# Patient Record
Sex: FEMALE | Race: BLACK OR AFRICAN AMERICAN | NOT HISPANIC OR LATINO | Employment: FULL TIME | URBAN - METROPOLITAN AREA
[De-identification: names, ages, dates, MRNs, and addresses within clinical notes are randomized per-mention and may not be internally consistent; named-entity substitution may affect disease eponyms.]

---

## 2017-03-31 ENCOUNTER — HOSPITAL ENCOUNTER (EMERGENCY)
Facility: HOSPITAL | Age: 32
Discharge: HOME | End: 2017-03-31
Attending: FAMILY MEDICINE
Payer: MEDICAID

## 2017-03-31 VITALS
RESPIRATION RATE: 16 BRPM | DIASTOLIC BLOOD PRESSURE: 65 MMHG | TEMPERATURE: 99 F | SYSTOLIC BLOOD PRESSURE: 109 MMHG | OXYGEN SATURATION: 99 % | BODY MASS INDEX: 26.58 KG/M2 | WEIGHT: 150 LBS | HEIGHT: 63 IN | HEART RATE: 91 BPM

## 2017-03-31 DIAGNOSIS — M54.50 ACUTE BILATERAL LOW BACK PAIN WITHOUT SCIATICA: Primary | ICD-10-CM

## 2017-03-31 DIAGNOSIS — Z86.39 HISTORY OF DIABETES MELLITUS: ICD-10-CM

## 2017-03-31 LAB
B-HCG UR QL: NEGATIVE
B-HCG UR QL: NEGATIVE
BACTERIA #/AREA URNS AUTO: ABNORMAL /HPF
BILIRUB UR QL STRIP: NEGATIVE
CLARITY UR REFRACT.AUTO: CLEAR
COLOR UR AUTO: YELLOW
CTP QC/QA: YES
GLUCOSE UR QL STRIP: ABNORMAL
HGB UR QL STRIP: ABNORMAL
HYALINE CASTS UR QL AUTO: 0 /LPF
KETONES UR QL STRIP: NEGATIVE
LEUKOCYTE ESTERASE UR QL STRIP: NEGATIVE
MICROSCOPIC COMMENT: ABNORMAL
NITRITE UR QL STRIP: NEGATIVE
PH UR STRIP: 6 [PH] (ref 5–8)
PROT UR QL STRIP: ABNORMAL
RBC #/AREA URNS AUTO: 6 /HPF (ref 0–4)
SP GR UR STRIP: 1.03 (ref 1–1.03)
SQUAMOUS #/AREA URNS AUTO: 0 /HPF
URN SPEC COLLECT METH UR: ABNORMAL
UROBILINOGEN UR STRIP-ACNC: NEGATIVE EU/DL
WBC #/AREA URNS AUTO: 2 /HPF (ref 0–5)
YEAST UR QL AUTO: ABNORMAL

## 2017-03-31 PROCEDURE — 81025 URINE PREGNANCY TEST: CPT | Performed by: FAMILY MEDICINE

## 2017-03-31 PROCEDURE — 81001 URINALYSIS AUTO W/SCOPE: CPT

## 2017-03-31 PROCEDURE — 81025 URINE PREGNANCY TEST: CPT

## 2017-03-31 PROCEDURE — 99283 EMERGENCY DEPT VISIT LOW MDM: CPT | Mod: 25

## 2017-03-31 PROCEDURE — 99284 EMERGENCY DEPT VISIT MOD MDM: CPT | Mod: ,,, | Performed by: PHYSICIAN ASSISTANT

## 2017-03-31 RX ORDER — METHOCARBAMOL 750 MG/1
750 TABLET, FILM COATED ORAL 2 TIMES DAILY PRN
Qty: 15 TABLET | Refills: 0 | Status: SHIPPED | OUTPATIENT
Start: 2017-03-31 | End: 2017-06-29

## 2017-03-31 NOTE — DISCHARGE INSTRUCTIONS
Back Care Tips    Caring for your back  These are things you can do to prevent a recurrence of acute back pain and to reduce symptoms from chronic back pain:  · Maintain a healthy weight. If you are overweight, losing weight will help most types of back pain.  · Exercise is an important part of recovery from most types of back pain. The muscles behind and in front of the spine support the back. This means strengthening both the back muscles and the abdominal muscles will provide better support for your spine.   · Swimming and brisk walking are good overall exercises to improve your fitness level.  · Practice safe lifting methods (below).  · Practice good posture when sitting, standing and walking. Avoid prolonged sitting. This puts more stress on the lower back than standing or walking.  · Wear quality shoes with sufficient arch support. Foot and ankle alignment can affect back symptoms. Women should avoid wearing high heels.  · Therapeutic massage can help relax the back muscles without stretching them.  · During the first 24 to 72 hours after an acute injury or flare-up of chronic back pain, apply an ice pack to the painful area for 20 minutes and then remove it for 20 minutes, over a period of 60 to 90 minutes, or several times a day. As a safety precaution, do not use a heating pad at bedtime. Sleeping on a heating pad can lead to skin burns or tissue damage.  · You can alternate ice and heat therapies.  Medications  Talk to your healthcare provider before using medicines, especially if you have other medical problems or are taking other medicines.  · You may use acetaminophen or ibuprofen to control pain, unless your healthcare provider prescribed other pain medicine. If you have chronic conditions like diabetes, liver or kidney disease, stomach ulcers, or gastrointestinal bleeding, or are taking blood thinners, talk with your healthcare provider before taking any medicines.  · Be careful if you are given  prescription pain medicines, narcotics, or medicine for muscle spasm. They can cause drowsiness, affect your coordination, reflexes, and judgment. Do not drive or operate heavy machinery while taking these types of medicines. Take prescription pain medicine only as prescribed by your healthcare provider.  Lumbar stretch  Here is a simple stretching exercise that will help relax muscle spasm and keep your back more limber. If exercise makes your back pain worse, dont do it.  · Lie on your back with your knees bent and both feet on the ground.  · Slowly raise your left knee to your chest as you flatten your lower back against the floor. Hold for 5 seconds.  · Relax and repeat the exercise with your right knee.  · Do 10 of these exercises for each leg.  Safe lifting method  · Dont bend over at the waist to lift an object off the floor.  Instead, bend your knees and hips in a squat.   · Keep your back and head upright  · Hold the object close to your body, directly in front of you.  · Straighten your legs to lift the object.   · Lower the object to the floor in the reverse fashion.  · If you must slide something across the floor, push it.  Posture tips  Sitting  Sit in chairs with straight backs or low-back support. Keep your knees lower than your hips, with your feet flat on the floor.  When driving, sit up straight. Adjust the seat forward so you are not leaning toward the steering wheel.  A small pillow or rolled towel behind your lower back may help if you are driving long distances.   Standing  When standing for long periods, shift most of your weight to one leg at a time. Alternate legs every few minutes.   Sleeping  The best way to sleep is on your side with your knees bent. Put a low pillow under your head to support your neck in a neutral spine position. Avoid thick pillows that bend your neck to one side. Put a pillow between your legs to further relax your lower back. If you sleep on your back, put pillows  under your knees to support your legs in a slightly flexed position. Use a firm mattress. If your mattress sags, replace it, or use a 1/2-inch plywood board under the mattress to add support.  Follow-up care  Follow up with your healthcare provider, or as advised.  If X-rays, a CT scan or an MRI scan were taken, they will be reviewed by a radiologist. You will be notified of any new findings that may affect your care.  Call 911  Seek emergency medical care if any of the following occur:  · Trouble breathing  · Confusion  · Very drowsy  · Fainting or loss of consciousness  · Rapid or very slow heart rate  · Loss of  bowel or bladder control  When to seek medical care  Call your healthcare provider if any of the following occur:  · Pain becomes worse or spreads to your arms or legs  · Weakness or numbness in one or both arms or legs  · Numbness in the groin area  Date Last Reviewed: 6/1/2016  © 5078-9494 [employer]. [address] [place] [address]. All rights reserved. This information is not intended as a substitute for professional medical care. Always follow your healthcare professional's instructions.          Self-Care for Low Back Pain    Most people have low back pain now and then. In many cases, it isnt serious and self-care can help. Sometimes low back pain can be a sign of a bigger problem. Call your healthcare provider if your pain returns often or gets worse over time. For the long-term care of your back, get regular exercise, lose any excess weight and learn good posture.  Take a short rest  Lying down during the day may be beneficial for short periods of time if severe pain increases with sitting or standing. Long-term bed rest could be detrimental.  Reduce pain and swelling  Cold reduces swelling. Both cold and heat can reduce pain. Protect your skin by placing a towel between your body and the ice or heat source.  · For the first few days, apply an ice pack for 15 to 20  minutes .  · After the first few days, try heat for 15 minutes at a time to ease pain. Never sleep on a heating pad.  · Over-the-counter medicine can help control pain and swelling. Try aspirin or ibuprofen.  Exercise  Exercise can help your back heal. It also helps your back get stronger and more flexible, preventing any reinjury. Ask your healthcare provider about specific exercises for your back.  Use good posture to avoid reinjury  · When moving, bend at the hips and knees. Dont bend at the waist or twist around.  · When lifting, keep the object close to your body. Dont try to lift more than you can handle.  · When sitting, keep your lower back supported. Use a rolled-up towel as needed.  Seek immediate medical care if:  · Youre unable to stand or walk.  · You have a temperature over 100.4°F (38.0°C)  · You have frequent, painful, or bloody urination.  · You have severe abdominal pain.  · You have a sharp, stabbing pain.  · Your pain is constant.  · You have pain or numbness in your leg.  · You feel pain in a new area of your back.  · You notice that the pain isnt decreasing after more than a week.   Date Last Reviewed: 9/29/2015  © 8798-2507 [employer]. [address] [place] [address], [place] [address]. All rights reserved. This information is not intended as a substitute for professional medical care. Always follow your healthcare professional's instructions.          Relieving Back Pain  Back pain is a common problem. You can strain back muscles by lifting too much weight or just by moving the wrong way. Back strain can be uncomfortable, even painful. And it can take weeks or months to improve. To help yourself feel better and prevent future back strains, try these tips.  Important Note: Do not give aspirin to children or teens without first discussing it with your healthcare provider.      ? Ice    Ice reduces muscle pain and swelling. It helps most during the first 24 to 48 hours after an  injury.  · Wrap an ice pack or a bag of frozen peas in a thin towel. (Never place ice directly on your skin.)  · Place the ice where your back hurts the most.  · Dont ice for more than 20 minutes at a time.  · You can use ice several times a day.  ? Medicines  Over-the-counter pain relievers can include acetaminophen and anti-inflammatory medicines, which includes aspirin or ibuprofen. They can help ease discomfort. Some also reduce swelling.  · Tell your healthcare provider about any medicines you are already taking.  · Take medicines only as directed.  ? Heat  After the first 48 hours, heat can relax sore muscles and improve blood flow.  · Try a warm bath or shower. Or use a heating pad set on low. To prevent a burn, keep a cloth between you and the heating pad.  · Dont use a heating pad for more than 15 minutes at a time. Never sleep on a heating pad.  Date Last Reviewed: 9/1/2015  © 4435-6340 [employer]. [address] [place] [address]. All rights reserved. This information is not intended as a substitute for professional medical care. Always follow your healthcare professional's instructions.          Self-Care for Low Back Pain    Most people have low back pain now and then. In many cases, it isnt serious and self-care can help. Sometimes low back pain can be a sign of a bigger problem. Call your healthcare provider if your pain returns often or gets worse over time. For the long-term care of your back, get regular exercise, lose any excess weight and learn good posture.  Take a short rest  Lying down during the day may be beneficial for short periods of time if severe pain increases with sitting or standing. Long-term bed rest could be detrimental.  Reduce pain and swelling  Cold reduces swelling. Both cold and heat can reduce pain. Protect your skin by placing a towel between your body and the ice or heat source.  · For the first few days, apply an ice pack for 15 to 20 minutes  .  · After the first few days, try heat for 15 minutes at a time to ease pain. Never sleep on a heating pad.  · Over-the-counter medicine can help control pain and swelling. Try aspirin or ibuprofen.  Exercise  Exercise can help your back heal. It also helps your back get stronger and more flexible, preventing any reinjury. Ask your healthcare provider about specific exercises for your back.  Use good posture to avoid reinjury  · When moving, bend at the hips and knees. Dont bend at the waist or twist around.  · When lifting, keep the object close to your body. Dont try to lift more than you can handle.  · When sitting, keep your lower back supported. Use a rolled-up towel as needed.  Seek immediate medical care if:  · Youre unable to stand or walk.  · You have a temperature over 100.4°F (38.0°C)  · You have frequent, painful, or bloody urination.  · You have severe abdominal pain.  · You have a sharp, stabbing pain.  · Your pain is constant.  · You have pain or numbness in your leg.  · You feel pain in a new area of your back.  · You notice that the pain isnt decreasing after more than a week.   Date Last Reviewed: 9/29/2015  © 1598-4885 [employer]. [address] [place] [address], [place] [address]. All rights reserved. This information is not intended as a substitute for professional medical care. Always follow your healthcare professional's instructions.

## 2017-03-31 NOTE — ED AVS SNAPSHOT
[facility]  [address] [address]               Mary Sims   3/31/2017 12:30 PM   ED    Description:  Female : 1985   Department:  [facility]           Your Care was Coordinated By:     Provider Role From To    Eduard Stanton MD Attending Provider 17 1300 --    Gautam Charles PA-C Physician Assistant 17 1246 --      Reason for Visit     Flank Pain           Diagnoses this Visit        Comments    Acute bilateral low back pain without sciatica    -  Primary     History of diabetes mellitus           ED Disposition     ED Disposition Condition Comment    Discharge             To Do List           Follow-up Information     Follow up with [facility]. Schedule an appointment as soon as possible for a visit in 2 days.    Specialties:  Behavioral Health, Psychiatry    Why:  Follow up with your primary care physician in the next 1-2 days for re-evaluation and further management.     Contact information:    [address]  [facility] [id number] [phone]          Follow up with [facility].    Specialty:  Emergency Medicine    Why:  If symptoms worsen in any way.     Contact information:    [address] [address] [phone]       These Medications        Disp Refills Start End    methocarbamol (ROBAXIN) 750 MG Tab 15 tablet 0 3/31/2017     Take 1 tablet (750 mg total) by mouth 2 (two) times daily as needed (Back pain). - Oral      [facility]     [facility] Nurse Care Line - 24/ Assistance  Unless otherwise directed by your provider, please contact [facility], our nurse care line that is available for  assistance.     Registered nurses in the [facility] provide: appointment scheduling, clinical advisement, health education, and other advisory services.  Call: [phone] (toll free)               Medications  "          Message regarding Medications     Verify the changes and/or additions to your medication regime listed below are the same as discussed with your clinician today.  If any of these changes or additions are incorrect, please notify your healthcare provider.        START taking these NEW medications        Refills    methocarbamol (ROBAXIN) 750 MG Tab 0    Sig: Take 1 tablet (750 mg total) by mouth 2 (two) times daily as needed (Back pain).    Class: Print    Route: Oral           Verify that the below list of medications is an accurate representation of the medications you are currently taking.  If none reported, the list may be blank. If incorrect, please contact your healthcare provider. Carry this list with you in case of emergency.           Current Medications     cetirizine (ZYRTEC) 10 MG tablet Take 1 tablet (10 mg total) by mouth once daily.    fluticasone (FLONASE) 50 mcg/actuation nasal spray 1 spray by Each Nare route 2 (two) times daily as needed.    insulin aspart (NOVOLOG) 100 unit/mL InPn pen Inject 5 Units into the skin 3 (three) times daily with meals.    insulin detemir (LEVEMIR FLEXTOUCH) 100 unit/mL (3 mL) SubQ InPn pen Inject 15 Units into the skin 2 (two) times daily.    metformin (GLUCOPHAGE) 500 MG tablet Take 500 mg by mouth 2 (two) times daily with meals.    methocarbamol (ROBAXIN) 750 MG Tab Take 1 tablet (750 mg total) by mouth 2 (two) times daily as needed (Back pain).    ondansetron (ZOFRAN) 4 MG tablet Take 1 tablet (4 mg total) by mouth every 8 (eight) hours as needed.    pen needle, diabetic (BD ULTRA-FINE ISAAC PEN NEEDLES) 32 gauge x 5/32" Ndle Use to inject insulin 5x/day    polyethylene glycol (GLYCOLAX) 17 gram/dose powder Take 17 g by mouth once daily.           Clinical Reference Information           Your Vitals Were     BP Pulse Temp Resp Height Weight    109/65 (BP Location: Left arm, Patient Position: Sitting, BP Method: Automatic) 91 98.6 °F (37 °C) (Oral) 16 5' 3" " (1.6 m) 68 kg (150 lb)    SpO2 BMI             99% 26.57 kg/m2         Allergies as of 3/31/2017     No Known Allergies      Immunizations Administered on Date of Encounter - 3/31/2017     None      ED Micro, Lab, POCT     Start Ordered       Status Ordering Provider    03/31/17 1314 03/31/17 1313  POCT urine pregnancy  Once      Final result     03/31/17 1311 03/31/17 1311    Add-on,   Status:  Canceled      Canceled     03/31/17 1304 03/31/17 1303    Once,   Status:  Canceled      Canceled     03/31/17 1252 03/31/17 1251  Urinalysis  STAT      Final result     03/31/17 1251 03/31/17 1251  Urinalysis Microscopic  Once      Final result       ED Imaging Orders     None        Discharge Instructions         Back Care Tips    Caring for your back  These are things you can do to prevent a recurrence of acute back pain and to reduce symptoms from chronic back pain:  · Maintain a healthy weight. If you are overweight, losing weight will help most types of back pain.  · Exercise is an important part of recovery from most types of back pain. The muscles behind and in front of the spine support the back. This means strengthening both the back muscles and the abdominal muscles will provide better support for your spine.   · Swimming and brisk walking are good overall exercises to improve your fitness level.  · Practice safe lifting methods (below).  · Practice good posture when sitting, standing and walking. Avoid prolonged sitting. This puts more stress on the lower back than standing or walking.  · Wear quality shoes with sufficient arch support. Foot and ankle alignment can affect back symptoms. Women should avoid wearing high heels.  · Therapeutic massage can help relax the back muscles without stretching them.  · During the first 24 to 72 hours after an acute injury or flare-up of chronic back pain, apply an ice pack to the painful area for 20 minutes and then remove it for 20 minutes, over a period of 60 to 90 minutes,  or several times a day. As a safety precaution, do not use a heating pad at bedtime. Sleeping on a heating pad can lead to skin burns or tissue damage.  · You can alternate ice and heat therapies.  Medications  Talk to your healthcare provider before using medicines, especially if you have other medical problems or are taking other medicines.  · You may use acetaminophen or ibuprofen to control pain, unless your healthcare provider prescribed other pain medicine. If you have chronic conditions like diabetes, liver or kidney disease, stomach ulcers, or gastrointestinal bleeding, or are taking blood thinners, talk with your healthcare provider before taking any medicines.  · Be careful if you are given prescription pain medicines, narcotics, or medicine for muscle spasm. They can cause drowsiness, affect your coordination, reflexes, and judgment. Do not drive or operate heavy machinery while taking these types of medicines. Take prescription pain medicine only as prescribed by your healthcare provider.  Lumbar stretch  Here is a simple stretching exercise that will help relax muscle spasm and keep your back more limber. If exercise makes your back pain worse, dont do it.  · Lie on your back with your knees bent and both feet on the ground.  · Slowly raise your left knee to your chest as you flatten your lower back against the floor. Hold for 5 seconds.  · Relax and repeat the exercise with your right knee.  · Do 10 of these exercises for each leg.  Safe lifting method  · Dont bend over at the waist to lift an object off the floor.  Instead, bend your knees and hips in a squat.   · Keep your back and head upright  · Hold the object close to your body, directly in front of you.  · Straighten your legs to lift the object.   · Lower the object to the floor in the reverse fashion.  · If you must slide something across the floor, push it.  Posture tips  Sitting  Sit in chairs with straight backs or low-back support. Keep  your knees lower than your hips, with your feet flat on the floor.  When driving, sit up straight. Adjust the seat forward so you are not leaning toward the steering wheel.  A small pillow or rolled towel behind your lower back may help if you are driving long distances.   Standing  When standing for long periods, shift most of your weight to one leg at a time. Alternate legs every few minutes.   Sleeping  The best way to sleep is on your side with your knees bent. Put a low pillow under your head to support your neck in a neutral spine position. Avoid thick pillows that bend your neck to one side. Put a pillow between your legs to further relax your lower back. If you sleep on your back, put pillows under your knees to support your legs in a slightly flexed position. Use a firm mattress. If your mattress sags, replace it, or use a 1/2-inch plywood board under the mattress to add support.  Follow-up care  Follow up with your healthcare provider, or as advised.  If X-rays, a CT scan or an MRI scan were taken, they will be reviewed by a radiologist. You will be notified of any new findings that may affect your care.  Call 911  Seek emergency medical care if any of the following occur:  · Trouble breathing  · Confusion  · Very drowsy  · Fainting or loss of consciousness  · Rapid or very slow heart rate  · Loss of  bowel or bladder control  When to seek medical care  Call your healthcare provider if any of the following occur:  · Pain becomes worse or spreads to your arms or legs  · Weakness or numbness in one or both arms or legs  · Numbness in the groin area  Date Last Reviewed: 6/1/2016  © 3723-6277 [employer]. [address] [place] [address]. All rights reserved. This information is not intended as a substitute for professional medical care. Always follow your healthcare professional's instructions.          Self-Care for Low Back Pain    Most people have low back pain now and then. In many  cases, it isnt serious and self-care can help. Sometimes low back pain can be a sign of a bigger problem. Call your healthcare provider if your pain returns often or gets worse over time. For the long-term care of your back, get regular exercise, lose any excess weight and learn good posture.  Take a short rest  Lying down during the day may be beneficial for short periods of time if severe pain increases with sitting or standing. Long-term bed rest could be detrimental.  Reduce pain and swelling  Cold reduces swelling. Both cold and heat can reduce pain. Protect your skin by placing a towel between your body and the ice or heat source.  · For the first few days, apply an ice pack for 15 to 20 minutes .  · After the first few days, try heat for 15 minutes at a time to ease pain. Never sleep on a heating pad.  · Over-the-counter medicine can help control pain and swelling. Try aspirin or ibuprofen.  Exercise  Exercise can help your back heal. It also helps your back get stronger and more flexible, preventing any reinjury. Ask your healthcare provider about specific exercises for your back.  Use good posture to avoid reinjury  · When moving, bend at the hips and knees. Dont bend at the waist or twist around.  · When lifting, keep the object close to your body. Dont try to lift more than you can handle.  · When sitting, keep your lower back supported. Use a rolled-up towel as needed.  Seek immediate medical care if:  · Youre unable to stand or walk.  · You have a temperature over 100.4°F (38.0°C)  · You have frequent, painful, or bloody urination.  · You have severe abdominal pain.  · You have a sharp, stabbing pain.  · Your pain is constant.  · You have pain or numbness in your leg.  · You feel pain in a new area of your back.  · You notice that the pain isnt decreasing after more than a week.   Date Last Reviewed: 9/29/2015  © 4206-3112 [employer]. [address] [place] [address], [place] [address]. All  rights reserved. This information is not intended as a substitute for professional medical care. Always follow your healthcare professional's instructions.          Relieving Back Pain  Back pain is a common problem. You can strain back muscles by lifting too much weight or just by moving the wrong way. Back strain can be uncomfortable, even painful. And it can take weeks or months to improve. To help yourself feel better and prevent future back strains, try these tips.  Important Note: Do not give aspirin to children or teens without first discussing it with your healthcare provider.      ? Ice    Ice reduces muscle pain and swelling. It helps most during the first 24 to 48 hours after an injury.  · Wrap an ice pack or a bag of frozen peas in a thin towel. (Never place ice directly on your skin.)  · Place the ice where your back hurts the most.  · Dont ice for more than 20 minutes at a time.  · You can use ice several times a day.  ? Medicines  Over-the-counter pain relievers can include acetaminophen and anti-inflammatory medicines, which includes aspirin or ibuprofen. They can help ease discomfort. Some also reduce swelling.  · Tell your healthcare provider about any medicines you are already taking.  · Take medicines only as directed.  ? Heat  After the first 48 hours, heat can relax sore muscles and improve blood flow.  · Try a warm bath or shower. Or use a heating pad set on low. To prevent a burn, keep a cloth between you and the heating pad.  · Dont use a heating pad for more than 15 minutes at a time. Never sleep on a heating pad.  Date Last Reviewed: 9/1/2015 © 2000-2016 [employer]. [address] [place] [address], [place] [address]. All rights reserved. This information is not intended as a substitute for professional medical care. Always follow your healthcare professional's instructions.          Self-Care for Low Back Pain    Most people have low back pain now and then. In many cases, it isnt  serious and self-care can help. Sometimes low back pain can be a sign of a bigger problem. Call your healthcare provider if your pain returns often or gets worse over time. For the long-term care of your back, get regular exercise, lose any excess weight and learn good posture.  Take a short rest  Lying down during the day may be beneficial for short periods of time if severe pain increases with sitting or standing. Long-term bed rest could be detrimental.  Reduce pain and swelling  Cold reduces swelling. Both cold and heat can reduce pain. Protect your skin by placing a towel between your body and the ice or heat source.  · For the first few days, apply an ice pack for 15 to 20 minutes .  · After the first few days, try heat for 15 minutes at a time to ease pain. Never sleep on a heating pad.  · Over-the-counter medicine can help control pain and swelling. Try aspirin or ibuprofen.  Exercise  Exercise can help your back heal. It also helps your back get stronger and more flexible, preventing any reinjury. Ask your healthcare provider about specific exercises for your back.  Use good posture to avoid reinjury  · When moving, bend at the hips and knees. Dont bend at the waist or twist around.  · When lifting, keep the object close to your body. Dont try to lift more than you can handle.  · When sitting, keep your lower back supported. Use a rolled-up towel as needed.  Seek immediate medical care if:  · Youre unable to stand or walk.  · You have a temperature over 100.4°F (38.0°C)  · You have frequent, painful, or bloody urination.  · You have severe abdominal pain.  · You have a sharp, stabbing pain.  · Your pain is constant.  · You have pain or numbness in your leg.  · You feel pain in a new area of your back.  · You notice that the pain isnt decreasing after more than a week.   Date Last Reviewed: 9/29/2015  © 4171-7559 [employer]. [address] [place] [address], [place] [address]. All rights reserved.  This information is not intended as a substitute for professional medical care. Always follow your healthcare professional's instructions.           [facility] complies with applicable Federal civil rights laws and does not discriminate on the basis of race, color, national origin, age, disability, or sex.        Language Assistance Services     ATTENTION: Language assistance services are available, free of charge. Please call [phone].      ATENCIÓN: Si habla español, tiene a mesa disposición servicios gratuitos de asistencia lingüística. Llame al [phone].     CHÚ Ý: N?u b?n nói Ti?ng Vi?t, có các d?ch v? h? tr? ngôn ng? mi?n phí dành cho b?n. G?i s? [phone].

## 2017-03-31 NOTE — ED NOTES
"The patient reports lower back pain and "side pain" that began yesterday afternoon. Denies any trauma or heavy lifting. Hx of DM. Reports urinary frequency. Denies nausea vomiting or diarrhea. + excessive flatus. Pt is concerned her back pain may have something to do with her kidneys  "

## 2017-03-31 NOTE — ED PROVIDER NOTES
Encounter Date: 3/31/2017       History     Chief Complaint   Patient presents with    Flank Pain     Denies Dysuria, hematuria.      Review of patient's allergies indicates:  No Known Allergies  HPI Comments: The patient presents to the ER c/o bilateral low back pain. She states that the pain began gradually yesterday. She denies any known injury or trauma. She states that the pain is sore and aching nature. She states that the pain does not radiate. She states that the pain degree is mild to moderate. She states that the pain course is constant. She states that the pain is worse with certain movements and positions. This is a new problem. She reports having polyuria, but notes that she is a diabetic. She denies any dysuria, fever, or chills. She denies any additional symptoms. She denies any saddle paresthesia. She denies any weakness, numbness, or loss of function. She denies any bowel or bladder dysfunction. Pre-arrival treatment: none.     Past Medical History:   Diagnosis Date    Diabetes mellitus      History reviewed. No pertinent surgical history.  Family History   Problem Relation Age of Onset    Diabetes Mother      Social History   Substance Use Topics    Smoking status: Never Smoker    Smokeless tobacco: Never Used    Alcohol use Yes      Comment: social     Review of Systems   Constitutional: Negative for activity change, appetite change, chills and fever.   HENT: Negative for sore throat.    Respiratory: Negative for cough, chest tightness and shortness of breath.    Cardiovascular: Negative for chest pain and palpitations.   Gastrointestinal: Negative for abdominal distention, abdominal pain, constipation, diarrhea, nausea and vomiting.   Genitourinary: Positive for frequency. Negative for decreased urine volume, difficulty urinating, dysuria, menstrual problem, pelvic pain, urgency, vaginal bleeding and vaginal discharge.   Musculoskeletal: Positive for back pain. Negative for gait problem,  myalgias and neck pain.   Skin: Negative for rash.   Neurological: Negative for dizziness, syncope, weakness, light-headedness and headaches.   Psychiatric/Behavioral: Negative for confusion.       Physical Exam   Initial Vitals   BP Pulse Resp Temp SpO2   03/31/17 1215 03/31/17 1215 03/31/17 1215 03/31/17 1215 03/31/17 1215   112/72 98 16 98.8 °F (37.1 °C) 98 %     Physical Exam    Nursing note and vitals reviewed.  Constitutional: She appears well-developed and well-nourished. She is not diaphoretic.   HENT:   Mouth/Throat: Oropharynx is clear and moist.   Eyes: Conjunctivae are normal.   Cardiovascular: Normal rate, regular rhythm and intact distal pulses.   Pulmonary/Chest: Breath sounds normal. No respiratory distress.   Abdominal: Soft. She exhibits no distension. There is no tenderness. There is no rebound and no guarding.   Musculoskeletal: Normal range of motion.        Arms:  Mild, diffuse, Lumbar paraspinal muscle tenderness to palpation bilaterally. No midline or vertebral point tenderness. No CVA tenderness. No T-spine tenderness.    Neurological: She is alert and oriented to person, place, and time. She has normal strength. No cranial nerve deficit or sensory deficit.   Skin: Skin is warm and dry. No rash and no abscess noted. No erythema.   Psychiatric: She has a normal mood and affect. Her behavior is normal.         ED Course   Procedures  Labs Reviewed   URINALYSIS - Abnormal; Notable for the following:        Result Value    Protein, UA 2+ (*)     Glucose, UA 3+ (*)     Occult Blood UA 1+ (*)     All other components within normal limits    Narrative:     1 CUP OF URINE   URINALYSIS MICROSCOPIC - Abnormal; Notable for the following:     RBC, UA 6 (*)     All other components within normal limits    Narrative:     1 CUP OF URINE   POCT URINE PREGNANCY     Results for orders placed or performed during the hospital encounter of 03/31/17   Urinalysis   Result Value Ref Range    Specimen UA Urine,  Clean Catch     Color, UA Yellow Yellow, Straw, Suly    Appearance, UA Clear Clear    pH, UA 6.0 5.0 - 8.0    Specific Gravity, UA 1.030 1.005 - 1.030    Protein, UA 2+ (A) Negative    Glucose, UA 3+ (A) Negative    Ketones, UA Negative Negative    Bilirubin (UA) Negative Negative    Occult Blood UA 1+ (A) Negative    Nitrite, UA Negative Negative    Urobilinogen, UA Negative <2.0 EU/dL    Leukocytes, UA Negative Negative   Urinalysis Microscopic   Result Value Ref Range    RBC, UA 6 (H) 0 - 4 /hpf    WBC, UA 2 0 - 5 /hpf    Bacteria, UA None None-Occ /hpf    Yeast, UA None None    Squam Epithel, UA 0 /hpf    Hyaline Casts, UA 0 0-1/lpf /lpf    Microscopic Comment SEE COMMENT    POCT urine pregnancy   Result Value Ref Range    POC Preg Test, Ur Negative Negative     Acceptable Yes                Medical Decision Making:   Initial Assessment:   Acute bilateral low back pain, atraumatic, x 2 days.   Differential Diagnosis:   Pregnancy, UTI, Kidney stone, muscle spasm, Lumbar strain, etc   Clinical Tests:   Lab Tests: Ordered and Reviewed  ED Management:  UA negative for infection   UPT negative     Rx for Robaxin provided   Patient instructed to follow up with her PCP in the next 2-3 days for re-evaluation, or return to the ER if worse.   Other:   I have discussed this case with another health care provider.       <> Summary of the Discussion: I discussed the case in detail with the ER attending physician.                    ED Course     Clinical Impression:   The primary encounter diagnosis was Acute bilateral low back pain without sciatica. A diagnosis of History of diabetes mellitus was also pertinent to this visit.    Disposition:   Disposition: Discharged  Condition: Stable       Gautam Charles PA-C  03/31/17 2627

## 2017-06-29 ENCOUNTER — HOSPITAL ENCOUNTER (EMERGENCY)
Facility: HOSPITAL | Age: 32
Discharge: HOME | End: 2017-06-29
Attending: EMERGENCY MEDICINE
Payer: MEDICAID

## 2017-06-29 VITALS
HEIGHT: 63 IN | DIASTOLIC BLOOD PRESSURE: 74 MMHG | BODY MASS INDEX: 24.8 KG/M2 | HEART RATE: 95 BPM | OXYGEN SATURATION: 99 % | TEMPERATURE: 99 F | RESPIRATION RATE: 14 BRPM | WEIGHT: 140 LBS | SYSTOLIC BLOOD PRESSURE: 134 MMHG

## 2017-06-29 DIAGNOSIS — L02.31 LEFT BUTTOCK ABSCESS: Primary | ICD-10-CM

## 2017-06-29 PROCEDURE — 10061 I&D ABSCESS COMP/MULTIPLE: CPT

## 2017-06-29 PROCEDURE — 25000003 PHARM REV CODE 250: Performed by: EMERGENCY MEDICINE

## 2017-06-29 PROCEDURE — 99283 EMERGENCY DEPT VISIT LOW MDM: CPT | Mod: 25

## 2017-06-29 PROCEDURE — 10060 I&D ABSCESS SIMPLE/SINGLE: CPT | Mod: ,,, | Performed by: EMERGENCY MEDICINE

## 2017-06-29 PROCEDURE — 99284 EMERGENCY DEPT VISIT MOD MDM: CPT | Mod: 25,,, | Performed by: EMERGENCY MEDICINE

## 2017-06-29 RX ORDER — SULFAMETHOXAZOLE AND TRIMETHOPRIM 800; 160 MG/1; MG/1
2 TABLET ORAL 2 TIMES DAILY
Qty: 40 TABLET | Refills: 0 | Status: SHIPPED | OUTPATIENT
Start: 2017-06-29 | End: 2017-07-09

## 2017-06-29 RX ORDER — SULFAMETHOXAZOLE AND TRIMETHOPRIM 800; 160 MG/1; MG/1
2 TABLET ORAL
Status: COMPLETED | OUTPATIENT
Start: 2017-06-29 | End: 2017-06-29

## 2017-06-29 RX ORDER — LIDOCAINE HYDROCHLORIDE 10 MG/ML
10 INJECTION INFILTRATION; PERINEURAL
Status: COMPLETED | OUTPATIENT
Start: 2017-06-29 | End: 2017-06-29

## 2017-06-29 RX ADMIN — SULFAMETHOXAZOLE AND TRIMETHOPRIM 2 TABLET: 800; 160 TABLET ORAL at 02:06

## 2017-06-29 RX ADMIN — LIDOCAINE HYDROCHLORIDE 10 ML: 10 INJECTION, SOLUTION INFILTRATION; PERINEURAL at 02:06

## 2017-06-29 NOTE — ED PROVIDER NOTES
Encounter Date: 6/29/2017    SCRIBE #1 NOTE: I, Yung Maier, am scribing for, and in the presence of,  Kevin Disla MD. I have scribed the entire note.       History     Chief Complaint   Patient presents with    Recurrent Skin Infections     Pt reports boil in sacral area x2 weeks.     Time seen by provider: 1:29 AM    This is a 32 y.o. female who presents for evaluation of recurrent skin infection. Reports abscess in sacral area for 2 weeks duration. States she popped it herself, attempted to medicate herself with peroxide and petroleum jelly, then it turned a yellowish color and another one formed. Denies any fever, chills, N/V. States blood sugar has been running in the 300s recently which is typical for her. No further complaints or concerns at this time.         The history is provided by the patient and medical records.     Review of patient's allergies indicates:  No Known Allergies  Past Medical History:   Diagnosis Date    Diabetes mellitus      History reviewed. No pertinent surgical history.  Family History   Problem Relation Age of Onset    Diabetes Mother      Social History   Substance Use Topics    Smoking status: Never Smoker    Smokeless tobacco: Never Used    Alcohol use Yes      Comment: social     Review of Systems   Constitutional: Negative for chills and fever.   HENT: Negative for sore throat.    Respiratory: Negative for shortness of breath.    Cardiovascular: Negative for chest pain.   Gastrointestinal: Negative for nausea and vomiting.   Genitourinary: Negative for dysuria.   Musculoskeletal: Negative for back pain.   Skin: Negative for rash.        (+) Sacral abscess   Neurological: Negative for weakness.   Hematological: Does not bruise/bleed easily.       Physical Exam     Initial Vitals [06/29/17 0103]   BP Pulse Resp Temp SpO2   134/74 95 14 99.3 °F (37.4 °C) 99 %      MAP       94         Physical Exam    Nursing note and vitals reviewed.  Constitutional: She appears  well-developed and well-nourished. She is not diaphoretic. No distress.   HENT:   Head: Normocephalic and atraumatic.   Eyes: EOM are normal. Pupils are equal, round, and reactive to light.   Neck: Normal range of motion. Neck supple.   Cardiovascular: Normal rate and regular rhythm. Exam reveals no gallop and no friction rub.    No murmur heard.  Pulmonary/Chest: Breath sounds normal. No respiratory distress. She has no wheezes. She has no rhonchi. She has no rales.   Abdominal: Soft. She exhibits no distension. There is no tenderness. There is no rebound and no guarding.   Musculoskeletal: Normal range of motion. She exhibits no edema or tenderness.   Neurological: She is alert and oriented to person, place, and time. She has normal strength. No cranial nerve deficit or sensory deficit.   Skin: Skin is warm and dry. Abscess noted.   3X3 cm area of induration to left gluteal region. More inferiorly, she has two smaller areas of induration that are about 1X1 cm. She has a resolved small abscess to her right gluteal region. No erythema or fever. There is a chronic skin induration, likely from peroxide use.   Psychiatric: She has a normal mood and affect. Her behavior is normal. Judgment and thought content normal.         ED Course   I & D - Incision and Drainage  Date/Time: 6/29/2017 2:03 AM  Performed by: AVLERIE ARMSTRONG III  Authorized by: VALERIE ARMSTRONG III   Consent Done: Not Needed  Type: abscess  Location: Left gluteal region.  Anesthesia: local infiltration    Anesthesia:  Local Anesthetic: lidocaine 1% without epinephrine  Patient sedated: no  Scalpel size: 11  Incision type: single straight  Complexity: complex  Drainage: purulent  Drainage amount: moderate  Wound treatment: incision,  drainage and  wound packed  Packing material: 1/4 in gauze  Complications: No  Specimens: No  Implants: No  Patient tolerance: Patient tolerated the procedure well with no immediate complications        Labs Reviewed - No data  to display          Medical Decision Making:   History:   Old Medical Records: I decided to obtain old medical records.  Initial Assessment:   Pt with 2 abscesses. Will I&D at least 1 of them and discharge with antibiotics.   ED Management:  2:07 AM: I&D preformed. Will discharge with Bactrim.             Scribe Attestation:   Scribe #1: I performed the above scribed service and the documentation accurately describes the services I performed. I attest to the accuracy of the note.    Attending Attestation:           Physician Attestation for Scribe:  Physician Attestation Statement for Scribe #1: I, Kevin Disla MD, reviewed documentation, as scribed by Yung Maier in my presence, and it is both accurate and complete.                 ED Course     Clinical Impression:   The encounter diagnosis was Left buttock abscess.    Disposition:   Disposition: Discharged  Condition: Stable                        Kevin Disla III, MD  07/21/17 [id number]

## 2017-06-29 NOTE — ED NOTES
Two patient identifiers have been checked and are correct.      Appearance: Pt awake, alert & oriented to person, place & time. Pt in no acute distress at present time. Pt is clean and well groomed with clothes appropriately fastened.   Skin: Skin warm, dry & intact. Color consistent with ethnicity. Mucous membranes moist. Pt reports two boils in between buttocks. Pt states she popped one about 2 weeks ago and then another appeared.   Musculoskeletal: Patient moving all extremities well, no obvious swelling or deformities noted.   Respiratory: Respirations spontaneous, even, and non-labored. Visible chest rise noted. Airway is open and patent. No accessory muscle use noted. Denies SOB.   Neurologic: Sensation is intact. Speech is clear and appropriate. Eyes open spontaneously, behavior appropriate to situation, follows commands, facial expression symmetrical, bilateral hand grasp equal and even, purposeful motor response noted. Denies HA.  Cardiac: All peripheral pulses present. No Bilateral lower extremity edema. Cap refill is <3 seconds. Denies CP.  Abdomen: Abdomen soft, non-tender to palpation. Denies NVD.  : Pt reports no dysuria or hematuria.

## 2017-06-29 NOTE — ED NOTES
Patient escorted to registration desk. Discharge paperwork discussed. Discussed changes in medications, new prescriptions were given and discussed. Patient instructed to follow up per physician recommendations. Patient verbalized understanding.

## 2017-06-29 NOTE — ED TRIAGE NOTES
Mary Sims, a 32 y.o. female presents to the ED       Chief Complaint   Patient presents with    Recurrent Skin Infections     Pt reports boil in sacral area x2 weeks.     Review of patient's allergies indicates:  No Known Allergies  Past Medical History:   Diagnosis Date    Diabetes mellitus

## 2017-09-19 ENCOUNTER — HOSPITAL ENCOUNTER (EMERGENCY)
Facility: HOSPITAL | Age: 32
Discharge: HOME | End: 2017-09-19
Attending: EMERGENCY MEDICINE
Payer: MEDICAID

## 2017-09-19 VITALS
BODY MASS INDEX: 24.8 KG/M2 | HEART RATE: 80 BPM | WEIGHT: 140 LBS | RESPIRATION RATE: 14 BRPM | DIASTOLIC BLOOD PRESSURE: 80 MMHG | OXYGEN SATURATION: 98 % | SYSTOLIC BLOOD PRESSURE: 132 MMHG | HEIGHT: 63 IN | TEMPERATURE: 98 F

## 2017-09-19 DIAGNOSIS — N64.4 BREAST PAIN: Primary | ICD-10-CM

## 2017-09-19 LAB
B-HCG UR QL: NEGATIVE
CTP QC/QA: YES

## 2017-09-19 PROCEDURE — 99284 EMERGENCY DEPT VISIT MOD MDM: CPT | Mod: ,,, | Performed by: PHYSICIAN ASSISTANT

## 2017-09-19 PROCEDURE — 25000003 PHARM REV CODE 250: Performed by: PHYSICIAN ASSISTANT

## 2017-09-19 PROCEDURE — 81025 URINE PREGNANCY TEST: CPT | Performed by: EMERGENCY MEDICINE

## 2017-09-19 PROCEDURE — 99284 EMERGENCY DEPT VISIT MOD MDM: CPT

## 2017-09-19 RX ORDER — KETOROLAC TROMETHAMINE 10 MG/1
10 TABLET, FILM COATED ORAL
Status: COMPLETED | OUTPATIENT
Start: 2017-09-19 | End: 2017-09-19

## 2017-09-19 RX ORDER — NAPROXEN 375 MG/1
375 TABLET ORAL 2 TIMES DAILY WITH MEALS
Qty: 12 TABLET | Refills: 0 | Status: SHIPPED | OUTPATIENT
Start: 2017-09-19 | End: 2018-07-20 | Stop reason: SDUPTHER

## 2017-09-19 RX ADMIN — KETOROLAC TROMETHAMINE 10 MG: 10 TABLET, FILM COATED ORAL at 04:09

## 2017-09-19 NOTE — ED TRIAGE NOTES
Mary Sims, a 32 y.o. female presents to the ED c/o L breast pain that began last night.  Pain came back this am.  Pt states she had clear breast discharge in shower last night.       Chief Complaint   Patient presents with    Breast Pain     L breast painful and red, did have leakage but stopped     Review of patient's allergies indicates:  No Known Allergies  Past Medical History:   Diagnosis Date    Diabetes mellitus      LOC: Patient name and date of birth verified. The patient is awake, alert and aware of environment with an appropriate affect, the patient is oriented x 3 and speaking appropriately.   APPEARANCE: Patient resting comfortably, patient is clean and well groomed, patient's clothing is properly fastened.  SKIN: The skin is warm and dry, color consistent with ethnicity, patient has normal skin turgor and moist mucus membranes, skin intact, no breakdown or bruising noted.  MUSCULOSKELETAL: Patient moving all extremities well, no obvious swelling or deformities noted.   RESPIRATORY: Respirations are spontaneous, patient has a normal effort and rate, no accessory muscle use noted.  CARDIAC: Patient has a normal rate and rhythm, no periphreal edema noted, capillary refill < 3 seconds.  ABDOMEN: Soft and non tender to palpation, no distention noted. Bowel sounds present in all four quadrants.  NEUROLOGIC: Eyes open spontaneously, behavior appropriate to situation, follows commands, facial expression symmetrical, bilateral hand grasp equal and even, purposeful motor response noted, normal sensation in all extremities when touched with a finger.

## 2017-09-19 NOTE — ED NOTES
Patient given urine cup, patient verbalized understanding on how to provide sample. Patient instructed to bring cup back to room with patient.

## 2017-09-19 NOTE — ED PROVIDER NOTES
Encounter Date: 9/19/2017       History     Chief Complaint   Patient presents with    Breast Pain     L breast painful and red, did have leakage but stopped     Patient is a 32 year old female with PMHx of type 2 diabetes mellitus. She presents to the ED for left breast pain. She reports breast pain beginning last night and is localized to top of breast. Her pain is associated with redness, tenderness, and clear nipple discharge. She denies any foul smelling discharge, lactation, or masses. Denies hx of fibrocystic breast. She denies fever ,chills, nausea, vomiting, SOB, chest pain, abd pain, or dysuria. She is a nonsmoker and reports social alcohol use.          Review of patient's allergies indicates:  No Known Allergies  Past Medical History:   Diagnosis Date    Diabetes mellitus      History reviewed. No pertinent surgical history.  Family History   Problem Relation Age of Onset    Diabetes Mother      Social History   Substance Use Topics    Smoking status: Never Smoker    Smokeless tobacco: Never Used    Alcohol use Yes      Comment: social     Review of Systems   Constitutional: Negative for fever.   HENT: Negative for sore throat.    Respiratory: Negative for shortness of breath.    Cardiovascular: Negative for chest pain.   Gastrointestinal: Negative for nausea.   Genitourinary: Negative for dysuria.   Musculoskeletal: Negative for back pain.   Skin: Negative for rash.        Breast pain   Neurological: Negative for weakness.   Hematological: Does not bruise/bleed easily.       Physical Exam     Initial Vitals [09/19/17 1315]   BP Pulse Resp Temp SpO2   122/75 98 18 99 °F (37.2 °C) 100 %      MAP       90.67         Physical Exam    Vitals reviewed.  Constitutional: She appears well-developed and well-nourished. She is not diaphoretic. No distress.   HENT:   Head: Normocephalic and atraumatic.   Nose: Nose normal.   Eyes: Conjunctivae and EOM are normal. Pupils are equal, round, and reactive to  light.   Neck: Normal range of motion.   Cardiovascular: Normal rate and regular rhythm. Exam reveals no friction rub.    No murmur heard.  Pulmonary/Chest: Breath sounds normal. No respiratory distress. She has no wheezes. She has no rales.   Abdominal: Soft. Bowel sounds are normal. She exhibits no distension. There is no tenderness. There is no rebound.   Musculoskeletal: Normal range of motion.   Neurological: She is alert and oriented to person, place, and time. She has normal strength. No sensory deficit.   Skin: Skin is warm and dry. No erythema.   Diffuse TTP of left breast. No erythema of breast or nipple discharge.    Psychiatric: She has a normal mood and affect. Thought content normal.         ED Course   Procedures  Labs Reviewed   POCT URINE PREGNANCY        Imaging Results          US Chest Mediastinum (Final result)  Result time 09/19/17 17:38:11    Final result by Jose Alfredo aVsquez MD (09/19/17 17:38:11)                 Impression:        Unremarkable limited ultrasound of the breasts without evidence of abscess.  If patient's pain persists, recommend outpatient dedicated diagnostic breast ultrasound.  ______________________________________     Electronically signed by resident: WARREN PRATT MD  Date:     09/19/17  Time:    17:28            As the supervising and teaching physician, I personally reviewed the images and resident's interpretation and I agree with the findings.            Electronically signed by: JOSE ALFREDO VASQUEZ MD  Date:     09/19/17  Time:    17:38              Narrative:    Indication: Left breast pain.    Comparison: None.    Findings:     Limited soft tissue ultrasound of the breasts for evaluation of abscess only was performed according to institutional policy.  No focal abnormalities are identified within either breasts.  No fluid collections or hypervascularity.                                       APC / Resident Notes:   Patient is a 32 year old female with PMHx of type 2  diabetes mellitus. She presents to the ED for left breast pain. Patient is in no acute distress. Vitals stable. AAOx3. RRR. Lungs CTA. Abdomen is soft, non tender, non distended. Skin is normal appearance without rashes.     Will order breast u/s.    Differential diagnoses include, but are not limited to: breast abscess, fibrocystic breast, mastitis, ductal ectasia, and pain secondary to large pendulous breast.     I have discussed and reviewed with my supervising physician.    Patient reassessed, reports pain has improved.   Will discharge home with gynecology f/u.               ED Course      Clinical Impression:   The encounter diagnosis was Breast pain.                           Therese Ferraro PA-C  09/19/17 [id number]

## 2017-12-18 ENCOUNTER — OFFICE VISIT (OUTPATIENT)
Dept: URGENT CARE | Facility: CLINIC | Age: 32
End: 2017-12-18
Payer: MEDICAID

## 2017-12-18 VITALS
HEIGHT: 63 IN | TEMPERATURE: 98 F | WEIGHT: 140 LBS | SYSTOLIC BLOOD PRESSURE: 114 MMHG | BODY MASS INDEX: 24.8 KG/M2 | DIASTOLIC BLOOD PRESSURE: 80 MMHG | OXYGEN SATURATION: 100 % | HEART RATE: 92 BPM

## 2017-12-18 DIAGNOSIS — R11.0 NAUSEA: Primary | ICD-10-CM

## 2017-12-18 LAB
B-HCG UR QL: NEGATIVE
BILIRUB UR QL STRIP: NEGATIVE
CTP QC/QA: YES
GLUCOSE SERPL-MCNC: 332 MG/DL (ref 70–110)
GLUCOSE UR QL STRIP: POSITIVE
KETONES UR QL STRIP: NEGATIVE
LEUKOCYTE ESTERASE UR QL STRIP: NEGATIVE
PH, POC UA: 6 (ref 5–8)
POC BLOOD, URINE: POSITIVE
POC NITRATES, URINE: NEGATIVE
PROT UR QL STRIP: POSITIVE
SP GR UR STRIP: 1.01 (ref 1–1.03)
UROBILINOGEN UR STRIP-ACNC: NORMAL (ref 0.1–1.1)

## 2017-12-18 PROCEDURE — 81025 URINE PREGNANCY TEST: CPT | Mod: S$GLB,,, | Performed by: NURSE PRACTITIONER

## 2017-12-18 PROCEDURE — 81003 URINALYSIS AUTO W/O SCOPE: CPT | Mod: QW,S$GLB,, | Performed by: NURSE PRACTITIONER

## 2017-12-18 PROCEDURE — 82948 REAGENT STRIP/BLOOD GLUCOSE: CPT | Mod: S$GLB,,, | Performed by: NURSE PRACTITIONER

## 2017-12-18 PROCEDURE — 99213 OFFICE O/P EST LOW 20 MIN: CPT | Mod: 25,S$GLB,, | Performed by: NURSE PRACTITIONER

## 2017-12-18 RX ORDER — ONDANSETRON 4 MG/1
4 TABLET, ORALLY DISINTEGRATING ORAL EVERY 6 HOURS PRN
Qty: 25 TABLET | Refills: 0 | Status: SHIPPED | OUTPATIENT
Start: 2017-12-18 | End: 2018-03-30 | Stop reason: ALTCHOICE

## 2017-12-18 NOTE — PROGRESS NOTES
"Subjective:       Patient ID: Mary Sims is a 32 y.o. female.    Vitals:  height is 5' 3" (1.6 m) and weight is 63.5 kg (140 lb). Her temperature is 97.6 °F (36.4 °C). Her blood pressure is 114/80 and her pulse is 92. Her oxygen saturation is 100%.     Chief Complaint: Abdominal Pain    Abdominal Pain   This is a new problem. The current episode started today. The problem has been unchanged. The pain is at a severity of 6/10. The pain is moderate. The abdominal pain radiates to the RUQ. Associated symptoms include diarrhea, nausea and vomiting. Pertinent negatives include no dysuria, fever, hematochezia or melena. The pain is aggravated by vomiting. She has tried nothing for the symptoms.     Review of Systems   Constitution: Negative for chills and fever.   Cardiovascular: Negative for chest pain.   Respiratory: Negative for shortness of breath.    Musculoskeletal: Negative for back pain.   Gastrointestinal: Positive for abdominal pain, diarrhea, nausea and vomiting. Negative for hematochezia and melena.   Genitourinary: Negative for dysuria.       Objective:      Physical Exam   Constitutional: She is oriented to person, place, and time. She appears well-developed and well-nourished. She is cooperative.  Non-toxic appearance. She does not appear ill. No distress.   HENT:   Head: Normocephalic and atraumatic.   Right Ear: Hearing, tympanic membrane and ear canal normal.   Left Ear: Hearing, tympanic membrane and ear canal normal.   Nose: Nose normal. No mucosal edema, rhinorrhea or nasal deformity. No epistaxis. Right sinus exhibits no maxillary sinus tenderness and no frontal sinus tenderness. Left sinus exhibits no maxillary sinus tenderness and no frontal sinus tenderness.   Mouth/Throat: Uvula is midline and mucous membranes are normal. No trismus in the jaw. Normal dentition. No uvula swelling. No posterior oropharyngeal erythema.   Eyes: Conjunctivae and lids are normal. Right eye exhibits no " discharge. Left eye exhibits no discharge. No scleral icterus.   Sclera clear bilat   Neck: Trachea normal, normal range of motion, full passive range of motion without pain and phonation normal. Neck supple.   Cardiovascular: Normal rate, regular rhythm and normal pulses.    Pulmonary/Chest: Effort normal. No respiratory distress.   Abdominal: Soft. Normal appearance and bowel sounds are normal. She exhibits no distension, no pulsatile midline mass and no mass. There is tenderness in the suprapubic area.   Genitourinary: No vaginal discharge found.   Musculoskeletal: Normal range of motion. She exhibits no edema or deformity.   Neurological: She is alert and oriented to person, place, and time. She exhibits normal muscle tone. Coordination normal.   Skin: Skin is warm, dry and intact. She is not diaphoretic. No pallor.   Psychiatric: She has a normal mood and affect. Her speech is normal and behavior is normal. Judgment and thought content normal. Cognition and memory are normal.   Nursing note and vitals reviewed.      Assessment:       1. Nausea        Plan:         Nausea  -     POCT urine pregnancy  -     POCT Urinalysis, Dipstick, Automated, W/O Scope  -     POCT glucose (Manual)

## 2017-12-18 NOTE — LETTER
December 18, 2017      [facility] [address] [facility] [address] [facility], [place] [address]  Phone: [phone]  Fax: [phone]       Patient: Mary Sims   YOB: 1985  Date of Visit: 12/18/2017    To Whom It May Concern:    Wale Sims  was at [facility] on 12/18/2017. She may return to work/school on 12/20/2017 with no restrictions. If you have any questions or concerns, or if I can be of further assistance, please do not hesitate to contact me.    Sincerely,    Stacy Torrez NP

## 2017-12-19 NOTE — PATIENT INSTRUCTIONS

## 2018-02-07 ENCOUNTER — HOSPITAL ENCOUNTER (EMERGENCY)
Facility: HOSPITAL | Age: 33
Discharge: HOME | End: 2018-02-08
Attending: EMERGENCY MEDICINE
Payer: MEDICAID

## 2018-02-07 DIAGNOSIS — Z79.4 TYPE 2 DIABETES MELLITUS WITH HYPERGLYCEMIA, WITH LONG-TERM CURRENT USE OF INSULIN: ICD-10-CM

## 2018-02-07 DIAGNOSIS — L03.115 CELLULITIS OF RIGHT LOWER EXTREMITY: Primary | ICD-10-CM

## 2018-02-07 DIAGNOSIS — E11.65 TYPE 2 DIABETES MELLITUS WITH HYPERGLYCEMIA, WITH LONG-TERM CURRENT USE OF INSULIN: ICD-10-CM

## 2018-02-07 PROCEDURE — 99283 EMERGENCY DEPT VISIT LOW MDM: CPT | Mod: 25

## 2018-02-07 PROCEDURE — 96361 HYDRATE IV INFUSION ADD-ON: CPT

## 2018-02-07 PROCEDURE — 82962 GLUCOSE BLOOD TEST: CPT

## 2018-02-07 PROCEDURE — 96374 THER/PROPH/DIAG INJ IV PUSH: CPT

## 2018-02-07 PROCEDURE — 99284 EMERGENCY DEPT VISIT MOD MDM: CPT | Mod: ,,, | Performed by: EMERGENCY MEDICINE

## 2018-02-08 VITALS
DIASTOLIC BLOOD PRESSURE: 75 MMHG | HEART RATE: 91 BPM | TEMPERATURE: 99 F | HEIGHT: 63 IN | BODY MASS INDEX: 24.8 KG/M2 | RESPIRATION RATE: 18 BRPM | OXYGEN SATURATION: 99 % | WEIGHT: 140 LBS | SYSTOLIC BLOOD PRESSURE: 122 MMHG

## 2018-02-08 LAB
ANION GAP SERPL CALC-SCNC: 10 MMOL/L
BACTERIA #/AREA URNS AUTO: ABNORMAL /HPF
BILIRUB UR QL STRIP: NEGATIVE
BUN SERPL-MCNC: 18 MG/DL
CALCIUM SERPL-MCNC: 8.6 MG/DL
CHLORIDE SERPL-SCNC: 100 MMOL/L
CLARITY UR REFRACT.AUTO: ABNORMAL
CO2 SERPL-SCNC: 21 MMOL/L
COLOR UR AUTO: YELLOW
CREAT SERPL-MCNC: 0.9 MG/DL
EST. GFR  (AFRICAN AMERICAN): >60 ML/MIN/1.73 M^2
EST. GFR  (NON AFRICAN AMERICAN): >60 ML/MIN/1.73 M^2
GLUCOSE SERPL-MCNC: 428 MG/DL
GLUCOSE UR QL STRIP: ABNORMAL
HGB UR QL STRIP: ABNORMAL
HYALINE CASTS UR QL AUTO: 0 /LPF
KETONES UR QL STRIP: NEGATIVE
LEUKOCYTE ESTERASE UR QL STRIP: NEGATIVE
MICROSCOPIC COMMENT: ABNORMAL
NITRITE UR QL STRIP: NEGATIVE
PH UR STRIP: 5 [PH] (ref 5–8)
POCT GLUCOSE: 284 MG/DL (ref 70–110)
POCT GLUCOSE: 447 MG/DL (ref 70–110)
POTASSIUM SERPL-SCNC: 4.7 MMOL/L
PROT UR QL STRIP: ABNORMAL
RBC #/AREA URNS AUTO: 6 /HPF (ref 0–4)
SODIUM SERPL-SCNC: 131 MMOL/L
SP GR UR STRIP: 1.03 (ref 1–1.03)
SQUAMOUS #/AREA URNS AUTO: 0 /HPF
URN SPEC COLLECT METH UR: ABNORMAL
UROBILINOGEN UR STRIP-ACNC: NEGATIVE EU/DL
WBC #/AREA URNS AUTO: 4 /HPF (ref 0–5)
YEAST UR QL AUTO: ABNORMAL

## 2018-02-08 PROCEDURE — 25000003 PHARM REV CODE 250: Performed by: PHYSICIAN ASSISTANT

## 2018-02-08 PROCEDURE — 80048 BASIC METABOLIC PNL TOTAL CA: CPT

## 2018-02-08 PROCEDURE — 81001 URINALYSIS AUTO W/SCOPE: CPT

## 2018-02-08 PROCEDURE — 63600175 PHARM REV CODE 636 W HCPCS: Performed by: PHYSICIAN ASSISTANT

## 2018-02-08 RX ORDER — SULFAMETHOXAZOLE AND TRIMETHOPRIM 800; 160 MG/1; MG/1
2 TABLET ORAL 2 TIMES DAILY
Qty: 28 TABLET | Refills: 0 | Status: SHIPPED | OUTPATIENT
Start: 2018-02-08 | End: 2018-02-15

## 2018-02-08 RX ADMIN — INSULIN HUMAN 10 UNITS: 100 INJECTION, SOLUTION PARENTERAL at 12:02

## 2018-02-08 RX ADMIN — SODIUM CHLORIDE 1000 ML: 0.9 INJECTION, SOLUTION INTRAVENOUS at 12:02

## 2018-02-08 NOTE — ED PROVIDER NOTES
Encounter Date: 2/7/2018    SCRIBE #1 NOTE: I, Juliette Jaime, am scribing for, and in the presence of,  Dr. Prakash. I have scribed the following portions of the note - the APC attestation.       History     Chief Complaint   Patient presents with    Rash     pt states she has a rash on bottom of leg x2days, states it is painful today and itching      32 year old female with medical history of IDDM, lower extremity cellulitis presenting to the ED with the chief complaint of a rash. Patient reports having bumps occurring on her bilateral extremities for the past 1 year. She reports having to be admitted for cellulitis requiring IV antibiotics a few months ago. She reports the bumps heal into black spots. She has previously seen dermatology for the bumps and received phototherapy. She reports developing a new bump on her left lateral shin 2 days ago. She has associated pain and tingling sensations. She denies having any discharge, erythema, or pruritus. Patient states she takes Levemir 20 units three times a day. She reports she does not check her glucose at all and does not know what it typically runs.           Review of patient's allergies indicates:  No Known Allergies  Past Medical History:   Diagnosis Date    Diabetes mellitus      No past surgical history on file.  Family History   Problem Relation Age of Onset    Diabetes Mother      Social History   Substance Use Topics    Smoking status: Never Smoker    Smokeless tobacco: Never Used    Alcohol use Yes      Comment: social     Review of Systems   Constitutional: Negative for chills and fever.   HENT: Negative for sore throat and trouble swallowing.    Respiratory: Negative for shortness of breath.    Cardiovascular: Negative for chest pain.   Gastrointestinal: Negative for nausea.   Genitourinary: Negative for dysuria.   Musculoskeletal: Negative for back pain, gait problem, joint swelling, neck pain and neck stiffness.   Skin: Positive for rash  and wound.   Neurological: Positive for numbness. Negative for weakness, light-headedness and headaches.   Hematological: Does not bruise/bleed easily.       Physical Exam     Initial Vitals [02/07/18 2314]   BP Pulse Resp Temp SpO2   122/69 92 20 98.8 °F (37.1 °C) 100 %      MAP       86.67         Physical Exam    Constitutional: She appears well-developed and well-nourished. She is not diaphoretic. No distress.   HENT:   Head: Normocephalic and atraumatic.   Mouth/Throat: Oropharynx is clear and moist. No oropharyngeal exudate.   Eyes: EOM are normal. Pupils are equal, round, and reactive to light.   Neck: Normal range of motion. Neck supple.   Cardiovascular: Normal rate and regular rhythm.   Pulmonary/Chest: Breath sounds normal. No respiratory distress. She has no wheezes.   Abdominal: Soft. Bowel sounds are normal.   Musculoskeletal: Normal range of motion. She exhibits no edema or tenderness.   Neurological: She is alert and oriented to person, place, and time. She has normal strength. No cranial nerve deficit.   Skin: Skin is warm and dry. No erythema.   There are scattered 1-3 diameter macular hypopigmentations on the bilateral lower extremities. No palmar or sole involvement. There is a tender, slightly edematous lesion on the left lateral shin. There is no fluctuance or expressible discharge.          ED Course   Procedures  Labs Reviewed   BASIC METABOLIC PANEL - Abnormal; Notable for the following:        Result Value    Sodium 131 (*)     CO2 21 (*)     Glucose 428 (*)     Calcium 8.6 (*)     All other components within normal limits   URINALYSIS, REFLEX TO URINE CULTURE - Abnormal; Notable for the following:     Appearance, UA Hazy (*)     Protein, UA 2+ (*)     Glucose, UA 3+ (*)     Occult Blood UA 1+ (*)     All other components within normal limits    Narrative:     rec'd one cup  Preferred Collection Type->Urine, Clean Catch   URINALYSIS MICROSCOPIC - Abnormal; Notable for the following:      RBC, UA 6 (*)     All other components within normal limits    Narrative:     rec'd one cup  Preferred Collection Type->Urine, Clean Catch   POCT GLUCOSE - Abnormal; Notable for the following:     POCT Glucose 447 (*)     All other components within normal limits   POCT GLUCOSE - Abnormal; Notable for the following:     POCT Glucose 284 (*)     All other components within normal limits   POCT GLUCOSE MONITORING CONTINUOUS             Medical Decision Making:   History:   Old Medical Records: I decided to obtain old medical records.  Clinical Tests:   Lab Tests: Ordered and Reviewed  ED Management:  Benson Hines PA-C  Case signed out to me by fellow CIERA Hamilton,   Repeat blood sugar improving.  No evidence of DKA.   She will be discharged home to follow-up with her primary care provider and take her regular prescribed medications       APC / Resident Notes:   32 year old female with medical history of IDDM, lower extremity cellulitis presenting to the ED c/o rash. DDx includes but not limited to cellulitis, abscess, dermatitis, syphilis, lyme disease, RMSF.  Will get POCT glucose given patient states she does not monitor her glucose.     POCT glucose elevated 447. Patient likely lives at elevated glucose given poor compliance. Will give fluids and insulin 10 units IV. Will get UA and BMP to check for ketones and renal function.        Scribe Attestation:   Scribe #1: I performed the above scribed service and the documentation accurately describes the services I performed. I attest to the accuracy of the note.    Attending Attestation:     Physician Attestation Statement for NP/PA:   I have conducted a face to face encounter with this patient in addition to the NP/PA, due to Medical Complexity    Other NP/PA Attestation Additions:    History of Present Illness: No area of induration significant enough for drainage at this time. Patient provided with anticipatory guidance, instructions for warm compresses, Bactrim, and  follow up with derm.                    ED Course      Clinical Impression:   The primary encounter diagnosis was Cellulitis of right lower extremity. A diagnosis of Type 2 diabetes mellitus with hyperglycemia, with long-term current use of insulin was also pertinent to this visit.    Disposition:   Disposition: Discharged  Condition: Ashley Hines PA-C  02/08/18 0210

## 2018-02-08 NOTE — DISCHARGE INSTRUCTIONS
The rash on your legs is the beginning of cellulitis. The area of concern is not drainable at this time. Please begin the prescribed antibiotics and follow-up with dermatology for further management. Please establish yourself with a primary care provider to manage your diabetes mellitus and glucose.

## 2018-03-30 ENCOUNTER — OFFICE VISIT (OUTPATIENT)
Dept: URGENT CARE | Facility: CLINIC | Age: 33
End: 2018-03-30
Payer: MEDICAID

## 2018-03-30 ENCOUNTER — HOSPITAL ENCOUNTER (EMERGENCY)
Facility: HOSPITAL | Age: 33
Discharge: HOME | End: 2018-03-30
Attending: FAMILY MEDICINE
Payer: MEDICAID

## 2018-03-30 VITALS
OXYGEN SATURATION: 99 % | TEMPERATURE: 98 F | BODY MASS INDEX: 24.8 KG/M2 | WEIGHT: 140 LBS | SYSTOLIC BLOOD PRESSURE: 106 MMHG | HEART RATE: 115 BPM | HEIGHT: 63 IN | DIASTOLIC BLOOD PRESSURE: 79 MMHG

## 2018-03-30 VITALS
WEIGHT: 140 LBS | OXYGEN SATURATION: 99 % | SYSTOLIC BLOOD PRESSURE: 124 MMHG | HEART RATE: 96 BPM | TEMPERATURE: 98 F | HEIGHT: 63 IN | BODY MASS INDEX: 24.8 KG/M2 | DIASTOLIC BLOOD PRESSURE: 80 MMHG | RESPIRATION RATE: 18 BRPM

## 2018-03-30 DIAGNOSIS — R11.2 NON-INTRACTABLE VOMITING WITH NAUSEA, UNSPECIFIED VOMITING TYPE: Primary | ICD-10-CM

## 2018-03-30 DIAGNOSIS — R11.0 NAUSEA: Primary | ICD-10-CM

## 2018-03-30 LAB
B-HCG UR QL: NEGATIVE
BILIRUB UR QL STRIP: NEGATIVE
CTP QC/QA: YES
GLUCOSE SERPL-MCNC: 172 MG/DL (ref 70–110)
GLUCOSE UR QL STRIP: POSITIVE
KETONES UR QL STRIP: NEGATIVE
LEUKOCYTE ESTERASE UR QL STRIP: NEGATIVE
PH, POC UA: 7.5 (ref 5–8)
POC BLOOD, URINE: NEGATIVE
POC NITRATES, URINE: NEGATIVE
POCT GLUCOSE: 246 MG/DL (ref 70–110)
PROT UR QL STRIP: POSITIVE
SP GR UR STRIP: 1.01 (ref 1–1.03)
UROBILINOGEN UR STRIP-ACNC: ABNORMAL (ref 0.1–1.1)

## 2018-03-30 PROCEDURE — 82948 REAGENT STRIP/BLOOD GLUCOSE: CPT | Mod: S$GLB,,, | Performed by: NURSE PRACTITIONER

## 2018-03-30 PROCEDURE — 99283 EMERGENCY DEPT VISIT LOW MDM: CPT | Mod: ,,, | Performed by: FAMILY MEDICINE

## 2018-03-30 PROCEDURE — 99283 EMERGENCY DEPT VISIT LOW MDM: CPT | Mod: 25

## 2018-03-30 PROCEDURE — 99214 OFFICE O/P EST MOD 30 MIN: CPT | Mod: 25,S$GLB,, | Performed by: NURSE PRACTITIONER

## 2018-03-30 PROCEDURE — 82962 GLUCOSE BLOOD TEST: CPT

## 2018-03-30 PROCEDURE — 81025 URINE PREGNANCY TEST: CPT | Mod: S$GLB,,, | Performed by: NURSE PRACTITIONER

## 2018-03-30 PROCEDURE — 81003 URINALYSIS AUTO W/O SCOPE: CPT | Mod: QW,S$GLB,, | Performed by: NURSE PRACTITIONER

## 2018-03-30 RX ORDER — ONDANSETRON 4 MG/1
4 TABLET, FILM COATED ORAL EVERY 8 HOURS PRN
Qty: 16 TABLET | Refills: 0 | Status: SHIPPED | OUTPATIENT
Start: 2018-03-30 | End: 2019-01-22

## 2018-03-30 RX ORDER — ONDANSETRON 4 MG/1
4 TABLET, ORALLY DISINTEGRATING ORAL EVERY 6 HOURS PRN
Qty: 20 TABLET | Refills: 0 | Status: SHIPPED | OUTPATIENT
Start: 2018-03-30 | End: 2019-01-22

## 2018-03-30 NOTE — PROGRESS NOTES
"Subjective:       Patient ID: Mary Sims is a 32 y.o. female.    Vitals:  height is 5' 3" (1.6 m) and weight is 63.5 kg (140 lb). Her temperature is 97.8 °F (36.6 °C). Her blood pressure is 106/79 and her pulse is 115 (abnormal). Her oxygen saturation is 99%.     Chief Complaint: Abdominal Pain    Pt denies abd pain at present time, states she had doctor appt at "clinic" today and she couldn't make it due to work so she came here instead. Pt aware of glucose in urine and elevated CBG, admits she just started back on her insulin recently, dx at age 15y/o and has been in "denial". Pt instructed to f/u with clinic on Monday for further evaluation of her Diabetes. Admits she does not stick to appropriate diabetic diet.      Abdominal Pain   This is a new problem. The current episode started in the past 7 days. The problem has been gradually worsening. The pain is located in the generalized abdominal region. The pain is at a severity of 7/10. The pain is moderate. The quality of the pain is cramping. Associated symptoms include diarrhea, nausea and vomiting. Pertinent negatives include no constipation, dysuria, fever, hematochezia or melena. The pain is aggravated by eating and vomiting. She has tried antacids for the symptoms. The treatment provided no relief.     Review of Systems   Constitution: Negative for chills and fever.   Cardiovascular: Negative for chest pain.   Respiratory: Negative for shortness of breath.    Musculoskeletal: Negative for back pain.   Gastrointestinal: Positive for diarrhea, nausea and vomiting. Negative for abdominal pain, constipation, hematochezia and melena.   Genitourinary: Negative for dysuria.   All other systems reviewed and are negative.      Objective:      Physical Exam   Constitutional: She is oriented to person, place, and time. She appears well-developed and well-nourished.   HENT:   Head: Normocephalic and atraumatic.   Right Ear: External ear normal.   Left Ear: " External ear normal.   Nose: Nose normal.   Mouth/Throat: Mucous membranes are normal.   Eyes: Conjunctivae and lids are normal.   Neck: Trachea normal and full passive range of motion without pain. Neck supple.   Cardiovascular: Normal rate, regular rhythm and normal heart sounds.    Pulmonary/Chest: Effort normal and breath sounds normal. No respiratory distress.   Abdominal: Soft. Normal appearance and bowel sounds are normal. She exhibits no distension, no abdominal bruit, no pulsatile midline mass and no mass. There is no tenderness.   Musculoskeletal: Normal range of motion. She exhibits no edema.   Neurological: She is alert and oriented to person, place, and time. She has normal strength.   Skin: Skin is warm, dry and intact. She is not diaphoretic. No pallor.   Psychiatric: She has a normal mood and affect. Her speech is normal and behavior is normal. Judgment and thought content normal. Cognition and memory are normal.   Nursing note and vitals reviewed.      Results for orders placed or performed in visit on 03/30/18   POCT glucose   Result Value Ref Range    POC Glucose 172 (A) 70 - 110 mg/dL   POCT Urinalysis, Dipstick, Automated, W/O Scope   Result Value Ref Range    POC Blood, Urine Negative Negative    POC Bilirubin, Urine Negative Negative    POC Urobilinogen, Urine p 0.1 - 1.1    POC Ketones, Urine Negative Negative    POC Protein, Urine Positive (A) Negative    POC Nitrates, Urine Negative Negative    POC Glucose, Urine Positive (A) Negative    pH, UA 7.5 5 - 8    POC Specific Gravity, Urine 1.010 1.003 - 1.029    POC Leukocytes, Urine Negative Negative   POCT urine pregnancy   Result Value Ref Range    POC Preg Test, Ur Negative Negative     Acceptable Yes      Assessment:       1. Nausea        Plan:      Pt instructed to f/u with clinic on Monday for further evaluation of her Diabetes.     Nausea  -     POCT glucose  -     POCT Urinalysis, Dipstick, Automated, W/O Scope  -      POCT urine pregnancy  -     ondansetron (ZOFRAN-ODT) 4 MG TbDL; Take 1 tablet (4 mg total) by mouth every 6 (six) hours as needed (nausea vomiting).  Dispense: 20 tablet; Refill: 0

## 2018-03-30 NOTE — LETTER
March 30, 2018      [facility] [address] [facility], [place] [address]  Phone: [phone]  Fax: [phone]       Patient: Mary Sims   YOB: 1985  Date of Visit: 03/30/2018    To Whom It May Concern:    Wale Sims  was at [facility] on 03/30/2018. She may return to work/school on 03/31/2018 with no restrictions. If you have any questions or concerns, or if I can be of further assistance, please do not hesitate to contact me.    Sincerely,    Margarita Houston MA

## 2018-03-30 NOTE — ED NOTES
.  Patient identifiers for Mary Sims 32 y.o. female checked and correct.  Chief Complaint   Patient presents with    Medication Refill     seen at , no rx zofran, needing rx     Past Medical History:   Diagnosis Date    Diabetes mellitus      Allergies reported: Review of patient's allergies indicates:  No Known Allergies      LOC: Patient is awake, alert, and aware of environment with an appropriate affect. Patient is oriented x 3 and speaking appropriately.  APPEARANCE: Patient resting comfortably and in no acute distress. Patient is clean and well groomed, patient's clothing is properly fastened.  SKIN: The skin is warm and dry. Patient has normal skin turgor and moist mucus membranes. Skin is intact; no bruising or breakdown noted.  MUSKULOSKELETAL: Patient is moving all extremities well, no obvious deformities noted. Pulses intact.   RESPIRATORY: Airway is open and patent. Respirations are spontaneous and non-labored with normal effort and rate, BBS=clear  ABDOMEN: No distention noted. Bowel sounds active in all 4 quadrants. Reports approx 4-5 episodes of diarrhea today, nauseated and vomiting. Reports her blood sugars have been high since shes been having these symptoms. Today reports her blood sugar is 162.

## 2018-03-30 NOTE — PATIENT INSTRUCTIONS
Understanding Carbohydrates, Fats, and Protein  Food is a source of fuel and nourishment for your body. Its also a source of pleasure. Having diabetes doesnt mean you have to eat special foods or give up desserts. Instead, your dietitian can show you how to plan meals to suit your body. To start, learn how different foods affect blood sugar.  Carbohydrates  Carbohydrates are the main source of fuel for the body. Carbohydrates raise blood sugar. Many people think carbohydrates are only found in pasta or bread. But carbohydrates are actually in many kinds of foods:  · Sugars occur naturally in foods such as fruit, milk, honey, and molasses. Sugars can also be added to many foods, from cereals and yogurt to candy and desserts. Sugars raise blood sugar.  · Starches are found in bread, cereals, pasta, and dried beans. Theyre also found in corn, peas, potatoes, yam, acorn squash, and butternut squash. Starches also raise blood sugar.   · Fiber is found in foods such as vegetables, fruits, beans, and whole grains. Unlike other carbs, fiber isnt digested or absorbed. So it doesnt raise blood sugar. In fact, fiber can help keep blood sugar from rising too fast. It also helps keep blood cholesterol at a healthy level.  Did you know?  Even though carbohydrates raise blood sugar, its best to have some in every meal. They are an important part of a healthy diet.   Fat  Fat is an energy source that can be stored until needed. Fat does not raise blood sugar. However, it can raise blood cholesterol, increasing the risk of heart disease. Fat is also high in calories, which can cause weight gain. Not all types of fat are the same.  More Healthy:  · Monounsaturated fats are mostly found in vegetable oils, such as olive, canola, and peanut oils. They are also found in avocados and some nuts. Monounsaturated fats are healthy for your heart. Thats because they lower LDL (unhealthy) cholesterol.  · Polyunsaturated fats are mostly  "found in vegetable oils, such as corn, safflower, and soybean oils. They are also found in some seeds, nuts, and fish. Polyunsaturated fats lower LDL (unhealthy) cholesterol. So, choosing them instead of saturated fats is healthy for your heart. Certain unsaturated fats can help lower triglycerides.   Less Healthy:  · Saturated fats are found in animal products, such as meat, poultry, whole milk, lard, and butter. Saturated fats raise LDL cholesterol and are not healthy for your heart.  · Hydrogenated oils and trans fats are formed when vegetable oils are processed into solid fats. They are found in many processed foods. Hydrogenated oils and trans fats raise LDL cholesterol and lower HDL (healthy) cholesterol. They are not healthy for your heart.  Protein  Protein helps the body build and repair muscle and other tissue. Protein has little or no effect on blood sugar. However, many foods that contain protein also contain saturated fat. By choosing low-fat protein sources, you can get the benefits of protein without the extra fat:  · Plant protein is found in dry beans and peas, nuts, and soy products, such as tofu and soymilk. These sources tend to be cholesterol-free and low in saturated fat.  · Animal protein is found in fish, poultry, meat, cheese, milk, and eggs. These contain cholesterol and can be high in saturated fat. Aim for lean, lower-fat choices.  Date Last Reviewed: 3/1/2016  © 6218-3933 [employer]. [address] [place] [id number]. All rights reserved. This information is not intended as a substitute for professional medical care. Always follow your healthcare professional's instructions.        Vomiting (Adult)  Vomiting is a common symptom that may be due to different causes. These include gastroenteritis ("stomach flu"), food poisoning and gastritis. There are other more serious causes of vomiting which may be hard to diagnose early in the illness. Therefore, it is important " to watch for the warning signs listed below.  The main danger from repeated vomiting is dehydration. This is due to excess loss of water and minerals from the body. When this occurs, body fluids must be replaced.  Home care  · If symptoms are severe, rest at home for the next 24 hours.  · Because your symptoms may be from an infection, wash your hands frequently and well, and use alcohol-based  to avoid spreading the infection to others.  · Wash your hands for at least 20 seconds. Hum the happy birthday song twice for the correct length of time.  · Wash your hands after using the toilet, before and after preparing food, before eating food, after changing a diaper, cleaning a wound, caring for a sick person, and blowing your nose, coughing, or sneezing. You should also wash your hands after caring for someone who is sick, touching pet food, or treats, and touching an animal, or animal waste.  · You may use acetaminophen or NSAID medicines like ibuprofen or naproxen to control fever, unless another medicine was prescribed. If you have chronic liver or kidney disease or ever had a stomach ulcer or GI bleeding, talk with your doctor before using these medicines. Aspirin should never be used in anyone under 18 years of age who is ill with a fever. It may cause severe liver damage. Don't use NSAID medicines if you are already taking one for another condition (like arthritis) or are on aspirin (such as for heart disease, or after a stroke)  · Avoid tobacco and alcohol use, which may worsen your symptoms.  · If medicines for vomiting were prescribed, take as directed.  · Once vomiting stops, then follow these guidelines:  During the first 12 to 24 hours follow the diet below:  · Fruit juices. Apple, grape juice, clear fruit drinks, and electrolyte replacement drinks.  · Beverages. Soft drinks without caffeine; mineral water (plain or flavored), decaffeinated tea and coffee.  · Soups. Clear broth, consommé and  bouillon  · Desserts. Plain gelatin, popsicles and fruit juice bars. As you feel better, you may add 6-8 ounces of yogurt per day.  During the next 24 hours you may add the following to the above:  · Hot cereal, plain toast, bread, rolls, crackers  · Plain noodles, rice, mashed potatoes, chicken noodle or rice soup  · Unsweetened canned fruit (avoid pineapple), bananas  · Limit caffeine and chocolate. No spices or seasonings except salt.  During the next 24 hours:  Gradually resume a normal diet, as you feel better and your symptoms lessen.  Follow-up care  Follow up with your healthcare provider, or as advised.  When to seek medical advice  Call your healthcare provider right away if any of these occur:  · Constant right-sided lower abdominal pain or increasing general abdominal pain  · Continued vomiting (unable to keep liquids down) for 24 hours  · Frequent diarrhea (more than 5 times a day); blood (red or black color) or mucus in diarrhea  · Reduced urine output or extreme thirst  · Weakness, dizziness or fainting  · Unusually drowsy or confused  · Fever of 100.4°F (38°C) oral or higher, or as directed  · Yellow color of the eyes or skin  Date Last Reviewed: 11/16/2015 © 2000-2017 [employer]. [address] [place] [address], [place] [address]. All rights reserved. This information is not intended as a substitute for professional medical care. Always follow your healthcare professional's instructions.    Please arrange follow up with your primary medical clinic as soon as possible. You must understand that you've received an Urgent Care treatment only and that you may be released before all of your medical problems are known or treated. You, the patient, will arrange for follow up as instructed. If your symptoms worsen or fail to improve you should go to the Emergency Room.

## 2018-03-30 NOTE — ED TRIAGE NOTES
Presents with c/o vomiting, nausea and diarrhea x 3 days and elevated blood sugar. Was seen at Urgent care today and was unable to get RX pharmacy where it was sent is closed.

## 2018-03-31 NOTE — ED PROVIDER NOTES
Encounter Date: 3/30/2018       History     Chief Complaint   Patient presents with    Medication Refill     seen at , no rx zofran, needing rx     HPI patient is a 32-year-old female with a history of type 1 diabetes who was seen at a local urgent care clinic today because of nausea, vomiting and diarrhea.  At the time her blood sugar was approximately 160.  She was hydrated at the urgent care and discharged in stable condition with prescriptions for Zofran to help control her nausea.  Unfortunately, she is unable to get to the pharmacy where her Zofran was prescribed and presents with concerns that she will become dehydrated without an adequate antibiotic.  Ice fever, chills, hematemesis or melena.  Review of patient's allergies indicates:  No Known Allergies  Past Medical History:   Diagnosis Date    Diabetes mellitus      History reviewed. No pertinent surgical history.  Family History   Problem Relation Age of Onset    Diabetes Mother      Social History   Substance Use Topics    Smoking status: Never Smoker    Smokeless tobacco: Never Used    Alcohol use Yes      Comment: social     Review of Systems   Constitutional: Negative for chills, fever and unexpected weight change.   HENT: Negative for hearing loss, nosebleeds, sore throat and trouble swallowing.    Eyes: Negative for photophobia.   Respiratory: Negative for chest tightness, shortness of breath and wheezing.    Cardiovascular: Negative for chest pain and palpitations.   Gastrointestinal: Positive for diarrhea, nausea and vomiting. Negative for abdominal distention.   Genitourinary: Negative for dysuria, frequency, menstrual problem, urgency and vaginal bleeding.   Musculoskeletal: Negative for arthralgias, back pain and gait problem.   Skin: Negative for rash.   Neurological: Negative for seizures, syncope, light-headedness and headaches.   Hematological: Negative for adenopathy.       Physical Exam     Initial Vitals [03/30/18 1825]   BP  Pulse Resp Temp SpO2   124/80 96 18 98.2 °F (36.8 °C) 99 %      MAP       94.67         Physical Exam    Nursing note and vitals reviewed.  Constitutional: She appears well-developed and well-nourished.   HENT:   Head: Normocephalic.   Mouth/Throat: Oropharynx is clear and moist.   Eyes: Conjunctivae are normal. Pupils are equal, round, and reactive to light.   Neck: Normal range of motion.   Cardiovascular: Normal rate and regular rhythm.   Pulmonary/Chest: Breath sounds normal.   Abdominal: Soft. There is no tenderness.   Neurological: She is alert and oriented to person, place, and time.   Skin: Skin is dry.         ED Course   Procedures  Labs Reviewed   POCT GLUCOSE - Abnormal; Notable for the following:        Result Value    POCT Glucose 246 (*)     All other components within normal limits   POCT GLUCOSE MONITORING CONTINUOUS        Random glucose 245.     Medical Decision Making:   ED Management:  Patient stable for discharge.  Glucose is normal.  Patient has no signs or symptoms consistent with evolving hyperosmolar coma.  We will prescribe Zofran for her with a written prescription.  She can take or she wishes she is discharged in stable condition                      Clinical Impression:     The encounter diagnosis was Non-intractable vomiting with nausea, unspecified vomiting type.                         Eduard Stanton MD  03/30/18 2008

## 2018-07-10 ENCOUNTER — HOSPITAL ENCOUNTER (EMERGENCY)
Facility: HOSPITAL | Age: 33
Discharge: HOME | End: 2018-07-10
Attending: EMERGENCY MEDICINE
Payer: MEDICAID

## 2018-07-10 VITALS
OXYGEN SATURATION: 99 % | WEIGHT: 140 LBS | HEART RATE: 83 BPM | SYSTOLIC BLOOD PRESSURE: 130 MMHG | RESPIRATION RATE: 16 BRPM | BODY MASS INDEX: 24.8 KG/M2 | DIASTOLIC BLOOD PRESSURE: 75 MMHG | TEMPERATURE: 98 F | HEIGHT: 63 IN

## 2018-07-10 DIAGNOSIS — M54.9 RIGHT-SIDED BACK PAIN, UNSPECIFIED BACK LOCATION, UNSPECIFIED CHRONICITY: Primary | ICD-10-CM

## 2018-07-10 DIAGNOSIS — D64.9 ANEMIA, UNSPECIFIED TYPE: ICD-10-CM

## 2018-07-10 LAB
ALBUMIN SERPL BCP-MCNC: 2.4 G/DL
ALP SERPL-CCNC: 65 U/L
ALT SERPL W/O P-5'-P-CCNC: 11 U/L
ANION GAP SERPL CALC-SCNC: 7 MMOL/L
AST SERPL-CCNC: 15 U/L
B-HCG UR QL: NEGATIVE
BACTERIA #/AREA URNS AUTO: NORMAL /HPF
BASOPHILS # BLD AUTO: 0.05 K/UL
BASOPHILS NFR BLD: 0.8 %
BILIRUB SERPL-MCNC: 0.2 MG/DL
BILIRUB UR QL STRIP: NEGATIVE
BUN SERPL-MCNC: 10 MG/DL
CALCIUM SERPL-MCNC: 8.3 MG/DL
CHLORIDE SERPL-SCNC: 107 MMOL/L
CLARITY UR REFRACT.AUTO: ABNORMAL
CO2 SERPL-SCNC: 24 MMOL/L
COLOR UR AUTO: YELLOW
CREAT SERPL-MCNC: 0.7 MG/DL
CTP QC/QA: YES
DIFFERENTIAL METHOD: ABNORMAL
EOSINOPHIL # BLD AUTO: 0.1 K/UL
EOSINOPHIL NFR BLD: 2.2 %
ERYTHROCYTE [DISTWIDTH] IN BLOOD BY AUTOMATED COUNT: 14 %
EST. GFR  (AFRICAN AMERICAN): >60 ML/MIN/1.73 M^2
EST. GFR  (NON AFRICAN AMERICAN): >60 ML/MIN/1.73 M^2
GLUCOSE SERPL-MCNC: 138 MG/DL
GLUCOSE UR QL STRIP: NEGATIVE
HCT VFR BLD AUTO: 29.6 %
HGB BLD-MCNC: 10.2 G/DL
HGB UR QL STRIP: ABNORMAL
HYALINE CASTS UR QL AUTO: 0 /LPF
IMM GRANULOCYTES # BLD AUTO: 0.01 K/UL
IMM GRANULOCYTES NFR BLD AUTO: 0.2 %
KETONES UR QL STRIP: NEGATIVE
LEUKOCYTE ESTERASE UR QL STRIP: NEGATIVE
LYMPHOCYTES # BLD AUTO: 1.9 K/UL
LYMPHOCYTES NFR BLD: 31.4 %
MCH RBC QN AUTO: 24.8 PG
MCHC RBC AUTO-ENTMCNC: 34.5 G/DL
MCV RBC AUTO: 72 FL
MICROSCOPIC COMMENT: NORMAL
MONOCYTES # BLD AUTO: 0.4 K/UL
MONOCYTES NFR BLD: 6.7 %
NEUTROPHILS # BLD AUTO: 3.5 K/UL
NEUTROPHILS NFR BLD: 58.7 %
NITRITE UR QL STRIP: NEGATIVE
NRBC BLD-RTO: 0 /100 WBC
PH UR STRIP: 6 [PH] (ref 5–8)
PLATELET # BLD AUTO: 267 K/UL
PMV BLD AUTO: 11.4 FL
POTASSIUM SERPL-SCNC: 3.5 MMOL/L
PROT SERPL-MCNC: 5.8 G/DL
PROT UR QL STRIP: ABNORMAL
RBC # BLD AUTO: 4.11 M/UL
RBC #/AREA URNS AUTO: 2 /HPF (ref 0–4)
SODIUM SERPL-SCNC: 138 MMOL/L
SP GR UR STRIP: 1.03 (ref 1–1.03)
SQUAMOUS #/AREA URNS AUTO: 3 /HPF
URN SPEC COLLECT METH UR: ABNORMAL
UROBILINOGEN UR STRIP-ACNC: NEGATIVE EU/DL
WBC # BLD AUTO: 5.98 K/UL
WBC #/AREA URNS AUTO: 1 /HPF (ref 0–5)

## 2018-07-10 PROCEDURE — 99283 EMERGENCY DEPT VISIT LOW MDM: CPT | Mod: ,,, | Performed by: EMERGENCY MEDICINE

## 2018-07-10 PROCEDURE — 99283 EMERGENCY DEPT VISIT LOW MDM: CPT | Mod: 25

## 2018-07-10 PROCEDURE — 96374 THER/PROPH/DIAG INJ IV PUSH: CPT

## 2018-07-10 PROCEDURE — 81025 URINE PREGNANCY TEST: CPT | Performed by: PHYSICIAN ASSISTANT

## 2018-07-10 PROCEDURE — 81001 URINALYSIS AUTO W/SCOPE: CPT

## 2018-07-10 PROCEDURE — 85025 COMPLETE CBC W/AUTO DIFF WBC: CPT

## 2018-07-10 PROCEDURE — 63600175 PHARM REV CODE 636 W HCPCS: Performed by: PHYSICIAN ASSISTANT

## 2018-07-10 PROCEDURE — 80053 COMPREHEN METABOLIC PANEL: CPT

## 2018-07-10 RX ORDER — KETOROLAC TROMETHAMINE 30 MG/ML
15 INJECTION, SOLUTION INTRAMUSCULAR; INTRAVENOUS
Status: COMPLETED | OUTPATIENT
Start: 2018-07-10 | End: 2018-07-10

## 2018-07-10 RX ORDER — NAPROXEN 500 MG/1
500 TABLET ORAL 2 TIMES DAILY WITH MEALS
Qty: 30 TABLET | Refills: 0 | Status: SHIPPED | OUTPATIENT
Start: 2018-07-10 | End: 2018-07-20 | Stop reason: SDUPTHER

## 2018-07-10 RX ADMIN — KETOROLAC TROMETHAMINE 15 MG: 30 INJECTION, SOLUTION INTRAMUSCULAR at 09:07

## 2018-07-10 NOTE — DISCHARGE INSTRUCTIONS
Please take the prescribed Naprosyn for ongoing management of your pain. Please follow-up with your primary care provider if you continue to have back pain. Please return to the emergency room if you develop fever, difficulty urinating, vomiting, or worsening of your pain.    Our goal in the emergency department is to always give you outstanding care and exceptional service. You may receive a survey by mail or e-mail in the next week regarding your experience in our ED. We would greatly appreciate your completing and returning the survey. Your feedback provides us with a way to recognize our staff who give very good care and it helps us learn how to improve when your experience was below our aspiration of excellence.

## 2018-07-10 NOTE — ED PROVIDER NOTES
Encounter Date: 7/10/2018       History     Chief Complaint   Patient presents with    Flank Pain     my whole r side been hurting since 1 am     33 year old female with medical history of Type I DM presenting to the ED with the chief complaint of right flank pain. Patient reports the pain started at 1am today and woke her up from sleep. She reports taking IBU and falling back asleep. She states waking up at 530am and the pain was still present. She describes it as a sharp, constant sensation that increases whenever she twists to her right side. She denies falls, trauma, fever, dysuria, hematuria, nausea, vomiting, abdominal pain, diarrhea, constipation. 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 She denies history of kidney stones.           Review of patient's allergies indicates:  No Known Allergies  Past Medical History:   Diagnosis Date    Diabetes mellitus      History reviewed. No pertinent surgical history.  Family History   Problem Relation Age of Onset    Diabetes Mother      Social History   Substance Use Topics    Smoking status: Never Smoker    Smokeless tobacco: Never Used    Alcohol use Yes      Comment: social     Review of Systems   Constitutional: Negative for chills, diaphoresis, fatigue and fever.   HENT: Negative for congestion, sore throat and trouble swallowing.    Eyes: Negative for pain and redness.   Respiratory: Negative for cough and shortness of breath.    Cardiovascular: Negative for chest pain and leg swelling.   Gastrointestinal: Negative for abdominal pain, diarrhea, nausea and vomiting.   Genitourinary: Negative for dysuria, hematuria, vaginal bleeding, vaginal discharge and vaginal pain.   Musculoskeletal: Positive for back pain. Negative for arthralgias, neck pain and neck stiffness.   Skin: Negative for rash and wound.   Neurological: Negative for weakness, light-headedness and headaches.       Physical Exam     Initial Vitals [07/10/18  0818]   BP Pulse Resp Temp SpO2   134/80 95 18 98.5 °F (36.9 °C) 99 %      MAP       --         Physical Exam    Constitutional: She appears well-developed and well-nourished. She is not diaphoretic. No distress.   HENT:   Head: Normocephalic and atraumatic.   Mouth/Throat: Oropharynx is clear and moist. No oropharyngeal exudate.   Eyes: EOM are normal. Pupils are equal, round, and reactive to light.   Neck: Normal range of motion. Neck supple.   Cardiovascular: Normal rate and regular rhythm.   Pulmonary/Chest: Breath sounds normal. No respiratory distress. She has no wheezes.           Abdominal: Soft. Bowel sounds are normal. There is no tenderness.   Musculoskeletal: Normal range of motion. She exhibits no edema or tenderness.   Neurological: She is alert and oriented to person, place, and time. She has normal strength. No cranial nerve deficit or sensory deficit.   Skin: Skin is warm and dry. No erythema.         ED Course   Procedures  Labs Reviewed   CBC W/ AUTO DIFFERENTIAL - Abnormal; Notable for the following:        Result Value    Hemoglobin 10.2 (*)     Hematocrit 29.6 (*)     MCV 72 (*)     MCH 24.8 (*)     All other components within normal limits   COMPREHENSIVE METABOLIC PANEL - Abnormal; Notable for the following:     Glucose 138 (*)     Calcium 8.3 (*)     Total Protein 5.8 (*)     Albumin 2.4 (*)     Anion Gap 7 (*)     All other components within normal limits   URINALYSIS, REFLEX TO URINE CULTURE - Abnormal; Notable for the following:     Appearance, UA Hazy (*)     Protein, UA 3+ (*)     Occult Blood UA 2+ (*)     All other components within normal limits    Narrative:     Preferred Collection Type->Urine, Clean Catch   URINALYSIS MICROSCOPIC    Narrative:     Preferred Collection Type->Urine, Clean Catch   POCT URINE PREGNANCY          Imaging Results    None                APC / Resident Notes:   33 year old female with medical history of Type I DM presenting to the ED c/o R flank pain  starting today. DDx includes but not limited to muscular strain, nephrolithiasis, UTI, pyelonephritis, electrolyte disturbance. Will get labs and UA. Will give Toradol for pain.     Work-up shows UA negative nitrite, leuko, 2 micro RBC. Negative UPT. H/H decreased 10.2/29.6 (BL 11-12/30-35), CMP stable.     Patient on reassessment states her pain has improved with Toradol. Etiology for patient's back pain consistent with muscular injury. Do not suspect nephrolithiasis, pyelonephritis or other emergent processes at this time. Will give Naprosyn for ongoing management. Work excuse given. Labs show anemia which patient denies history of. Patient does not show any symptoms from her anemia and stable for follow-up with her PCP for further management. Return to ED precautions given for new, worsening, or concerning symptoms. I have discussed the care of this patient with my supervising physician.                  Clinical Impression:   The primary encounter diagnosis was Right-sided back pain, unspecified back location, unspecified chronicity. A diagnosis of Anemia, unspecified type was also pertinent to this visit.      Disposition:   Disposition: Discharged  Condition: Stable                        Alfonso Llamas PA-C  07/10/18 1129

## 2018-07-10 NOTE — ED TRIAGE NOTES
Pt c/o right flank pain that began around 1am.  Pt states pain worsens with movement.  Denies urinary symptoms.

## 2018-07-20 ENCOUNTER — HOSPITAL ENCOUNTER (EMERGENCY)
Facility: HOSPITAL | Age: 33
Discharge: HOME | End: 2018-07-20
Attending: EMERGENCY MEDICINE
Payer: MEDICAID

## 2018-07-20 VITALS
BODY MASS INDEX: 24.8 KG/M2 | TEMPERATURE: 99 F | HEIGHT: 63 IN | HEART RATE: 85 BPM | SYSTOLIC BLOOD PRESSURE: 155 MMHG | DIASTOLIC BLOOD PRESSURE: 80 MMHG | WEIGHT: 140 LBS | RESPIRATION RATE: 18 BRPM | OXYGEN SATURATION: 98 %

## 2018-07-20 DIAGNOSIS — R10.9 FLANK PAIN: Primary | ICD-10-CM

## 2018-07-20 DIAGNOSIS — R31.9 HEMATURIA, UNSPECIFIED TYPE: ICD-10-CM

## 2018-07-20 LAB
ALBUMIN SERPL BCP-MCNC: 2.7 G/DL
ALP SERPL-CCNC: 74 U/L
ALT SERPL W/O P-5'-P-CCNC: 12 U/L
ANION GAP SERPL CALC-SCNC: 7 MMOL/L
AST SERPL-CCNC: 13 U/L
B-HCG UR QL: NEGATIVE
BACTERIA #/AREA URNS AUTO: ABNORMAL /HPF
BASOPHILS # BLD AUTO: 0.06 K/UL
BASOPHILS NFR BLD: 1 %
BILIRUB SERPL-MCNC: 0.2 MG/DL
BILIRUB UR QL STRIP: NEGATIVE
BUN SERPL-MCNC: 10 MG/DL
CALCIUM SERPL-MCNC: 8.9 MG/DL
CHLORIDE SERPL-SCNC: 103 MMOL/L
CLARITY UR REFRACT.AUTO: CLEAR
CO2 SERPL-SCNC: 28 MMOL/L
COLOR UR AUTO: YELLOW
CREAT SERPL-MCNC: 0.9 MG/DL
CTP QC/QA: YES
DIFFERENTIAL METHOD: ABNORMAL
EOSINOPHIL # BLD AUTO: 0.1 K/UL
EOSINOPHIL NFR BLD: 1.7 %
ERYTHROCYTE [DISTWIDTH] IN BLOOD BY AUTOMATED COUNT: 13.7 %
EST. GFR  (AFRICAN AMERICAN): >60 ML/MIN/1.73 M^2
EST. GFR  (NON AFRICAN AMERICAN): >60 ML/MIN/1.73 M^2
GLUCOSE SERPL-MCNC: 317 MG/DL
GLUCOSE UR QL STRIP: ABNORMAL
HCT VFR BLD AUTO: 32.5 %
HGB BLD-MCNC: 11.1 G/DL
HGB UR QL STRIP: ABNORMAL
HYALINE CASTS UR QL AUTO: 1 /LPF
IMM GRANULOCYTES # BLD AUTO: 0.01 K/UL
IMM GRANULOCYTES NFR BLD AUTO: 0.2 %
KETONES UR QL STRIP: NEGATIVE
LEUKOCYTE ESTERASE UR QL STRIP: NEGATIVE
LYMPHOCYTES # BLD AUTO: 1.7 K/UL
LYMPHOCYTES NFR BLD: 28.7 %
MCH RBC QN AUTO: 24.8 PG
MCHC RBC AUTO-ENTMCNC: 34.2 G/DL
MCV RBC AUTO: 73 FL
MICROSCOPIC COMMENT: ABNORMAL
MONOCYTES # BLD AUTO: 0.3 K/UL
MONOCYTES NFR BLD: 5.5 %
NEUTROPHILS # BLD AUTO: 3.8 K/UL
NEUTROPHILS NFR BLD: 62.9 %
NITRITE UR QL STRIP: NEGATIVE
NRBC BLD-RTO: 0 /100 WBC
PH UR STRIP: 6 [PH] (ref 5–8)
PLATELET # BLD AUTO: 256 K/UL
PMV BLD AUTO: 11 FL
POTASSIUM SERPL-SCNC: 3.8 MMOL/L
PROT SERPL-MCNC: 6.5 G/DL
PROT UR QL STRIP: ABNORMAL
RBC # BLD AUTO: 4.48 M/UL
RBC #/AREA URNS AUTO: 18 /HPF (ref 0–4)
SODIUM SERPL-SCNC: 138 MMOL/L
SP GR UR STRIP: 1.02 (ref 1–1.03)
SQUAMOUS #/AREA URNS AUTO: 1 /HPF
URN SPEC COLLECT METH UR: ABNORMAL
UROBILINOGEN UR STRIP-ACNC: NEGATIVE EU/DL
WBC # BLD AUTO: 5.96 K/UL
WBC #/AREA URNS AUTO: 0 /HPF (ref 0–5)
YEAST UR QL AUTO: ABNORMAL

## 2018-07-20 PROCEDURE — 81025 URINE PREGNANCY TEST: CPT | Performed by: EMERGENCY MEDICINE

## 2018-07-20 PROCEDURE — 81001 URINALYSIS AUTO W/SCOPE: CPT

## 2018-07-20 PROCEDURE — 96374 THER/PROPH/DIAG INJ IV PUSH: CPT

## 2018-07-20 PROCEDURE — 99283 EMERGENCY DEPT VISIT LOW MDM: CPT | Mod: 25

## 2018-07-20 PROCEDURE — 63600175 PHARM REV CODE 636 W HCPCS: Performed by: EMERGENCY MEDICINE

## 2018-07-20 PROCEDURE — 85025 COMPLETE CBC W/AUTO DIFF WBC: CPT

## 2018-07-20 PROCEDURE — 96361 HYDRATE IV INFUSION ADD-ON: CPT

## 2018-07-20 PROCEDURE — 99283 EMERGENCY DEPT VISIT LOW MDM: CPT | Mod: ,,, | Performed by: EMERGENCY MEDICINE

## 2018-07-20 PROCEDURE — 80053 COMPREHEN METABOLIC PANEL: CPT

## 2018-07-20 PROCEDURE — 25000003 PHARM REV CODE 250: Performed by: EMERGENCY MEDICINE

## 2018-07-20 RX ORDER — KETOROLAC TROMETHAMINE 30 MG/ML
15 INJECTION, SOLUTION INTRAMUSCULAR; INTRAVENOUS
Status: COMPLETED | OUTPATIENT
Start: 2018-07-20 | End: 2018-07-20

## 2018-07-20 RX ORDER — METHOCARBAMOL 500 MG/1
1000 TABLET, FILM COATED ORAL
Status: COMPLETED | OUTPATIENT
Start: 2018-07-20 | End: 2018-07-20

## 2018-07-20 RX ORDER — METHOCARBAMOL 500 MG/1
1000 TABLET, FILM COATED ORAL 3 TIMES DAILY PRN
Qty: 30 TABLET | Refills: 0 | Status: SHIPPED | OUTPATIENT
Start: 2018-07-20 | End: 2018-07-25

## 2018-07-20 RX ORDER — NAPROXEN 500 MG/1
500 TABLET ORAL 2 TIMES DAILY PRN
Qty: 20 TABLET | Refills: 0 | Status: SHIPPED | OUTPATIENT
Start: 2018-07-20 | End: 2019-04-23 | Stop reason: CLARIF

## 2018-07-20 RX ADMIN — KETOROLAC TROMETHAMINE 15 MG: 30 INJECTION, SOLUTION INTRAMUSCULAR at 03:07

## 2018-07-20 RX ADMIN — SODIUM CHLORIDE 1000 ML: 0.9 INJECTION, SOLUTION INTRAVENOUS at 03:07

## 2018-07-20 RX ADMIN — METHOCARBAMOL 1000 MG: 500 TABLET ORAL at 03:07

## 2018-07-20 NOTE — ED PROVIDER NOTES
Encounter Date: 7/20/2018    SCRIBE #1 NOTE: I, Kadie Dudley, am scribing for, and in the presence of,  Dr. Gutierrez. I have scribed the following portions of the note - the Resident attestation.       History     Chief Complaint   Patient presents with    Back Pain     Right sided for the past week. Naproxen not helping     Patient is a 33-year-old female with past medical history of hypertension and diabetes who presents with right-sided flank pain.  Patient states that pain began approximately 1.5 weeks ago was seen at this facility approximately 1 week ago was diagnosed with musculoskeletal pain and discharged with naproxen.  Patient stays naproxen is no longer helping pain and feels that pain is getting worse.  Patient describes pain in his right back and right flank pain 7/10 intensity radiating into min worsened with toys rotation or palpation with no obvious alleviating symptoms.  Patient denies fevers chills chest pain shortness of breath nausea vomiting diarrhea dysuria hematuria vaginal pain discharge bleeding lightheadedness dizziness numbness weakness or recent trauma.          Review of patient's allergies indicates:  No Known Allergies  Past Medical History:   Diagnosis Date    Diabetes mellitus      No past surgical history on file.  Family History   Problem Relation Age of Onset    Diabetes Mother      Social History   Substance Use Topics    Smoking status: Never Smoker    Smokeless tobacco: Never Used    Alcohol use Yes      Comment: social     Review of Systems   Constitutional: Negative for fever.   HENT: Negative for congestion, facial swelling, rhinorrhea and sore throat.    Eyes: Negative for visual disturbance.   Respiratory: Negative for cough, shortness of breath and wheezing.    Cardiovascular: Negative for chest pain, palpitations and leg swelling.   Gastrointestinal: Negative for abdominal pain, blood in stool, constipation, diarrhea, nausea and vomiting.   Genitourinary:  Positive for flank pain. Negative for dysuria, frequency, hematuria, vaginal bleeding, vaginal discharge and vaginal pain.   Musculoskeletal: Positive for back pain. Negative for neck pain and neck stiffness.   Skin: Negative for rash and wound.   Neurological: Negative for facial asymmetry, weakness, light-headedness and numbness.   Hematological: Does not bruise/bleed easily.   Psychiatric/Behavioral: Negative for agitation and confusion.   All other systems reviewed and are negative.      Physical Exam     Initial Vitals [07/20/18 0313]   BP Pulse Resp Temp SpO2   (!) 169/85 100 16 98.4 °F (36.9 °C) 100 %      MAP       --         Physical Exam    Nursing note and vitals reviewed.  Constitutional: She appears well-developed and well-nourished.   HENT:   Head: Normocephalic and atraumatic.   Eyes: Conjunctivae and EOM are normal. Pupils are equal, round, and reactive to light.   Neck: Normal range of motion. Neck supple.   Cardiovascular: Normal rate, regular rhythm, normal heart sounds and intact distal pulses.   Pulmonary/Chest: Breath sounds normal. No respiratory distress. She has no wheezes. She has no rhonchi. She has no rales. She exhibits no tenderness.   Abdominal: Soft. Bowel sounds are normal. She exhibits no distension and no mass. There is no tenderness. There is CVA tenderness. There is no rebound and no guarding.   Right CVA and right flank tenderness to palpation with no obvious rash ecchymosis erythema swelling or induration.   Musculoskeletal: Normal range of motion.   Neurological: She is alert and oriented to person, place, and time. She has normal strength.   Skin: Skin is warm and dry. Capillary refill takes less than 2 seconds.   Psychiatric: She has a normal mood and affect.         ED Course   Procedures  Labs Reviewed   CBC W/ AUTO DIFFERENTIAL - Abnormal; Notable for the following:        Result Value    Hemoglobin 11.1 (*)     Hematocrit 32.5 (*)     MCV 73 (*)     MCH 24.8 (*)     All  other components within normal limits   COMPREHENSIVE METABOLIC PANEL - Abnormal; Notable for the following:     Glucose 317 (*)     Albumin 2.7 (*)     Anion Gap 7 (*)     All other components within normal limits   URINALYSIS, REFLEX TO URINE CULTURE - Abnormal; Notable for the following:     Protein, UA 3+ (*)     Glucose, UA 3+ (*)     Occult Blood UA 1+ (*)     All other components within normal limits    Narrative:     Preferred Collection Type->Urine, Clean Catch   URINALYSIS MICROSCOPIC - Abnormal; Notable for the following:     RBC, UA 18 (*)     All other components within normal limits    Narrative:     Preferred Collection Type->Urine, Clean Catch   POCT URINE PREGNANCY          Imaging Results    None          Medical Decision Making:   History:   Old Medical Records: I decided to obtain old medical records.  Clinical Tests:   Lab Tests: Ordered and Reviewed       APC / Resident Notes:   Patient is a 33-year-old female with past medical history of hypertension and diabetes who presents with right-sided flank pain. Vitals normal.  Patient with a right flank and right CVA tenderness on exam.  Patient with pain with torso rotation.  CBC grossly normal.  CMP reveals glucose 317 with anion gap of 7.  UA reveals glucosuria proteinuria and recurrent hematuria.  Patient with normal creatinine tolerating p.o..  The patient is sleeping comfortably at bedside status post Toradol and Robaxin.  Patient with likely musculoskeletal pain considering point tenderness and pain worsened with torso rotation.  However differential includes nephrolithiasis as patient with hematuria and flank pain, however flank pain has been occurring for months, and patient has non infectious labs and normal creatinine not concerning for an obstructive uropathy or infectious etiology.  Patient with improvement of pain status post Toradol Robaxin IV fluids.  Plan to discharge home with naproxen robaxin return precautions and outpatient PCP  follow-up.  Patient agrees with plan.       Scribe Attestation:   Scribe #1: I performed the above scribed service and the documentation accurately describes the services I performed. I attest to the accuracy of the note.    Attending Attestation:   Physician Attestation Statement for Resident:  As the supervising MD   Physician Attestation Statement: I have personally seen and examined this patient.   I agree with the above history. -: 33 year old female presenting with right sided flank/ back pain for the past 10 days. Worse with movement. Reproducible. Denies hematuria or chest pains.   As the supervising MD I agree with the above PE.   -: General:  Well-appearing in no acute distress  Cardiac:  Normal S1-S2  Lungs:  Clear to auscultation bilaterally  Right back/flank:  Point tenderness with no rebound or guarding, negative CVA tenderness  Exam is consistent with musculoskeletal pain.    As the supervising MD I agree with the above treatment, course, plan, and disposition.   -: CT was initially ordered but canceled. Will review labs. Patient treated with Toradol and muscle relaxer.    P reports improvement of symptoms.  Patient continues to have RBCs in the urine.  She is well-appearing in no acute distress. I doubt obstructing stone.  Will have patient follow up with Urology for continued hematuria  I have reviewed and agree with the residents interpretation of the following: lab data.          Physician Attestation for Scribe:      Comments: I, Dr. Lakshmi Gutierrez, personally performed the services described in this documentation. All medical record entries made by the scribe were at my direction and in my presence.  I have reviewed the chart and agree that the record reflects my personal performance and is accurate and complete. Lakshmi Gutierrez MD.               Clinical Impression:   The primary encounter diagnosis was Flank pain. A diagnosis of Hematuria, unspecified type was also pertinent to this  visit.      Disposition:   Disposition: Discharged  Condition: Stable                        Andrez Peña MD  Resident  07/20/18 0511       Lakshmi Gutierrez MD  07/20/18 0618

## 2018-07-20 NOTE — ED TRIAGE NOTES
Mary Sims, a 33 y.o. female presents to the ED c/o right sided lower back pain since last week. Pt reports being seen last week and told it was musculoskeletal and given Naproxen. Pt denies relief with meds. Pain is worsened with certain movements.      Chief Complaint   Patient presents with    Back Pain     Right sided for the past week. Naproxen not helping     Review of patient's allergies indicates:  No Known Allergies  Past Medical History:   Diagnosis Date    Diabetes mellitus       Adult Physical Assessment  LOC: Mary Sims, 33 y.o. female verified via two identifiers.  The patient is awake, alert, oriented and speaking appropriately at this time.  APPEARANCE: Patient resting comfortably and appears to be in no acute distress at this time. Patient is clean and well groomed, patient's clothing is properly fastened.  SKIN:The skin is warm and dry, color consistent with ethnicity, patient has normal skin turgor and moist mucus membranes, skin intact, no breakdown or brusing noted.  MUSCULOSKELETAL: Patient moving all extremities well, no obvious swelling or deformities noted.  RESPIRATORY: Airway is open and patent, respirations are spontaneous, patient has a normal effort and rate, no accessory muscle use noted.  CARDIAC: Patient has a normal rate and rhythm, no periphreal edema noted in any extremity, capillary refill < 3 seconds in all extremities  ABDOMEN: Soft and non tender to palpation, no abdominal distention noted. Bowel sounds present in all four quadrants.  NEUROLOGIC: Eyes open spontaneously, behavior appropriate to situation, follows commands, facial expression symmetrical, bilateral hand grasp equal and even, purposeful motor response noted, normal sensation in all extremities when touched with a finger.

## 2019-01-22 ENCOUNTER — HOSPITAL ENCOUNTER (EMERGENCY)
Facility: HOSPITAL | Age: 34
Discharge: HOME | End: 2019-01-22
Attending: EMERGENCY MEDICINE
Payer: MEDICAID

## 2019-01-22 VITALS
TEMPERATURE: 98 F | OXYGEN SATURATION: 100 % | HEART RATE: 85 BPM | DIASTOLIC BLOOD PRESSURE: 70 MMHG | RESPIRATION RATE: 16 BRPM | HEIGHT: 63 IN | SYSTOLIC BLOOD PRESSURE: 141 MMHG | WEIGHT: 155 LBS | BODY MASS INDEX: 27.46 KG/M2

## 2019-01-22 DIAGNOSIS — H43.13 VITREOUS HEMORRHAGE OF BOTH EYES: Primary | ICD-10-CM

## 2019-01-22 PROBLEM — E10.3593 PROLIFERATIVE DIABETIC RETINOPATHY OF BOTH EYES ASSOCIATED WITH TYPE 1 DIABETES MELLITUS: Status: ACTIVE | Noted: 2019-01-22

## 2019-01-22 PROCEDURE — 99284 PR EMERGENCY DEPT VISIT,LEVEL IV: ICD-10-PCS | Mod: ,,, | Performed by: EMERGENCY MEDICINE

## 2019-01-22 PROCEDURE — 92004 COMPRE OPH EXAM NEW PT 1/>: CPT | Mod: ,,, | Performed by: OPHTHALMOLOGY

## 2019-01-22 PROCEDURE — 99284 EMERGENCY DEPT VISIT MOD MDM: CPT | Mod: ,,, | Performed by: EMERGENCY MEDICINE

## 2019-01-22 PROCEDURE — 92004 PR EYE EXAM, NEW PATIENT,COMPREHESV: ICD-10-PCS | Mod: ,,, | Performed by: OPHTHALMOLOGY

## 2019-01-22 PROCEDURE — 99282 EMERGENCY DEPT VISIT SF MDM: CPT

## 2019-01-22 NOTE — ED TRIAGE NOTES
Patient states that she cannot see anything out of both eyes and she sees black spots. Patient states she was supposed to see an eye specialist today but they changed the eye appointment.

## 2019-01-22 NOTE — HPI
This is a 33 year old female with PMH of uncontrolled DM type I since the age of 16 who presented to the Emergency Room for floaters and flashes in both eyes for the past 4 weeks. She reports that symptoms alternate between one eye and the other. Currently with decreased vision in both eyes. She denied complete loss of vision.    Patient reports she was seen by optometrist last Thursday 1/17/2019 who noticed vitreous hemorrhage in both eyes. Patient was schedules to follow up with ophthalmologist today but was re-scheduled for 1/24/2019 for insurance reasons. Patient presented to ED today and ophthalmology was consulted.     PMH: Diabetes Mellitus type I since the age of 16 and HTN   Allergies: no known allergies   POH: Shed denied history of retinal detachment or other eye problems in the past   Family history: Negative for glaucoma, blindness, AMD

## 2019-01-22 NOTE — SUBJECTIVE & OBJECTIVE
"No current facility-administered medications on file prior to encounter.      Current Outpatient Medications on File Prior to Encounter   Medication Sig    ergocalciferol, vitamin D2, (VITAMIN D ORAL) Take by mouth.    insulin aspart (NOVOLOG) 100 unit/mL InPn pen Inject 5 Units into the skin 3 (three) times daily with meals. (Patient taking differently: Inject 10 Units into the skin 3 (three) times daily with meals. )    LISINOPRIL ORAL Take by mouth.    naproxen (NAPROSYN) 500 MG tablet Take 1 tablet (500 mg total) by mouth 2 (two) times daily as needed.    pen needle, diabetic (BD ULTRA-FINE ISAAC PEN NEEDLES) 32 gauge x 5/32" Ndle Use to inject insulin 5x/day    [DISCONTINUED] insulin detemir (LEVEMIR FLEXTOUCH) 100 unit/mL (3 mL) SubQ InPn pen Inject 15 Units into the skin 2 (two) times daily. (Patient taking differently: Inject 45 Units into the skin every evening. )    [DISCONTINUED] ondansetron (ZOFRAN) 4 MG tablet Take 1 tablet (4 mg total) by mouth every 8 (eight) hours as needed for Nausea.    [DISCONTINUED] ondansetron (ZOFRAN-ODT) 4 MG TbDL Take 1 tablet (4 mg total) by mouth every 6 (six) hours as needed (nausea vomiting).       Past Medical History:   Diagnosis Date    Diabetes mellitus     Hypertension        History reviewed. No pertinent surgical history.    Review of patient's allergies indicates:  No Known Allergies    Family History     Problem Relation (Age of Onset)    Diabetes Mother        Tobacco Use    Smoking status: Never Smoker    Smokeless tobacco: Never Used   Substance and Sexual Activity    Alcohol use: Yes     Comment: social    Drug use: No    Sexual activity: Yes     Partners: Male     Review of Systems   All other systems reviewed and are negative.    Objective:     Vital Signs (Most Recent):  Temp: 97.6 °F (36.4 °C) (01/22/19 0808)  Pulse: 88 (01/22/19 0808)  Resp: 18 (01/22/19 0808)  BP: (!) 154/93 (01/22/19 0808)  SpO2: 100 % (01/22/19 0808) Vital Signs (24h " Range):  Temp:  [97.6 °F (36.4 °C)] 97.6 °F (36.4 °C)  Pulse:  [88] 88  Resp:  [18] 18  SpO2:  [100 %] 100 %  BP: (154)/(93) 154/93     Weight: 70.3 kg (155 lb)  Body mass index is 27.46 kg/m².    Significant Labs:  None    Significant Imaging:  None

## 2019-01-22 NOTE — CONSULTS
[facility]  Ophthalmology  Consult Note    Patient Name: Mary Sims  MRN: [id number]  Admission Date: 1/22/2019  Hospital Length of Stay: 0 days  Attending Provider: Roel Olivera MD   Primary Care Physician: [facility]  Principal Problem:<principal problem not specified>    Inpatient consult to Ophthalmology  Consult performed by: Catherine Lucia MD  Consult ordered by: Roel Olivera MD        Subjective:     Chief Complaint:  Flashes and floaters     HPI:   This is a 33 year old female with PMH of uncontrolled DM type I since the age of 16 who presented to the Emergency Room for floaters and flashes in both eyes for the past 4 weeks. She reports that symptoms alternate between one eye and the other. Currently with decreased vision in both eyes. She denied complete loss of vision.    Patient reports she was seen by optometrist last Thursday 1/17/2019 who noticed vitreous hemorrhage in both eyes. Patient was schedules to follow up with ophthalmologist today but was re-scheduled for 1/24/2019 for insurance reasons. Patient presented to ED today and ophthalmology was consulted.     PMH: Diabetes Mellitus type I since the age of 16 and HTN   Allergies: no known allergies   POH: Shed denied history of retinal detachment or other eye problems in the past   Family history: Negative for glaucoma, blindness, AMD     No current facility-administered medications on file prior to encounter.      Current Outpatient Medications on File Prior to Encounter   Medication Sig    ergocalciferol, vitamin D2, (VITAMIN D ORAL) Take by mouth.    insulin aspart (NOVOLOG) 100 unit/mL InPn pen Inject 5 Units into the skin 3 (three) times daily with meals. (Patient taking differently: Inject 10 Units into the skin 3 (three) times daily with meals. )    LISINOPRIL ORAL Take by mouth.    naproxen (NAPROSYN) 500 MG tablet Take 1 tablet (500 mg total) by mouth 2 (two) times daily  "as needed.    pen needle, diabetic (BD ULTRA-FINE ISAAC PEN NEEDLES) 32 gauge x 5/32" Ndle Use to inject insulin 5x/day    [DISCONTINUED] insulin detemir (LEVEMIR FLEXTOUCH) 100 unit/mL (3 mL) SubQ InPn pen Inject 15 Units into the skin 2 (two) times daily. (Patient taking differently: Inject 45 Units into the skin every evening. )    [DISCONTINUED] ondansetron (ZOFRAN) 4 MG tablet Take 1 tablet (4 mg total) by mouth every 8 (eight) hours as needed for Nausea.    [DISCONTINUED] ondansetron (ZOFRAN-ODT) 4 MG TbDL Take 1 tablet (4 mg total) by mouth every 6 (six) hours as needed (nausea vomiting).       Past Medical History:   Diagnosis Date    Diabetes mellitus     Hypertension        History reviewed. No pertinent surgical history.    Review of patient's allergies indicates:  No Known Allergies    Family History     Problem Relation (Age of Onset)    Diabetes Mother        Tobacco Use    Smoking status: Never Smoker    Smokeless tobacco: Never Used   Substance and Sexual Activity    Alcohol use: Yes     Comment: social    Drug use: No    Sexual activity: Yes     Partners: Male     Review of Systems   All other systems reviewed and are negative.    Objective:     Vital Signs (Most Recent):  Temp: 97.6 °F (36.4 °C) (01/22/19 0808)  Pulse: 88 (01/22/19 0808)  Resp: 18 (01/22/19 0808)  BP: (!) 154/93 (01/22/19 0808)  SpO2: 100 % (01/22/19 0808) Vital Signs (24h Range):  Temp:  [97.6 °F (36.4 °C)] 97.6 °F (36.4 °C)  Pulse:  [88] 88  Resp:  [18] 18  SpO2:  [100 %] 100 %  BP: (154)/(93) 154/93     Weight: 70.3 kg (155 lb)  Body mass index is 27.46 kg/m².    Significant Labs:  None    Significant Imaging:  None       Base Eye Exam     Visual Acuity (Snellen - Linear)       Right Left    Dist sc 20/40 CF @3 ft     Dist ph sc 20/30-2           Tonometry (Tonopen, 11:22 AM)       Right Left    Pressure 17 18          Pupils       Pupils Dark Light Shape React APD    Right PERRL 4 2 Round Brisk None    Left PERRL 4 2 " Round Brisk None          Visual Fields       Right Left     Full Full          Extraocular Movement       Right Left     Full, Ortho Full, Ortho          Dilation     Both eyes:  1.0% Mydriacyl, 0.5% Mydriacyl @ 11:25 AM            Slit Lamp and Fundus Exam     External Exam       Right Left    External Normal Normal          Slit Lamp Exam       Right Left    Lids/Lashes Normal Normal    Conjunctiva/Sclera White and quiet White and quiet    Cornea Clear Clear    Anterior Chamber Deep and quiet Deep and quiet    Iris Round and reactive no NVI  Round and reactive, no NVI     Lens Clear Clear    Vitreous Vitreous hemorrhage mostly inferiorly  Dense vitreous and preretinal hemorrhage overlying the macula and around ONGH           Fundus Exam       Right Left    Disc NVD  NVD with dense overlying heme     C/D Ratio 0.3 Obscured by heme     Macula Flat  No view     Vessels NVE observed in the midperiphery and around ONH . DBH and preretinal heme seen mostly inferiorly  Obscured by Vit heme     Periphery DBH no breaks, holes or tears  DBH no breaks, holes or tears               Assessment and Plan:     Proliferative diabetic retinopathy of both eyes associated with type 1 diabetes mellitus    Patient with longstanding uncontrolled DMI   Currently with flashes and floaters in both eyes for the past 4 weeks  Seen by optometry who noticed vit heme OU   Patient presented to ED today with flashes and floaters   On DFE with Pre-retinal and vitreous heme OU NVD and NVE (High risk characteristics)   No RD breaks or tears seen on exam   Recommend to sleep with head elevated   Will refer to Retina clinic next available   Discussion regarding importance of good blood sugar, blood pressure control   Patient acknowledged information            Thank you for your consult. I will follow-up with patient. Please contact us if you have any additional questions.    Catherine Lucia MD  Ophthalmology  [facility]

## 2019-01-22 NOTE — ASSESSMENT & PLAN NOTE
Patient with longstanding uncontrolled DMI   Currently with flashes and floaters in both eyes for the past 4 weeks  Seen by optometry who noticed vit heme OU   Patient presented to ED today with flashes and floaters   On DFE with Pre-retinal and vitreous heme OU NVD and NVE (High risk characteristics)   No RD breaks or tears seen on exam   Recommend to sleep with head elevated   Will refer to Retina clinic next available   Discussion regarding importance of good blood sugar, blood pressure control   Patient acknowledged information

## 2019-01-22 NOTE — ED PROVIDER NOTES
"Encounter Date: 1/22/2019    SCRIBE #1 NOTE: I, Nydia Art, am scribing for, and in the presence of,  Dr. Olivera. I have scribed the following portions of the note - the Resident attestation.       History     Chief Complaint   Patient presents with    Loss of Vision     "I  cant see out of both my eyes" x 2 weeks. States has optho appointment on 24th but states vision has gotten worse.      Ms. Sims is a 33 y/of female with a PMHx of uncontrolled type 1 DM, HTN presenting to ED complaining of worsening vision for the last 2 weeks. Pt reports that she first began seeing black spots bilaterally, this progressed into the feeling that a "curtain was coming down" as patient reported that she felt like she was seeing through a film. Patient now reports only being able to see shapes and colors, however, is able to ambulate without much difficulty. Patient was supposed to see an ophthalmologist today, but had her appointment canceled and re-scheduled for Thursday, 01/24. She is experiencing a slight headache and some tightness around her eyes.  Patient denies fever, chills, nausea/vomiting, CP, SOB, abdominal pain, dysuria, weakness/numbness in extremities.           Review of patient's allergies indicates:  No Known Allergies  Past Medical History:   Diagnosis Date    Diabetes mellitus     Hypertension      History reviewed. No pertinent surgical history.  Family History   Problem Relation Age of Onset    Diabetes Mother      Social History     Tobacco Use    Smoking status: Never Smoker    Smokeless tobacco: Never Used   Substance Use Topics    Alcohol use: Yes     Comment: social    Drug use: No     Review of Systems   Constitutional: Negative for fever.   HENT: Negative for sinus pain and sore throat.    Eyes: Positive for visual disturbance. Negative for pain, discharge and redness.   Respiratory: Negative for shortness of breath.    Cardiovascular: Negative for chest pain.   Gastrointestinal: " Negative for nausea.   Genitourinary: Negative for dysuria.   Musculoskeletal: Negative for back pain.   Skin: Negative for rash.   Neurological: Positive for headaches. Negative for dizziness, weakness and light-headedness.   Hematological: Does not bruise/bleed easily.   Psychiatric/Behavioral: Negative for agitation and confusion.       Physical Exam     Initial Vitals [01/22/19 0808]   BP Pulse Resp Temp SpO2   (!) 154/93 88 18 97.6 °F (36.4 °C) 100 %      MAP       --         Physical Exam    Nursing note and vitals reviewed.  Constitutional: She appears well-developed and well-nourished. No distress.   HENT:   Head: Normocephalic and atraumatic.   Mouth/Throat: Oropharynx is clear and moist.   Eyes: Conjunctivae and EOM are normal. Pupils are equal, round, and reactive to light. Right eye exhibits no chemosis, no discharge and no exudate. Left eye exhibits no chemosis, no discharge and no exudate. Right conjunctiva has no hemorrhage. Left conjunctiva has no hemorrhage.   Visual acuity testing:  Right eye: 20/50  Left eye: unable to make out any letter on chart    Bedside ultrasound shows evidence with concern for vitreous vs retinal detachment.    Neck: Normal range of motion. Neck supple.   Cardiovascular: Normal rate, regular rhythm, normal heart sounds and intact distal pulses. Exam reveals no gallop and no friction rub.    No murmur heard.  Pulmonary/Chest: Breath sounds normal. No respiratory distress. She exhibits no tenderness.   Abdominal: Soft. Bowel sounds are normal. She exhibits no distension. There is no tenderness.   Musculoskeletal: Normal range of motion.   Lymphadenopathy:     She has no cervical adenopathy.   Neurological: She is alert and oriented to person, place, and time. No cranial nerve deficit.   Skin: Skin is warm and dry. Capillary refill takes less than 2 seconds. No erythema. No pallor.   Psychiatric: She has a normal mood and affect.         ED Course   Procedures  Labs Reviewed -  No data to display       Imaging Results    None          Medical Decision Making:   History:   Old Medical Records: I decided to obtain old medical records.  Initial Assessment:   34 y/o female with a PMHx of uncontrolled type 1 DM, HTN presenting to the ED complaining of worsening vision loss for 2 weeks. Upon arrival to ED, patient hemodynamically stable, alert and oriented x3. No papilledema noted on exam. No conjunctival hemorrhage visible. My differential diagnosis includes but is not limited to progressive diabetic retinopathy, vitreous detachment, retinal detachment, vitreous hemorrhage. We will consult ophthalmology for further evaluation and management.   Other:   I have discussed this case with another health care provider.       <> Summary of the Discussion: opthalmology        APC / Resident Notes:   Ophthalmology has seen and evaluated pt in clinic, they believe the patient is experiencing vitreous hemorrhages bilaterally, no retinal detachment noted on exam. Floater and flashes also noted one exam. They recommend sleeping with the head elevated. Patient will follow-up in retina clinic at the next available appointment. Discussion was had about the importance of controlling blood sugars and blood pressure.        Scribe Attestation:   Scribe #1: I performed the above scribed service and the documentation accurately describes the services I performed. I attest to the accuracy of the note.    Attending Attestation:   Physician Attestation Statement for Resident:  As the supervising MD   Physician Attestation Statement: I have personally seen and examined this patient.   I agree with the above history. -:   As the supervising MD I agree with the above PE.   - with the following exceptions: No appreciable visual field loss. No injection. VA OD 20/50, OS finger count. EOMI. No nystagmus.    As the supervising MD I agree with the above treatment, course, plan, and disposition.   - with the following  exceptions:     Vitals normal. Afebrile. Exam as above. Tylenol given for headache. Here w/ acute on chronic vision changes, but does describe curtain like effect over past week. Has decreased acuity in OS. Bedside US perfomed which showed bilateral vitreaous hemorrhage/detachment. I have some concern for retinal detachment. Ophthalmology consulted, who evaluted pt. They state no retinal detachment, but will have pt f/u ASAP w/ retinal specialist. Has appt in 2 days. Stable for discharge at this time.                       Clinical Impression:   The encounter diagnosis was Vitreous hemorrhage of both eyes.      Disposition:   Disposition: Discharged  Condition: Stable                        Amilcar Richard MD  Resident  01/25/19 0603       Roel Olivera MD  01/25/19 7253

## 2019-01-22 NOTE — ED NOTES
The patient is resting quietly, eyes closed, arouses easily to stimuli. Airway is open and patent, respirations are spontaneous, normal respiratory effort and rate noted, skin warm and dry, appearance: in no acute distress and resting comfortably.  Blankets provided, patient aware she is awaiting opth, lights dimmed per pt request.

## 2019-02-25 ENCOUNTER — OFFICE VISIT (OUTPATIENT)
Dept: URGENT CARE | Facility: CLINIC | Age: 34
End: 2019-02-25
Payer: MEDICAID

## 2019-02-25 VITALS
DIASTOLIC BLOOD PRESSURE: 75 MMHG | BODY MASS INDEX: 27.46 KG/M2 | OXYGEN SATURATION: 99 % | SYSTOLIC BLOOD PRESSURE: 106 MMHG | TEMPERATURE: 98 F | HEART RATE: 89 BPM | WEIGHT: 155 LBS

## 2019-02-25 DIAGNOSIS — K52.9 GASTROENTERITIS: Primary | ICD-10-CM

## 2019-02-25 DIAGNOSIS — E11.9 TYPE 2 DIABETES MELLITUS WITHOUT COMPLICATION, WITHOUT LONG-TERM CURRENT USE OF INSULIN: ICD-10-CM

## 2019-02-25 PROCEDURE — 99214 OFFICE O/P EST MOD 30 MIN: CPT | Mod: S$GLB,,, | Performed by: SURGERY

## 2019-02-25 PROCEDURE — 99214 PR OFFICE/OUTPT VISIT, EST, LEVL IV, 30-39 MIN: ICD-10-PCS | Mod: S$GLB,,, | Performed by: SURGERY

## 2019-02-25 RX ORDER — ONDANSETRON 4 MG/1
4 TABLET, ORALLY DISINTEGRATING ORAL EVERY 8 HOURS PRN
Qty: 12 TABLET | Refills: 0 | Status: SHIPPED | OUTPATIENT
Start: 2019-02-25 | End: 2019-03-02

## 2019-02-25 RX ORDER — AZITHROMYCIN 500 MG/1
500 TABLET, FILM COATED ORAL DAILY
Qty: 3 TABLET | Refills: 0 | Status: SHIPPED | OUTPATIENT
Start: 2019-02-25 | End: 2019-04-16

## 2019-02-25 RX ORDER — ONDANSETRON 8 MG/1
8 TABLET, ORALLY DISINTEGRATING ORAL ONCE
Status: COMPLETED | OUTPATIENT
Start: 2019-02-25 | End: 2019-02-25

## 2019-02-25 RX ADMIN — ONDANSETRON 8 MG: 8 TABLET, ORALLY DISINTEGRATING ORAL at 04:02

## 2019-02-25 NOTE — PROGRESS NOTES
Subjective:       Patient ID: Mary Sims is a 33 y.o. female.    Vitals:  weight is 70.3 kg (155 lb). Her temperature is 97.8 °F (36.6 °C). Her blood pressure is 106/75 and her pulse is 89. Her oxygen saturation is 99%.     Chief Complaint: Emesis    Pt states that she started with vomiting and nausea about 3 days ago. Pt was taking pepto and zofran .  Her glucose was 144 today. No diarrhea. Think she got food poisoning from frozen pizza      Emesis    This is a new problem. The current episode started in the past 7 days. The emesis has an appearance of stomach contents. Pertinent negatives include no arthralgias, chest pain, chills, coughing, dizziness, fever, headaches or myalgias. Treatments tried: pepto. The treatment provided mild relief.       Constitution: Negative for chills, fatigue and fever.   HENT: Negative for congestion and sore throat.    Neck: Negative for painful lymph nodes.   Cardiovascular: Negative for chest pain and leg swelling.   Eyes: Negative for double vision and blurred vision.   Respiratory: Negative for cough and shortness of breath.    Gastrointestinal: Positive for nausea and vomiting.   Genitourinary: Negative for dysuria, frequency, urgency and history of kidney stones.   Musculoskeletal: Negative for joint pain, joint swelling, muscle cramps and muscle ache.   Skin: Negative for color change, pale, rash and bruising.   Allergic/Immunologic: Negative for seasonal allergies.   Neurological: Negative for dizziness, history of vertigo, light-headedness, passing out and headaches.   Hematologic/Lymphatic: Negative for swollen lymph nodes.   Psychiatric/Behavioral: Negative for nervous/anxious, sleep disturbance and depression. The patient is not nervous/anxious.        Objective:      Physical Exam   Constitutional: She is oriented to person, place, and time. She appears well-developed and well-nourished. She is cooperative.  Non-toxic appearance. She does not appear ill. No  distress.   HENT:   Head: Normocephalic and atraumatic.   Right Ear: Hearing, tympanic membrane, external ear and ear canal normal.   Left Ear: Hearing, tympanic membrane, external ear and ear canal normal.   Nose: Nose normal. No mucosal edema, rhinorrhea or nasal deformity. No epistaxis. Right sinus exhibits no maxillary sinus tenderness and no frontal sinus tenderness. Left sinus exhibits no maxillary sinus tenderness and no frontal sinus tenderness.   Mouth/Throat: Uvula is midline, oropharynx is clear and moist and mucous membranes are normal. No trismus in the jaw. Normal dentition. No uvula swelling. No posterior oropharyngeal erythema.   Eyes: Conjunctivae and lids are normal. No scleral icterus.   Sclera clear bilat   Neck: Trachea normal, full passive range of motion without pain and phonation normal. Neck supple.   Cardiovascular: Normal rate, regular rhythm, normal heart sounds, intact distal pulses and normal pulses.   Pulmonary/Chest: Effort normal and breath sounds normal. No respiratory distress.   Abdominal: Soft. Normal appearance. She exhibits no distension. Bowel sounds are increased. There is no tenderness.   Musculoskeletal: Normal range of motion. She exhibits no edema or deformity.   Neurological: She is alert and oriented to person, place, and time. She exhibits normal muscle tone. Coordination normal.   Skin: Skin is warm, dry and intact. She is not diaphoretic. No pallor.   Psychiatric: She has a normal mood and affect. Her speech is normal and behavior is normal. Judgment and thought content normal. Cognition and memory are normal.   Nursing note and vitals reviewed.      Assessment:       1. Gastroenteritis    2. Type 2 diabetes mellitus without complication, without long-term current use of insulin        Plan:         Gastroenteritis  -     ondansetron disintegrating tablet 8 mg  -     ondansetron (ZOFRAN-ODT) 4 MG TbDL; Take 1 tablet (4 mg total) by mouth every 8 (eight) hours as  needed.  Dispense: 12 tablet; Refill: 0  -     azithromycin (ZITHROMAX) 500 MG tablet; Take 1 tablet (500 mg total) by mouth once daily.  Dispense: 3 tablet; Refill: 0    Type 2 diabetes mellitus without complication, without long-term current use of insulin      Patient Instructions     Noninfectious Gastroenteritis (Ages 6 Years to Adult)    Gastroenteritis can cause nausea, vomiting, diarrhea, and abdominal cramping. This may occur as a result of food sensitivity, inflammation of your gastrointestinal tract, medicines, stress, or other causes not related to infection. Your symptoms will usually last from 1 to 3 days, but can last longer. Antibiotics are not effective, but simple home treatment will be helpful.  Home care  Medicine  · You may use acetaminophen or NSAID medicines like ibuprofen or naproxen to control fever, unless another medicine is prescribed. (Note: If you have chronic liver or kidney disease, or ever had a stomach ulcer or gastrointestinalI bleeding, talk with your healthcare provider before using these medicines.) Aspirin should never be used in anyone under 18 years of age who is ill with a fever. It may cause severe liver damage. Don't increase your NSAID medicines if you are already taking these medicines for another condition (like arthritis). Don't use NSAIDS if you are on aspirin (such as for heart disease, or after a stroke).  · If medicines for diarrhea or vomiting are prescribed, take only as directed.  General care and preventing spread of the illness  · If symptoms are severe, rest at home for the next 24 hours or until you feel better.  · Hand washing with soap and water is the best way to prevent the spread of infection. Wash your hands after touching anyone who is sick.  · Wash your hands after using the toilet and before meals. Clean the toilet after each use.  · Caffeine, tobacco, and alcohol can make your diarrhea, cramping, and pain worse.  Diet  · Water and clear liquids are  important so you do not get dehydrated. Drink a small amount at a time.  · Do not force yourself to eat, especially if you have cramps, vomiting, or diarrhea. When you finally decide to start eating, do not eat large amounts at a time, even if you are hungry.  · If you eat, avoid fatty, greasy, spicy, or fried foods.  · Do not eat dairy products if you have diarrhea; they can make the diarrhea worse.  During the first 24 hours (the first full day), follow the diet below:  · Beverages: Water, clear liquids, soft drinks without caffeine, like ginger ale; mineral water (plain or flavored); decaffeinated tea and coffee.  · Soups: Clear broth, consommé, and bouillon Sports drinks aren't a good choice because they have too much sugar and not enough electrolytes. In this case, commercially available products called oral rehydration solutions are best.  · Desserts: Plain gelatin, popsicles, and fruit juice bars.  During the next 24 hours (the second day), you may add the following to the above if you have improved. If not, continue what you did the first day:  · Hot cereal, plain toast, bread, rolls, crackers  · Plain noodles, rice, mashed potatoes, chicken noodle or rice soup  · Unsweetened canned fruit (avoid pineapple), bananas  · Limit caffeine and chocolate. No spices or seasonings except salt.  During the next 24 hours  · Gradually resume a normal diet, as you feel better and your symptoms improve.  · If at any time your symptoms start getting worse, go back to clear liquids until you feel better.  Food preparation  · If you have diarrhea, you should not prepare food for others. When you  prepare food for yourself, wash your hands before and after.  · Wash your hands after using cutting boards, countertops, and knives that have been in contact with raw food.  · Keep uncooked meats away from cooked and ready-to-eat foods.  Follow-up care  Follow up with your healthcare provider if you are not improving over the next 2  to 3 days, or as advised. If a stool (diarrhea) sample was taken, call for the results as directed.  When to seek medical care  Call your healthcare provider right away if any of these occur:   · Increasing abdominal pain or constant lower right abdominal pain  · Continued vomiting (unable to keep liquids down)  · Frequent diarrhea (more than 5 times a day)  · Blood in vomit or stool (black or red color)  · Inability to tolerate solid food after a few days.  · Dark urine, reduced urine output  · Weakness, dizziness  · Drowsiness  · Fever of 100.4ºF (38.0ºC) or higher, or as directed by your healthcare provider  · New rash  Call 911  Call 911 if any of these occur:  · Trouble breathing  · Chest pain  · Confusion  · Severe drowsiness or trouble awakening  · Seizure  · Stiff neck  Date Last Reviewed: 11/16/2015  © 9073-4893 [employer]. [address] [place] [address], [place] [address]. All rights reserved. This information is not intended as a substitute for professional medical care. Always follow your healthcare professional's instructions.

## 2019-02-25 NOTE — PATIENT INSTRUCTIONS
Noninfectious Gastroenteritis (Ages 6 Years to Adult)    Gastroenteritis can cause nausea, vomiting, diarrhea, and abdominal cramping. This may occur as a result of food sensitivity, inflammation of your gastrointestinal tract, medicines, stress, or other causes not related to infection. Your symptoms will usually last from 1 to 3 days, but can last longer. Antibiotics are not effective, but simple home treatment will be helpful.  Home care  Medicine  · You may use acetaminophen or NSAID medicines like ibuprofen or naproxen to control fever, unless another medicine is prescribed. (Note: If you have chronic liver or kidney disease, or ever had a stomach ulcer or gastrointestinalI bleeding, talk with your healthcare provider before using these medicines.) Aspirin should never be used in anyone under 18 years of age who is ill with a fever. It may cause severe liver damage. Don't increase your NSAID medicines if you are already taking these medicines for another condition (like arthritis). Don't use NSAIDS if you are on aspirin (such as for heart disease, or after a stroke).  · If medicines for diarrhea or vomiting are prescribed, take only as directed.  General care and preventing spread of the illness  · If symptoms are severe, rest at home for the next 24 hours or until you feel better.  · Hand washing with soap and water is the best way to prevent the spread of infection. Wash your hands after touching anyone who is sick.  · Wash your hands after using the toilet and before meals. Clean the toilet after each use.  · Caffeine, tobacco, and alcohol can make your diarrhea, cramping, and pain worse.  Diet  · Water and clear liquids are important so you do not get dehydrated. Drink a small amount at a time.  · Do not force yourself to eat, especially if you have cramps, vomiting, or diarrhea. When you finally decide to start eating, do not eat large amounts at a time, even if you are hungry.  · If you eat, avoid  fatty, greasy, spicy, or fried foods.  · Do not eat dairy products if you have diarrhea; they can make the diarrhea worse.  During the first 24 hours (the first full day), follow the diet below:  · Beverages: Water, clear liquids, soft drinks without caffeine, like ginger ale; mineral water (plain or flavored); decaffeinated tea and coffee.  · Soups: Clear broth, consommé, and bouillon Sports drinks aren't a good choice because they have too much sugar and not enough electrolytes. In this case, commercially available products called oral rehydration solutions are best.  · Desserts: Plain gelatin, popsicles, and fruit juice bars.  During the next 24 hours (the second day), you may add the following to the above if you have improved. If not, continue what you did the first day:  · Hot cereal, plain toast, bread, rolls, crackers  · Plain noodles, rice, mashed potatoes, chicken noodle or rice soup  · Unsweetened canned fruit (avoid pineapple), bananas  · Limit caffeine and chocolate. No spices or seasonings except salt.  During the next 24 hours  · Gradually resume a normal diet, as you feel better and your symptoms improve.  · If at any time your symptoms start getting worse, go back to clear liquids until you feel better.  Food preparation  · If you have diarrhea, you should not prepare food for others. When you  prepare food for yourself, wash your hands before and after.  · Wash your hands after using cutting boards, countertops, and knives that have been in contact with raw food.  · Keep uncooked meats away from cooked and ready-to-eat foods.  Follow-up care  Follow up with your healthcare provider if you are not improving over the next 2 to 3 days, or as advised. If a stool (diarrhea) sample was taken, call for the results as directed.  When to seek medical care  Call your healthcare provider right away if any of these occur:   · Increasing abdominal pain or constant lower right abdominal pain  · Continued  vomiting (unable to keep liquids down)  · Frequent diarrhea (more than 5 times a day)  · Blood in vomit or stool (black or red color)  · Inability to tolerate solid food after a few days.  · Dark urine, reduced urine output  · Weakness, dizziness  · Drowsiness  · Fever of 100.4ºF (38.0ºC) or higher, or as directed by your healthcare provider  · New rash  Call 911  Call 911 if any of these occur:  · Trouble breathing  · Chest pain  · Confusion  · Severe drowsiness or trouble awakening  · Seizure  · Stiff neck  Date Last Reviewed: 11/16/2015  © 7018-9584 [employer]. [address] [place] [address], [place] [address]. All rights reserved. This information is not intended as a substitute for professional medical care. Always follow your healthcare professional's instructions.

## 2019-02-25 NOTE — LETTER
February 25, 2019      [facility] [address], [place] [address]  Phone: [phone]  Fax: [phone]       Patient: Mary Sims   YOB: 1985  Date of Visit: 02/25/2019    To Whom It May Concern:    Wale Sims  was at [facility] on 02/25/2019. She may return to work/school on 2/26/2019 with no restrictions. If you have any questions or concerns, or if I can be of further assistance, please do not hesitate to contact me.    Sincerely,      Ally Murphy MD

## 2019-04-16 ENCOUNTER — INITIAL CONSULT (OUTPATIENT)
Dept: OPHTHALMOLOGY | Facility: CLINIC | Age: 34
End: 2019-04-16
Payer: MEDICAID

## 2019-04-16 ENCOUNTER — TELEPHONE (OUTPATIENT)
Dept: OPHTHALMOLOGY | Facility: CLINIC | Age: 34
End: 2019-04-16

## 2019-04-16 VITALS — SYSTOLIC BLOOD PRESSURE: 129 MMHG | HEART RATE: 101 BPM | DIASTOLIC BLOOD PRESSURE: 85 MMHG

## 2019-04-16 DIAGNOSIS — H43.13 VITREOUS HEMORRHAGE, BILATERAL: ICD-10-CM

## 2019-04-16 DIAGNOSIS — H33.41 COMBINED TRACTION AND RHEGMATOGENOUS RETINAL DETACHMENT OF RIGHT EYE: Primary | ICD-10-CM

## 2019-04-16 DIAGNOSIS — H33.001 COMBINED TRACTION AND RHEGMATOGENOUS RETINAL DETACHMENT OF RIGHT EYE: Primary | ICD-10-CM

## 2019-04-16 PROCEDURE — 92225 PR SPECIAL EYE EXAM, INITIAL: CPT | Mod: 50,PBBFAC | Performed by: OPHTHALMOLOGY

## 2019-04-16 PROCEDURE — 92225 PR SPECIAL EYE EXAM, INITIAL: CPT | Mod: 50,S$PBB,, | Performed by: OPHTHALMOLOGY

## 2019-04-16 PROCEDURE — 99999 PR PBB SHADOW E&M-EST. PATIENT-LVL III: ICD-10-PCS | Mod: PBBFAC,,, | Performed by: OPHTHALMOLOGY

## 2019-04-16 PROCEDURE — 92225 PR SPECIAL EYE EXAM, INITIAL: ICD-10-PCS | Mod: 50,S$PBB,, | Performed by: OPHTHALMOLOGY

## 2019-04-16 PROCEDURE — 99213 OFFICE O/P EST LOW 20 MIN: CPT | Mod: PBBFAC,25 | Performed by: OPHTHALMOLOGY

## 2019-04-16 PROCEDURE — 92014 PR EYE EXAM, EST PATIENT,COMPREHESV: ICD-10-PCS | Mod: S$PBB,,, | Performed by: OPHTHALMOLOGY

## 2019-04-16 PROCEDURE — 99999 PR PBB SHADOW E&M-EST. PATIENT-LVL III: CPT | Mod: PBBFAC,,, | Performed by: OPHTHALMOLOGY

## 2019-04-16 PROCEDURE — 92014 COMPRE OPH EXAM EST PT 1/>: CPT | Mod: S$PBB,,, | Performed by: OPHTHALMOLOGY

## 2019-04-16 NOTE — PROGRESS NOTES
HPI     Eye Problem      Additional comments: consult PDR/ previous [facility] patient              Comments     Everything is blurry x 4 mos. She was seeing black spots/things floating. She has been having eye pain which she rates as a 7 on the pain scale. She also has been having HAs. She was seeing Dr Pearson and has had . She had injections x 2 OU. She was told she needed sx but Dr Pearson does sx at  and insurance doesn't cover that facility. DM under control at present time    ANH=267 last night  A1C= ??    OCT - TRD OU      A/P    1. PDR OU  T1 - uncontrolled  S/p Avastin OU with [facility] Last 3/25/19    2. TRD OU    OD with combined RRD/TRD    Plan 25g PPV/membrane stripping/ILM peel/EL/AFx/GFx/Avastin OD  For RRD/TRD    Local MAC  LOC 80 min    Risks, benefits, and alternatives to treatment discussed in detail with the patient.  The patient voiced understanding and wished to proceed with the procedure    Will need repair OS in near future with Preop Avastin OS    3.VH OU        To OR

## 2019-04-16 NOTE — LETTER
April 16, 2019      Benito Pearson MD  [address] [facility] [place] [address]           [facility]  [address]  [place] [address]  Phone: [phone]  Fax: [phone]          Patient: Mary Sims   MR Number: [id number]   YOB: 1985   Date of Visit: 4/16/2019       Dear Dr. Benito Pearson:    Thank you for referring Mary Sims to me for evaluation. Attached you will find relevant portions of my assessment and plan of care.    If you have questions, please do not hesitate to call me. I look forward to following Mary Sims along with you.    Sincerely,    JAMI Schulz MD    Enclosure  CC:  No Recipients    If you would like to receive this communication electronically, please contact [email or web address] or [phone] to request more information on [facility] access.    For providers and/or their staff who would like to refer a patient to [facility], please contact us through our one-stop-shop provider referral line, [facility], at [phone].    If you feel you have received this communication in error or would no longer like to receive these types of communications, please e-mail [email or web address]

## 2019-04-18 ENCOUNTER — TELEPHONE (OUTPATIENT)
Dept: OPHTHALMOLOGY | Facility: CLINIC | Age: 34
End: 2019-04-18

## 2019-04-18 NOTE — TELEPHONE ENCOUNTER
----- Message from Xiomara Garnett sent at 4/18/2019 12:53 PM CDT -----  Contact: Karen Felix calling to find out after surgery on 04/24/19 what the downtime would be.  She can be reached at [phone]

## 2019-04-18 NOTE — TELEPHONE ENCOUNTER
Spoke with pt informed normal down time is about 2 wks on that return appt. Doctor assess the eye and see if there is a \ddition time needed.  Pt states that she needs this information possible sent to her job, informed will forward this message to Dr. Schulz's nurse and she maybe able to handle it from there they are gone for the day due to the incoming weather.

## 2019-04-22 NOTE — PRE-PROCEDURE INSTRUCTIONS
Pt unsure of dose and type of Insulin she's taking. Pt is currently away from home. Informed that she will receive a call in the am to complete Pre-operative medication review. V/u.

## 2019-04-23 ENCOUNTER — TELEPHONE (OUTPATIENT)
Dept: OPHTHALMOLOGY | Facility: CLINIC | Age: 34
End: 2019-04-23

## 2019-04-23 RX ORDER — INSULIN GLARGINE 100 [IU]/ML
45 INJECTION, SOLUTION SUBCUTANEOUS NIGHTLY
COMMUNITY

## 2019-04-23 NOTE — PRE-PROCEDURE INSTRUCTIONS
Preop instructions: NPO solids/ milk products after midnight or clears up to 2 hours before arrival (clear liquids are: water, Sugar free, Gatorade & Jell-O, black coffee/no milk, cream or creamer), shower instructions, directions, leave all valuables at home, medication instructions for PM prior & am of procedure explained. Patient stated an understanding.      Patient denies any family history of side effects or issues with anesthesia or sedation.    THIS WILL BE PATIENT'S 1ST SURGERY

## 2019-04-24 ENCOUNTER — ANESTHESIA EVENT (OUTPATIENT)
Dept: SURGERY | Facility: HOSPITAL | Age: 34
End: 2019-04-24
Payer: MEDICAID

## 2019-04-24 ENCOUNTER — HOSPITAL ENCOUNTER (OUTPATIENT)
Facility: HOSPITAL | Age: 34
Discharge: HOME | End: 2019-04-24
Attending: OPHTHALMOLOGY | Admitting: OPHTHALMOLOGY
Payer: MEDICAID

## 2019-04-24 ENCOUNTER — ANESTHESIA (OUTPATIENT)
Dept: SURGERY | Facility: HOSPITAL | Age: 34
End: 2019-04-24
Payer: MEDICAID

## 2019-04-24 VITALS
HEART RATE: 91 BPM | DIASTOLIC BLOOD PRESSURE: 89 MMHG | OXYGEN SATURATION: 100 % | RESPIRATION RATE: 15 BRPM | BODY MASS INDEX: 26.58 KG/M2 | TEMPERATURE: 98 F | SYSTOLIC BLOOD PRESSURE: 135 MMHG | WEIGHT: 150 LBS | HEIGHT: 63 IN

## 2019-04-24 DIAGNOSIS — H33.41 TRACTION RETINAL DETACHMENT, RIGHT: ICD-10-CM

## 2019-04-24 DIAGNOSIS — E10.3593 PROLIFERATIVE DIABETIC RETINOPATHY OF BOTH EYES ASSOCIATED WITH TYPE 1 DIABETES MELLITUS, UNSPECIFIED PROLIFERATIVE RETINOPATHY TYPE: ICD-10-CM

## 2019-04-24 DIAGNOSIS — H33.41 COMBINED TRACTION AND RHEGMATOGENOUS RETINAL DETACHMENT OF RIGHT EYE: Primary | ICD-10-CM

## 2019-04-24 DIAGNOSIS — H33.001 COMBINED TRACTION AND RHEGMATOGENOUS RETINAL DETACHMENT OF RIGHT EYE: Primary | ICD-10-CM

## 2019-04-24 LAB
POCT GLUCOSE: 245 MG/DL (ref 70–110)
POCT GLUCOSE: 334 MG/DL (ref 70–110)

## 2019-04-24 PROCEDURE — 37000009 HC ANESTHESIA EA ADD 15 MINS: Performed by: OPHTHALMOLOGY

## 2019-04-24 PROCEDURE — 00145 ANES PX EYE VITREORTNL SURG: CPT | Performed by: OPHTHALMOLOGY

## 2019-04-24 PROCEDURE — 25000003 PHARM REV CODE 250: Performed by: OPHTHALMOLOGY

## 2019-04-24 PROCEDURE — 82962 GLUCOSE BLOOD TEST: CPT | Performed by: OPHTHALMOLOGY

## 2019-04-24 PROCEDURE — 71000015 HC POSTOP RECOV 1ST HR: Performed by: OPHTHALMOLOGY

## 2019-04-24 PROCEDURE — 99499 NO LOS: ICD-10-PCS | Mod: ,,, | Performed by: OPHTHALMOLOGY

## 2019-04-24 PROCEDURE — C1784 OCULAR DEV, INTRAOP, DET RET: HCPCS | Performed by: OPHTHALMOLOGY

## 2019-04-24 PROCEDURE — 36000707: Performed by: OPHTHALMOLOGY

## 2019-04-24 PROCEDURE — D9220A PRA ANESTHESIA: Mod: ANES,,, | Performed by: ANESTHESIOLOGY

## 2019-04-24 PROCEDURE — D9220A PRA ANESTHESIA: Mod: CRNA,,, | Performed by: NURSE ANESTHETIST, CERTIFIED REGISTERED

## 2019-04-24 PROCEDURE — 99499 UNLISTED E&M SERVICE: CPT | Mod: ,,, | Performed by: OPHTHALMOLOGY

## 2019-04-24 PROCEDURE — S0020 INJECTION, BUPIVICAINE HYDRO: HCPCS | Performed by: OPHTHALMOLOGY

## 2019-04-24 PROCEDURE — D9220A PRA ANESTHESIA: ICD-10-PCS | Mod: ANES,,, | Performed by: ANESTHESIOLOGY

## 2019-04-24 PROCEDURE — 63600175 PHARM REV CODE 636 W HCPCS: Performed by: ANESTHESIOLOGY

## 2019-04-24 PROCEDURE — 67113 PR REPAIR COMPLEX RETINA DETACH VITRECTOMY & MEMB PEEL: ICD-10-PCS | Mod: RT,,, | Performed by: OPHTHALMOLOGY

## 2019-04-24 PROCEDURE — 63600175 PHARM REV CODE 636 W HCPCS: Performed by: OPHTHALMOLOGY

## 2019-04-24 PROCEDURE — 63600175 PHARM REV CODE 636 W HCPCS: Performed by: NURSE ANESTHETIST, CERTIFIED REGISTERED

## 2019-04-24 PROCEDURE — 27600004 OPTIME MED/SURG SUP & DEVICES INTRAOCULAR LENS: Performed by: OPHTHALMOLOGY

## 2019-04-24 PROCEDURE — 36000706: Performed by: OPHTHALMOLOGY

## 2019-04-24 PROCEDURE — 37000008 HC ANESTHESIA 1ST 15 MINUTES: Performed by: OPHTHALMOLOGY

## 2019-04-24 PROCEDURE — 67113 REPAIR RETINAL DETACH CPLX: CPT | Mod: RT,,, | Performed by: OPHTHALMOLOGY

## 2019-04-24 PROCEDURE — 25000003 PHARM REV CODE 250: Performed by: ANESTHESIOLOGY

## 2019-04-24 PROCEDURE — D9220A PRA ANESTHESIA: ICD-10-PCS | Mod: CRNA,,, | Performed by: NURSE ANESTHETIST, CERTIFIED REGISTERED

## 2019-04-24 PROCEDURE — 27201423 OPTIME MED/SURG SUP & DEVICES STERILE SUPPLY: Performed by: OPHTHALMOLOGY

## 2019-04-24 RX ORDER — ONDANSETRON 8 MG/1
8 TABLET, ORALLY DISINTEGRATING ORAL EVERY 8 HOURS PRN
Status: DISCONTINUED | OUTPATIENT
Start: 2019-04-24 | End: 2019-04-24 | Stop reason: HOSPADM

## 2019-04-24 RX ORDER — CYCLOPENTOLATE HYDROCHLORIDE 10 MG/ML
1 SOLUTION/ DROPS OPHTHALMIC
Status: DISPENSED | OUTPATIENT
Start: 2019-04-24

## 2019-04-24 RX ORDER — TETRACAINE HYDROCHLORIDE 5 MG/ML
1 SOLUTION OPHTHALMIC
Status: DISPENSED | OUTPATIENT
Start: 2019-04-24

## 2019-04-24 RX ORDER — MOXIFLOXACIN 5 MG/ML
1 SOLUTION/ DROPS OPHTHALMIC
Status: DISPENSED | OUTPATIENT
Start: 2019-04-24

## 2019-04-24 RX ORDER — PROPOFOL 10 MG/ML
VIAL (ML) INTRAVENOUS
Status: DISCONTINUED | OUTPATIENT
Start: 2019-04-24 | End: 2019-04-24

## 2019-04-24 RX ORDER — BUPIVACAINE HYDROCHLORIDE 7.5 MG/ML
INJECTION, SOLUTION EPIDURAL; RETROBULBAR
Status: DISCONTINUED
Start: 2019-04-24 | End: 2019-04-24 | Stop reason: HOSPADM

## 2019-04-24 RX ORDER — DEXAMETHASONE SODIUM PHOSPHATE 4 MG/ML
INJECTION, SOLUTION INTRA-ARTICULAR; INTRALESIONAL; INTRAMUSCULAR; INTRAVENOUS; SOFT TISSUE
Status: DISCONTINUED | OUTPATIENT
Start: 2019-04-24 | End: 2019-04-24 | Stop reason: HOSPADM

## 2019-04-24 RX ORDER — EPINEPHRINE 1 MG/ML
INJECTION, SOLUTION INTRACARDIAC; INTRAMUSCULAR; INTRAVENOUS; SUBCUTANEOUS
Status: DISCONTINUED
Start: 2019-04-24 | End: 2019-04-24 | Stop reason: HOSPADM

## 2019-04-24 RX ORDER — FENTANYL CITRATE 50 UG/ML
INJECTION, SOLUTION INTRAMUSCULAR; INTRAVENOUS
Status: DISCONTINUED | OUTPATIENT
Start: 2019-04-24 | End: 2019-04-24

## 2019-04-24 RX ORDER — EPINEPHRINE 1 MG/ML
INJECTION, SOLUTION INTRACARDIAC; INTRAMUSCULAR; INTRAVENOUS; SUBCUTANEOUS
Status: DISCONTINUED | OUTPATIENT
Start: 2019-04-24 | End: 2019-04-24 | Stop reason: HOSPADM

## 2019-04-24 RX ORDER — VANCOMYCIN HYDROCHLORIDE 500 MG/10ML
INJECTION, POWDER, LYOPHILIZED, FOR SOLUTION INTRAVENOUS
Status: DISCONTINUED
Start: 2019-04-24 | End: 2019-04-24 | Stop reason: HOSPADM

## 2019-04-24 RX ORDER — MIDAZOLAM HYDROCHLORIDE 1 MG/ML
INJECTION, SOLUTION INTRAMUSCULAR; INTRAVENOUS
Status: DISCONTINUED | OUTPATIENT
Start: 2019-04-24 | End: 2019-04-24

## 2019-04-24 RX ORDER — NEOMYCIN SULFATE, POLYMYXIN B SULFATE, AND DEXAMETHASONE 3.5; 10000; 1 MG/G; [USP'U]/G; MG/G
OINTMENT OPHTHALMIC
Status: DISCONTINUED | OUTPATIENT
Start: 2019-04-24 | End: 2019-04-24 | Stop reason: HOSPADM

## 2019-04-24 RX ORDER — ONDANSETRON 4 MG/1
4 TABLET, FILM COATED ORAL EVERY 8 HOURS PRN
Qty: 12 TABLET | Refills: 0 | Status: SHIPPED | OUTPATIENT
Start: 2019-04-24 | End: 2022-04-12 | Stop reason: CLARIF

## 2019-04-24 RX ORDER — OXYCODONE AND ACETAMINOPHEN 5; 325 MG/1; MG/1
1 TABLET ORAL EVERY 6 HOURS PRN
Qty: 12 TABLET | Refills: 0 | Status: SHIPPED | OUTPATIENT
Start: 2019-04-24 | End: 2020-01-05 | Stop reason: CLARIF

## 2019-04-24 RX ORDER — BUPIVACAINE HYDROCHLORIDE 7.5 MG/ML
INJECTION, SOLUTION EPIDURAL; RETROBULBAR
Status: DISCONTINUED | OUTPATIENT
Start: 2019-04-24 | End: 2019-04-24 | Stop reason: HOSPADM

## 2019-04-24 RX ORDER — ACETAMINOPHEN 325 MG/1
650 TABLET ORAL EVERY 4 HOURS PRN
Status: DISCONTINUED | OUTPATIENT
Start: 2019-04-24 | End: 2019-04-24 | Stop reason: HOSPADM

## 2019-04-24 RX ORDER — PREDNISOLONE ACETATE 10 MG/ML
1 SUSPENSION/ DROPS OPHTHALMIC
Status: DISPENSED | OUTPATIENT
Start: 2019-04-24

## 2019-04-24 RX ORDER — LIDOCAINE HYDROCHLORIDE 20 MG/ML
INJECTION, SOLUTION EPIDURAL; INFILTRATION; INTRACAUDAL; PERINEURAL
Status: DISCONTINUED
Start: 2019-04-24 | End: 2019-04-24 | Stop reason: HOSPADM

## 2019-04-24 RX ORDER — LIDOCAINE HYDROCHLORIDE 20 MG/ML
INJECTION, SOLUTION EPIDURAL; INFILTRATION; INTRACAUDAL; PERINEURAL
Status: DISCONTINUED | OUTPATIENT
Start: 2019-04-24 | End: 2019-04-24 | Stop reason: HOSPADM

## 2019-04-24 RX ORDER — SODIUM CHLORIDE 0.9 % (FLUSH) 0.9 %
10 SYRINGE (ML) INJECTION
Status: DISCONTINUED | OUTPATIENT
Start: 2019-04-24 | End: 2019-04-24 | Stop reason: HOSPADM

## 2019-04-24 RX ORDER — NEOMYCIN SULFATE, POLYMYXIN B SULFATE, AND DEXAMETHASONE 3.5; 10000; 1 MG/G; [USP'U]/G; MG/G
OINTMENT OPHTHALMIC
Status: DISCONTINUED
Start: 2019-04-24 | End: 2019-04-24 | Stop reason: HOSPADM

## 2019-04-24 RX ORDER — LIDOCAINE HCL/PF 100 MG/5ML
SYRINGE (ML) INTRAVENOUS
Status: DISCONTINUED | OUTPATIENT
Start: 2019-04-24 | End: 2019-04-24

## 2019-04-24 RX ORDER — NAPROXEN SODIUM 220 MG
220 TABLET ORAL
COMMUNITY
End: 2020-01-05 | Stop reason: CLARIF

## 2019-04-24 RX ORDER — PHENYLEPHRINE HYDROCHLORIDE 25 MG/ML
1 SOLUTION/ DROPS OPHTHALMIC
Status: DISPENSED | OUTPATIENT
Start: 2019-04-24

## 2019-04-24 RX ORDER — HYDROCODONE BITARTRATE AND ACETAMINOPHEN 5; 325 MG/1; MG/1
1 TABLET ORAL EVERY 4 HOURS PRN
Status: DISCONTINUED | OUTPATIENT
Start: 2019-04-24 | End: 2019-04-24 | Stop reason: HOSPADM

## 2019-04-24 RX ORDER — SODIUM CHLORIDE 9 MG/ML
INJECTION, SOLUTION INTRAVENOUS CONTINUOUS
Status: DISCONTINUED | OUTPATIENT
Start: 2019-04-24 | End: 2019-04-24 | Stop reason: HOSPADM

## 2019-04-24 RX ORDER — DEXAMETHASONE SODIUM PHOSPHATE 4 MG/ML
INJECTION, SOLUTION INTRA-ARTICULAR; INTRALESIONAL; INTRAMUSCULAR; INTRAVENOUS; SOFT TISSUE
Status: DISCONTINUED
Start: 2019-04-24 | End: 2019-04-24 | Stop reason: HOSPADM

## 2019-04-24 RX ORDER — TROPICAMIDE 10 MG/ML
1 SOLUTION/ DROPS OPHTHALMIC
Status: DISPENSED | OUTPATIENT
Start: 2019-04-24

## 2019-04-24 RX ORDER — VANCOMYCIN HYDROCHLORIDE 500 MG/10ML
INJECTION, POWDER, LYOPHILIZED, FOR SOLUTION INTRAVENOUS
Status: DISCONTINUED | OUTPATIENT
Start: 2019-04-24 | End: 2019-04-24 | Stop reason: HOSPADM

## 2019-04-24 RX ADMIN — FENTANYL CITRATE 25 MCG: 50 INJECTION, SOLUTION INTRAMUSCULAR; INTRAVENOUS at 01:04

## 2019-04-24 RX ADMIN — PREDNISOLONE ACETATE 1 DROP: 10 SUSPENSION/ DROPS OPHTHALMIC at 10:04

## 2019-04-24 RX ADMIN — CYCLOPENTOLATE HYDROCHLORIDE 1 DROP: 10 SOLUTION/ DROPS OPHTHALMIC at 10:04

## 2019-04-24 RX ADMIN — MOXIFLOXACIN 1 DROP: 5 SOLUTION/ DROPS OPHTHALMIC at 10:04

## 2019-04-24 RX ADMIN — LIDOCAINE HYDROCHLORIDE 50 MG: 20 INJECTION, SOLUTION INTRAVENOUS at 01:04

## 2019-04-24 RX ADMIN — SODIUM CHLORIDE 10 ML/HR: 0.9 INJECTION, SOLUTION INTRAVENOUS at 11:04

## 2019-04-24 RX ADMIN — HOMATROPINE HYDROBROMIDE 1 DROP: 50 SOLUTION OPHTHALMIC at 10:04

## 2019-04-24 RX ADMIN — TROPICAMIDE 1 DROP: 10 SOLUTION/ DROPS OPHTHALMIC at 10:04

## 2019-04-24 RX ADMIN — PHENYLEPHRINE HYDROCHLORIDE 1 DROP: 25 SOLUTION/ DROPS OPHTHALMIC at 10:04

## 2019-04-24 RX ADMIN — PROPOFOL 70 MG: 10 INJECTION, EMULSION INTRAVENOUS at 01:04

## 2019-04-24 RX ADMIN — INSULIN HUMAN 3 UNITS: 100 INJECTION, SOLUTION PARENTERAL at 11:04

## 2019-04-24 RX ADMIN — TETRACAINE HYDROCHLORIDE 1 DROP: 5 SOLUTION OPHTHALMIC at 10:04

## 2019-04-24 RX ADMIN — FENTANYL CITRATE 25 MCG: 50 INJECTION, SOLUTION INTRAMUSCULAR; INTRAVENOUS at 02:04

## 2019-04-24 RX ADMIN — MIDAZOLAM HYDROCHLORIDE 1 MG: 1 INJECTION, SOLUTION INTRAMUSCULAR; INTRAVENOUS at 01:04

## 2019-04-24 RX ADMIN — MIDAZOLAM HYDROCHLORIDE 2 MG: 1 INJECTION, SOLUTION INTRAMUSCULAR; INTRAVENOUS at 01:04

## 2019-04-24 NOTE — TRANSFER OF CARE
"Anesthesia Transfer of Care Note    Patient: Mary Sims    Procedure(s) Performed: Procedure(s) (LRB):  VITRECTOMY, PARS PLANA APPROACH (Right)    Patient location: PACU    Anesthesia Type: MAC    Transport from OR: Transported from OR on room air with adequate spontaneous ventilation    Post pain: adequate analgesia    Post assessment: no apparent anesthetic complications and tolerated procedure well    Post vital signs: stable    Level of consciousness: awake, alert and oriented    Nausea/Vomiting: no nausea/vomiting    Complications: none    Transfer of care protocol was followed      Last vitals:   Visit Vitals  /84   Pulse 92   Temp 37 °C (98.6 °F) (Oral)   Resp 18   Ht 5' 3" (1.6 m)   Wt 68 kg (150 lb)   SpO2 100%   BMI 26.57 kg/m²     "

## 2019-04-24 NOTE — H&P
Pre-Operative History & Physical  Ophthalmology      SUBJECTIVE:     History of Present Illness:  Patient is a 33 y.o. 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    MEDICATIONS:   No medications prior to admission.       ALLERGIES: Review of patient's allergies indicates:  No Known Allergies    PAST MEDICAL HISTORY:   Past Medical History:   Diagnosis Date    Diabetes mellitus     Hypertension      PAST SURGICAL HISTORY: No past surgical history on file.  PAST FAMILY HISTORY:   Family History   Problem Relation Age of Onset    Diabetes Mother      SOCIAL HISTORY:   Social History     Tobacco Use    Smoking status: Never Smoker    Smokeless tobacco: Never Used   Substance Use Topics    Alcohol use: Yes     Comment: social    Drug use: No        MENTAL STATUS: Alert    REVIEW OF SYSTEMS: Negative    OBJECTIVE:     Vital Signs (Most Recent)       Physical Exam:  General: NAD  HEENT: Atraumatic  Lungs: Adequate respirations, LCTAB  Heart: RRR, No murmur  Abdomen: Soft NT    ASSESSMENT/PLAN:     Patient is a 33 y.o. 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     - Plan for surgical correction Plan 25g PPV/membrane stripping/ILM peel/EL/AFx/GFx/Avastin OD  For RRD/TRD     Local MAC  LOC 80 min     - Risks/benefits/alternatives of the procedure including, but not limited to scarring, bleeding, infection, loss or decreased vision, and/or need for possible repeat surgery discussed with the patient and family.   - Informed consent obtained prior to surgery and the patient/family voiced good understanding.    Eduard Magdaleno  4/24/2019  5:39 AM

## 2019-04-24 NOTE — DISCHARGE INSTRUCTIONS
Post Op Instructions:  Patient should Maintain Eye shield & Dressing until seen tomorrow in eye clinic  Tylenol as needed for general discomfort  Use Prescription for pain medication if pain is severe  Use Prescription for Nausea (Zofran) if nausea or vomiting  No excessive exercise   No Bending, Lifting or Straining  Call MD if significant pain or nausea / vomiting uncontrolled by medications  Call MD if temperature in excess of 101' F    Return to eye clinic for Post Op Examination tomorrow Morning.  Bring Medicine bag to tomorrow's appointment.    Do not sleep flat on your back. Sleep either side or with head elevated.    PATIENT INSTRUCTIONS  POST-ANESTHESIA    IMMEDIATELY FOLLOWING SURGERY:  Do not drive or operate machinery for the first twenty four hours after surgery.  Do not make any important decisions for twenty four hours after surgery or while taking narcotic pain medications or sedatives.  If you develop intractable nausea and vomiting or a severe headache please notify your doctor immediately.    FOLLOW-UP:  Please make an appointment with your surgeon as instructed. You do not need to follow up with anesthesia unless specifically instructed to do so.    WOUND CARE INSTRUCTIONS (if applicable):  Keep a dry clean dressing on the anesthesia/puncture wound site if there is drainage.  Once the wound has quit draining you may leave it open to air.  Generally you should leave the bandage intact for twenty four hours unless there is drainage.  If the epidural site drains for more than 36-48 hours please call the anesthesia department.    QUESTIONS?:  Please feel free to call your physician or the hospital  if you have any questions, and they will be happy to assist you.       [facility] Anesthesia Department  [address] [facility]  [phone]      Having a Vitrectomy    A vitrectomy is a type of eye surgery to treat problems with the retina and vitreous. The vitreous is a gel-like  substance that fills the middle portion of your eye. During the surgery, your surgeon removes the vitreous and replaces it with another solution.  What to tell your health care provider  Before your surgery, tell your health care provider:  · What medicines you take. This includes over-the-counter medicines such as ibuprofen. It also includes vitamins, herbs, and other supplements. You may need to stop taking some medicines before the procedure, such as blood thinners and aspirin.  · If you smoke. You may need to stop before your surgery. Smoking can delay healing. Talk with your healthcare provider if you need help to stop smoking.  · If youve had recent changes in your health. This includes an infection or fever.  · If you are sensitive or allergic to anything. This includes medicines, latex, tape, and anesthetic medicines.  · If you are pregnant. Also tell your healthcare provider if you think you may be pregnant.  Getting ready for your surgery  Make sure to:  · Ask a family member or friend to take you home from the hospital  · Make plans for some help at home while you recover  · Follow all other instructions from your health care provider  · Read the consent form and ask questions if something is not clear  · Not eat or drink after midnight before your surgery  Tests before your surgery  Before your surgery, your doctor may look at your retina. Special tools are used to shine a light in your eye and look at your retina. You may need to have your eyes dilated for this eye exam. You also may need to have an ultrasound of your eye. This helps your doctor view the retina. An ultrasound uses sound waves to create images on a computer screen.  On the day of your surgery  Talk with your doctor about what to expect during your surgery. The details of the surgery may differ somewhat. A doctor specially trained in eye surgery (ophthalmologist) will do your operation. In general, you can expect the following:  · You  may have general anesthesia. This will cause you to sleep through the surgery. Or you may be awake during the surgery. You will receive a medicine to help you relax. You also may be given anesthetic eye drops and injections. This is to make sure you dont feel anything.  · Your surgeon will make an incision in the outer layer of your eye.  · He or she will make a small cut in the white part of the eye (sclera).  · Your doctor will remove the vitreous and any scar tissue or other material.  · Your doctor will do other repairs to your eye as needed. For example, he or she might use a laser to fix a tear in your retina. In some cases, your doctor may inject a gas bubble into your eye. This is to help keep the retina in place.  · Your doctor will replace the vitreous with another type of fluid. It may be replaced with silicone oil or saline.  · Your doctor will close your incisions with stitches.  · Your doctor will put an antibiotic ointment on your eye to help prevent infection.  · Your eye will be covered with a patch.  After your surgery  Youll likely be able to go home the same day. Have someone drive you home.  Recovering at home  Follow all of your doctors instructions about eye care. Your eye may be a little sore after the procedure. You can take over-the-counter pain medicine as approved by your healthcare provider. You may need to use eye drops with antibiotics. This is to help prevent infection. You may need to wear an eye patch for a day or so.  If you had a gas bubble placed in your eye during your vitrectomy, you will need to follow specific instructions about positioning. You will also need to not travel on an airplane for a period of time after the surgery. Ask your doctor when it will be safe for you to fly again.  Follow-up care  You will need close follow-up with your doctor to see how well the surgery worked. You may have an appointment the day after the surgery. You may need follow-up surgery in  the future to remove the replacement fluid from your eye.  Your vision may not be completely normal after your vitrectomy, especially if you had permanent damage to your retina. Ask your doctor how much improvement you can expect.     When to call your health care provider  Call your health care provider right away if you have any of the below:  · Vision that gets worse  · Pain or swelling around your eye that gets worse   Date Last Reviewed: 6/16/2015  © 3913-6773 [employer]. [address] [place] [address], [place] [address]. All rights reserved. This information is not intended as a substitute for professional medical care. Always follow your healthcare professional's instructions.

## 2019-04-24 NOTE — PROGRESS NOTES
Pt . Pt no S&S of distress noted. Notified Dr. Malhotra, anesthesia. See new orders.   1250- Per Dr. Pollock, anesthesia, Retake BS 30 mins after insulin given and call for further instructions. Pt states BS usually 208 range.   1200- Notified Dr. Pollock, anesthesia, PT . Pt no S&S of distress noted. Per Dr. Pollock, no more insulin needed to be given.

## 2019-04-24 NOTE — ANESTHESIA POSTPROCEDURE EVALUATION
Anesthesia Post Evaluation    Patient: Mary Sims    Procedure(s) Performed: Procedure(s) (LRB):  VITRECTOMY, PARS PLANA APPROACH (Right)    Final Anesthesia Type: MAC  Patient location during evaluation: PACU  Patient participation: Yes- Able to Participate  Level of consciousness: awake and alert  Post-procedure vital signs: reviewed and stable  Pain management: adequate  Airway patency: patent  PONV status at discharge: No PONV  Anesthetic complications: no      Cardiovascular status: blood pressure returned to baseline  Respiratory status: unassisted  Hydration status: euvolemic  Follow-up not needed.          Vitals Value Taken Time   /84 4/24/2019  2:39 PM   Temp 37 °C (98.6 °F) 4/24/2019 10:49 AM   Pulse 90 4/24/2019  2:42 PM   Resp 12 4/24/2019  2:42 PM   SpO2 100 % 4/24/2019  2:42 PM   Vitals shown include unvalidated device data.      No case tracking events are documented in the log.      Pain/Amparo Score: Amparo Score: 9 (4/24/2019  2:38 PM)

## 2019-04-24 NOTE — BRIEF OP NOTE
Pre-Op Dx: TRD from PDR OD    Post Op Dx: same    Procedure Performed: 25g PPV/membrane stripping/ILM peel/EL/AFx/SF6 20%/Avastin 1.25mg/0.05mL OD    Attending Surgeon: Jazz    Assistant Surgeon: Rasta    Anesthesia: Local/Mac, retrobulbar injection of 4.0cc mixture 0.75%Marcaine, 2% Xylocaine    Estimated blood loss: Minimal    Complication: None    Specimen: None    Disposition: Stable to recovery    Findings/Outcome: posterior macular detachment, SN posterior retinotomy to drain turbid fluid and allow more PRP    Date of Discharge: 4/24/19    Discharge Disposition: stable to recovery then home    F/U: tomorrow

## 2019-04-24 NOTE — ANESTHESIA PREPROCEDURE EVALUATION
"                                                                                                             04/24/2019  Mary Sims is a 33 y.o., female     Pre-operative evaluation for Procedure(s) (LRB):  VITRECTOMY, PARS PLANA APPROACH (Right)    Mary Sims is a 33 y.o. female     LDA:     Prev airway:     Drips:     Patient Active Problem List   Diagnosis    Cellulitis and abscess of leg    Proliferative diabetic retinopathy of both eyes associated with type 1 diabetes mellitus    Uncontrolled type 1 diabetes mellitus with both eyes affected by proliferative retinopathy and traction retinal detachments involving maculae    Vitreous hemorrhage, bilateral    Traction retinal detachment, right       Review of patient's allergies indicates:  No Known Allergies     No current facility-administered medications on file prior to encounter.      Current Outpatient Medications on File Prior to Encounter   Medication Sig Dispense Refill    insulin (BASAGLAR KWIKPEN U-100 INSULIN) glargine 100 units/mL (3mL) SubQ pen Inject 45 Units into the skin every evening.      insulin aspart (NOVOLOG) 100 unit/mL InPn pen Inject 5 Units into the skin 3 (three) times daily with meals. (Patient taking differently: Inject 10 Units into the skin 3 (three) times daily with meals. ) 1 Box 1    LISINOPRIL ORAL Take by mouth.      ergocalciferol, vitamin D2, (VITAMIN D ORAL) Take 50,000 Units by mouth every 7 days.       pen needle, diabetic (BD ULTRA-FINE ISAAC PEN NEEDLES) 32 gauge x 5/32" Ndle Use to inject insulin 5x/day 100 each 1       History reviewed. No pertinent surgical history.    Social History     Socioeconomic History    Marital status:      Spouse name: Not on file    Number of children: Not on file    Years of education: Not on file    Highest education level: Not on file   Occupational History    Not on file   Social Needs    Financial resource strain: Not on file    Food insecurity:     " Worry: Not on file     Inability: Not on file    Transportation needs:     Medical: Not on file     Non-medical: Not on file   Tobacco Use    Smoking status: Never Smoker    Smokeless tobacco: Never Used   Substance and Sexual Activity    Alcohol use: Yes     Comment: social    Drug use: No    Sexual activity: Yes     Partners: Male   Lifestyle    Physical activity:     Days per week: Not on file     Minutes per session: Not on file    Stress: Not on file   Relationships    Social connections:     Talks on phone: Not on file     Gets together: Not on file     Attends Yarsani service: Not on file     Active member of club or organization: Not on file     Attends meetings of clubs or organizations: Not on file     Relationship status: Not on file   Other Topics Concern    Not on file   Social History Narrative    Not on file         Vital Signs Range (Last 24H):         CBC: No results for input(s): WBC, RBC, HGB, HCT, PLT, MCV, MCH, MCHC in the last 72 hours.    CMP: No results for input(s): NA, K, CL, CO2, BUN, CREATININE, GLU, MG, PHOS, CALCIUM, ALBUMIN, PROT, ALKPHOS, ALT, AST, BILITOT in the last 72 hours.    INR  No results for input(s): PT, INR, PROTIME, APTT in the last 72 hours.        Diagnostic Studies:      EKD Echo:      .    Anesthesia Evaluation         Review of Systems      Physical Exam  General:  Well nourished    Airway/Jaw/Neck:  Airway Findings: Mouth Opening: Normal Tongue: Normal  General Airway Assessment: Adult  Mallampati: II  Improves to I with phonation.  TM Distance: Normal, at least 6 cm      Dental:  Dental Findings: Loose tooth         Mental Status:  Mental Status Findings:  Cooperative, Alert and Oriented         Anesthesia Plan  Type of Anesthesia, risks & benefits discussed:  Anesthesia Type:  MAC  Patient's Preference:   Intra-op Monitoring Plan: standard ASA monitors  Intra-op Monitoring Plan Comments:   Post Op Pain Control Plan: multimodal analgesia  Post  Op Pain Control Plan Comments:   Induction:   IV  Beta Blocker:  Patient is not currently on a Beta-Blocker (No further documentation required).       Informed Consent: Patient understands risks and agrees with Anesthesia plan.  Questions answered. Anesthesia consent signed with patient.  ASA Score: 3     Day of Surgery Review of History & Physical:    H&P update referred to the surgeon.         Ready For Surgery From Anesthesia Perspective.

## 2019-04-24 NOTE — OP NOTE
DATE OF PROCEDURE:  04/24/2019    PREOPERATIVE DIAGNOSIS:  Tractional retinal detachment from proliferative   diabetic retinopathy to the right eye, macula involving.    POSTOPERATIVE DIAGNOSIS:  Tractional retinal detachment from proliferative   diabetic retinopathy to the right eye, macula involving.    PROCEDURE PERFORMED:  A 25-gauge pars plana vitrectomy with membrane stripping,   internal limiting membrane peel, endolaser, air-fluid exchange, injection of SF6   gas 20% and Avastin 1.25 mg in 0.05 mL to the right eye.    ATTENDING SURGEON:  JAMI Schulz M.D.    ASSISTANT SURGEON:  Fellow, Eduard Magdaleno.    ANESTHESIA:  Local monitored anesthesia care with a retrobulbar injection of 4.0   mL mixture of 0.75% Marcaine and 2% Xylocaine.    ESTIMATED BLOOD LOSS:  Minimal.    COMPLICATIONS:  None.    DISPOSITION:  Stable to Recovery.    INDICATIONS FOR SURGERY:  This is a 33-year-old female with history of   proliferative diabetic retinopathy, who noticed progressive worsening of her   vision in the right eye over the last six months.  The patient was found to have   a significant tractional detachment with macular involvement.  Decision was   made to take the patient to surgery to remove the macular traction, flatten the   retina, help improve vision and prevent total vision loss.  Risks, benefits, and   alternatives of surgery were discussed in detail with risks including loss of   vision, loss of eye, recurrent retinal detachment, recurrent hemorrhage,   infection, cataract formation, lens dislocation, glaucoma, hypotony, ptosis and   diplopia.  The patient voiced understanding and wished to proceed with the   procedure.    DESCRIPTION OF PROCEDURE:  After proper informed consent was obtained, the   patient was brought back to the Operative Suite at [facility] where   MAC anesthesia was induced.  Retrobulbar injection was provided as above without   complication.  The patient was prepped and  draped in normal sterile fashion for   ophthalmic surgery and lid speculum was placed in the right eye.  Standard   3-port 25-gauge pars plana vitrectomy was set up with the infusion cannula   inserted 4 mm posterior to the limbus.  The infusion cannula was turned on only   after observed to be free and clear of all underlying retinal tissue.    Supranasal and supratemporal trocars were also placed 4 mm posterior to the   limbus.  The vitrector and light pipe were introduced in the vitreous cavity.  A   core vitrectomy was performed.  The hyaloid was contracted pulling on the   posterior macular detachment.  The hyaloid was  360 degrees.  Then, the   vitrector and the ILM forceps were used interchangeably to dissect and remove   the posterior tractional bands and membranes.  ICG was used to stain the   internal limiting membrane, which was peeled off the macula from around the   fovea with the ILM forceps under direct contact lens visualization.  360-degree   cortical vitrectomy was performed with the assistance of scleral depression.  No   additional identifiable retinal breaks were found.  Anterior PRP was performed;   however, there was significant posterior fluid up to the equator.  Decision was   made to allow a posterior drainage retinotomy to get greater PRP to decrease   the neovascular drive and help improve the vision resolution more quickly.    Supranasal retinotomy was created with diathermy and then air-fluid exchange.    Additional air-fluid exchange was performed, which helped the subretinal fluid   drained.  Additional PRP was applied just posterior to the mid periphery 360   degrees allowing for greater application of laser and around the posterior   retinotomy site as well.  Supranasal trocars were removed and not leaking after   gentle massage.  A 20% SF6 gas mixture was created; approximately 40 mL were   cycled through the eye.  The supratemporal infusion trocars were removed and not    leaking after gentle massage.  Avastin 1.25 mg in 0.05 mL was injected and the   eye was normal pressure via palpation.  Following this, a subconjunctival   injection of vancomycin and Decadron were given to the patient.  The drapes were   removed from the patient.  She was washed free of Betadine prep solution.    Maxitrol ointment was placed in the right eye.  The eye was patch shielded.  MAC   anesthesia was reversed.  She was brought to the Recovery Room in stable   condition, tolerating the procedure well.  Dr. Schulz was present for the   entire case.      DAM/IN  dd: 04/24/2019 14:44:28 (CDT)  td: 04/24/2019 17:32:54 (CDT)  Doc ID   #[id number]  Job ID #[id number]    CC:

## 2019-04-25 ENCOUNTER — OFFICE VISIT (OUTPATIENT)
Dept: OPHTHALMOLOGY | Facility: CLINIC | Age: 34
End: 2019-04-25
Payer: MEDICAID

## 2019-04-25 VITALS — HEART RATE: 94 BPM | SYSTOLIC BLOOD PRESSURE: 131 MMHG | DIASTOLIC BLOOD PRESSURE: 84 MMHG

## 2019-04-25 DIAGNOSIS — H33.41 TRACTION RETINAL DETACHMENT, RIGHT: ICD-10-CM

## 2019-04-25 LAB — POCT GLUCOSE: 178 MG/DL (ref 70–110)

## 2019-04-25 PROCEDURE — 99999 PR PBB SHADOW E&M-EST. PATIENT-LVL III: CPT | Mod: PBBFAC,,, | Performed by: OPHTHALMOLOGY

## 2019-04-25 PROCEDURE — 99999 PR PBB SHADOW E&M-EST. PATIENT-LVL III: ICD-10-PCS | Mod: PBBFAC,,, | Performed by: OPHTHALMOLOGY

## 2019-04-25 PROCEDURE — 99024 PR POST-OP FOLLOW-UP VISIT: ICD-10-PCS | Mod: ,,, | Performed by: OPHTHALMOLOGY

## 2019-04-25 PROCEDURE — 99024 POSTOP FOLLOW-UP VISIT: CPT | Mod: ,,, | Performed by: OPHTHALMOLOGY

## 2019-04-25 PROCEDURE — 99213 OFFICE O/P EST LOW 20 MIN: CPT | Mod: PBBFAC,PO | Performed by: OPHTHALMOLOGY

## 2019-04-25 NOTE — PROGRESS NOTES
HPI     Post-op Evaluation      Additional comments: 1 day check              Comments     1 day post op -- She did well post sx. No eye pain but she did c/o HA and nausea       OCT - TRD OU      A/P    1. PDR OU  T1 - uncontrolled  S/p Avastin OU with Germer Last 3/25/19    2. TRD OU    OD with combined RRD/TRD    S/p 25g PPV/membrane stripping/ILM peel/EL/AFx/GFx/Avastin OD For TRD 4/24/19    Doing well, IOP slightly elevated  Start gtts QID, ointment/shield QHS  Add Babs    Will need repair OS in near future with Preop Avastin OS    3.VH OU

## 2019-05-02 ENCOUNTER — OFFICE VISIT (OUTPATIENT)
Dept: OPHTHALMOLOGY | Facility: CLINIC | Age: 34
End: 2019-05-02
Payer: MEDICAID

## 2019-05-02 DIAGNOSIS — H33.41 TRACTION RETINAL DETACHMENT, RIGHT: ICD-10-CM

## 2019-05-02 PROCEDURE — 99024 PR POST-OP FOLLOW-UP VISIT: ICD-10-PCS | Mod: ,,, | Performed by: OPHTHALMOLOGY

## 2019-05-02 PROCEDURE — 99999 PR PBB SHADOW E&M-EST. PATIENT-LVL II: CPT | Mod: PBBFAC,,, | Performed by: OPHTHALMOLOGY

## 2019-05-02 PROCEDURE — 99999 PR PBB SHADOW E&M-EST. PATIENT-LVL II: ICD-10-PCS | Mod: PBBFAC,,, | Performed by: OPHTHALMOLOGY

## 2019-05-02 PROCEDURE — 99024 POSTOP FOLLOW-UP VISIT: CPT | Mod: ,,, | Performed by: OPHTHALMOLOGY

## 2019-05-02 PROCEDURE — 99212 OFFICE O/P EST SF 10 MIN: CPT | Mod: PBBFAC,PO | Performed by: OPHTHALMOLOGY

## 2019-05-02 RX ORDER — LOSARTAN POTASSIUM 50 MG/1
TABLET ORAL
COMMUNITY
Start: 2019-04-25 | End: 2019-05-19

## 2019-05-02 RX ORDER — METFORMIN HYDROCHLORIDE 500 MG/1
500 TABLET ORAL
COMMUNITY
End: 2019-05-19

## 2019-05-02 RX ORDER — PEN NEEDLE, DIABETIC 31 GX5/16"
NEEDLE, DISPOSABLE MISCELLANEOUS
Refills: 0 | COMMUNITY
Start: 2019-01-25

## 2019-05-02 RX ORDER — ERGOCALCIFEROL 1.25 MG/1
CAPSULE ORAL
Refills: 6 | Status: ON HOLD | COMMUNITY
Start: 2019-02-04 | End: 2020-04-18 | Stop reason: HOSPADM

## 2019-05-02 RX ORDER — AMITRIPTYLINE HYDROCHLORIDE 25 MG/1
TABLET, FILM COATED ORAL
Refills: 4 | COMMUNITY
Start: 2019-02-07 | End: 2019-05-19

## 2019-05-02 RX ORDER — INSULIN LISPRO 100 U/ML
INJECTION, SOLUTION INTRAVENOUS; SUBCUTANEOUS
Refills: 4 | COMMUNITY
Start: 2019-02-18

## 2019-05-02 RX ORDER — NAPROXEN SODIUM 220 MG
TABLET ORAL
Refills: 0 | COMMUNITY
Start: 2019-02-07

## 2019-05-02 NOTE — PROGRESS NOTES
HPI     Post-op Evaluation      Additional comments: 1 wk po chk              Comments       PF QID OD  Vigamox QID OD  HA QID OD  Maxitrol CHASITY QHS OD  Combigan BID OD    Prior OCT - TRD OU      A/P    1. PDR OU  T1 - uncontrolled  S/p Avastin OU with Germer Last 3/25/19    2. TRD OU    OD with combined RRD/TRD    S/p 25g PPV/membrane stripping/ILM peel/EL/AFx/GFx/Avastin OD For TRD 4/24/19    Doing well, IOP improved on combigan    Taper PF 3/2/1/0    2 weeks Dilate OU, OCT and preop OS with Avastin OS    3.VH OU

## 2019-05-14 ENCOUNTER — OFFICE VISIT (OUTPATIENT)
Dept: OPHTHALMOLOGY | Facility: CLINIC | Age: 34
End: 2019-05-14
Payer: MEDICAID

## 2019-05-14 ENCOUNTER — TELEPHONE (OUTPATIENT)
Dept: OPHTHALMOLOGY | Facility: CLINIC | Age: 34
End: 2019-05-14

## 2019-05-14 VITALS — HEART RATE: 78 BPM | SYSTOLIC BLOOD PRESSURE: 105 MMHG | DIASTOLIC BLOOD PRESSURE: 73 MMHG

## 2019-05-14 DIAGNOSIS — H43.10 VITREOUS HEMORRHAGE, UNSPECIFIED LATERALITY: ICD-10-CM

## 2019-05-14 DIAGNOSIS — H33.41 COMBINED TRACTION AND RHEGMATOGENOUS RETINAL DETACHMENT OF RIGHT EYE: ICD-10-CM

## 2019-05-14 DIAGNOSIS — H43.13 VITREOUS HEMORRHAGE, BILATERAL: ICD-10-CM

## 2019-05-14 DIAGNOSIS — H33.001 COMBINED TRACTION AND RHEGMATOGENOUS RETINAL DETACHMENT OF RIGHT EYE: ICD-10-CM

## 2019-05-14 DIAGNOSIS — H33.40 TRACTION DETACHMENT OF RETINA, UNSPECIFIED LATERALITY: Primary | ICD-10-CM

## 2019-05-14 PROCEDURE — 92134 POSTERIOR SEGMENT OCT RETINA (OCULAR COHERENCE TOMOGRAPHY)-BOTH EYES: ICD-10-PCS | Mod: 26,S$PBB,, | Performed by: OPHTHALMOLOGY

## 2019-05-14 PROCEDURE — 67028 PR INJECT INTRAVITREAL PHARMCOLOGIC: ICD-10-PCS | Mod: 79,S$PBB,LT, | Performed by: OPHTHALMOLOGY

## 2019-05-14 PROCEDURE — 67028 INJECTION EYE DRUG: CPT | Mod: 79,S$PBB,LT, | Performed by: OPHTHALMOLOGY

## 2019-05-14 PROCEDURE — 99999 PR PBB SHADOW E&M-EST. PATIENT-LVL III: ICD-10-PCS | Mod: PBBFAC,,, | Performed by: OPHTHALMOLOGY

## 2019-05-14 PROCEDURE — 99024 PR POST-OP FOLLOW-UP VISIT: ICD-10-PCS | Mod: ,,, | Performed by: OPHTHALMOLOGY

## 2019-05-14 PROCEDURE — 67028 INJECTION EYE DRUG: CPT | Mod: 79,PBBFAC | Performed by: OPHTHALMOLOGY

## 2019-05-14 PROCEDURE — 99999 PR PBB SHADOW E&M-EST. PATIENT-LVL III: CPT | Mod: PBBFAC,,, | Performed by: OPHTHALMOLOGY

## 2019-05-14 PROCEDURE — 92134 CPTRZ OPH DX IMG PST SGM RTA: CPT | Mod: PBBFAC | Performed by: OPHTHALMOLOGY

## 2019-05-14 PROCEDURE — 99213 OFFICE O/P EST LOW 20 MIN: CPT | Mod: PBBFAC | Performed by: OPHTHALMOLOGY

## 2019-05-14 PROCEDURE — 99024 POSTOP FOLLOW-UP VISIT: CPT | Mod: ,,, | Performed by: OPHTHALMOLOGY

## 2019-05-14 RX ADMIN — BEVACIZUMAB 1.25 MG: 100 INJECTION, SOLUTION INTRAVENOUS at 02:05

## 2019-05-14 NOTE — PROGRESS NOTES
HPI     Post-op Evaluation      Additional comments: PDR OU              Comments     DSL- 5/2/19 Dr. Schulz    Pt states she has floaters OS. Blurry Va OS. Denies pain. Denies flashes   and glare. Pt d/c PF X 1 week ago, misunderstood instructions.     PF QID OD----d/c X 1 week ago  Vigamox QID OD  HA QID OD  Maxitrol CHASITY QHS OD-- d/c   Combigan BID OD    HPI     Post-op Evaluation      Additional comments: 1 wk po chk              Comments       PF QID OD  Vigamox QID OD  HA QID OD  Maxitrol CHASITY QHS OD  Combigan BID OD    Prior OCT - TRD OU      A/P    1. PDR OU  T1 - uncontrolled  S/p Avastin OU with Germer Last 3/25/19    2. TRD OU    OD with combined RRD/TRD    S/p 25g PPV/membrane stripping/ILM peel/EL/AFx/GFx/Avastin OD For TRD 4/24/19    Doing well, IOP improved on combigan - continue BID until ou  Stop all others    PLan 25g PPV/membrane stripping/ILM peel/EL/AFx(?Gas)/Avastin OS for TRD and [facility] OS    Local MAC  LOC 60 min    Risks, benefits, and alternatives to treatment discussed in detail with the patient.  The patient voiced understanding and wished to proceed with the procedure      3.VH OS    To OR    Risks, benefits, and alternatives to treatment discussed in detail with the patient.  The patient voiced understanding and wished to proceed with the procedure    Injection Procedure Note:  Diagnosis: PCR with VH    Patient Identified and Time Out complete  Topical Proparacaine and Betadine.  Inject Avastin OS at 6:00 @ 3.5-4mm posterior to limbus  Post Operative Dx: Same  Complications: None  Follow up as above.

## 2019-05-15 NOTE — PATIENT INSTRUCTIONS

## 2019-05-19 ENCOUNTER — HOSPITAL ENCOUNTER (EMERGENCY)
Facility: HOSPITAL | Age: 34
Discharge: HOME | End: 2019-05-20
Attending: EMERGENCY MEDICINE
Payer: MEDICAID

## 2019-05-19 DIAGNOSIS — R73.9 HYPERGLYCEMIA: ICD-10-CM

## 2019-05-19 DIAGNOSIS — R19.7 DIARRHEA, UNSPECIFIED TYPE: Primary | ICD-10-CM

## 2019-05-19 LAB
ALBUMIN SERPL BCP-MCNC: 3 G/DL (ref 3.5–5.2)
ALLENS TEST: ABNORMAL
ALP SERPL-CCNC: 81 U/L (ref 55–135)
ALT SERPL W/O P-5'-P-CCNC: 13 U/L (ref 10–44)
ANION GAP SERPL CALC-SCNC: 5 MMOL/L (ref 8–16)
AST SERPL-CCNC: 14 U/L (ref 10–40)
B-OH-BUTYR BLD STRIP-SCNC: 0.1 MMOL/L (ref 0–0.5)
BASOPHILS # BLD AUTO: 0.03 K/UL (ref 0–0.2)
BASOPHILS NFR BLD: 0.5 % (ref 0–1.9)
BILIRUB SERPL-MCNC: 0.1 MG/DL (ref 0.1–1)
BUN SERPL-MCNC: 19 MG/DL (ref 6–20)
CALCIUM SERPL-MCNC: 9.7 MG/DL (ref 8.7–10.5)
CHLORIDE SERPL-SCNC: 102 MMOL/L (ref 95–110)
CO2 SERPL-SCNC: 26 MMOL/L (ref 23–29)
CREAT SERPL-MCNC: 1.1 MG/DL (ref 0.5–1.4)
DELSYS: ABNORMAL
DIFFERENTIAL METHOD: ABNORMAL
EOSINOPHIL # BLD AUTO: 0.1 K/UL (ref 0–0.5)
EOSINOPHIL NFR BLD: 1.8 % (ref 0–8)
ERYTHROCYTE [DISTWIDTH] IN BLOOD BY AUTOMATED COUNT: 13.2 % (ref 11.5–14.5)
EST. GFR  (AFRICAN AMERICAN): >60 ML/MIN/1.73 M^2
EST. GFR  (NON AFRICAN AMERICAN): >60 ML/MIN/1.73 M^2
GLUCOSE SERPL-MCNC: 405 MG/DL (ref 70–110)
HCO3 UR-SCNC: 27.1 MMOL/L (ref 24–28)
HCT VFR BLD AUTO: 30.4 % (ref 37–48.5)
HGB BLD-MCNC: 10.4 G/DL (ref 12–16)
IMM GRANULOCYTES # BLD AUTO: 0.01 K/UL (ref 0–0.04)
IMM GRANULOCYTES NFR BLD AUTO: 0.2 % (ref 0–0.5)
LIPASE SERPL-CCNC: 31 U/L (ref 4–60)
LYMPHOCYTES # BLD AUTO: 1.4 K/UL (ref 1–4.8)
LYMPHOCYTES NFR BLD: 25.5 % (ref 18–48)
MCH RBC QN AUTO: 24.8 PG (ref 27–31)
MCHC RBC AUTO-ENTMCNC: 34.2 G/DL (ref 32–36)
MCV RBC AUTO: 72 FL (ref 82–98)
MODE: ABNORMAL
MONOCYTES # BLD AUTO: 0.3 K/UL (ref 0.3–1)
MONOCYTES NFR BLD: 5.1 % (ref 4–15)
NEUTROPHILS # BLD AUTO: 3.7 K/UL (ref 1.8–7.7)
NEUTROPHILS NFR BLD: 66.9 % (ref 38–73)
NRBC BLD-RTO: 0 /100 WBC
PCO2 BLDA: 59 MMHG (ref 35–45)
PH SMN: 7.27 [PH] (ref 7.35–7.45)
PLATELET # BLD AUTO: 246 K/UL (ref 150–350)
PMV BLD AUTO: 11.8 FL (ref 9.2–12.9)
PO2 BLDA: 23 MMHG (ref 40–60)
POC BE: 0 MMOL/L
POC SATURATED O2: 31 % (ref 95–100)
POC TCO2: 29 MMOL/L (ref 24–29)
POTASSIUM SERPL-SCNC: 4.9 MMOL/L (ref 3.5–5.1)
PROT SERPL-MCNC: 6.9 G/DL (ref 6–8.4)
RBC # BLD AUTO: 4.2 M/UL (ref 4–5.4)
SAMPLE: ABNORMAL
SITE: ABNORMAL
SODIUM SERPL-SCNC: 133 MMOL/L (ref 136–145)
WBC # BLD AUTO: 5.49 K/UL (ref 3.9–12.7)

## 2019-05-19 PROCEDURE — 96360 HYDRATION IV INFUSION INIT: CPT

## 2019-05-19 PROCEDURE — 99285 EMERGENCY DEPT VISIT HI MDM: CPT | Mod: ,,, | Performed by: EMERGENCY MEDICINE

## 2019-05-19 PROCEDURE — 99900035 HC TECH TIME PER 15 MIN (STAT)

## 2019-05-19 PROCEDURE — 99285 PR EMERGENCY DEPT VISIT,LEVEL V: ICD-10-PCS | Mod: ,,, | Performed by: EMERGENCY MEDICINE

## 2019-05-19 PROCEDURE — 82010 KETONE BODYS QUAN: CPT

## 2019-05-19 PROCEDURE — 25000003 PHARM REV CODE 250: Performed by: EMERGENCY MEDICINE

## 2019-05-19 PROCEDURE — 83690 ASSAY OF LIPASE: CPT

## 2019-05-19 PROCEDURE — 80053 COMPREHEN METABOLIC PANEL: CPT

## 2019-05-19 PROCEDURE — 99284 EMERGENCY DEPT VISIT MOD MDM: CPT | Mod: 25

## 2019-05-19 PROCEDURE — 82962 GLUCOSE BLOOD TEST: CPT

## 2019-05-19 PROCEDURE — 82803 BLOOD GASES ANY COMBINATION: CPT

## 2019-05-19 PROCEDURE — 85025 COMPLETE CBC W/AUTO DIFF WBC: CPT

## 2019-05-19 PROCEDURE — 96372 THER/PROPH/DIAG INJ SC/IM: CPT

## 2019-05-19 PROCEDURE — 81001 URINALYSIS AUTO W/SCOPE: CPT

## 2019-05-19 RX ADMIN — SODIUM CHLORIDE 1000 ML: 0.9 INJECTION, SOLUTION INTRAVENOUS at 11:05

## 2019-05-20 VITALS
TEMPERATURE: 99 F | OXYGEN SATURATION: 100 % | DIASTOLIC BLOOD PRESSURE: 82 MMHG | SYSTOLIC BLOOD PRESSURE: 140 MMHG | WEIGHT: 150 LBS | BODY MASS INDEX: 26.58 KG/M2 | RESPIRATION RATE: 17 BRPM | HEIGHT: 63 IN | HEART RATE: 83 BPM

## 2019-05-20 LAB
BACTERIA #/AREA URNS AUTO: NORMAL /HPF
BILIRUB UR QL STRIP: NEGATIVE
CLARITY UR REFRACT.AUTO: CLEAR
COLOR UR AUTO: ABNORMAL
GLUCOSE UR QL STRIP: ABNORMAL
HGB UR QL STRIP: ABNORMAL
HYALINE CASTS UR QL AUTO: 0 /LPF
KETONES UR QL STRIP: NEGATIVE
LEUKOCYTE ESTERASE UR QL STRIP: NEGATIVE
MICROSCOPIC COMMENT: NORMAL
NITRITE UR QL STRIP: NEGATIVE
PH UR STRIP: 6 [PH] (ref 5–8)
POCT GLUCOSE: 296 MG/DL (ref 70–110)
POCT GLUCOSE: 336 MG/DL (ref 70–110)
PROT UR QL STRIP: ABNORMAL
RBC #/AREA URNS AUTO: 1 /HPF (ref 0–4)
SP GR UR STRIP: 1.02 (ref 1–1.03)
SQUAMOUS #/AREA URNS AUTO: 0 /HPF
URN SPEC COLLECT METH UR: ABNORMAL
WBC #/AREA URNS AUTO: 0 /HPF (ref 0–5)
YEAST UR QL AUTO: NORMAL

## 2019-05-20 PROCEDURE — S5571 INSULIN DISPOS PEN 3 ML: HCPCS | Performed by: EMERGENCY MEDICINE

## 2019-05-20 PROCEDURE — 63600175 PHARM REV CODE 636 W HCPCS: Performed by: EMERGENCY MEDICINE

## 2019-05-20 PROCEDURE — 96372 THER/PROPH/DIAG INJ SC/IM: CPT

## 2019-05-20 RX ORDER — INSULIN ASPART 100 [IU]/ML
10 INJECTION, SOLUTION INTRAVENOUS; SUBCUTANEOUS
Status: COMPLETED | OUTPATIENT
Start: 2019-05-20 | End: 2019-05-20

## 2019-05-20 RX ADMIN — INSULIN DETEMIR 45 UNITS: 100 INJECTION, SOLUTION SUBCUTANEOUS at 12:05

## 2019-05-20 RX ADMIN — INSULIN ASPART 10 UNITS: 100 INJECTION, SOLUTION INTRAVENOUS; SUBCUTANEOUS at 12:05

## 2019-05-20 NOTE — ED NOTES
"LOC: The patient is awake, alert, and oriented to place, time, situation. Affect is appropriate.  Speech is appropriate and clear.     APPEARANCE: Patient resting comfortably in no acute distress.  Patient is clean and well groomed.    SKIN: The skin is warm and dry; color consistent with ethnicity.  Patient has normal skin turgor and moist mucus membranes.  Skin intact; no breakdown, rash or bruising noted. Pt reports itching and "bumps" in BUE. None noted at this time.     MUSCULOSKELETAL: Patient moving upper and lower extremities without difficulty.  Reports increased weakness. Denies pain.    RESPIRATORY: Airway is open and patent. Respirations spontaneous, even, easy, and non-labored.  Patient has a normal effort and rate.  No accessory muscle use noted. Denies cough. Patient denies sob.    CARDIAC:  Normal rhythm and rate noted.  No peripheral edema noted. No complaints of chest pain.     ABDOMEN: Soft and non tender to palpation.  No distention noted. Patient denies vomitting. Reports nausea, increased gas, and x5 episodes of non-bloody diarrhea starting tonight.     NEUROLOGIC: Eyes open spontaneously.  Behavior appropriate to situation.  Follows commands; facial expression symmetrical.  Purposeful motor response noted; normal sensation in all extremities. Patient denies headache and dizziness.  "

## 2019-05-20 NOTE — ED TRIAGE NOTES
"Reports nausea and diarrhea starting tonight. Reports 5 episodes of diarrhea without blood. Reports increased flatulence. States she also is experiencing "bumps" on BUE and itching. No rash noted at this time. Denies recent abx use. 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  "

## 2019-05-20 NOTE — ED NOTES
Patient discharged home  Discharge instructions given  Patient verbalizes understanding   Patient denies pain, chest pain and shortness of breath   All belongings sent home with patient

## 2019-05-20 NOTE — ED PROVIDER NOTES
"Encounter Date: 5/19/2019    SCRIBE #1 NOTE: I, Deborah Daniels, am scribing for, and in the presence of,  Dr. Nina. I have scribed the entire note.       History     Chief Complaint   Patient presents with    Diarrhea     Pt reports to ED w/ c/o diarrhea onset today. 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     Time patient was seen by the provider: 9:52 PM      The patient is a 33 y.o. female with co-morbidities including: HTN and IDDM who presents to the ED with a complaint of diarrhea since earlier today. Patient states while at work today she became nauseated; then she noted abdominal cramping before having 5 episodes of diarrhea. She states her stools were loose. She denies vomiting, recent travel, known sick contacts, or new foods. Patient notes she typically has irregular bowel movements. She also reports diffuse itching "that feels like a rash". She denies recent changes in her soaps or detergents. Patient reports her blood sugar has been "up and down" as high as 200s; she states she has recently been trying to better control her diabetes.    The history is provided by the patient.     Review of patient's allergies indicates:  No Known Allergies  Past Medical History:   Diagnosis Date    Diabetes mellitus     Hypertension      Past Surgical History:   Procedure Laterality Date    VITRECTOMY, PARS PLANA  APPROACH  25g PPV/Membrane Stripping/IML peel/EL/AFX/Avastin left eye   60 mins Left 5/29/2019    Performed by JAMI Schulz MD at [facility] OR [facility]    VITRECTOMY, PARS PLANA APPROACH Right 4/24/2019    Performed by J[facility]I Schulz MD at [facility] OR [facility]     Family History   Problem Relation Age of Onset    Diabetes Mother      Social History     Tobacco Use    Smoking status: Never Smoker    Smokeless tobacco: Never Used   Substance Use Topics    Alcohol use: Yes     Comment: social    Drug use: No     Review of Systems "   Constitutional: Negative for fever.   HENT: Negative for sore throat.    Eyes: Negative for visual disturbance.   Respiratory: Negative for shortness of breath.    Cardiovascular: Negative for chest pain.   Gastrointestinal: Positive for abdominal pain (cramping prior to diarrhea), diarrhea and nausea.   Genitourinary: Negative for dysuria.   Musculoskeletal: Negative for back pain.   Skin: Positive for rash.   Neurological: Negative for weakness.       Physical Exam     Initial Vitals [05/19/19 2125]   BP Pulse Resp Temp SpO2   127/71 91 18 98.2 °F (36.8 °C) 100 %      MAP       --         Physical Exam    Nursing note and vitals reviewed.  Constitutional: She appears well-developed and well-nourished.   HENT:   Head: Normocephalic and atraumatic.   Nose: Nose normal.   Mouth/Throat: Oropharynx is clear and moist.   Eyes: Conjunctivae and EOM are normal. Pupils are equal, round, and reactive to light. No scleral icterus.   Neck: Normal range of motion. Neck supple.   Cardiovascular: Normal rate and regular rhythm. Exam reveals no gallop and no friction rub.    No murmur heard.  Pulmonary/Chest: Breath sounds normal. No respiratory distress.   Abdominal: Soft. She exhibits no distension. There is tenderness (mild tenderness in lower quadrants). There is no rebound and no guarding.   Musculoskeletal: Normal range of motion. She exhibits no tenderness.   Neurological: She is alert and oriented to person, place, and time. She has normal strength. No sensory deficit.   Skin: Skin is warm and dry.   Psychiatric: She has a normal mood and affect. Her behavior is normal. Judgment and thought content normal.         ED Course   Procedures  Labs Reviewed   CBC W/ AUTO DIFFERENTIAL - Abnormal; Notable for the following components:       Result Value    Hemoglobin 10.4 (*)     Hematocrit 30.4 (*)     Mean Corpuscular Volume 72 (*)     Mean Corpuscular Hemoglobin 24.8 (*)     All other components within normal limits  "  COMPREHENSIVE METABOLIC PANEL - Abnormal; Notable for the following components:    Sodium 133 (*)     Glucose 405 (*)     Albumin 3.0 (*)     Anion Gap 5 (*)     All other components within normal limits   URINALYSIS, REFLEX TO URINE CULTURE - Abnormal; Notable for the following components:    Protein, UA 2+ (*)     Glucose, UA 3+ (*)     Occult Blood UA 1+ (*)     All other components within normal limits    Narrative:     Preferred Collection Type->Urine, Clean Catch   ISTAT PROCEDURE - Abnormal; Notable for the following components:    POC PH 7.270 (*)     POC PCO2 59.0 (*)     POC PO2 23 (*)     POC SATURATED O2 31 (*)     All other components within normal limits   POCT GLUCOSE - Abnormal; Notable for the following components:    POCT Glucose 336 (*)     All other components within normal limits   POCT GLUCOSE - Abnormal; Notable for the following components:    POCT Glucose 296 (*)     All other components within normal limits   LIPASE   BETA - HYDROXYBUTYRATE, SERUM   URINALYSIS MICROSCOPIC    Narrative:     Preferred Collection Type->Urine, Clean Catch          Imaging Results    None          Medical Decision Making:   History:   Old Medical Records: I decided to obtain old medical records.  Initial Assessment:   33-year-old female with past medical history of T1 IDDM presenting with several episodes of loose brown stool today.  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  She denies any nausea or vomiting, no abdominal pain, no fever chills, no recent travel.  She endorses recent hyperglycemia to 200s, but denies any dysuria, polyuria, fatigue.  States she is currently menstruating.    On exam she is well appearing, mmm, no abdominal tenderness to palpation.  Likely gastroenteritis contracted from a known sick contact, less likely C diff as patient has " no known exposure.  As patient is diabetic, however, will obtain basic labs and re-evaluate.    Differential Diagnosis:   Gastroenteritis, C. Diff, hypo/hyperglycemia, DKA, UTI, colitis, IBD, IBS  Clinical Tests:   Lab Tests: Ordered and Reviewed  ED Management:  11:10 PM  H/H near baseline, no leukocytosis, glucose elevated to 405, VBG pH 7.27 however negative beta hydroxybutyrate and no ketones in urine.     Patient states she did not take her insulin this evening, states that she normally takes 10 units of NovoLog and 45 units of Basaglarg at night    D/w pharmacy who states that they substitute Levemir for glargine at equal unit dosing.    Pt stable for d/c to home with return precautions.             Scribe Attestation:   Scribe #1: I performed the above scribed service and the documentation accurately describes the services I performed. I attest to the accuracy of the note.               Clinical Impression:       ICD-10-CM ICD-9-CM   1. Diarrhea, unspecified type R19.7 787.91   2. Hyperglycemia R73.9 790.29                                Blaire Nina MD  06/02/19 [id number]

## 2019-05-20 NOTE — DISCHARGE INSTRUCTIONS
You were seen in the emergency department for diarrhea.  Your blood sugar was noted to be elevated, and your given her home doses of insulin.  Continue to drink plenty of clear fluids to stay hydrated. Do not resume work until you have been symptom free for 24 hr.  Return to the emergency department for worsening pain, uncontrolled vomiting or diarrhea, fever or chills, or other concerning symptoms.   Statement Selected

## 2019-05-27 ENCOUNTER — OFFICE VISIT (OUTPATIENT)
Dept: URGENT CARE | Facility: CLINIC | Age: 34
End: 2019-05-27
Payer: MEDICAID

## 2019-05-27 VITALS
BODY MASS INDEX: 26.58 KG/M2 | RESPIRATION RATE: 18 BRPM | DIASTOLIC BLOOD PRESSURE: 84 MMHG | TEMPERATURE: 98 F | SYSTOLIC BLOOD PRESSURE: 122 MMHG | HEART RATE: 91 BPM | HEIGHT: 63 IN | WEIGHT: 150 LBS | OXYGEN SATURATION: 98 %

## 2019-05-27 DIAGNOSIS — R19.7 DIARRHEA, UNSPECIFIED TYPE: ICD-10-CM

## 2019-05-27 DIAGNOSIS — R10.9 ABDOMINAL PAIN, UNSPECIFIED ABDOMINAL LOCATION: ICD-10-CM

## 2019-05-27 DIAGNOSIS — R11.2 NON-INTRACTABLE VOMITING WITH NAUSEA, UNSPECIFIED VOMITING TYPE: Primary | ICD-10-CM

## 2019-05-27 LAB
B-HCG UR QL: NEGATIVE
BILIRUB UR QL STRIP: NEGATIVE
BILIRUB UR QL STRIP: NORMAL
CTP QC/QA: YES
GLUCOSE UR QL STRIP: NEGATIVE
GLUCOSE UR QL STRIP: NORMAL
KETONES UR QL STRIP: NEGATIVE
KETONES UR QL STRIP: NORMAL
LEUKOCYTE ESTERASE UR QL STRIP: NEGATIVE
LEUKOCYTE ESTERASE UR QL STRIP: NORMAL
PH, POC UA: 5.5 (ref 5–8)
PH, POC UA: NORMAL (ref 5–8)
POC BLOOD, URINE: NEGATIVE
POC BLOOD, URINE: NORMAL
POC NITRATES, URINE: NEGATIVE
POC NITRATES, URINE: NORMAL
PROT UR QL STRIP: NORMAL
PROT UR QL STRIP: POSITIVE
SP GR UR STRIP: 1.02 (ref 1–1.03)
SP GR UR STRIP: NORMAL (ref 1–1.03)
UROBILINOGEN UR STRIP-ACNC: ABNORMAL (ref 0.1–1.1)
UROBILINOGEN UR STRIP-ACNC: NORMAL (ref 0.1–1.1)

## 2019-05-27 PROCEDURE — S0119 PR ONDANSETRON, ORAL, 4MG: ICD-10-PCS | Mod: S$GLB,,, | Performed by: EMERGENCY MEDICINE

## 2019-05-27 PROCEDURE — 81003 POCT URINALYSIS, DIPSTICK, AUTOMATED, W/O SCOPE: ICD-10-PCS | Mod: QW,S$GLB,, | Performed by: PHYSICIAN ASSISTANT

## 2019-05-27 PROCEDURE — S0119 ONDANSETRON 4 MG: HCPCS | Mod: S$GLB,,, | Performed by: EMERGENCY MEDICINE

## 2019-05-27 PROCEDURE — 81025 URINE PREGNANCY TEST: CPT | Mod: S$GLB,,, | Performed by: PHYSICIAN ASSISTANT

## 2019-05-27 PROCEDURE — 99214 OFFICE O/P EST MOD 30 MIN: CPT | Mod: 25,S$GLB,, | Performed by: PHYSICIAN ASSISTANT

## 2019-05-27 PROCEDURE — 99214 PR OFFICE/OUTPT VISIT, EST, LEVL IV, 30-39 MIN: ICD-10-PCS | Mod: 25,S$GLB,, | Performed by: PHYSICIAN ASSISTANT

## 2019-05-27 PROCEDURE — 81025 POCT URINE PREGNANCY: ICD-10-PCS | Mod: S$GLB,,, | Performed by: PHYSICIAN ASSISTANT

## 2019-05-27 PROCEDURE — 81003 URINALYSIS AUTO W/O SCOPE: CPT | Mod: QW,S$GLB,, | Performed by: PHYSICIAN ASSISTANT

## 2019-05-27 RX ORDER — DICYCLOMINE HYDROCHLORIDE 10 MG/1
10 CAPSULE ORAL 4 TIMES DAILY
Qty: 18 CAPSULE | Refills: 0 | Status: SHIPPED | OUTPATIENT
Start: 2019-05-27 | End: 2020-01-05 | Stop reason: CLARIF

## 2019-05-27 RX ORDER — ONDANSETRON 4 MG/1
4 TABLET, ORALLY DISINTEGRATING ORAL
Status: COMPLETED | OUTPATIENT
Start: 2019-05-27 | End: 2019-05-27

## 2019-05-27 RX ORDER — ONDANSETRON 4 MG/1
4 TABLET, FILM COATED ORAL EVERY 8 HOURS PRN
Qty: 15 TABLET | Refills: 0 | Status: SHIPPED | OUTPATIENT
Start: 2019-05-27 | End: 2019-06-01

## 2019-05-27 RX ADMIN — ONDANSETRON 4 MG: 4 TABLET, ORALLY DISINTEGRATING ORAL at 05:05

## 2019-05-27 NOTE — LETTER
May 27, 2019      [facility] [address], [place] [address]  Phone: [phone]  Fax: [phone]       Patient: Mary Sims   YOB: 1985  Date of Visit: 05/27/2019    To Whom It May Concern:    Wale Sims  was at [facility] on 05/27/2019. She may return to work/school on 5/28/19 with no restrictions. If you have any questions or concerns, or if I can be of further assistance, please do not hesitate to contact me.    Sincerely,    Ry Salgado PA-C

## 2019-05-27 NOTE — PROGRESS NOTES
"Subjective:       Patient ID: Mary Sims is a 33 y.o. female.    Vitals:  height is 5' 3" (1.6 m) and weight is 68 kg (150 lb). Her temperature is 97.8 °F (36.6 °C). Her blood pressure is 122/84 and her pulse is 91. Her respiration is 18 and oxygen saturation is 98%.     Chief Complaint: Abdominal Pain    Reports 4 episodes of vomiting and 2 episodes of diarrhea which she describes as "loose and watery." Denies blood in emesis or stool. Reports she is able to tolerate some solids and all liquids with no issues.     Abdominal Pain   This is a new problem. The current episode started today. The onset quality is sudden. The problem occurs constantly. The problem has been unchanged. The pain is located in the generalized abdominal region. The pain is mild. The quality of the pain is aching and colicky. The abdominal pain does not radiate. Associated symptoms include diarrhea, nausea and vomiting. Pertinent negatives include no constipation, dysuria or fever. Nothing aggravates the pain. The pain is relieved by nothing. She has tried nothing for the symptoms. The treatment provided no relief. There is no history of abdominal surgery.       Constitution: Negative for appetite change, chills, sweating and fever.   HENT: Negative for ear discharge and trouble swallowing.    Cardiovascular: Negative for chest pain.   Respiratory: Negative for cough and shortness of breath.    Gastrointestinal: Positive for abdominal pain, nausea, vomiting and diarrhea. Negative for abdominal trauma, abdominal bloating, history of abdominal surgery, constipation, dark colored stools and heartburn.   Genitourinary: Negative for dysuria, missed menses and pelvic pain.   Musculoskeletal: Negative for back pain.   Skin: Negative for pale and rash.   Neurological: Negative for dizziness.       Objective:      Physical Exam   Constitutional: She is oriented to person, place, and time. She appears well-developed and well-nourished.   HENT: "   Head: Normocephalic and atraumatic.   Right Ear: External ear normal.   Left Ear: External ear normal.   Nose: Nose normal.   Mouth/Throat: Mucous membranes are normal.   Eyes: Conjunctivae and lids are normal.   Neck: Trachea normal and full passive range of motion without pain. Neck supple.   Cardiovascular: Normal rate, regular rhythm and normal heart sounds.   Pulmonary/Chest: Effort normal and breath sounds normal. No respiratory distress.   Abdominal: Soft. Normal appearance. She exhibits no distension, no abdominal bruit, no pulsatile midline mass and no mass. Bowel sounds are increased. There is generalized tenderness. There is no rebound, no guarding, no CVA tenderness, no tenderness at McBurney's point and negative Warner's sign.   4mg zofran given in clinic with reported improvement in nausea. Patient was able to tolerate multiple cups of water in clinic with no issues.   Musculoskeletal: Normal range of motion. She exhibits no edema.   Neurological: She is alert and oriented to person, place, and time. She has normal strength.   Skin: Skin is warm, dry and intact. She is not diaphoretic. No pallor.   Psychiatric: She has a normal mood and affect. Her speech is normal and behavior is normal. Judgment and thought content normal. Cognition and memory are normal.   Nursing note and vitals reviewed.      Results for orders placed or performed in visit on 05/27/19   POCT Urinalysis, Dipstick, Automated, W/O Scope   Result Value Ref Range    POC Blood, Urine  Negative    POC Bilirubin, Urine  Negative    POC Urobilinogen, Urine  0.1 - 1.1    POC Ketones, Urine  Negative    POC Protein, Urine  Negative    POC Nitrates, Urine  Negative    POC Glucose, Urine  Negative    pH, UA  5 - 8    POC Specific Gravity, Urine  1.003 - 1.029    POC Leukocytes, Urine  Negative   POCT urine pregnancy   Result Value Ref Range    POC Preg Test, Ur Negative Negative     Acceptable Yes    POCT Urinalysis, Dipstick,  Automated, W/O Scope   Result Value Ref Range    POC Blood, Urine Negative Negative    POC Bilirubin, Urine Negative Negative    POC Urobilinogen, Urine norm 0.1 - 1.1    POC Ketones, Urine Negative Negative    POC Protein, Urine Positive (A) Negative    POC Nitrates, Urine Negative Negative    POC Glucose, Urine Negative Negative    pH, UA 5.5 5 - 8    POC Specific Gravity, Urine 1.020 1.003 - 1.029    POC Leukocytes, Urine Negative Negative     Patient left AMA, then returned to clinic roughly two hours later to complete visit.    Assessment:       1. Non-intractable vomiting with nausea, unspecified vomiting type    2. Diarrhea, unspecified type    3. Abdominal pain, unspecified abdominal location        Plan:         Non-intractable vomiting with nausea, unspecified vomiting type  -     ondansetron disintegrating tablet 4 mg  -     ondansetron (ZOFRAN) 4 MG tablet; Take 1 tablet (4 mg total) by mouth every 8 (eight) hours as needed.  Dispense: 15 tablet; Refill: 0    Diarrhea, unspecified type  -     dicyclomine (BENTYL) 10 MG capsule; Take 1 capsule (10 mg total) by mouth 4 (four) times daily.  Dispense: 18 capsule; Refill: 0    Abdominal pain, unspecified abdominal location  -     POCT Urinalysis, Dipstick, Automated, W/O Scope  -     POCT urine pregnancy  -     POCT Urinalysis, Dipstick, Automated, W/O Scope      Patient Instructions     If your condition worsens or fails to improve we recommend that you receive another evaluation at the ER immediately or contact your PCP to discuss your concerns or return here. You must understand that you've received an urgent care treatment only and that you may be released before all your medical problems are known or treated. You the patient will arrange for followup care as instructed.   - you can take Zofran (ondansetron) as needed for nausea.  - you can try promethazine as needed for nausea.  - Dicyclomine is an anti-spasmodic that helps with stomach pain/cramps  associated with diarrhea. It is to be taken only as needed.   Increase fluids and rest is important   - Pedialyte, Gatorade/powerade, water  Start off with liquid diet and progress as tolerated (see below)  · Water and clear liquids are important so you do not get dehydrated. Drink a small amount at a time.  · Do not force yourself to eat, especially if you have cramps, vomiting, or diarrhea. When you finally decide to start eating, do not eat large amounts at a time, even if you are hungry.  · If you eat, avoid fatty, greasy, spicy, or fried foods.  · Do not eat dairy products if you have diarrhea; they can make the diarrhea worse  Watch for any increase pain, fever, localized pain to right lower abdomen or continued vomiting or diarrhea.     - Tylenol or Ibuprofen as directed as needed for fever/pain.       - Follow up with your PCP or specialty clinic as directed in the next 1-2 days if not improved or as needed.        - Go to the ER if you develop any new or worsening symptoms      Viral Gastroenteritis (Adult)    Gastroenteritis is commonly called the stomach flu. It is most often caused by a virus that affects the stomach and intestinal tract and usually lasts from 2 to 7 days. Common viruses causing gastroenteritis include norovirus, rotavirus, and hepatitis A. Non-viral causes of gastroenteritis include bacteria, parasites, and toxins.  The danger from repeated vomiting or diarrhea is dehydration. This is the loss of too much fluid from the body. When this occurs, body fluids must be replaced. Antibiotics do not help with this illness because it is usually viral.Simple home treatment will be helpful.  Symptoms of viral gastroenteritis may include:  · Watery, loose stools  · Stomach pain or abdominal cramps  · Fever and chills  · Nausea and vomiting  · Loss of bowel control  · Headache  Home care  Gastroenteritis is transmitted by contact with the stool or vomit of an infected person. This can occur from  person to person or from contact with a contaminated surface.  Follow these guidelines when caring for yourself at home:  · If symptoms are severe, rest at home for the next 24 hours or until you are feeling better.  · Wash your hands with soap and water or use alcohol-based  to prevent the spread of infection. Wash your hands after touching anyone who is sick.  · Wash your hands or use alcohol-based  after using the toilet and before meals. Clean the toilet after each use.  Remember these tips when preparing food:  · People with diarrhea should not prepare or serve food to others. When preparing foods, wash your hands before and after.  · Wash your hands after using cutting boards, countertops, knives, or utensils that have been in contact with raw food.  · Keep uncooked meats away from cooked and ready-to-eat foods.  Medicine  You may use acetaminophen or NSAID medicines like ibuprofen or naproxen to control fever unless another medicine was given. If you have chronic liver or kidney disease, talk with your healthcare provider before using these medicines. Also talk with your provider if you've had a stomach ulcer or gastrointestinal bleeding. Don't give aspirin to anyone under 18 years of age who is ill with a fever. It may cause severe liver damage. Don't use NSAIDS is you are already taking one for another condition (like arthritis) or are on aspirin (such as for heart disease or after a stroke).  If medicine for vomiting or diarrhea are prescribed, take these only as directed. Do not take over-the-counter medicines for vomiting or diarrhea unless instructed by your healthcare provider.  Diet  Follow these guidelines for food:  · Water and liquids are important so you don't get dehydrated. Drink a small amount at a time or suck on ice chips if you are vomiting.  · If you eat, avoid fatty, greasy, spicy, or fried foods.  · Don't eat dairy if you have diarrhea. This can make diarrhea  worse.  · Avoid tobacco, alcohol, and caffeine which may worsen symptoms.  During the first 24 hours (the first full day), follow the diet below:  · Beverages. Sports drinks, soft drinks without caffeine, ginger ale, mineral water (plain or flavored), decaffeinated tea and coffee. If you are very dehydrated, sports drinks aren't a good choice. They have too much sugar and not enough electrolytes. In this case, commercially available products called oral rehydration solutions, are best.  · Soups. Eat clear broth, consommé, and bouillon.  · Desserts. Eat gelatin, popsicles, and fruit juice bars.  During the next 24 hours (the second day), you may add the following to the above:  · Hot cereal, plain toast, bread, rolls, and crackers  · Plain noodles, rice, mashed potatoes, chicken noodle or rice soup  · Unsweetened canned fruit (avoid pineapple), bananas  · Limit fat intake to less than 15 grams per day. Do this by avoiding margarine, butter, oils, mayonnaise, sauces, gravies, fried foods, peanut butter, meat, poultry, and fish.  · Limit fiber and avoid raw or cooked vegetables, fresh fruits (except bananas), and bran cereals.  · Limit caffeine and chocolate. Don't use spices or seasonings other than salt.  · Limit dairy products.  · Avoid alcohol.  During the next 24 hours:  · Gradually resume a normal diet as you feel better and your symptoms improve.  · If at any time it starts getting worse again, go back to clear liquids until you feel better.  Follow-up care  Follow up with your healthcare provider, or as advised. Call your provider if you don't get better within 24 hours or if diarrhea lasts more than a week. Also follow up if you are unable to keep down liquids and get dehydrated. If a stool (diarrhea) sample was taken, call as directed for the results.  Call 911  Call 911 if any of these occur:  · Trouble breathing  · Chest pain  · Confused  · Severe drowsiness or trouble awakening  · Fainting or loss of  consciousness  · Rapid heart rate  · Seizure  · Stiff neck  When to seek medical advice  Call your healthcare provider right away if any of these occur:  · Abdominal pain that gets worse  · Continued vomiting (unable to keep liquids down)  · Frequent diarrhea (more than 5 times a day)  · Blood in vomit or stool (black or red color)  · Dark urine, reduced urine output, or extreme thirst  · Weakness or dizziness  · Drowsiness  · Fever of 100.4°F (38°C) oral or higher that does not get better with fever medicine  · New rash  Date Last Reviewed: 1/3/2016  © 6997-8263 [employer]. [address] [place] [address], [place] [address]. All rights reserved. This information is not intended as a substitute for professional medical care. Always follow your healthcare professional's instructions.

## 2019-05-27 NOTE — PATIENT INSTRUCTIONS
If your condition worsens or fails to improve we recommend that you receive another evaluation at the ER immediately or contact your PCP to discuss your concerns or return here. You must understand that you've received an urgent care treatment only and that you may be released before all your medical problems are known or treated. You the patient will arrange for followup care as instructed.   - you can take Zofran (ondansetron) as needed for nausea.  - you can try promethazine as needed for nausea.  - Dicyclomine is an anti-spasmodic that helps with stomach pain/cramps associated with diarrhea. It is to be taken only as needed.   Increase fluids and rest is important   - Pedialyte, Gatorade/powerade, water  Start off with liquid diet and progress as tolerated (see below)  · Water and clear liquids are important so you do not get dehydrated. Drink a small amount at a time.  · Do not force yourself to eat, especially if you have cramps, vomiting, or diarrhea. When you finally decide to start eating, do not eat large amounts at a time, even if you are hungry.  · If you eat, avoid fatty, greasy, spicy, or fried foods.  · Do not eat dairy products if you have diarrhea; they can make the diarrhea worse  Watch for any increase pain, fever, localized pain to right lower abdomen or continued vomiting or diarrhea.     - Tylenol or Ibuprofen as directed as needed for fever/pain.       - Follow up with your PCP or specialty clinic as directed in the next 1-2 days if not improved or as needed.        - Go to the ER if you develop any new or worsening symptoms      Viral Gastroenteritis (Adult)    Gastroenteritis is commonly called the stomach flu. It is most often caused by a virus that affects the stomach and intestinal tract and usually lasts from 2 to 7 days. Common viruses causing gastroenteritis include norovirus, rotavirus, and hepatitis A. Non-viral causes of gastroenteritis include bacteria, parasites, and  toxins.  The danger from repeated vomiting or diarrhea is dehydration. This is the loss of too much fluid from the body. When this occurs, body fluids must be replaced. Antibiotics do not help with this illness because it is usually viral.Simple home treatment will be helpful.  Symptoms of viral gastroenteritis may include:  · Watery, loose stools  · Stomach pain or abdominal cramps  · Fever and chills  · Nausea and vomiting  · Loss of bowel control  · Headache  Home care  Gastroenteritis is transmitted by contact with the stool or vomit of an infected person. This can occur from person to person or from contact with a contaminated surface.  Follow these guidelines when caring for yourself at home:  · If symptoms are severe, rest at home for the next 24 hours or until you are feeling better.  · Wash your hands with soap and water or use alcohol-based  to prevent the spread of infection. Wash your hands after touching anyone who is sick.  · Wash your hands or use alcohol-based  after using the toilet and before meals. Clean the toilet after each use.  Remember these tips when preparing food:  · People with diarrhea should not prepare or serve food to others. When preparing foods, wash your hands before and after.  · Wash your hands after using cutting boards, countertops, knives, or utensils that have been in contact with raw food.  · Keep uncooked meats away from cooked and ready-to-eat foods.  Medicine  You may use acetaminophen or NSAID medicines like ibuprofen or naproxen to control fever unless another medicine was given. If you have chronic liver or kidney disease, talk with your healthcare provider before using these medicines. Also talk with your provider if you've had a stomach ulcer or gastrointestinal bleeding. Don't give aspirin to anyone under 18 years of age who is ill with a fever. It may cause severe liver damage. Don't use NSAIDS is you are already taking one for another condition  (like arthritis) or are on aspirin (such as for heart disease or after a stroke).  If medicine for vomiting or diarrhea are prescribed, take these only as directed. Do not take over-the-counter medicines for vomiting or diarrhea unless instructed by your healthcare provider.  Diet  Follow these guidelines for food:  · Water and liquids are important so you don't get dehydrated. Drink a small amount at a time or suck on ice chips if you are vomiting.  · If you eat, avoid fatty, greasy, spicy, or fried foods.  · Don't eat dairy if you have diarrhea. This can make diarrhea worse.  · Avoid tobacco, alcohol, and caffeine which may worsen symptoms.  During the first 24 hours (the first full day), follow the diet below:  · Beverages. Sports drinks, soft drinks without caffeine, ginger ale, mineral water (plain or flavored), decaffeinated tea and coffee. If you are very dehydrated, sports drinks aren't a good choice. They have too much sugar and not enough electrolytes. In this case, commercially available products called oral rehydration solutions, are best.  · Soups. Eat clear broth, consommé, and bouillon.  · Desserts. Eat gelatin, popsicles, and fruit juice bars.  During the next 24 hours (the second day), you may add the following to the above:  · Hot cereal, plain toast, bread, rolls, and crackers  · Plain noodles, rice, mashed potatoes, chicken noodle or rice soup  · Unsweetened canned fruit (avoid pineapple), bananas  · Limit fat intake to less than 15 grams per day. Do this by avoiding margarine, butter, oils, mayonnaise, sauces, gravies, fried foods, peanut butter, meat, poultry, and fish.  · Limit fiber and avoid raw or cooked vegetables, fresh fruits (except bananas), and bran cereals.  · Limit caffeine and chocolate. Don't use spices or seasonings other than salt.  · Limit dairy products.  · Avoid alcohol.  During the next 24 hours:  · Gradually resume a normal diet as you feel better and your symptoms  improve.  · If at any time it starts getting worse again, go back to clear liquids until you feel better.  Follow-up care  Follow up with your healthcare provider, or as advised. Call your provider if you don't get better within 24 hours or if diarrhea lasts more than a week. Also follow up if you are unable to keep down liquids and get dehydrated. If a stool (diarrhea) sample was taken, call as directed for the results.  Call 911  Call 911 if any of these occur:  · Trouble breathing  · Chest pain  · Confused  · Severe drowsiness or trouble awakening  · Fainting or loss of consciousness  · Rapid heart rate  · Seizure  · Stiff neck  When to seek medical advice  Call your healthcare provider right away if any of these occur:  · Abdominal pain that gets worse  · Continued vomiting (unable to keep liquids down)  · Frequent diarrhea (more than 5 times a day)  · Blood in vomit or stool (black or red color)  · Dark urine, reduced urine output, or extreme thirst  · Weakness or dizziness  · Drowsiness  · Fever of 100.4°F (38°C) oral or higher that does not get better with fever medicine  · New rash  Date Last Reviewed: 1/3/2016  © 2085-9688 [employer]. [address] [place] [address], [place] [address]. All rights reserved. This information is not intended as a substitute for professional medical care. Always follow your healthcare professional's instructions.

## 2019-05-28 ENCOUNTER — TELEPHONE (OUTPATIENT)
Dept: OPHTHALMOLOGY | Facility: CLINIC | Age: 34
End: 2019-05-28

## 2019-05-28 NOTE — H&P
Pre-Operative History & Physical  Ophthalmology      SUBJECTIVE:     History of Present Illness:  Patient is a 33 y.o. 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    MEDICATIONS:   No medications prior to admission.       ALLERGIES: Review of patient's allergies indicates:  No Known Allergies    PAST MEDICAL HISTORY:   Past Medical History:   Diagnosis Date    Diabetes mellitus     Hypertension      PAST SURGICAL HISTORY:   Past Surgical History:   Procedure Laterality Date    VITRECTOMY, PARS PLANA APPROACH Right 4/24/2019    Performed by JAMI Schulz MD at [facility] OR [address] [place] [address]     PAST FAMILY HISTORY:   Family History   Problem Relation Age of Onset    Diabetes Mother      SOCIAL HISTORY:   Social History     Tobacco Use    Smoking status: Never Smoker    Smokeless tobacco: Never Used   Substance Use Topics    Alcohol use: Yes     Comment: social    Drug use: No        MENTAL STATUS: Alert    REVIEW OF SYSTEMS: Negative    OBJECTIVE:     Vital Signs (Most Recent)       Physical Exam:  General: NAD  HEENT: ncvh os  Lungs: Adequate respirations  Heart: + pulses  Abdomen: Soft    ASSESSMENT/PLAN:     Patient is a 33 y.o. 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     - Proceed to OR for  25g PPV/membrane stripping/ILM peel/EL/AFx(?Gas)/Avastin OS for TRD and NCVH OS

## 2019-05-28 NOTE — TELEPHONE ENCOUNTER
----- Message from Coty Crespo sent at 5/28/2019  8:41 AM CDT -----  Contact: Mary Sims   Pt returned the called back ,can we called the pt back please ,pt can be reached at [phone] please thank you.

## 2019-05-29 ENCOUNTER — HOSPITAL ENCOUNTER (OUTPATIENT)
Facility: HOSPITAL | Age: 34
Discharge: HOME | End: 2019-05-29
Attending: OPHTHALMOLOGY | Admitting: OPHTHALMOLOGY
Payer: MEDICAID

## 2019-05-29 ENCOUNTER — ANESTHESIA EVENT (OUTPATIENT)
Dept: SURGERY | Facility: HOSPITAL | Age: 34
End: 2019-05-29
Payer: MEDICAID

## 2019-05-29 ENCOUNTER — ANESTHESIA (OUTPATIENT)
Dept: SURGERY | Facility: HOSPITAL | Age: 34
End: 2019-05-29
Payer: MEDICAID

## 2019-05-29 VITALS
WEIGHT: 150 LBS | BODY MASS INDEX: 26.58 KG/M2 | HEIGHT: 63 IN | SYSTOLIC BLOOD PRESSURE: 119 MMHG | HEART RATE: 82 BPM | TEMPERATURE: 97 F | RESPIRATION RATE: 16 BRPM | DIASTOLIC BLOOD PRESSURE: 75 MMHG | OXYGEN SATURATION: 100 %

## 2019-05-29 DIAGNOSIS — H33.42 TRACTION RETINAL DETACHMENT INVOLVING MACULA, LEFT: ICD-10-CM

## 2019-05-29 DIAGNOSIS — H43.13 VITREOUS HEMORRHAGE, BILATERAL: ICD-10-CM

## 2019-05-29 LAB
POCT GLUCOSE: 147 MG/DL (ref 70–110)
POCT GLUCOSE: 183 MG/DL (ref 70–110)
POCT GLUCOSE: 244 MG/DL (ref 70–110)
POCT GLUCOSE: 65 MG/DL (ref 70–110)
POCT GLUCOSE: 98 MG/DL (ref 70–110)

## 2019-05-29 PROCEDURE — D9220A PRA ANESTHESIA: Mod: ANES,,, | Performed by: ANESTHESIOLOGY

## 2019-05-29 PROCEDURE — 37000009 HC ANESTHESIA EA ADD 15 MINS: Performed by: OPHTHALMOLOGY

## 2019-05-29 PROCEDURE — C1784 OCULAR DEV, INTRAOP, DET RET: HCPCS | Performed by: OPHTHALMOLOGY

## 2019-05-29 PROCEDURE — 63600175 PHARM REV CODE 636 W HCPCS: Performed by: OPHTHALMOLOGY

## 2019-05-29 PROCEDURE — 82962 GLUCOSE BLOOD TEST: CPT | Performed by: OPHTHALMOLOGY

## 2019-05-29 PROCEDURE — D9220A PRA ANESTHESIA: ICD-10-PCS | Mod: CRNA,,, | Performed by: NURSE ANESTHETIST, CERTIFIED REGISTERED

## 2019-05-29 PROCEDURE — 27201423 OPTIME MED/SURG SUP & DEVICES STERILE SUPPLY: Performed by: OPHTHALMOLOGY

## 2019-05-29 PROCEDURE — 27600004 OPTIME MED/SURG SUP & DEVICES INTRAOCULAR LENS: Performed by: OPHTHALMOLOGY

## 2019-05-29 PROCEDURE — 67113 PR REPAIR COMPLEX RETINA DETACH VITRECTOMY & MEMB PEEL: ICD-10-PCS | Mod: 79,LT,, | Performed by: OPHTHALMOLOGY

## 2019-05-29 PROCEDURE — 00145 ANES PX EYE VITREORTNL SURG: CPT | Performed by: OPHTHALMOLOGY

## 2019-05-29 PROCEDURE — D9220A PRA ANESTHESIA: Mod: CRNA,,, | Performed by: NURSE ANESTHETIST, CERTIFIED REGISTERED

## 2019-05-29 PROCEDURE — 63600175 PHARM REV CODE 636 W HCPCS: Performed by: NURSE ANESTHETIST, CERTIFIED REGISTERED

## 2019-05-29 PROCEDURE — 71000015 HC POSTOP RECOV 1ST HR: Performed by: OPHTHALMOLOGY

## 2019-05-29 PROCEDURE — S0020 INJECTION, BUPIVICAINE HYDRO: HCPCS | Performed by: OPHTHALMOLOGY

## 2019-05-29 PROCEDURE — 63600175 PHARM REV CODE 636 W HCPCS: Performed by: ANESTHESIOLOGY

## 2019-05-29 PROCEDURE — 36000707: Performed by: OPHTHALMOLOGY

## 2019-05-29 PROCEDURE — D9220A PRA ANESTHESIA: ICD-10-PCS | Mod: ANES,,, | Performed by: ANESTHESIOLOGY

## 2019-05-29 PROCEDURE — 67113 REPAIR RETINAL DETACH CPLX: CPT | Mod: 79,LT,, | Performed by: OPHTHALMOLOGY

## 2019-05-29 PROCEDURE — 25000003 PHARM REV CODE 250: Performed by: ANESTHESIOLOGY

## 2019-05-29 PROCEDURE — 36000706: Performed by: OPHTHALMOLOGY

## 2019-05-29 PROCEDURE — 25000003 PHARM REV CODE 250: Performed by: OPHTHALMOLOGY

## 2019-05-29 PROCEDURE — 25000003 PHARM REV CODE 250: Performed by: NURSE ANESTHETIST, CERTIFIED REGISTERED

## 2019-05-29 PROCEDURE — 37000008 HC ANESTHESIA 1ST 15 MINUTES: Performed by: OPHTHALMOLOGY

## 2019-05-29 RX ORDER — INDOCYANINE GREEN AND WATER 25 MG
KIT INJECTION
Status: DISCONTINUED | OUTPATIENT
Start: 2019-05-29 | End: 2019-05-29 | Stop reason: HOSPADM

## 2019-05-29 RX ORDER — FENTANYL CITRATE 50 UG/ML
INJECTION, SOLUTION INTRAMUSCULAR; INTRAVENOUS
Status: DISCONTINUED | OUTPATIENT
Start: 2019-05-29 | End: 2019-05-29

## 2019-05-29 RX ORDER — LIDOCAINE HCL/PF 100 MG/5ML
SYRINGE (ML) INTRAVENOUS
Status: DISCONTINUED | OUTPATIENT
Start: 2019-05-29 | End: 2019-05-29

## 2019-05-29 RX ORDER — BUPIVACAINE HYDROCHLORIDE 7.5 MG/ML
INJECTION, SOLUTION EPIDURAL; RETROBULBAR
Status: DISCONTINUED | OUTPATIENT
Start: 2019-05-29 | End: 2019-05-29 | Stop reason: HOSPADM

## 2019-05-29 RX ORDER — ONDANSETRON 8 MG/1
8 TABLET, ORALLY DISINTEGRATING ORAL EVERY 8 HOURS PRN
Status: DISCONTINUED | OUTPATIENT
Start: 2019-05-29 | End: 2019-05-29 | Stop reason: HOSPADM

## 2019-05-29 RX ORDER — DEXTROSE 50 % IN WATER (D50W) INTRAVENOUS SYRINGE
Status: DISCONTINUED
Start: 2019-05-29 | End: 2019-05-29 | Stop reason: HOSPADM

## 2019-05-29 RX ORDER — ACETAMINOPHEN 325 MG/1
650 TABLET ORAL EVERY 4 HOURS PRN
Status: DISCONTINUED | OUTPATIENT
Start: 2019-05-29 | End: 2019-05-29 | Stop reason: HOSPADM

## 2019-05-29 RX ORDER — TROPICAMIDE 10 MG/ML
1 SOLUTION/ DROPS OPHTHALMIC
Status: DISCONTINUED | OUTPATIENT
Start: 2019-05-29 | End: 2019-05-29 | Stop reason: HOSPADM

## 2019-05-29 RX ORDER — INDOCYANINE GREEN AND WATER 25 MG
KIT INJECTION
Status: DISCONTINUED
Start: 2019-05-29 | End: 2019-05-29 | Stop reason: HOSPADM

## 2019-05-29 RX ORDER — PROPOFOL 10 MG/ML
VIAL (ML) INTRAVENOUS
Status: DISCONTINUED | OUTPATIENT
Start: 2019-05-29 | End: 2019-05-29

## 2019-05-29 RX ORDER — LIDOCAINE HYDROCHLORIDE 10 MG/ML
1 INJECTION, SOLUTION EPIDURAL; INFILTRATION; INTRACAUDAL; PERINEURAL ONCE
Status: COMPLETED | OUTPATIENT
Start: 2019-05-29 | End: 2019-05-29

## 2019-05-29 RX ORDER — PHENYLEPHRINE HYDROCHLORIDE 25 MG/ML
1 SOLUTION/ DROPS OPHTHALMIC
Status: DISCONTINUED | OUTPATIENT
Start: 2019-05-29 | End: 2019-05-29 | Stop reason: HOSPADM

## 2019-05-29 RX ORDER — ONDANSETRON 4 MG/1
4 TABLET, FILM COATED ORAL EVERY 8 HOURS PRN
Qty: 12 TABLET | Refills: 0 | Status: SHIPPED | OUTPATIENT
Start: 2019-05-29 | End: 2020-01-05 | Stop reason: CLARIF

## 2019-05-29 RX ORDER — SODIUM CHLORIDE 0.9 % (FLUSH) 0.9 %
3 SYRINGE (ML) INJECTION
Status: DISCONTINUED | OUTPATIENT
Start: 2019-05-29 | End: 2019-05-29 | Stop reason: HOSPADM

## 2019-05-29 RX ORDER — DEXAMETHASONE SODIUM PHOSPHATE 4 MG/ML
INJECTION, SOLUTION INTRA-ARTICULAR; INTRALESIONAL; INTRAMUSCULAR; INTRAVENOUS; SOFT TISSUE
Status: DISCONTINUED | OUTPATIENT
Start: 2019-05-29 | End: 2019-05-29 | Stop reason: HOSPADM

## 2019-05-29 RX ORDER — EPINEPHRINE 1 MG/ML
INJECTION, SOLUTION INTRACARDIAC; INTRAMUSCULAR; INTRAVENOUS; SUBCUTANEOUS
Status: DISCONTINUED
Start: 2019-05-29 | End: 2019-05-29 | Stop reason: HOSPADM

## 2019-05-29 RX ORDER — HYDROCODONE BITARTRATE AND ACETAMINOPHEN 5; 325 MG/1; MG/1
1 TABLET ORAL EVERY 4 HOURS PRN
Status: DISCONTINUED | OUTPATIENT
Start: 2019-05-29 | End: 2019-05-29 | Stop reason: HOSPADM

## 2019-05-29 RX ORDER — VANCOMYCIN HYDROCHLORIDE 500 MG/10ML
INJECTION, POWDER, LYOPHILIZED, FOR SOLUTION INTRAVENOUS
Status: DISCONTINUED | OUTPATIENT
Start: 2019-05-29 | End: 2019-05-29 | Stop reason: HOSPADM

## 2019-05-29 RX ORDER — MIDAZOLAM HYDROCHLORIDE 1 MG/ML
INJECTION, SOLUTION INTRAMUSCULAR; INTRAVENOUS
Status: DISCONTINUED | OUTPATIENT
Start: 2019-05-29 | End: 2019-05-29

## 2019-05-29 RX ORDER — EPINEPHRINE 1 MG/ML
INJECTION, SOLUTION INTRACARDIAC; INTRAMUSCULAR; INTRAVENOUS; SUBCUTANEOUS
Status: DISCONTINUED | OUTPATIENT
Start: 2019-05-29 | End: 2019-05-29 | Stop reason: HOSPADM

## 2019-05-29 RX ORDER — SODIUM CHLORIDE 0.9 % (FLUSH) 0.9 %
10 SYRINGE (ML) INJECTION
Status: DISCONTINUED | OUTPATIENT
Start: 2019-05-29 | End: 2019-05-29 | Stop reason: HOSPADM

## 2019-05-29 RX ORDER — NEOMYCIN SULFATE, POLYMYXIN B SULFATE, AND DEXAMETHASONE 3.5; 10000; 1 MG/G; [USP'U]/G; MG/G
OINTMENT OPHTHALMIC
Status: DISCONTINUED | OUTPATIENT
Start: 2019-05-29 | End: 2019-05-29 | Stop reason: HOSPADM

## 2019-05-29 RX ORDER — LIDOCAINE HYDROCHLORIDE 20 MG/ML
INJECTION, SOLUTION EPIDURAL; INFILTRATION; INTRACAUDAL; PERINEURAL
Status: DISCONTINUED
Start: 2019-05-29 | End: 2019-05-29 | Stop reason: HOSPADM

## 2019-05-29 RX ORDER — FENTANYL CITRATE 50 UG/ML
50 INJECTION, SOLUTION INTRAMUSCULAR; INTRAVENOUS EVERY 5 MIN PRN
Status: DISCONTINUED | OUTPATIENT
Start: 2019-05-29 | End: 2019-05-29 | Stop reason: HOSPADM

## 2019-05-29 RX ORDER — SODIUM CHLORIDE 9 MG/ML
INJECTION, SOLUTION INTRAVENOUS CONTINUOUS
Status: DISCONTINUED | OUTPATIENT
Start: 2019-05-29 | End: 2019-05-29 | Stop reason: HOSPADM

## 2019-05-29 RX ORDER — BUPIVACAINE HYDROCHLORIDE 7.5 MG/ML
INJECTION, SOLUTION EPIDURAL; RETROBULBAR
Status: DISCONTINUED
Start: 2019-05-29 | End: 2019-05-29 | Stop reason: HOSPADM

## 2019-05-29 RX ORDER — MOXIFLOXACIN 5 MG/ML
1 SOLUTION/ DROPS OPHTHALMIC
Status: DISCONTINUED | OUTPATIENT
Start: 2019-05-29 | End: 2019-05-29 | Stop reason: HOSPADM

## 2019-05-29 RX ORDER — TETRACAINE HYDROCHLORIDE 5 MG/ML
1 SOLUTION OPHTHALMIC
Status: DISCONTINUED | OUTPATIENT
Start: 2019-05-29 | End: 2019-05-29 | Stop reason: HOSPADM

## 2019-05-29 RX ORDER — VANCOMYCIN HYDROCHLORIDE 500 MG/10ML
INJECTION, POWDER, LYOPHILIZED, FOR SOLUTION INTRAVENOUS
Status: DISCONTINUED
Start: 2019-05-29 | End: 2019-05-29 | Stop reason: HOSPADM

## 2019-05-29 RX ORDER — SODIUM CHLORIDE 9 MG/ML
INJECTION, SOLUTION INTRAVENOUS CONTINUOUS PRN
Status: DISCONTINUED | OUTPATIENT
Start: 2019-05-29 | End: 2019-05-29

## 2019-05-29 RX ORDER — DEXAMETHASONE SODIUM PHOSPHATE 4 MG/ML
INJECTION, SOLUTION INTRA-ARTICULAR; INTRALESIONAL; INTRAMUSCULAR; INTRAVENOUS; SOFT TISSUE
Status: DISCONTINUED
Start: 2019-05-29 | End: 2019-05-29 | Stop reason: HOSPADM

## 2019-05-29 RX ORDER — PREDNISOLONE ACETATE 10 MG/ML
1 SUSPENSION/ DROPS OPHTHALMIC
Status: DISCONTINUED | OUTPATIENT
Start: 2019-05-29 | End: 2019-05-29 | Stop reason: HOSPADM

## 2019-05-29 RX ORDER — OXYCODONE AND ACETAMINOPHEN 5; 325 MG/1; MG/1
1 TABLET ORAL EVERY 6 HOURS PRN
Qty: 12 TABLET | Refills: 0 | Status: SHIPPED | OUTPATIENT
Start: 2019-05-29 | End: 2020-01-05 | Stop reason: CLARIF

## 2019-05-29 RX ORDER — LIDOCAINE HYDROCHLORIDE 20 MG/ML
INJECTION, SOLUTION EPIDURAL; INFILTRATION; INTRACAUDAL; PERINEURAL
Status: DISCONTINUED | OUTPATIENT
Start: 2019-05-29 | End: 2019-05-29 | Stop reason: HOSPADM

## 2019-05-29 RX ADMIN — PREDNISOLONE ACETATE 1 DROP: 10 SUSPENSION OPHTHALMIC at 07:05

## 2019-05-29 RX ADMIN — DEXTROSE MONOHYDRATE 25 G: 500 INJECTION PARENTERAL at 09:05

## 2019-05-29 RX ADMIN — PHENYLEPHRINE HYDROCHLORIDE 1 DROP: 2.5 SOLUTION/ DROPS OPHTHALMIC at 07:05

## 2019-05-29 RX ADMIN — PROPOFOL 50 MG: 10 INJECTION, EMULSION INTRAVENOUS at 10:05

## 2019-05-29 RX ADMIN — HOMATROPINE HYDROBROMIDE 1 DROP: 50 SOLUTION OPHTHALMIC at 07:05

## 2019-05-29 RX ADMIN — TROPICAMIDE 1 DROP: 10 SOLUTION/ DROPS OPHTHALMIC at 07:05

## 2019-05-29 RX ADMIN — MOXIFLOXACIN HYDROCHLORIDE 1 DROP: 5 SOLUTION/ DROPS OPHTHALMIC at 07:05

## 2019-05-29 RX ADMIN — SODIUM CHLORIDE: 0.9 INJECTION, SOLUTION INTRAVENOUS at 10:05

## 2019-05-29 RX ADMIN — TETRACAINE HYDROCHLORIDE 1 DROP: 5 SOLUTION OPHTHALMIC at 07:05

## 2019-05-29 RX ADMIN — MIDAZOLAM HYDROCHLORIDE 2 MG: 1 INJECTION, SOLUTION INTRAMUSCULAR; INTRAVENOUS at 10:05

## 2019-05-29 RX ADMIN — INSULIN HUMAN 8 UNITS: 100 INJECTION, SOLUTION PARENTERAL at 07:05

## 2019-05-29 RX ADMIN — LIDOCAINE HYDROCHLORIDE 50 MG: 20 INJECTION, SOLUTION INTRAVENOUS at 10:05

## 2019-05-29 RX ADMIN — HYDROCODONE BITARTRATE AND ACETAMINOPHEN 1 TABLET: 5; 325 TABLET ORAL at 11:05

## 2019-05-29 RX ADMIN — FENTANYL CITRATE 25 MCG: 50 INJECTION, SOLUTION INTRAMUSCULAR; INTRAVENOUS at 10:05

## 2019-05-29 RX ADMIN — LIDOCAINE HYDROCHLORIDE 1 MG: 10 INJECTION, SOLUTION EPIDURAL; INFILTRATION; INTRACAUDAL; PERINEURAL at 07:05

## 2019-05-29 NOTE — PROGRESS NOTES
Patient c/o being very shaky. Dr Souza.notified, blood sugar rechecked at 65. Dr Souza notified, order received to give dextrose 50% 25g IV. Given.

## 2019-05-29 NOTE — BRIEF OP NOTE
Pre-Op Dx: TRD and NCVH from [facility] OS    Post Op Dx: same    Procedure Performed: 25g PPV/membrane stripping/ILM peel/EL/AFx/Avastin OS 1.25mg/0.05mL  El #[id number], P 100mW, D0.1s    Attending Surgeon: Jazz    Anesthesia: Local/Mac, retrobulbar injection of 4.0cc mixture 0.75%Marcaine, 2% Xylocaine    Estimated blood loss: Minimal    Complication: None    Specimen: None    Disposition: Stable to recovery    Findings/Outcome: table top posterior traction detachment with fovea attached.    Date of Discharge: 5/29/19    Discharge Disposition: stable to recovery then home    F/U: tomorrow

## 2019-05-29 NOTE — DISCHARGE INSTRUCTIONS
Post Op Instructions:  Patient should Maintain Eye shield & Dressing until seen tomorrow in eye clinic  Tylenol as needed for general discomfort  Use Prescription for pain medication if pain is severe  Use Prescription for Nausea (Zofran) if nausea or vomiting  No excessive exercise   No Bending, Lifting or Straining  Call MD if significant pain or nausea / vomiting uncontrolled by medications  Call MD if temperature in excess of 101' F  Maintain Head in a Face-Down Position until bed, then ok to sleep on either side  Return to eye clinic for Post Op Examination tomorrow Morning.  Bring Medicine bag to tomorrow's appointment.

## 2019-05-29 NOTE — ANESTHESIA PREPROCEDURE EVALUATION
05/29/2019  Mary Sims is a 33 y.o., female.    Anesthesia Evaluation    I have reviewed the Patient Summary Reports.    I have reviewed the Nursing Notes.   I have reviewed the Medications.     Review of Systems  Anesthesia Hx:  No problems with previous Anesthesia  History of prior surgery of interest to airway management or planning: Denies Family Hx of Anesthesia complications.   Denies Personal Hx of Anesthesia complications.   Cardiovascular:   Hypertension Denies MI.   ECG has been reviewed.    Pulmonary:  Pulmonary Normal  Denies Sleep Apnea.    Renal/:  Renal/ Normal     Hepatic/GI:  Hepatic/GI Normal    Musculoskeletal:  Musculoskeletal Normal    Neurological:  Neurology Normal  Denies CVA. Denies Seizures.    Endocrine:   Diabetes        Physical Exam  General:  Well nourished    Airway/Jaw/Neck:  Airway Findings: Mouth Opening: Normal Tongue: Normal  Jaw/Neck Findings:  Micrognathia: Negative Neck ROM: Normal ROM      Dental:  Dental Findings: Periodontal disease, Severe    Chest/Lungs:  Chest/Lungs Findings: Clear to auscultation, Normal Respiratory Rate     Heart/Vascular:  Heart Findings: Rate: Normal  Rhythm: Regular Rhythm  Sounds: Normal  Heart murmur: negative    Abdomen:  Abdomen Findings:  Normal, Nontender, Soft       Mental Status:  Mental Status Findings:  Cooperative, Alert and Oriented         Anesthesia Plan  Type of Anesthesia, risks & benefits discussed:  Anesthesia Type:  general  Patient's Preference:   Intra-op Monitoring Plan:   Intra-op Monitoring Plan Comments:   Post Op Pain Control Plan: multimodal analgesia, IV/PO Opioids PRN and per primary service following discharge from PACU  Post Op Pain Control Plan Comments:   Induction:   IV  Beta Blocker:  Patient is not currently on a Beta-Blocker (No further documentation required).       Informed Consent: Patient  understands risks and agrees with Anesthesia plan.  Questions answered. Anesthesia consent signed with patient.  ASA Score: 2     Day of Surgery Review of History & Physical:    H&P update referred to the surgeon.         Ready For Surgery From Anesthesia Perspective.

## 2019-05-29 NOTE — H&P
"Pre-Operative History & Physical  Ophthalmology      SUBJECTIVE:     History of Present Illness:  Patient is a 33 y.o. 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    MEDICATIONS:   Facility-Administered Medications Prior to Admission   Medication    bevacizumab (AVASTIN) 2.5 mg/0.10 mL injection 1.25 mg     PTA Medications   Medication Sig    ADMELOG U-100 INSULIN LISPRO 100 unit/mL injection ADM 10 UNI SC TID    ergocalciferol (ERGOCALCIFEROL) 50,000 unit Cap TK 1 C PO Q WK    ergocalciferol, vitamin D2, (VITAMIN D ORAL) Take 50,000 Units by mouth every 7 days.     insulin (BASAGLAR KWIKPEN U-100 INSULIN) glargine 100 units/mL (3mL) SubQ pen Inject 45 Units into the skin every evening.    insulin aspart (NOVOLOG) 100 unit/mL InPn pen Inject 5 Units into the skin 3 (three) times daily with meals. (Patient taking differently: Inject 10 Units into the skin 3 (three) times daily with meals. )    LISINOPRIL ORAL Take by mouth.    ondansetron (ZOFRAN) 4 MG tablet Take 1 tablet (4 mg total) by mouth every 8 (eight) hours as needed.    BD ULTRA-FINE SHORT PEN NEEDLE 31 gauge x 5/16" Ndle USE UTD    dicyclomine (BENTYL) 10 MG capsule Take 1 capsule (10 mg total) by mouth 4 (four) times daily.    insulin syringe-needle U-100 1 mL 30 gauge X 7/16" Syrg USE AS DIRECTED    naproxen sodium (ANAPROX) 220 MG tablet Take 220 mg by mouth every 12 (twelve) hours.    ondansetron (ZOFRAN) 4 MG tablet Take 1 tablet (4 mg total) by mouth every 8 (eight) hours as needed for Nausea.    ONETOUCH DELICA LANCETS 33 gauge Misc USE AS DIRECTED    ONETOUCH ULTRA BLUE TEST STRIP Strp USE TO TEST BLOOD GLUCOSE BID    oxyCODONE-acetaminophen (PERCOCET) 5-325 mg per tablet Take 1 tablet by mouth every 6 (six) hours as needed for Pain.    pen needle, diabetic (BD ULTRA-FINE ISAAC " "PEN NEEDLES) 32 gauge x 5/32" Ndle Use to inject insulin 5x/day       ALLERGIES: Review of patient's allergies indicates:  No Known Allergies    PAST MEDICAL HISTORY:   Past Medical History:   Diagnosis Date    Diabetes mellitus     Hypertension      PAST SURGICAL HISTORY:   Past Surgical History:   Procedure Laterality Date    VITRECTOMY, PARS PLANA APPROACH Right 4/24/2019    Performed by [facility] Schulz [facility] at [facility] OR [address] [place] [address]     PAST FAMILY HISTORY:   Family History   Problem Relation Age of Onset    Diabetes Mother      SOCIAL HISTORY:   Social History     Tobacco Use    Smoking status: Never Smoker    Smokeless tobacco: Never Used   Substance Use Topics    Alcohol use: Yes     Comment: social    Drug use: No        MENTAL STATUS: Alert    REVIEW OF SYSTEMS: Negative    OBJECTIVE:     Vital Signs (Most Recent)  Temp: 98.3 °F (36.8 °C) (05/29/19 0734)  Pulse: 83 (05/29/19 0734)  Resp: 16 (05/29/19 0734)  BP: 128/77 (05/29/19 0734)  SpO2: 96 % (05/29/19 0734)    Physical Exam:  General: NAD  HEENT: ncvh  Lungs: Adequate respirations  Heart: + pulses  Abdomen: Soft    ASSESSMENT/PLAN:     Patient is a 33 y.o. 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    Patient seen and examined today, H&P reviewed.  There are no changes to the patient's H&P.  There is still an ongoing indication for the procedure.  Will proceed with 25g PPV/membrane stripping/ILM peel/EL/AFx(?Gas)/Avastin OS for TRD and NCV[facility] OS. All questions answered.    Beth Luciano MD  PGY-2, Ophthalmology   05/29/2019  7:49 AM                "

## 2019-05-29 NOTE — OP NOTE
DATE OF PROCEDURE:  05/29/2019    PREOPERATIVE DIAGNOSES:  Tractional retinal detachment with nonclearing vitreous   hemorrhage from proliferative diabetic retinopathy to the left eye.    POSTOPERATIVE DIAGNOSES:  Tractional retinal detachment with nonclearing   vitreous hemorrhage from proliferative diabetic retinopathy to the left eye.    PROCEDURE PERFORMED:  A 25-gauge pars plana vitrectomy with membrane stripping,   internal limiting membrane peel, endolaser, air-fluid exchange, injection of   Avastin 1.25 mg in 0.05 mL to the left eye.    ENDOLASER PARAMETERS:  Number of spots 1917, power 100 milliwatts, duration 0.1   seconds.    ATTENDING SURGEON:  JAMI Schulz M.D.    ANESTHESIA:  Local Monitored anesthesia care with a retrobulbar injection of 4.0   mL mixture of 0.75% Marcaine and 2% Xylocaine.    ESTIMATED BLOOD LOSS:  Minimal.    COMPLICATIONS:  None.    DISPOSITION:  Stable to recovery.    INDICATIONS FOR SURGERY:  This lady is a young lady who has had bilateral   tractional retinal detachments and vitreous hemorrhages, right eye, status post   repair.  The patient requested surgery for the left eye to remove the hemorrhage   and scar tissue to stabilize the eye, prevent further vision loss and hopefully   improve some vision.  Risks, benefits, and alternatives of surgery were   discussed in detail with risks including loss of vision, loss of eye, recurrent   retinal detachment, recurrent hemorrhage, infection, cataract formation, lens   dislocation, glaucoma, hypotony, ptosis and diplopia.  The patient voiced   understanding and wished to proceed with the procedure.    DESCRIPTION OF PROCEDURE:  After proper informed consent was obtained, the   patient was brought back to the operating suite at [facility] where   MAC anesthesia was induced.  Retrobulbar injection was provided as above without   complication.  The patient was prepped and draped in a sterile fashion for   ophthalmic  surgery and lid speculum was placed in the left eye.  A standard   3-port 25-gauge pars plana vitrectomy was set up with the infusion cannula   inserted 4 mm posterior to the limbus.  The infusion cannula was turned on only   after observed to be free and clear of all underlying retinal tissue.    Supranasal and supratemporal trocars were also placed 4 mm posterior to the   limbus.  The vitrector and light pipe were introduced in the vitreous cavity and   a core vitrectomy was performed.  Posterior hyaloid was attached to the various   neovascular foci from a tabletop detachment.  The hyaloid was segmented and    from the posterior disease, so there was no anterior traction   remaining.  Then, ILM forceps and the vitrector were used to strip off the   neovascular foci and fibrotic elements.  ICG was used to stain the internal   limiting membrane, which was peeled off of the macula with direct contact lens   visualization and the ILM forceps.  Scleral depression was then performed 360   degrees to help with removal of the cortical vitreous hemorrhage and residual   cortical vitreous.  No identifiable retinal breaks were found.  Endolaser was   applied in a panretinal photocoagulation pattern 360 degrees.  An air-fluid   exchange was performed.  The trocars were removed from the eye, not leaking   after gentle massage.  Avastin 1.25 mg in 0.05 mL was injected 4 mm posterior to   the limbus with a 30-gauge needle and the eye was normal pressure via palpation   following injection.  Subconjunctival injections of vancomycin and Decadron   were given to the patient.  The drapes were removed from the patient.  She was   washed free of Betadine prep solution.  Maxitrol ointment was placed in the left   eye.  The eye was patch shielded.  MAC anesthesia was reversed and she was   brought to the Recovery Room in stable condition tolerating the procedure well.    Dr. Schulz was present for the entire  case.      JOSE/IN  dd: 05/29/2019 11:25:36 (CDT)  td: 05/29/2019 12:18:01 (CDT)  Doc ID   #[id number]  Job ID #[id number]    CC:

## 2019-05-29 NOTE — PROGRESS NOTES
. Anesthesia notified. 8 units of insulin ordered. Medication given. Will reassess CBG in 30 mins.

## 2019-05-29 NOTE — ANESTHESIA POSTPROCEDURE EVALUATION
Anesthesia Post Evaluation    Patient: Mary Sims    Procedure(s) Performed: Procedure(s) (LRB):  VITRECTOMY, PARS PLANA  APPROACH  25g PPV/Membrane Stripping/IML peel/EL/AFX/Avastin left eye   60 mins (Left)    Final Anesthesia Type: general  Patient location during evaluation: PACU  Patient participation: Yes- Able to Participate  Level of consciousness: awake and alert  Post-procedure vital signs: reviewed and stable  Pain management: adequate  Airway patency: patent  PONV status at discharge: No PONV  Anesthetic complications: no      Cardiovascular status: stable  Respiratory status: unassisted and spontaneous ventilation  Hydration status: euvolemic  Follow-up not needed.          Vitals Value Taken Time   /75 5/29/2019 12:02 PM   Temp 36.2 °C (97.2 °F) 5/29/2019 12:00 PM   Pulse 83 5/29/2019 12:10 PM   Resp 16 5/29/2019 11:45 AM   SpO2 100 % 5/29/2019 12:10 PM   Vitals shown include unvalidated device data.      No case tracking events are documented in the log.      Pain/Amparo Score: Pain Rating Prior to Med Admin: 7 (5/29/2019 11:43 AM)  Pain Rating Post Med Admin: 6 (5/29/2019 12:00 PM)

## 2019-05-29 NOTE — TRANSFER OF CARE
"Anesthesia Transfer of Care Note    Patient: Mary Sims    Procedure(s) Performed: Procedure(s) (LRB):  VITRECTOMY, PARS PLANA  APPROACH  25g PPV/Membrane Stripping/IML peel/EL/AFX/Avastin left eye   60 mins (Left)    Patient location: [facility]    Transport from OR: Transported from OR on room air with adequate spontaneous ventilation    Post pain: adequate analgesia    Post assessment: no apparent anesthetic complications and tolerated procedure well    Post vital signs: stable    Level of consciousness: awake and alert    Nausea/Vomiting: no nausea/vomiting    Complications: none    Transfer of care protocol was followed      Last vitals:   Visit Vitals  /78   Pulse 82   Temp 36.8 °C (98.3 °F) (Oral)   Resp 18   Ht 5' 3" (1.6 m)   Wt 68 kg (150 lb)   SpO2 99%   Breastfeeding? No   BMI 26.57 kg/m²     "

## 2019-05-30 ENCOUNTER — OFFICE VISIT (OUTPATIENT)
Dept: OPHTHALMOLOGY | Facility: CLINIC | Age: 34
End: 2019-05-30
Payer: MEDICAID

## 2019-05-30 PROCEDURE — 99999 PR PBB SHADOW E&M-EST. PATIENT-LVL III: CPT | Mod: PBBFAC,,, | Performed by: OPHTHALMOLOGY

## 2019-05-30 PROCEDURE — 99213 OFFICE O/P EST LOW 20 MIN: CPT | Mod: PBBFAC,PO | Performed by: OPHTHALMOLOGY

## 2019-05-30 PROCEDURE — 99024 PR POST-OP FOLLOW-UP VISIT: ICD-10-PCS | Mod: ,,, | Performed by: OPHTHALMOLOGY

## 2019-05-30 PROCEDURE — 99999 PR PBB SHADOW E&M-EST. PATIENT-LVL III: ICD-10-PCS | Mod: PBBFAC,,, | Performed by: OPHTHALMOLOGY

## 2019-05-30 PROCEDURE — 99024 POSTOP FOLLOW-UP VISIT: CPT | Mod: ,,, | Performed by: OPHTHALMOLOGY

## 2019-05-30 NOTE — PROGRESS NOTES
HPI     1 day PO     Pt has not used any pain meds yet pain is bearable   Irritation from patch and tearing     Last edited by Lillie Spivey on 5/30/2019  8:02 AM. (History)          Prior OCT - TRD OU      A/P    1. PDR OU  T1 - uncontrolled  S/p Avastin OU with Germer Last 3/25/19    2. TRD OU    OD with combined RRD/TRD    S/p 25g PPV/membrane stripping/ILM peel/EL/AFx/GFx/Avastin OD For TRD 4/24/19    Plan 25g PPV/membrane stripping/ILM peel/EL/AFx/Avastin OS for TRD and [facility] OS    Doing well, IOP slightly elevated  Start gtts QID, ointment shield QHS  Add combigan BID      3.VH OS    4. ?steroid response        1 week

## 2019-06-06 ENCOUNTER — OFFICE VISIT (OUTPATIENT)
Dept: OPHTHALMOLOGY | Facility: CLINIC | Age: 34
End: 2019-06-06
Payer: MEDICAID

## 2019-06-06 DIAGNOSIS — H33.41 COMBINED TRACTION AND RHEGMATOGENOUS RETINAL DETACHMENT OF RIGHT EYE: ICD-10-CM

## 2019-06-06 DIAGNOSIS — H33.001 COMBINED TRACTION AND RHEGMATOGENOUS RETINAL DETACHMENT OF RIGHT EYE: ICD-10-CM

## 2019-06-06 DIAGNOSIS — H33.41 TRACTION RETINAL DETACHMENT, RIGHT: ICD-10-CM

## 2019-06-06 PROCEDURE — 99999 PR PBB SHADOW E&M-EST. PATIENT-LVL III: CPT | Mod: PBBFAC,,, | Performed by: OPHTHALMOLOGY

## 2019-06-06 PROCEDURE — 92014 PR EYE EXAM, EST PATIENT,COMPREHESV: ICD-10-PCS | Mod: S$PBB,,, | Performed by: OPHTHALMOLOGY

## 2019-06-06 PROCEDURE — 99999 PR PBB SHADOW E&M-EST. PATIENT-LVL III: ICD-10-PCS | Mod: PBBFAC,,, | Performed by: OPHTHALMOLOGY

## 2019-06-06 PROCEDURE — 99213 OFFICE O/P EST LOW 20 MIN: CPT | Mod: PBBFAC,PO | Performed by: OPHTHALMOLOGY

## 2019-06-06 PROCEDURE — 92014 COMPRE OPH EXAM EST PT 1/>: CPT | Mod: S$PBB,,, | Performed by: OPHTHALMOLOGY

## 2019-06-06 RX ORDER — NEOMYCIN SULFATE, POLYMYXIN B SULFATE, AND DEXAMETHASONE 3.5; 10000; 1 MG/G; [USP'U]/G; MG/G
1 OINTMENT OPHTHALMIC NIGHTLY
COMMUNITY
End: 2020-01-05 | Stop reason: CLARIF

## 2019-06-06 RX ORDER — MOXIFLOXACIN 5 MG/ML
1 SOLUTION/ DROPS OPHTHALMIC 4 TIMES DAILY
COMMUNITY
End: 2020-01-05 | Stop reason: CLARIF

## 2019-06-06 RX ORDER — BRIMONIDINE TARTRATE AND TIMOLOL MALEATE 2; 5 MG/ML; MG/ML
1 SOLUTION OPHTHALMIC 2 TIMES DAILY
COMMUNITY
End: 2020-01-05 | Stop reason: CLARIF

## 2019-06-06 RX ORDER — PREDNISOLONE ACETATE 10 MG/ML
1 SUSPENSION/ DROPS OPHTHALMIC 4 TIMES DAILY
COMMUNITY
End: 2020-01-05 | Stop reason: CLARIF

## 2019-06-06 NOTE — PROGRESS NOTES
HPI       No pain, Va improving          Prior OCT - TRD OU      A/P    1. PDR OU  T1 - uncontrolled  S/p Avastin OU with Germer Last 3/25/19    2. TRD OU    OD with combined RRD/TRD    S/p 25g PPV/membrane stripping/ILM peel/EL/AFx/GFx/Avastin OD For TRD 4/24/19    Plan 25g PPV/membrane stripping/ILM peel/EL/AFx/Avastin OS for TRD and NCVH OS    Doing well, IOP slightly elevated though improved    Taper PF 3/2/1/0  Continue combigan BID Until empy      3.VH OS    4. ?steroid response        4-5 weeks OCT

## 2019-06-14 ENCOUNTER — HOSPITAL ENCOUNTER (EMERGENCY)
Facility: HOSPITAL | Age: 34
Discharge: HOME | End: 2019-06-14
Attending: EMERGENCY MEDICINE
Payer: MEDICAID

## 2019-06-14 VITALS
RESPIRATION RATE: 18 BRPM | HEART RATE: 92 BPM | SYSTOLIC BLOOD PRESSURE: 134 MMHG | TEMPERATURE: 98 F | HEIGHT: 63 IN | WEIGHT: 150 LBS | BODY MASS INDEX: 26.58 KG/M2 | DIASTOLIC BLOOD PRESSURE: 68 MMHG | OXYGEN SATURATION: 99 %

## 2019-06-14 DIAGNOSIS — K52.9 GASTROENTERITIS: Primary | ICD-10-CM

## 2019-06-14 LAB
ALBUMIN SERPL BCP-MCNC: 2.6 G/DL (ref 3.5–5.2)
ALP SERPL-CCNC: 80 U/L (ref 55–135)
ALT SERPL W/O P-5'-P-CCNC: 13 U/L (ref 10–44)
ANION GAP SERPL CALC-SCNC: 6 MMOL/L (ref 8–16)
AST SERPL-CCNC: 17 U/L (ref 10–40)
BASOPHILS # BLD AUTO: 0.02 K/UL (ref 0–0.2)
BASOPHILS NFR BLD: 0.4 % (ref 0–1.9)
BILIRUB SERPL-MCNC: 0.3 MG/DL (ref 0.1–1)
BUN SERPL-MCNC: 11 MG/DL (ref 6–20)
C DIFF GDH STL QL: NEGATIVE
C DIFF TOX A+B STL QL IA: NEGATIVE
CALCIUM SERPL-MCNC: 8.6 MG/DL (ref 8.7–10.5)
CHLORIDE SERPL-SCNC: 105 MMOL/L (ref 95–110)
CO2 SERPL-SCNC: 25 MMOL/L (ref 23–29)
CREAT SERPL-MCNC: 1 MG/DL (ref 0.5–1.4)
DIFFERENTIAL METHOD: ABNORMAL
EOSINOPHIL # BLD AUTO: 0.1 K/UL (ref 0–0.5)
EOSINOPHIL NFR BLD: 2.4 % (ref 0–8)
ERYTHROCYTE [DISTWIDTH] IN BLOOD BY AUTOMATED COUNT: 13.1 % (ref 11.5–14.5)
EST. GFR  (AFRICAN AMERICAN): >60 ML/MIN/1.73 M^2
EST. GFR  (NON AFRICAN AMERICAN): >60 ML/MIN/1.73 M^2
GLUCOSE SERPL-MCNC: 283 MG/DL (ref 70–110)
HCT VFR BLD AUTO: 29.1 % (ref 37–48.5)
HGB BLD-MCNC: 9.9 G/DL (ref 12–16)
IMM GRANULOCYTES # BLD AUTO: 0.01 K/UL (ref 0–0.04)
IMM GRANULOCYTES NFR BLD AUTO: 0.2 % (ref 0–0.5)
LIPASE SERPL-CCNC: 29 U/L (ref 4–60)
LYMPHOCYTES # BLD AUTO: 1.3 K/UL (ref 1–4.8)
LYMPHOCYTES NFR BLD: 28.5 % (ref 18–48)
MCH RBC QN AUTO: 24.6 PG (ref 27–31)
MCHC RBC AUTO-ENTMCNC: 34 G/DL (ref 32–36)
MCV RBC AUTO: 72 FL (ref 82–98)
MONOCYTES # BLD AUTO: 0.3 K/UL (ref 0.3–1)
MONOCYTES NFR BLD: 7.3 % (ref 4–15)
NEUTROPHILS # BLD AUTO: 2.9 K/UL (ref 1.8–7.7)
NEUTROPHILS NFR BLD: 61.2 % (ref 38–73)
NRBC BLD-RTO: 0 /100 WBC
PLATELET # BLD AUTO: 230 K/UL (ref 150–350)
PMV BLD AUTO: 11.5 FL (ref 9.2–12.9)
POTASSIUM SERPL-SCNC: 4.4 MMOL/L (ref 3.5–5.1)
PROT SERPL-MCNC: 6.3 G/DL (ref 6–8.4)
RBC # BLD AUTO: 4.03 M/UL (ref 4–5.4)
SODIUM SERPL-SCNC: 136 MMOL/L (ref 136–145)
WBC # BLD AUTO: 4.67 K/UL (ref 3.9–12.7)

## 2019-06-14 PROCEDURE — 25000003 PHARM REV CODE 250: Performed by: EMERGENCY MEDICINE

## 2019-06-14 PROCEDURE — 99284 PR EMERGENCY DEPT VISIT,LEVEL IV: ICD-10-PCS | Mod: ,,, | Performed by: EMERGENCY MEDICINE

## 2019-06-14 PROCEDURE — 99283 EMERGENCY DEPT VISIT LOW MDM: CPT

## 2019-06-14 PROCEDURE — 99284 EMERGENCY DEPT VISIT MOD MDM: CPT | Mod: ,,, | Performed by: EMERGENCY MEDICINE

## 2019-06-14 PROCEDURE — 80053 COMPREHEN METABOLIC PANEL: CPT

## 2019-06-14 PROCEDURE — 87449 NOS EACH ORGANISM AG IA: CPT

## 2019-06-14 PROCEDURE — 85025 COMPLETE CBC W/AUTO DIFF WBC: CPT

## 2019-06-14 PROCEDURE — 83690 ASSAY OF LIPASE: CPT

## 2019-06-14 RX ORDER — ONDANSETRON 4 MG/1
4 TABLET, ORALLY DISINTEGRATING ORAL EVERY 6 HOURS PRN
Qty: 10 TABLET | Refills: 0 | Status: SHIPPED | OUTPATIENT
Start: 2019-06-14 | End: 2020-01-05 | Stop reason: CLARIF

## 2019-06-14 RX ORDER — ONDANSETRON 4 MG/1
4 TABLET, ORALLY DISINTEGRATING ORAL
Status: COMPLETED | OUTPATIENT
Start: 2019-06-14 | End: 2019-06-14

## 2019-06-14 RX ADMIN — ONDANSETRON 4 MG: 4 TABLET, ORALLY DISINTEGRATING ORAL at 11:06

## 2019-06-14 RX ADMIN — ONDANSETRON 4 MG: 4 TABLET, ORALLY DISINTEGRATING ORAL at 12:06

## 2019-06-14 NOTE — ED PROVIDER NOTES
"Encounter Date: 6/14/2019    SCRIBE #1 NOTE: I, Judah Mcguire, am scribing for, and in the presence of,  Vinod Ross MD. I have scribed the following portions of the note - Other sections scribed: HPI, ROS, PE.       History     Chief Complaint   Patient presents with    Abdominal Pain     nv and diarrhea stopped, started 7d ago     34 year old female with past medical history of diabetes mellitus and hypertension presents to the ED with complaints of abdominal pain, diarrhea, and vomiting. The patient states that her symptoms began 4 days ago and have been getting worse since. On the first day she felt fine until the evening when she suddenly began vomiting. She is unable to keep down any food, or even the Zofran she was prescribed a while back. She states that she is vomiting about 4 times per day now and has diarrhea "more times than I can count." She has not been on antibiotics any time recently. Denies fever, blood in stool, or mucous in her stool. No chance she is pregnant. She does not get periods due to her IUD.    The history is provided by the patient.     Review of patient's allergies indicates:  No Known Allergies  Past Medical History:   Diagnosis Date    Diabetes mellitus     Hypertension      Past Surgical History:   Procedure Laterality Date    VITRECTOMY, PARS PLANA  APPROACH  25g PPV/Membrane Stripping/IML peel/EL/AFX/Avastin left eye   60 mins Left 5/29/2019    Performed by JAMI Schulz MD at [facility] OR [facility]    VITRECTOMY, PARS PLANA APPROACH Right 4/24/2019    Performed by JAMI Schulz MD at [facility] OR [facility]     Family History   Problem Relation Age of Onset    Diabetes Mother      Social History     Tobacco Use    Smoking status: Never Smoker    Smokeless tobacco: Never Used   Substance Use Topics    Alcohol use: Yes     Comment: social    Drug use: No     Review of Systems   Constitutional: Positive for chills. Negative for fever.   HENT: Negative for drooling and " rhinorrhea.    Respiratory: Negative for cough and shortness of breath.    Cardiovascular: Negative for chest pain and leg swelling.   Gastrointestinal: Positive for abdominal distention, abdominal pain, diarrhea, nausea and vomiting. Negative for blood in stool.        (-) Mucous in stool   Genitourinary: Negative for frequency and urgency.   Musculoskeletal: Negative for arthralgias and myalgias.   Skin: Negative for pallor and rash.   Neurological: Negative for syncope and numbness.   Hematological: Negative for adenopathy. Does not bruise/bleed easily.       Physical Exam     Initial Vitals [06/14/19 1013]   BP Pulse Resp Temp SpO2   134/68 92 18 98 °F (36.7 °C) 99 %      MAP       --         Physical Exam    Nursing note and vitals reviewed.  Constitutional: She appears well-developed and well-nourished. No distress.   HENT:   Head: Normocephalic and atraumatic.   Mouth/Throat: Oropharynx is clear and moist.   Eyes: EOM are normal. Pupils are equal, round, and reactive to light.   Neck: Normal range of motion. Neck supple. No JVD present.   Cardiovascular: Normal rate, regular rhythm, normal heart sounds and intact distal pulses. Exam reveals no gallop and no friction rub.    No murmur heard.  Pulmonary/Chest: Breath sounds normal. No respiratory distress. She has no wheezes.   Abdominal: Soft. Bowel sounds are normal. She exhibits no distension. There is no tenderness. There is no rebound and no guarding.   Musculoskeletal: Normal range of motion. She exhibits no edema.   Neurological: She is alert and oriented to person, place, and time.   Skin: Skin is warm and dry.         ED Course   Procedures  Labs Reviewed   COMPREHENSIVE METABOLIC PANEL - Abnormal; Notable for the following components:       Result Value    Glucose 283 (*)     Calcium 8.6 (*)     Albumin 2.6 (*)     Anion Gap 6 (*)     All other components within normal limits   CBC W/ AUTO DIFFERENTIAL - Abnormal; Notable for the following components:     Hemoglobin 9.9 (*)     Hematocrit 29.1 (*)     Mean Corpuscular Volume 72 (*)     Mean Corpuscular Hemoglobin 24.6 (*)     All other components within normal limits   CLOSTRIDIUM DIFFICILE   LIPASE          Imaging Results    None          Medical Decision Making:   Initial Assessment:   Patient presents with nausea vomiting and diarrhea.  It started after eating April[employer].  Her boyfriend also has it.  She has had multiple episodes of diarrhea but does not endorse any blood or mucus.  Initially vomited undigested food.  Differential Diagnosis:   Differential diagnosis includes but is not limited to food borne illness, gastroenteritis, skin closed heard him difficile colitis  ED Management:  The patient was seen and examined she was given a Zofran ODT.  She has no real appreciable peritonitis based on her physical exam.  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  It really sounds more like she has a gastroenteritis.    1223:  The patient's diagnostic the this point is unremarkable.  I will treat her with orally disintegrating tablets of Zofran.  She has no evidence of peritonitis.  The Clostridium difficile assay still pending.  I will give her a 24 hr work note.            Scribe Attestation:   Scribe #1: I performed the above scribed service and the documentation accurately describes the services I performed. I attest to the accuracy of the note.    Attending Attestation:           Physician Attestation for Scribe:      Comments: I, Dr. Ross, personally performed the services described in this documentation. All medical record entries made by the scribe were at my direction and in my presence.  I have reviewed the chart and agree that the record reflects my personal performance and is accurate and complete. Vinod Ross DO  11:23 AM 06/14/2019                 Clinical Impression:       ICD-10-CM ICD-9-CM   1. Gastroenteritis K52.9 558.9         Disposition:   Disposition:  Discharged  Condition: Stable                        Vinod Ross, DO  06/14/19 [id number]

## 2019-06-14 NOTE — ED TRIAGE NOTES
Patient identifiers verified and correct for Mary Sims, [birth year].  Pt c/o n/v starting 2 1/2 days ago with abd pain. Diarrhea began after initial onset of symptoms.   LOC: The patient is awake, alert and aware of environment with an appropriate affect, the patient is oriented x 3 and speaking appropriately.   APPEARANCE: Patient appears comfortable and in no acute distress, patient is clean and well groomed.  SKIN: The skin is warm and dry, color consistent with ethnicity, patient has normal skin turgor and moist mucus membranes, skin intact, no breakdown or bruising noted.   MUSCULOSKELETAL: Patient moving all extremities spontaneously, no swelling noted.  RESPIRATORY: Airway is open and patent, respirations are spontaneous, patient has a normal effort and rate, no accessory muscle use noted, 99% on room air.  CARDIAC: Pt placed on cardiac monitor. Patient has a normal rate and regular rhythm, no edema noted, capillary refill < 3 seconds.   GASTRO: Rounded abdomen no distention noted. Pt states bowel movements have been diarrhea, no blood or mucous reported in stool.   : Pt denies any pain or frequency with urination.  NEURO: Pt opens eyes spontaneously, behavior appropriate to situation, follows commands, facial expression symmetrical, bilateral hand grasp equal and even, purposeful motor response noted, normal sensation in all extremities when touched with a finger.

## 2019-07-08 ENCOUNTER — OFFICE VISIT (OUTPATIENT)
Dept: URGENT CARE | Facility: CLINIC | Age: 34
End: 2019-07-08
Payer: MEDICAID

## 2019-07-08 VITALS
OXYGEN SATURATION: 98 % | SYSTOLIC BLOOD PRESSURE: 112 MMHG | BODY MASS INDEX: 26.58 KG/M2 | WEIGHT: 150 LBS | HEART RATE: 64 BPM | HEIGHT: 63 IN | TEMPERATURE: 97 F | DIASTOLIC BLOOD PRESSURE: 70 MMHG

## 2019-07-08 DIAGNOSIS — R51.9 NONINTRACTABLE HEADACHE, UNSPECIFIED CHRONICITY PATTERN, UNSPECIFIED HEADACHE TYPE: Primary | ICD-10-CM

## 2019-07-08 PROCEDURE — 99214 PR OFFICE/OUTPT VISIT, EST, LEVL IV, 30-39 MIN: ICD-10-PCS | Mod: S$GLB,,, | Performed by: NURSE PRACTITIONER

## 2019-07-08 PROCEDURE — 99214 OFFICE O/P EST MOD 30 MIN: CPT | Mod: S$GLB,,, | Performed by: NURSE PRACTITIONER

## 2019-07-08 RX ORDER — BUTALBITAL, ACETAMINOPHEN AND CAFFEINE 50; 325; 40 MG/1; MG/1; MG/1
1 TABLET ORAL
Qty: 18 TABLET | Refills: 0 | Status: SHIPPED | OUTPATIENT
Start: 2019-07-08 | End: 2020-01-05 | Stop reason: CLARIF

## 2019-07-08 NOTE — LETTER
July 8, 2019      [facility] [address] [place] [address]  Phone: [phone]  Fax: [phone]       Patient: Mary Sims   YOB: 1985  Date of Visit: 07/08/2019    To Whom It May Concern:    Wale Sims  was at [facility] on 07/08/2019. She may return to work/school on 07/09/2019 with no restrictions. If you have any questions or concerns, or if I can be of further assistance, please do not hesitate to contact me.    Sincerely,    Zarina Mcadams NP

## 2019-07-08 NOTE — PROGRESS NOTES
"Subjective:       Patient ID: Mary Sims is a 34 y.o. female.    Vitals:  height is 5' 3" (1.6 m) and weight is 68 kg (150 lb). Her temperature is 97.1 °F (36.2 °C). Her blood pressure is 112/70 and her pulse is 64. Her oxygen saturation is 98%.     Chief Complaint: Headache    Pt reports she has been having a temporal headache for 3 days , she has been stressed with work lately     Headache    This is a new problem. The current episode started in the past 7 days. The problem occurs constantly. The problem has been gradually worsening. The pain is located in the temporal region. The pain radiates to the face. The pain quality is not similar to prior headaches. The quality of the pain is described as squeezing and throbbing. The pain is at a severity of 8/10. The pain is moderate. Pertinent negatives include no blurred vision, dizziness, eye pain, fever, loss of balance, nausea, neck pain, photophobia, tinnitus, vomiting or weakness. Exacerbated by: stress. She has tried acetaminophen for the symptoms. The treatment provided mild relief. There is no history of migraine headaches.       Constitution: Negative for chills, sweating and fever.   HENT: Negative for tinnitus, facial swelling, congestion and sinus pain.    Neck: Negative for neck pain and neck stiffness.   Eyes: Negative for eye pain, photophobia, vision loss, double vision and blurred vision.   Gastrointestinal: Negative for nausea and vomiting.   Genitourinary: Negative for missed menses.   Musculoskeletal: Negative for trauma and muscle ache.   Skin: Negative for rash, wound and lesion.   Neurological: Positive for headaches. Negative for dizziness, history of vertigo, light-headedness, facial drooping, speech difficulty, coordination disturbances, loss of balance, history of migraines, disorientation and loss of consciousness.   Psychiatric/Behavioral: Negative for disorientation, confusion, nervous/anxious, sleep disturbance and depression. The " patient is not nervous/anxious.        Objective:      Physical Exam   Constitutional: She is oriented to person, place, and time. Vital signs are normal. She appears well-developed and well-nourished. She is active and cooperative.  Non-toxic appearance. She does not have a sickly appearance. She does not appear ill. No distress.   HENT:   Head: Normocephalic and atraumatic.   Right Ear: Hearing, tympanic membrane, external ear and ear canal normal.   Left Ear: Hearing, tympanic membrane, external ear and ear canal normal.   Nose: Nose normal. No mucosal edema, rhinorrhea or nasal deformity. No epistaxis. Right sinus exhibits no maxillary sinus tenderness and no frontal sinus tenderness. Left sinus exhibits no maxillary sinus tenderness and no frontal sinus tenderness.   Mouth/Throat: Uvula is midline, oropharynx is clear and moist and mucous membranes are normal. No trismus in the jaw. Normal dentition. No uvula swelling. No posterior oropharyngeal erythema.   Eyes: Pupils are equal, round, and reactive to light. Conjunctivae, EOM and lids are normal. Right eye exhibits no discharge. Left eye exhibits no discharge. No scleral icterus.   Sclera clear bilat   Neck: Trachea normal, normal range of motion, full passive range of motion without pain and phonation normal. Neck supple.   Cardiovascular: Normal rate, regular rhythm, normal heart sounds, intact distal pulses and normal pulses.   Pulses:       Radial pulses are 2+ on the right side, and 2+ on the left side.   Pulmonary/Chest: Effort normal and breath sounds normal. No respiratory distress.   Musculoskeletal: Normal range of motion. She exhibits no edema or deformity.   Neurological: She is alert and oriented to person, place, and time. She has normal strength and normal reflexes. No cranial nerve deficit or sensory deficit. She exhibits normal muscle tone. She displays a negative Romberg sign. Coordination normal.   Skin: Skin is warm, dry and intact.  Capillary refill takes less than 2 seconds. No rash noted. She is not diaphoretic. No pallor.   Psychiatric: She has a normal mood and affect. Her speech is normal and behavior is normal. Judgment and thought content normal. Cognition and memory are normal.   Nursing note and vitals reviewed.      Assessment:       1. Nonintractable headache, unspecified chronicity pattern, unspecified headache type        Plan:       This is an urgent evaluation of a 34 y.o. female with a PMHx of recurrent headaches presenting to the Urgent Care for gradual onset of headache similar to past headaches per patient. Denies traumatic injury, LOC, emesis, and seizure. Also denies fever, neck rigidity, sinus pain, dental pain, and otalgia.  Vitals reassuring. Neurologically intact without focal deficits. Patient reports stress on job which most likely precipitated the headache.     Presentation most consistent with acute migraine vs tension HA in this patient with Hx of recurrent HAs. No clinical Hx or findings to suggest intracranial hemorrhage or warrant emergent imaging at this time. Not consistent with infectious etiology, including for meningitis, acute bacterial sinusitis, dental abscess, AOM, and mastoiditis. I considered but have a lower suspicion for neoplastic etiology.     Discharged home with Fioricet.    Nonintractable headache, unspecified chronicity pattern, unspecified headache type  -     butalbital-acetaminophen-caffeine -40 mg (FIORICET, ESGIC) -40 mg per tablet; Take 1 tablet by mouth every 4 to 6 hours as needed for Pain or Headaches.  Dispense: 18 tablet; Refill: 0      Patient Instructions     If you were prescribed a narcotic or controlled medication, do not drive or operate heavy equipment or machinery while taking these medications.  You must understand that you've received an Urgent Care treatment only and that you may be released before all your medical problems are known or treated. You, the  "patient, will arrange for follow up care as instructed.  Follow up with your PCP or specialty clinic as directed within 2-5 days if not improved or as needed.  You can call [phone] to schedule an appointment with the appropriate provider.  If your condition worsens we recommend that you receive another evaluation at the emergency room immediately or contact your primary medical clinics after hours call service to discuss your concerns.  Please return here or go to the Emergency Department for any concerns or worsening of condition.      Headache, Unspecified    A number of things can cause headaches. The cause of your headache isnt clear. But it doesnt seem to be a sign of any serious illness.  You could have a tension headache or a migraine headache.  Stress can cause a tension headache. This can happen if you tense the muscles of your shoulders, neck, and scalp without knowing it. If this stress lasts long enough, you may develop a tension headache.  It is not clear why migraines occur, but certain things called" triggers" can raise the risk of having a migraine attack. Migraine triggers may include emotional stress or depression, or by hormone changes during the menstrual cycle. Other triggers include birth control pills and other medicines, alcohol or caffeine, foods with tyramine (such as aged cheese, wine), eyestrain, weather changes, missed meals, and lack of sleep or oversleeping.  Other causes of headache include:  · Viral illness with high fever  · Head injury with concussion  · Sinus, ear, or throat infection  · Dental pain and jaw joint (TMJ) pain  More serious but less common causes of headache include stroke, brain hemorrhage, brain tumor, meningitis, and encephalitis.  Home care  Follow these tips when taking care of yourself at home:  · Dont drive yourself home if you were given pain medicine for your headache. Instead, have someone else drive you home. Try to sleep when you get home. You " should feel much better when you wake up.  · Apply heat to the back of your neck to ease a neck muscle spasm. Take care of a migraine headache by putting an ice pack on your forehead or at the base of your skull.  · If you have nausea or vomiting, eat a light diet until your headache eases.  · If you have a migraine headache, use sunglasses when in the daylight or around bright indoor lighting until your symptoms get better. Bright glaring light can make this type of headache worse.  Follow-up care  Follow up with your healthcare provider, or as advised. Talk with your provider if you have frequent headaches. He or she can help figure out a treatment plan. By knowing the earliest signs of headache, and starting treatment right away, you may be able to stop the pain yourself.  When to seek medical advice  Call your healthcare provider right away if any of these occur:  · Your head pain suddenly gets worse after sexual intercourse or strenuous activity  · Your head pain doesnt get better within 24 hours  · You arent able to keep liquids down (repeated vomiting)  · Fever of 100.4ºF (38ºC) or higher, or as directed by your healthcare provider  · Stiff neck  · Extreme drowsiness, confusion, or fainting  · Dizziness or dizziness with spinning sensation (vertigo)  · Weakness in an arm or leg or one side of your face  · You have trouble talking or seeing  Date Last Reviewed: 8/1/2016  © 2800-4535 [employer]. [address] [place] [address], [place] [id number]. All rights reserved. This information is not intended as a substitute for professional medical care. Always follow your healthcare professional's instructions.        Preventing Migraine Headaches: Triggers     Red wine is a common migraine trigger.     The first step in preventing migraines is to learn what triggers them. You may then be able to control your triggers to avoid or reduce the severity of your migraines.  Know your triggers  Be aware that you may  have more than one trigger, and that some triggers may work together. Common migraine triggers include:  · Food and nutrition. Skipping meals or not drinking enough water can trigger headaches. So can certain foods, such as caffeine, monosodium glutamate (MSG), aged cheese, or sausage.  · Alcohol. Red wine and other alcoholic beverages are common migraine triggers.  · Chemicals. Scents, cleaning products, gasoline, glue, perfume, and paint can be triggers. So can tobacco smoke, including secondhand smoke.  · Emotions. Stress can trigger headaches or make them worse once they begin.  · Sleep disruption. Staying up late, sleeping late, and traveling across time zones can disrupt your sleep cycle, triggering headaches.  · Hormones. Many women notice that migraines tend to happen at a certain point in their menstrual cycle. Birth control pills or hormone replacement therapy may also trigger migraines.  · Environment and weather. Air travel, changes in altitude, air pressure changes, hot sun, or bright or flashing lights can be triggers.  Control your triggers  These are some of the things you can do to try to control triggers:  · Avoid triggers if you can. For example, stay clear of alcohol and foods that trigger your headaches. Use unscented household products. Keep regular sleep habits. Manage stress to help control emotional triggers.  · Change your behavior at times when triggers can't be avoided. For example, make sure to get enough rest and drink plenty of water while you're traveling. Make sure to carry a hat, sunglasses, and your medicines. Be alert for migraine symptoms, so you can treat a migraine early if it happens.  Date Last Reviewed: 10/9/2015  © 6132-7830 [employer]. [address] [place] [address], [place] [address]. All rights reserved. This information is not intended as a substitute for professional medical care. Always follow your healthcare professional's instructions.        Migraine  Headache  This often severe type of headache is different from other types of headaches in that symptoms other than pain occur with the headache. Nausea and vomiting, lightheadedness, sensitivity to light (photophobia), and other visual disturbances are common migraine symptoms. The pain may last from a few hours to several days. It is not clear why migraines occur but certain factors called triggers can raise the risk of having a migraine attack. A migraine may be triggered by emotional stress or depression, or by hormone changes during the menstrual cycle. Other triggers include birth control pills, overuse of migraine medicines, alcohol or caffeine, foods with tyramine (such as aged cheese and wine), eyestrain, weather changes, missed meals, or too little or too much sleep.  Home care  Follow these tips when taking care of yourself at home:  · Dont drive yourself home if you were given pain medicine for your headache or are having visual symptoms. Instead, have someone else drive you home. Try to sleep when you get home. You should feel much better when you wake up.  · Cold can help ease migraine symptoms. Put an ice pack on your forehead or at the base of your skull. Put heat on the back of your neck to help ease any neck spasm.  · Drink only clear liquids or eat a light diet until your symptoms get better. This will help you avoid nausea and vomiting.  How to prevent migraines  Pay attention to what seems to trigger your headache. Try to avoid the triggers when you can. If you have frequent headaches, consider keeping a headache diary. In it, write down what you were doing, feeling, or eating in the hours before each headache. Show this to your healthcare provider to help find the cause of your headaches.  If stress seems to be a trigger for your headaches, figure out what is causing stress in your life. Learn new ways to handle your stress. Ideas include regular exercise, biofeedback, self-hypnosis, yoga,  and meditation. Talk with your healthcare provider to find out more information about managing stress. Many books and digital media are also available on this subject.  Tyramine is a substance found in many foods. It can trigger a migraine in some people. These foods contain tyramine:  · Chocolate  · Yogurt  · All cheeses, but especially aged cheeses  · Smoked or pickled fish and meat, including herring, caviar, bologna, pepperoni, and salami  · Liver  · Avocados  · Bananas  · Figs  · Raisins  · Red wine  Try staying away from these foods for 1 to 2 months to see if you have fewer headaches.  How to treat future headaches  · Take time out at the first sign of a headache, if possible. Find a quiet, dark, comfortable place to sit or lie down. Let yourself relax or sleep.  · Put an ice pack on your forehead or on the area of greatest pain. A heating pad and massage may help if you are having a muscle spasm and tightness in your neck.  · If you have been prescribed a medicine to stop a migraine headache, use this at the first warning sign of the headache for best results. First signs may be an aura or pain.  · If you need to take medicine often for your migraine, talk with your healthcare provider about other ways to prevent your headaches.  Follow-up care  Follow up with your healthcare provider, or as advised. Talk with your provider if you have frequent headaches. He or she can figure out a treatment plan. Ask if you can have medicine to take at home the next time you get a bad headache. This may keep you from having to visit the emergency department in the future. You may need to see a headache specialist (neurologist) if you continue to have headaches.  When to seek medical advice  Call your healthcare provider right away if any of these occur:  · Your head pain gets worse, or doesnt get better within 24 hours  · You cant keep liquids down (repeated vomiting)  · Pain in your sinuses, ears, or throat  · Fever of  100.4º F (38º C) or higher, or as directed by your healthcare provider  · Stiff neck  · Extreme drowsiness, confusion, or fainting  · Dizziness, or dizziness with spinning sensation (vertigo)  · Weakness in an arm or leg, or on one side of your face  · Difficulty talking or seeing  Date Last Reviewed: 8/1/2016  © 1533-3918 [employer]. [address] [place] [address]. All rights reserved. This information is not intended as a substitute for professional medical care. Always follow your healthcare professional's instructions.

## 2019-07-08 NOTE — PATIENT INSTRUCTIONS
"If you were prescribed a narcotic or controlled medication, do not drive or operate heavy equipment or machinery while taking these medications.  You must understand that you've received an Urgent Care treatment only and that you may be released before all your medical problems are known or treated. You, the patient, will arrange for follow up care as instructed.  Follow up with your PCP or specialty clinic as directed within 2-5 days if not improved or as needed.  You can call [phone] to schedule an appointment with the appropriate provider.  If your condition worsens we recommend that you receive another evaluation at the emergency room immediately or contact your primary medical clinics after hours call service to discuss your concerns.  Please return here or go to the Emergency Department for any concerns or worsening of condition.      Headache, Unspecified    A number of things can cause headaches. The cause of your headache isnt clear. But it doesnt seem to be a sign of any serious illness.  You could have a tension headache or a migraine headache.  Stress can cause a tension headache. This can happen if you tense the muscles of your shoulders, neck, and scalp without knowing it. If this stress lasts long enough, you may develop a tension headache.  It is not clear why migraines occur, but certain things called" triggers" can raise the risk of having a migraine attack. Migraine triggers may include emotional stress or depression, or by hormone changes during the menstrual cycle. Other triggers include birth control pills and other medicines, alcohol or caffeine, foods with tyramine (such as aged cheese, wine), eyestrain, weather changes, missed meals, and lack of sleep or oversleeping.  Other causes of headache include:  · Viral illness with high fever  · Head injury with concussion  · Sinus, ear, or throat infection  · Dental pain and jaw joint (TMJ) pain  More serious but less common causes of " headache include stroke, brain hemorrhage, brain tumor, meningitis, and encephalitis.  Home care  Follow these tips when taking care of yourself at home:  · Dont drive yourself home if you were given pain medicine for your headache. Instead, have someone else drive you home. Try to sleep when you get home. You should feel much better when you wake up.  · Apply heat to the back of your neck to ease a neck muscle spasm. Take care of a migraine headache by putting an ice pack on your forehead or at the base of your skull.  · If you have nausea or vomiting, eat a light diet until your headache eases.  · If you have a migraine headache, use sunglasses when in the daylight or around bright indoor lighting until your symptoms get better. Bright glaring light can make this type of headache worse.  Follow-up care  Follow up with your healthcare provider, or as advised. Talk with your provider if you have frequent headaches. He or she can help figure out a treatment plan. By knowing the earliest signs of headache, and starting treatment right away, you may be able to stop the pain yourself.  When to seek medical advice  Call your healthcare provider right away if any of these occur:  · Your head pain suddenly gets worse after sexual intercourse or strenuous activity  · Your head pain doesnt get better within 24 hours  · You arent able to keep liquids down (repeated vomiting)  · Fever of 100.4ºF (38ºC) or higher, or as directed by your healthcare provider  · Stiff neck  · Extreme drowsiness, confusion, or fainting  · Dizziness or dizziness with spinning sensation (vertigo)  · Weakness in an arm or leg or one side of your face  · You have trouble talking or seeing  Date Last Reviewed: 8/1/2016  © 3080-3189 [employer]. [address] [place] [address], [place] [address]. All rights reserved. This information is not intended as a substitute for professional medical care. Always follow your healthcare professional's  instructions.        Preventing Migraine Headaches: Triggers     Red wine is a common migraine trigger.     The first step in preventing migraines is to learn what triggers them. You may then be able to control your triggers to avoid or reduce the severity of your migraines.  Know your triggers  Be aware that you may have more than one trigger, and that some triggers may work together. Common migraine triggers include:  · Food and nutrition. Skipping meals or not drinking enough water can trigger headaches. So can certain foods, such as caffeine, monosodium glutamate (MSG), aged cheese, or sausage.  · Alcohol. Red wine and other alcoholic beverages are common migraine triggers.  · Chemicals. Scents, cleaning products, gasoline, glue, perfume, and paint can be triggers. So can tobacco smoke, including secondhand smoke.  · Emotions. Stress can trigger headaches or make them worse once they begin.  · Sleep disruption. Staying up late, sleeping late, and traveling across time zones can disrupt your sleep cycle, triggering headaches.  · Hormones. Many women notice that migraines tend to happen at a certain point in their menstrual cycle. Birth control pills or hormone replacement therapy may also trigger migraines.  · Environment and weather. Air travel, changes in altitude, air pressure changes, hot sun, or bright or flashing lights can be triggers.  Control your triggers  These are some of the things you can do to try to control triggers:  · Avoid triggers if you can. For example, stay clear of alcohol and foods that trigger your headaches. Use unscented household products. Keep regular sleep habits. Manage stress to help control emotional triggers.  · Change your behavior at times when triggers can't be avoided. For example, make sure to get enough rest and drink plenty of water while you're traveling. Make sure to carry a hat, sunglasses, and your medicines. Be alert for migraine symptoms, so you can treat a migraine  early if it happens.  Date Last Reviewed: 10/9/2015  © 6248-2825 [employer]. [address] [place] [address], [place] [address]. All rights reserved. This information is not intended as a substitute for professional medical care. Always follow your healthcare professional's instructions.        Migraine Headache  This often severe type of headache is different from other types of headaches in that symptoms other than pain occur with the headache. Nausea and vomiting, lightheadedness, sensitivity to light (photophobia), and other visual disturbances are common migraine symptoms. The pain may last from a few hours to several days. It is not clear why migraines occur but certain factors called triggers can raise the risk of having a migraine attack. A migraine may be triggered by emotional stress or depression, or by hormone changes during the menstrual cycle. Other triggers include birth control pills, overuse of migraine medicines, alcohol or caffeine, foods with tyramine (such as aged cheese and wine), eyestrain, weather changes, missed meals, or too little or too much sleep.  Home care  Follow these tips when taking care of yourself at home:  · Dont drive yourself home if you were given pain medicine for your headache or are having visual symptoms. Instead, have someone else drive you home. Try to sleep when you get home. You should feel much better when you wake up.  · Cold can help ease migraine symptoms. Put an ice pack on your forehead or at the base of your skull. Put heat on the back of your neck to help ease any neck spasm.  · Drink only clear liquids or eat a light diet until your symptoms get better. This will help you avoid nausea and vomiting.  How to prevent migraines  Pay attention to what seems to trigger your headache. Try to avoid the triggers when you can. If you have frequent headaches, consider keeping a headache diary. In it, write down what you were doing, feeling, or eating in the  hours before each headache. Show this to your healthcare provider to help find the cause of your headaches.  If stress seems to be a trigger for your headaches, figure out what is causing stress in your life. Learn new ways to handle your stress. Ideas include regular exercise, biofeedback, self-hypnosis, yoga, and meditation. Talk with your healthcare provider to find out more information about managing stress. Many books and digital media are also available on this subject.  Tyramine is a substance found in many foods. It can trigger a migraine in some people. These foods contain tyramine:  · Chocolate  · Yogurt  · All cheeses, but especially aged cheeses  · Smoked or pickled fish and meat, including herring, caviar, bologna, pepperoni, and salami  · Liver  · Avocados  · Bananas  · Figs  · Raisins  · Red wine  Try staying away from these foods for 1 to 2 months to see if you have fewer headaches.  How to treat future headaches  · Take time out at the first sign of a headache, if possible. Find a quiet, dark, comfortable place to sit or lie down. Let yourself relax or sleep.  · Put an ice pack on your forehead or on the area of greatest pain. A heating pad and massage may help if you are having a muscle spasm and tightness in your neck.  · If you have been prescribed a medicine to stop a migraine headache, use this at the first warning sign of the headache for best results. First signs may be an aura or pain.  · If you need to take medicine often for your migraine, talk with your healthcare provider about other ways to prevent your headaches.  Follow-up care  Follow up with your healthcare provider, or as advised. Talk with your provider if you have frequent headaches. He or she can figure out a treatment plan. Ask if you can have medicine to take at home the next time you get a bad headache. This may keep you from having to visit the emergency department in the future. You may need to see a headache specialist  (neurologist) if you continue to have headaches.  When to seek medical advice  Call your healthcare provider right away if any of these occur:  · Your head pain gets worse, or doesnt get better within 24 hours  · You cant keep liquids down (repeated vomiting)  · Pain in your sinuses, ears, or throat  · Fever of 100.4º F (38º C) or higher, or as directed by your healthcare provider  · Stiff neck  · Extreme drowsiness, confusion, or fainting  · Dizziness, or dizziness with spinning sensation (vertigo)  · Weakness in an arm or leg, or on one side of your face  · Difficulty talking or seeing  Date Last Reviewed: 8/1/2016  © 4414-8158 [employer]. [address] [place] [address], [place] [address]. All rights reserved. This information is not intended as a substitute for professional medical care. Always follow your healthcare professional's instructions.

## 2019-07-26 ENCOUNTER — OFFICE VISIT (OUTPATIENT)
Dept: URGENT CARE | Facility: CLINIC | Age: 34
End: 2019-07-26
Payer: MEDICAID

## 2019-07-26 VITALS
TEMPERATURE: 98 F | BODY MASS INDEX: 26.58 KG/M2 | HEART RATE: 93 BPM | RESPIRATION RATE: 18 BRPM | SYSTOLIC BLOOD PRESSURE: 136 MMHG | WEIGHT: 150 LBS | OXYGEN SATURATION: 100 % | DIASTOLIC BLOOD PRESSURE: 94 MMHG | HEIGHT: 63 IN

## 2019-07-26 DIAGNOSIS — B37.89 CANDIDIASIS OF BREAST: ICD-10-CM

## 2019-07-26 DIAGNOSIS — L02.416 ABSCESS OF LEFT LOWER LEG: Primary | ICD-10-CM

## 2019-07-26 PROCEDURE — 99214 OFFICE O/P EST MOD 30 MIN: CPT | Mod: 25,S$GLB,, | Performed by: STUDENT IN AN ORGANIZED HEALTH CARE EDUCATION/TRAINING PROGRAM

## 2019-07-26 PROCEDURE — 10060 INCISION & DRAINAGE: ICD-10-PCS | Mod: S$GLB,,, | Performed by: STUDENT IN AN ORGANIZED HEALTH CARE EDUCATION/TRAINING PROGRAM

## 2019-07-26 PROCEDURE — 10060 I&D ABSCESS SIMPLE/SINGLE: CPT | Mod: S$GLB,,, | Performed by: STUDENT IN AN ORGANIZED HEALTH CARE EDUCATION/TRAINING PROGRAM

## 2019-07-26 PROCEDURE — 99214 PR OFFICE/OUTPT VISIT, EST, LEVL IV, 30-39 MIN: ICD-10-PCS | Mod: 25,S$GLB,, | Performed by: STUDENT IN AN ORGANIZED HEALTH CARE EDUCATION/TRAINING PROGRAM

## 2019-07-26 RX ORDER — SULFAMETHOXAZOLE AND TRIMETHOPRIM 800; 160 MG/1; MG/1
1 TABLET ORAL 2 TIMES DAILY
Qty: 10 TABLET | Refills: 0 | Status: SHIPPED | OUTPATIENT
Start: 2019-07-26 | End: 2019-07-31

## 2019-07-26 RX ORDER — NYSTATIN 100000 [USP'U]/G
POWDER TOPICAL 2 TIMES DAILY
Qty: 60 G | Refills: 0 | Status: SHIPPED | OUTPATIENT
Start: 2019-07-26 | End: 2019-08-05

## 2019-07-26 NOTE — PATIENT INSTRUCTIONS
Abscess (Antibiotic Treatment Only)  An abscess (sometimes called a boil) happens when bacteria get trapped under the skin and start to grow. Pus forms inside the abscess as the body responds to the bacteria. An abscess can happen with an insect bite, ingrown hair, blocked oil gland, pimple, cyst, or puncture wound.  In the early stages, your wound may be red and tender. For this stage, you may get antibiotics. If the abscess does not get better with antibiotics, it will need to be drained with a small cut.  Home care  These tips will help you care for your abscess at home:  · Soak the wound in hot water or apply hot packs (small towel soaked in hot water) to the area for 20 minutes at a time. Do this 3 to 4 times a day.  · Do not cut, squeeze, or pop the boil yourself.  · Apply antibiotic cream or ointment to the skin 3 to 4 times a day, unless something else was prescribed. Some ointments include an antibiotic plus a pain reliever.  · If your doctor prescribed antibiotics, do not stop taking them until you have finished the medicine or the doctor tells you to stop.  · You may use an over-the-counter pain medicine to control pain, unless another pain medicine was prescribed. If you have chronic liver or kidney disease or ever had a stomach ulcer or gastrointestinal bleeding, talk with your doctor before using these any of these.  Follow-up care  Follow up with your healthcare provider, or as advised. Check your wound each day for the signs of worsening infection listed below.  When to seek medical advice  Get prompt medical attention if any of these occur:  · An increase in redness or swelling  · Red streaks in the skin leading away from the abscess  · An increase in local pain or swelling  · Fever of 100.4ºF (38ºC) or higher, or as directed by your healthcare provider  · Pus or fluid coming from the abscess  · Boil returns after getting better  Date Last Reviewed: 9/1/2016  © 7820-6237 [employer] [address] [place] [address]. All rights reserved. This information is not intended as a substitute for professional medical care. Always follow your healthcare professional's instructions.

## 2019-07-26 NOTE — PROGRESS NOTES
"Subjective:       Patient ID: Mary Sims is a 34 y.o. female.    Vitals:  height is 5' 3" (1.6 m) and weight is 68 kg (150 lb). Her temperature is 98 °F (36.7 °C). Her blood pressure is 136/94 (abnormal) and her pulse is 93. Her respiration is 18 and oxygen saturation is 100%.     Chief Complaint: Breast Problem    Pt c/o a big knot on her left leg that she noticed since Wednesday, she doesn't remember hitting her leg on anything or anything insect biting her. She is concerned because she is a diabetic and wants to find out the underlying cause. She is also c/o odor under her breasts lately when she sweats. Has associated redness, itching, and chafing. She's tried putting powder but it doesn't help.    Cyst   This is a new problem. The current episode started in the past 7 days. The problem occurs constantly. The problem has been gradually worsening. Associated symptoms include myalgias (LLE) and a rash. Pertinent negatives include no abdominal pain, anorexia, arthralgias, chest pain, chills, congestion, coughing, fatigue, fever, headaches, joint swelling, nausea, neck pain, numbness, sore throat, visual change, vomiting or weakness. Nothing aggravates the symptoms. She has tried nothing for the symptoms. The treatment provided no relief.       Constitution: Negative for chills, fatigue and fever.   HENT: Negative for congestion, sinus pressure and sore throat.    Neck: Negative for neck pain and painful lymph nodes.   Cardiovascular: Negative for chest pain and leg swelling.   Eyes: Negative for eye pain, double vision and blurred vision.   Respiratory: Negative for cough and shortness of breath.    Gastrointestinal: Negative for abdominal pain, nausea, vomiting and diarrhea.   Genitourinary: Negative for dysuria, frequency and urgency.   Musculoskeletal: Positive for pain (LLE) and muscle ache (LLE). Negative for joint pain, joint swelling and muscle cramps.   Skin: Positive for rash and erythema " "(~1.5x1.5cm area of erythema with warmth, minimal TTP, and fluctuance). Negative for pale and bruising.   Allergic/Immunologic: Positive for itching. Negative for seasonal allergies.   Neurological: Negative for dizziness, light-headedness, passing out, headaches and numbness.   Hematologic/Lymphatic: Negative for swollen lymph nodes.   Psychiatric/Behavioral: Negative for nervous/anxious, sleep disturbance and depression. The patient is not nervous/anxious.        Objective:       Vitals:    07/26/19 1111   BP: (!) 136/94   Pulse: 93   Resp: 18   Temp: 98 °F (36.7 °C)   SpO2: 100%   Weight: 68 kg (150 lb)   Height: 5' 3" (1.6 m)     Physical Exam   Constitutional: She is oriented to person, place, and time. She appears well-developed and well-nourished. No distress.   HENT:   Head: Normocephalic and atraumatic.   Nose: Nose normal.   Eyes: Conjunctivae and EOM are normal. No scleral icterus.   Neck: Normal range of motion. Neck supple.   Cardiovascular: Normal rate, regular rhythm and intact distal pulses.   Musculoskeletal: She exhibits no edema or deformity.   Neurological: She is alert and oriented to person, place, and time. No cranial nerve deficit (grossly intact).   Skin: Skin is warm and dry. Rash (macular rash with hyperpigmentation of skin under b/l breasts) noted. There is erythema (~1.5x1.5cm area of erythema with warmth, minimal TTP, and fluctuance).   Psychiatric: She has a normal mood and affect. Her behavior is normal. Judgment and thought content normal.   Nursing note and vitals reviewed.      Assessment:       1. Abscess of left lower leg    2. Candidiasis of breast        Plan:         Abscess of left lower leg  -     sulfamethoxazole-trimethoprim 800-160mg (BACTRIM DS) 800-160 mg Tab; Take 1 tablet by mouth 2 (two) times daily. for 5 days  Dispense: 10 tablet; Refill: 0  -     Incision & Drainage       - aspirated with 22G to differentiate abscess from hematoma and returned purulent material; " manual expressed without making incision; discussed incision vs monitoring and abx for further treatment and pt preferred to try antibiotics after manual expression    Candidiasis of breast  -     nystatin (MYCOSTATIN) powder; Apply topically 2 (two) times daily. for 10 days  Dispense: 60 g; Refill: 0    Medications and diagnosis reviewed with patient, questions answered, and return precautions given.    Follow up if symptoms worsen or fail to improve.    Shiv Lucas MD/MPH  [facility]  [facility]

## 2019-07-26 NOTE — PROCEDURES
Incision & Drainage  Date/Time: 7/26/2019 11:54 AM  Performed by: Shiv Lucas MD  Authorized by: Shiv Lucsa MD     Consent Done?:  Yes (Verbal)    Type:  Abscess  Body area:  Lower extremity  Location details:  Left leg  Scalpel size: 22G needle.  Complexity:  Simple  Drainage:  Pus and bloody  Drainage amount:  Moderate  Wound treatment:  Drainage and expression of material  Packing material:  None  Patient tolerance:  Patient tolerated the procedure well with no immediate complications    Needle aspiration done to confirm presence of pus vs hematoma; manually drained with expression after return of purulent material with incision

## 2019-07-29 ENCOUNTER — OCCUPATIONAL HEALTH (OUTPATIENT)
Dept: URGENT CARE | Facility: CLINIC | Age: 34
End: 2019-07-29

## 2019-07-29 ENCOUNTER — OFFICE VISIT (OUTPATIENT)
Dept: URGENT CARE | Facility: CLINIC | Age: 34
End: 2019-07-29
Payer: MEDICAID

## 2019-07-29 VITALS
OXYGEN SATURATION: 99 % | DIASTOLIC BLOOD PRESSURE: 86 MMHG | SYSTOLIC BLOOD PRESSURE: 126 MMHG | HEART RATE: 89 BPM | TEMPERATURE: 97 F

## 2019-07-29 DIAGNOSIS — L02.416 ABSCESS OF LEFT LOWER EXTREMITY: Primary | ICD-10-CM

## 2019-07-29 DIAGNOSIS — Z02.1 PRE-EMPLOYMENT EXAMINATION: Primary | ICD-10-CM

## 2019-07-29 LAB
CTP QC/QA: YES
POC 10 PANEL DRUG SCREEN: NEGATIVE

## 2019-07-29 PROCEDURE — 99214 OFFICE O/P EST MOD 30 MIN: CPT | Mod: 25,S$GLB,, | Performed by: PHYSICIAN ASSISTANT

## 2019-07-29 PROCEDURE — 80305 DRUG TEST PRSMV DIR OPT OBS: CPT | Mod: QW,S$GLB,, | Performed by: NURSE PRACTITIONER

## 2019-07-29 PROCEDURE — 10060 I&D ABSCESS SIMPLE/SINGLE: CPT | Mod: S$GLB,,, | Performed by: PHYSICIAN ASSISTANT

## 2019-07-29 PROCEDURE — 99214 PR OFFICE/OUTPT VISIT, EST, LEVL IV, 30-39 MIN: ICD-10-PCS | Mod: 25,S$GLB,, | Performed by: PHYSICIAN ASSISTANT

## 2019-07-29 PROCEDURE — 10060 INCISION & DRAINAGE: ICD-10-PCS | Mod: S$GLB,,, | Performed by: PHYSICIAN ASSISTANT

## 2019-07-29 PROCEDURE — 99499 UNLISTED E&M SERVICE: CPT | Mod: S$GLB,,, | Performed by: NURSE PRACTITIONER

## 2019-07-29 PROCEDURE — 99499 PHYSICAL, BASIC COMPLEXITY: ICD-10-PCS | Mod: S$GLB,,, | Performed by: NURSE PRACTITIONER

## 2019-07-29 PROCEDURE — 80305 POCT RAPID DRUG SCREEN 10 PANEL: ICD-10-PCS | Mod: QW,S$GLB,, | Performed by: NURSE PRACTITIONER

## 2019-07-29 RX ORDER — SULFAMETHOXAZOLE AND TRIMETHOPRIM 800; 160 MG/1; MG/1
1 TABLET ORAL 2 TIMES DAILY
Qty: 10 TABLET | Refills: 0 | Status: SHIPPED | OUTPATIENT
Start: 2019-07-29 | End: 2019-08-03

## 2019-07-29 NOTE — PROGRESS NOTES
Subjective:       Patient ID: Mary Sims is a 34 y.o. female.    Vitals:  temperature is 97.4 °F (36.3 °C). Her blood pressure is 126/86 and her pulse is 89. Her oxygen saturation is 99%.     Chief Complaint: Abscess    Ms. Sims presents for evaluation of left lower leg abscess.  She was seen 7/26/19 in  & given antibiotics, as abscess was not ready to drain.  Today she presents with worsening erythema & now with fluctuance & visible pus beneath surface.  She denies any fevers, chills.  She has been taking the antibiotics as described.     Abscess   Chronicity:  NewProgression Since Onset: gradually worsening  Location:  Leg  Associated Symptoms: no fever, no chills  Characteristics: draining and painful    Characteristics comment: Throbbing  Pain Scale:  5/10  Treatments Tried:  Draining/squeezing (Tylenol)      Constitution: Negative for chills, fatigue and fever.   HENT: Negative for congestion and sore throat.    Neck: Negative for painful lymph nodes.   Cardiovascular: Negative for chest pain and leg swelling.   Eyes: Negative for double vision and blurred vision.   Respiratory: Negative for cough and shortness of breath.    Gastrointestinal: Negative for nausea, vomiting and diarrhea.   Genitourinary: Negative for dysuria, frequency, urgency and history of kidney stones.   Musculoskeletal: Negative for joint pain, joint swelling, muscle cramps and muscle ache.   Skin: Positive for erythema and abscess. Negative for color change, pale, rash and bruising.   Allergic/Immunologic: Negative for seasonal allergies.   Neurological: Negative for dizziness, history of vertigo, light-headedness, passing out and headaches.   Hematologic/Lymphatic: Negative for swollen lymph nodes.   Psychiatric/Behavioral: Negative for nervous/anxious, sleep disturbance and depression. The patient is not nervous/anxious.        Objective:      Physical Exam   Constitutional: She is oriented to person, place, and time. She  appears well-developed and well-nourished. She is cooperative.  Non-toxic appearance. She does not appear ill. No distress.   HENT:   Head: Normocephalic and atraumatic.   Right Ear: Hearing, tympanic membrane, external ear and ear canal normal.   Left Ear: Hearing, tympanic membrane, external ear and ear canal normal.   Nose: Nose normal. No mucosal edema, rhinorrhea or nasal deformity. No epistaxis. Right sinus exhibits no maxillary sinus tenderness and no frontal sinus tenderness. Left sinus exhibits no maxillary sinus tenderness and no frontal sinus tenderness.   Mouth/Throat: Uvula is midline, oropharynx is clear and moist and mucous membranes are normal. No trismus in the jaw. Normal dentition. No uvula swelling. No posterior oropharyngeal erythema.   Eyes: Conjunctivae and lids are normal. Right eye exhibits no discharge. Left eye exhibits no discharge. No scleral icterus.   Sclera clear bilat   Neck: Trachea normal, normal range of motion, full passive range of motion without pain and phonation normal. Neck supple.   Cardiovascular: Normal rate, regular rhythm, normal heart sounds, intact distal pulses and normal pulses.   Pulmonary/Chest: Effort normal and breath sounds normal. No respiratory distress.   Abdominal: Soft. Normal appearance and bowel sounds are normal. She exhibits no distension, no pulsatile midline mass and no mass. There is no tenderness.   Musculoskeletal: Normal range of motion. She exhibits no edema or deformity.   Neurological: She is alert and oriented to person, place, and time. She exhibits normal muscle tone. Coordination normal.   Skin: Skin is warm, dry and intact. She is not diaphoretic. There is erythema. No pallor.        2x2cm erythematous abscess with warmth, tenderness, and central area of underlying fluctuance.     Psychiatric: She has a normal mood and affect. Her speech is normal and behavior is normal. Judgment and thought content normal. Cognition and memory are  normal.   Nursing note and vitals reviewed.          Assessment:       1. Abscess of left lower extremity        Plan:         Abscess of left lower extremity    Other orders  -     sulfamethoxazole-trimethoprim 800-160mg (BACTRIM DS) 800-160 mg Tab; Take 1 tablet by mouth 2 (two) times daily. for 5 days  Dispense: 10 tablet; Refill: 0      Patient Instructions   PLEASE READ YOUR DISCHARGE INSTRUCTIONS ENTIRELY AS IT CONTAINS IMPORTANT INFORMATION.  You were give 5 more days of the same antibiotic for a total of 10 days of antibiotics.  Please finish these antibiotics.  No showering until 7/31/19.  You may then remove your dressing & the packing in the shower.    - Rest.    - Drink plenty of fluids.    - Tylenol or Ibuprofen as directed as needed for fever/pain.    - Follow up with your PCP or specialty clinic as directed in the next 1-2 weeks if not improved or as needed.  You can call [phone] to schedule an appointment with the appropriate provider.    - If you were prescribed antibiotics, please take them to completion.  - If you were prescribed a narcotic medication, do not drive or operate heavy equipment or machinery while taking these medications.  - If you  smoke, please stop smoking.  -You must understand that you've received an Urgent Care treatment only and that you may be released before all your medical problems are known or treated. You, the patient, will    arrange for follow up care as instructed.  - Please return to Urgent Care or to the Emergency Department if your symptoms worsen.    Patient aware and verbalized understanding.    Abscess (Incision & Drainage)  An abscess is sometimes called a boil. It happens when bacteria get trapped under the skin and start to grow. Pus forms inside the abscess as the body responds to the bacteria. An abscess can happen with an insect bite, ingrown hair, blocked oil gland, pimple, cyst, or puncture wound.  Your healthcare provider has drained the pus from  your abscess. If the abscess pocket was large, your healthcare provider may have put in gauze packing. Your provider will need to remove it on your next visit. He or she may also replace it at that time. You may not need antibiotics to treat a simple abscess, unless the infection is spreading into the skin around the wound (cellulitis).  The wound will take about 1 to 2 weeks to heal, depending on the size of the abscess. Healthy tissue will grow from the bottom and sides of the opening until it seals over.  Home care  These tips can help your wound heal:  · The wound may drain for the first 2 days. Cover the wound with a clean dry dressing. Change the dressing if it becomes soaked with blood or pus.  · If a gauze packing was placed inside the abscess pocket, you may be told to remove it yourself. You may do this in the shower. Once the packing is removed, you should wash the area in the shower, or clean the area as directed by your provider. Continue to do this until the skin opening has closed. Make sure you wash your hands after changing the packing or cleaning the wound.  · If you were prescribed antibiotics, take them as directed until they are all gone.  · You may use acetaminophen or ibuprofen to control pain, unless another pain medicine was prescribed. If you have liver disease or ever had a stomach ulcer, talk with your doctor before using these medicines.  Follow-up care  Follow up with your healthcare provider, or as advised. If a gauze packing was put in your wound, it should be removed in 1 to 2 days. Check your wound every day for any signs that the infection is getting worse. The signs are listed below.  When to seek medical advice  Call your healthcare provider right away if any of these occur:  · Increasing redness or swelling  · Red streaks in the skin leading away from the wound  · Increasing local pain or swelling  · Continued pus draining from the wound 2 days after treatment  · Fever of  100.4ºF (38ºC) or higher, or as directed by your healthcare provider  · Boil returns when you are at home  Date Last Reviewed: 9/1/2016  © 3904-9354 [employer]. [address] [place] [address], [place] [address]. All rights reserved. This information is not intended as a substitute for professional medical care. Always follow your healthcare professional's instructions.

## 2019-07-29 NOTE — PATIENT INSTRUCTIONS
PLEASE READ YOUR DISCHARGE INSTRUCTIONS ENTIRELY AS IT CONTAINS IMPORTANT INFORMATION.  You were give 5 more days of the same antibiotic for a total of 10 days of antibiotics.  Please finish these antibiotics.  No showering until 7/31/19.  You may then remove your dressing & the packing in the shower.    - Rest.    - Drink plenty of fluids.    - Tylenol or Ibuprofen as directed as needed for fever/pain.    - Follow up with your PCP or specialty clinic as directed in the next 1-2 weeks if not improved or as needed.  You can call [phone] to schedule an appointment with the appropriate provider.    - If you were prescribed antibiotics, please take them to completion.  - If you were prescribed a narcotic medication, do not drive or operate heavy equipment or machinery while taking these medications.  - If you  smoke, please stop smoking.  -You must understand that you've received an Urgent Care treatment only and that you may be released before all your medical problems are known or treated. You, the patient, will    arrange for follow up care as instructed.  - Please return to Urgent Care or to the Emergency Department if your symptoms worsen.    Patient aware and verbalized understanding.    Abscess (Incision & Drainage)  An abscess is sometimes called a boil. It happens when bacteria get trapped under the skin and start to grow. Pus forms inside the abscess as the body responds to the bacteria. An abscess can happen with an insect bite, ingrown hair, blocked oil gland, pimple, cyst, or puncture wound.  Your healthcare provider has drained the pus from your abscess. If the abscess pocket was large, your healthcare provider may have put in gauze packing. Your provider will need to remove it on your next visit. He or she may also replace it at that time. You may not need antibiotics to treat a simple abscess, unless the infection is spreading into the skin around the wound (cellulitis).  The wound will take about  1 to 2 weeks to heal, depending on the size of the abscess. Healthy tissue will grow from the bottom and sides of the opening until it seals over.  Home care  These tips can help your wound heal:  · The wound may drain for the first 2 days. Cover the wound with a clean dry dressing. Change the dressing if it becomes soaked with blood or pus.  · If a gauze packing was placed inside the abscess pocket, you may be told to remove it yourself. You may do this in the shower. Once the packing is removed, you should wash the area in the shower, or clean the area as directed by your provider. Continue to do this until the skin opening has closed. Make sure you wash your hands after changing the packing or cleaning the wound.  · If you were prescribed antibiotics, take them as directed until they are all gone.  · You may use acetaminophen or ibuprofen to control pain, unless another pain medicine was prescribed. If you have liver disease or ever had a stomach ulcer, talk with your doctor before using these medicines.  Follow-up care  Follow up with your healthcare provider, or as advised. If a gauze packing was put in your wound, it should be removed in 1 to 2 days. Check your wound every day for any signs that the infection is getting worse. The signs are listed below.  When to seek medical advice  Call your healthcare provider right away if any of these occur:  · Increasing redness or swelling  · Red streaks in the skin leading away from the wound  · Increasing local pain or swelling  · Continued pus draining from the wound 2 days after treatment  · Fever of 100.4ºF (38ºC) or higher, or as directed by your healthcare provider  · Boil returns when you are at home  Date Last Reviewed: 9/1/2016  © 5786-8054 [employer]. [address] [place] [address], [place] [address]. All rights reserved. This information is not intended as a substitute for professional medical care. Always follow your healthcare professional's  instructions.

## 2019-07-30 NOTE — PROCEDURES
Incision & Drainage  Date/Time: 7/29/2019 11:22 PM  Performed by: Angela Walker PA-C  Authorized by: Angela Walker PA-C     Consent Done?:  Yes (Verbal)    Type:  Abscess  Body area:  Lower extremity  Location details:  Left leg  Anesthesia:  Local infiltration  Local anesthetic: lidocaine 1% without epinephrine  Anesthetic total (ml):  2  Scalpel size:  11  Incision type:  Single straight  Complexity:  Simple  Drainage:  Pus  Drainage amount:  Moderate  Wound treatment:  Incision, drainage and wound packed  Packing material:  1/4 in gauze  Patient tolerance:  Patient tolerated the procedure well with no immediate complications

## 2019-08-21 ENCOUNTER — OFFICE VISIT (OUTPATIENT)
Dept: URGENT CARE | Facility: CLINIC | Age: 34
End: 2019-08-21
Payer: MEDICAID

## 2019-08-21 VITALS
SYSTOLIC BLOOD PRESSURE: 112 MMHG | TEMPERATURE: 99 F | HEART RATE: 101 BPM | RESPIRATION RATE: 18 BRPM | WEIGHT: 150 LBS | HEIGHT: 63 IN | OXYGEN SATURATION: 99 % | DIASTOLIC BLOOD PRESSURE: 77 MMHG | BODY MASS INDEX: 26.58 KG/M2

## 2019-08-21 DIAGNOSIS — L02.31 ABSCESS OF RIGHT BUTTOCK: Primary | ICD-10-CM

## 2019-08-21 PROCEDURE — 99214 PR OFFICE/OUTPT VISIT, EST, LEVL IV, 30-39 MIN: ICD-10-PCS | Mod: S$GLB,,, | Performed by: PHYSICIAN ASSISTANT

## 2019-08-21 PROCEDURE — 99214 OFFICE O/P EST MOD 30 MIN: CPT | Mod: S$GLB,,, | Performed by: PHYSICIAN ASSISTANT

## 2019-08-21 RX ORDER — MUPIROCIN 20 MG/G
OINTMENT TOPICAL
Qty: 22 G | Refills: 1 | Status: SHIPPED | OUTPATIENT
Start: 2019-08-21 | End: 2020-01-05 | Stop reason: CLARIF

## 2019-08-21 RX ORDER — SULFAMETHOXAZOLE AND TRIMETHOPRIM 800; 160 MG/1; MG/1
1 TABLET ORAL 2 TIMES DAILY
Qty: 14 TABLET | Refills: 0 | Status: SHIPPED | OUTPATIENT
Start: 2019-08-21 | End: 2019-08-28

## 2019-08-21 NOTE — PROGRESS NOTES
"Subjective:       Patient ID: Mary Sims is a 34 y.o. female.    Vitals:  height is 5' 3" (1.6 m) and weight is 68 kg (150 lb). Her temperature is 98.9 °F (37.2 °C). Her blood pressure is 112/77 and her pulse is 101. Her respiration is 18 and oxygen saturation is 99%.     Chief Complaint: Abscess    Pt c/o abscess under buttocks on right thigh, pt states it may be draining, warm to touch and painful. She's a diabetic and she gets them often but first time in that area. She took some antibiotic she had left over from the last one with no relief. Hurts to sit on her buttocks.     Abscess   Chronicity:  New  Onset:  3-5 days ago  Progression Since Onset: worsening  Abscess location: buttock.  Associated Symptoms: no fever, no chills  Characteristics: draining, painful and swelling    Pain Scale:  7/10  Treatments Tried:  Oral antibiotics  Relieved by:  Nothing      Constitution: Negative for chills, fatigue and fever.   HENT: Negative for congestion and sore throat.    Neck: Negative for painful lymph nodes.   Cardiovascular: Negative for chest pain and leg swelling.   Eyes: Negative for double vision and blurred vision.   Respiratory: Negative for cough and shortness of breath.    Gastrointestinal: Negative for nausea, vomiting and diarrhea.   Genitourinary: Negative for dysuria, frequency, urgency and history of kidney stones.   Musculoskeletal: Negative for joint pain, joint swelling, muscle cramps and muscle ache.   Skin: Positive for abscess. Negative for color change, pale, rash and bruising.   Allergic/Immunologic: Negative for seasonal allergies.   Neurological: Negative for dizziness, history of vertigo, light-headedness, passing out and headaches.   Hematologic/Lymphatic: Negative for swollen lymph nodes.   Psychiatric/Behavioral: Negative for nervous/anxious, sleep disturbance and depression. The patient is not nervous/anxious.        Objective:      Physical Exam   Constitutional: She is oriented to " person, place, and time. She appears well-developed and well-nourished.   HENT:   Head: Normocephalic and atraumatic. Head is without abrasion, without contusion and without laceration.   Right Ear: External ear normal.   Left Ear: External ear normal.   Nose: Nose normal.   Mouth/Throat: Oropharynx is clear and moist.   Eyes: Pupils are equal, round, and reactive to light. Conjunctivae, EOM and lids are normal.   Neck: Trachea normal, full passive range of motion without pain and phonation normal. Neck supple.   Cardiovascular: Normal rate, regular rhythm and normal heart sounds.   Pulmonary/Chest: Effort normal and breath sounds normal. No stridor. No respiratory distress.   Genitourinary:         Musculoskeletal: Normal range of motion.   Neurological: She is alert and oriented to person, place, and time.   Skin: Skin is warm, dry and intact. Capillary refill takes less than 2 seconds. No abrasion, no bruising, no burn, no ecchymosis, no laceration, no lesion and no rash noted.   Psychiatric: She has a normal mood and affect. Her speech is normal and behavior is normal. Judgment and thought content normal. Cognition and memory are normal.   Nursing note and vitals reviewed.      Assessment:       1. Abscess of right buttock        Plan:         Abscess of right buttock  -     mupirocin (BACTROBAN) 2 % ointment; Apply to affected area 3 times daily  Dispense: 22 g; Refill: 1  -     sulfamethoxazole-trimethoprim 800-160mg (BACTRIM DS) 800-160 mg Tab; Take 1 tablet by mouth 2 (two) times daily. for 7 days  Dispense: 14 tablet; Refill: 0      Abscess (Incision & Drainage)  An abscess is sometimes called a boil. It happens when bacteria get trapped under the skin and start to grow. Pus forms inside the abscess as the body responds to the bacteria. An abscess can happen with an insect bite, ingrown hair, blocked oil gland, pimple, cyst, or puncture wound.  Your healthcare provider has drained the pus from your abscess.  If the abscess pocket was large, your healthcare provider may have put in gauze packing. Your provider will need to remove it on your next visit. He or she may also replace it at that time. You may not need antibiotics to treat a simple abscess, unless the infection is spreading into the skin around the wound (cellulitis).  The wound will take about 1 to 2 weeks to heal, depending on the size of the abscess. Healthy tissue will grow from the bottom and sides of the opening until it seals over.  Home care  These tips can help your wound heal:  · The wound may drain for the first 2 days. Cover the wound with a clean dry dressing. Change the dressing if it becomes soaked with blood or pus.  · If a gauze packing was placed inside the abscess pocket, you may be told to remove it yourself. You may do this in the shower. Once the packing is removed, you should wash the area in the shower, or clean the area as directed by your provider. Continue to do this until the skin opening has closed. Make sure you wash your hands after changing the packing or cleaning the wound.  · If you were prescribed antibiotics, take them as directed until they are all gone.  · You may use acetaminophen or ibuprofen to control pain, unless another pain medicine was prescribed. If you have liver disease or ever had a stomach ulcer, talk with your doctor before using these medicines.  Follow-up care  Follow up with your healthcare provider, or as advised. If a gauze packing was put in your wound, it should be removed in 1 to 2 days. Check your wound every day for any signs that the infection is getting worse. The signs are listed below.  When to seek medical advice  Call your healthcare provider right away if any of these occur:  · Increasing redness or swelling  · Red streaks in the skin leading away from the wound  · Increasing local pain or swelling  · Continued pus draining from the wound 2 days after treatment  · Fever of 100.4ºF (38ºC) or  higher, or as directed by your healthcare provider  · Boil returns when you are at home  Date Last Reviewed: 9/1/2016  © 4589-7500 [employer]. [address] [place] [address], [place] [address]. All rights reserved. This information is not intended as a substitute for professional medical care. Always follow your healthcare professional's instructions.

## 2019-08-22 NOTE — PATIENT INSTRUCTIONS
Abscess (Incision & Drainage)  An abscess is sometimes called a boil. It happens when bacteria get trapped under the skin and start to grow. Pus forms inside the abscess as the body responds to the bacteria. An abscess can happen with an insect bite, ingrown hair, blocked oil gland, pimple, cyst, or puncture wound.  Your healthcare provider has drained the pus from your abscess. If the abscess pocket was large, your healthcare provider may have put in gauze packing. Your provider will need to remove it on your next visit. He or she may also replace it at that time. You may not need antibiotics to treat a simple abscess, unless the infection is spreading into the skin around the wound (cellulitis).  The wound will take about 1 to 2 weeks to heal, depending on the size of the abscess. Healthy tissue will grow from the bottom and sides of the opening until it seals over.  Home care  These tips can help your wound heal:  · The wound may drain for the first 2 days. Cover the wound with a clean dry dressing. Change the dressing if it becomes soaked with blood or pus.  · If a gauze packing was placed inside the abscess pocket, you may be told to remove it yourself. You may do this in the shower. Once the packing is removed, you should wash the area in the shower, or clean the area as directed by your provider. Continue to do this until the skin opening has closed. Make sure you wash your hands after changing the packing or cleaning the wound.  · If you were prescribed antibiotics, take them as directed until they are all gone.  · You may use acetaminophen or ibuprofen to control pain, unless another pain medicine was prescribed. If you have liver disease or ever had a stomach ulcer, talk with your doctor before using these medicines.  Follow-up care  Follow up with your healthcare provider, or as advised. If a gauze packing was put in your wound, it should be removed in 1 to 2 days. Check your wound every day for any  signs that the infection is getting worse. The signs are listed below.  When to seek medical advice  Call your healthcare provider right away if any of these occur:  · Increasing redness or swelling  · Red streaks in the skin leading away from the wound  · Increasing local pain or swelling  · Continued pus draining from the wound 2 days after treatment  · Fever of 100.4ºF (38ºC) or higher, or as directed by your healthcare provider  · Boil returns when you are at home  Date Last Reviewed: 9/1/2016  © 8746-7540 [employer]. [address] [place] [address]. All rights reserved. This information is not intended as a substitute for professional medical care. Always follow your healthcare professional's instructions.

## 2019-08-23 NOTE — PROCEDURES
Incision & Drainage  Date/Time: 8/23/2019 8:03 AM  Performed by: Tameka Nicole PA-C  Authorized by: Tameka Nicole PA-C     Consent Done?:  Yes (Verbal)    Type:  Abscess  Body area:  Anogenital  Location details:  Gluteal cleft  Anesthesia:  Local infiltration  Local anesthetic: lidocaine 1% without epinephrine  Anesthetic total (ml):  3  Scalpel size:  10  Incision type:  Single straight  Complexity:  Simple  Drainage:  Pus and bloody  Drainage amount:  Scant  Wound treatment:  Wound left open, deloculation and drainage  Packing material:  None  Patient tolerance:  Patient tolerated the procedure well with no immediate complications

## 2019-09-23 ENCOUNTER — OFFICE VISIT (OUTPATIENT)
Dept: URGENT CARE | Facility: CLINIC | Age: 34
End: 2019-09-23
Payer: MEDICAID

## 2019-09-23 VITALS
DIASTOLIC BLOOD PRESSURE: 85 MMHG | RESPIRATION RATE: 18 BRPM | HEIGHT: 63 IN | OXYGEN SATURATION: 99 % | WEIGHT: 150 LBS | BODY MASS INDEX: 26.58 KG/M2 | SYSTOLIC BLOOD PRESSURE: 124 MMHG | TEMPERATURE: 97 F | HEART RATE: 94 BPM

## 2019-09-23 DIAGNOSIS — G43.009 MIGRAINE WITHOUT AURA AND WITHOUT STATUS MIGRAINOSUS, NOT INTRACTABLE: Primary | ICD-10-CM

## 2019-09-23 PROCEDURE — 99213 OFFICE O/P EST LOW 20 MIN: CPT | Mod: S$GLB,,, | Performed by: PHYSICIAN ASSISTANT

## 2019-09-23 PROCEDURE — 99213 PR OFFICE/OUTPT VISIT, EST, LEVL III, 20-29 MIN: ICD-10-PCS | Mod: S$GLB,,, | Performed by: PHYSICIAN ASSISTANT

## 2019-09-23 RX ORDER — IBUPROFEN 800 MG/1
800 TABLET ORAL EVERY 6 HOURS PRN
Qty: 30 TABLET | Refills: 0 | Status: ON HOLD | OUTPATIENT
Start: 2019-09-23 | End: 2020-04-18 | Stop reason: HOSPADM

## 2019-09-23 RX ORDER — BUTALBITAL, ACETAMINOPHEN AND CAFFEINE 50; 325; 40 MG/1; MG/1; MG/1
1 TABLET ORAL EVERY 6 HOURS PRN
Qty: 15 TABLET | Refills: 0 | Status: SHIPPED | OUTPATIENT
Start: 2019-09-23 | End: 2019-09-28

## 2019-09-23 NOTE — PATIENT INSTRUCTIONS
PLEASE READ YOUR DISCHARGE INSTRUCTIONS ENTIRELY AS IT CONTAINS IMPORTANT INFORMATION.  - Rest.    - Drink plenty of fluids.    - Follow up with your PCP or specialty clinic as directed in the next 1-2 weeks if not improved or as needed.  You can call [phone] to schedule an appointment with the appropriate provider.    - If you were prescribed antibiotics, please take them to completion.  - If you were prescribed a narcotic medication, do not drive or operate heavy equipment or machinery while taking these medications.  - If you  smoke, please stop smoking.  -You must understand that you've received an Urgent Care treatment only and that you may be released before all your medical problems are known or treated. You, the patient, will    arrange for follow up care as instructed.  - Please return to Urgent Care or to the Emergency Department if your symptoms worsen.    Patient aware and verbalized understanding.    What Are Migraine and Tension Headaches?    Although there are several types of headaches, migraine and tension headaches affect the most people. When you have a headache, it isn't your brain that's hurting. Your head aches because nerves in the bones, blood vessels, meninges, and muscles of your head are irritated. These irritated nerves send pain signals to the brain, which identifies where you hurt and how bad the pain is.  Talk with your healthcare provider about a treatment plan that may help relieve pain and prevent future headaches.   What causes your headache?  The actual headache process is not yet understood. Only rarely are headaches a sign of a serious medical problem. Headache pain may be caused by abnormal interaction between the brain and the nerves and blood vessels in the head. Environmental stresses or certain foods and drinks may trigger headache pain.  What is referred pain?  Headache pain can be referred pain, which is pain that has its source in one place but is felt in another.  "For example, pain behind the eyes may actually be caused by tense muscles in the neck and shoulders. This means that the place that hurts may not be the part of the body that needs treatment.  Is it a migraine?  Migraine is a vascular headache that causes throbbing pain felt on one or both sides of the head. You may feel nauseated or vomit. This headache may also be preceded or associated with changes in sight (like seeing spots or flashes of light), ability to speak, or sensation (aura). There are a wide variety of environmental and food-related triggers for migraines. The pain may last for 4 to 72 hours. Afterward, you may feel shaky for a day or so. If this is the first time you experience these symptoms, you should immediately seek medical attention because you could be having a stroke.  Is it a tension headache?  This type of headache is usually a dull ache or a sensation of pressure on both sides of the head. It may be associated with pain or tension in the neck and shoulders. Depression, anxiety, and stress can cause a tension headache. The pain may not have a definite beginning or end. It may come and go, or seem never to go away.  When to call the healthcare provider  Call your healthcare provider for headaches that happen along with any of these symptoms:  · Sudden, severe headache that is different from your usual headache pain  · High fever along with a stiff neck  · Recurring headache in children   · Ongoing numbness or muscle weakness  · Loss of vision  · Pain following a head injury  · Convulsions, or a change in mental awareness  · A headache you would call "the worst headache you've ever had"   Date Last Reviewed: 9/14/2015 © 2000-2017 [employer]. [address] [place] [address], [place] [address]. All rights reserved. This information is not intended as a substitute for professional medical care. Always follow your healthcare professional's instructions.        "

## 2019-09-23 NOTE — LETTER
September 23, 2019      [facility] [address] [place], [place] [address]  Phone: [phone]  Fax: [phone]       Patient: Mary Sims   YOB: 1985  Date of Visit: 09/23/2019    To Whom It May Concern:    Wale Sims  was at [facility] on 09/23/2019. She may return to work/school on 9/24/19 with no restrictions. If you have any questions or concerns, or if I can be of further assistance, please do not hesitate to contact me.    Sincerely,    Angela Walker PA-C

## 2019-09-23 NOTE — PROGRESS NOTES
"Subjective:       Patient ID: Mary Sims is a 34 y.o. female.    Vitals:  height is 5' 3" (1.6 m) and weight is 68 kg (150 lb). Her temperature is 97 °F (36.1 °C). Her blood pressure is 124/85 and her pulse is 94. Her respiration is 18 and oxygen saturation is 99%.     Chief Complaint: Headache    Ms. Sims presents for evaluation of headache x 2 days.  She has a history of headaches & gets migraines every few months.  She usually takes tylenol or ibuprofen, but neither have worked for her for this headache.  It is on the right side behind her eye.  No N/V, weakness, paresthesias, photophobia, vision changes.     Headache    This is a new problem. Episode onset: 2 days  The problem occurs constantly. The problem has been gradually worsening. The pain is located in the left unilateral region. The pain does not radiate. The pain quality is not similar to prior headaches. The quality of the pain is described as throbbing. The pain is at a severity of 8/10. The pain is severe. Pertinent negatives include no blurred vision, dizziness, eye pain, fever, loss of balance, nausea, neck pain, photophobia, tinnitus, vomiting or weakness. The symptoms are aggravated by bright light. Treatments tried: aleve  The treatment provided no relief. There is no history of migraine headaches.       Constitution: Negative for chills, sweating and fever.   HENT: Negative for tinnitus, facial swelling, congestion and sinus pain.    Neck: Negative for neck pain and neck stiffness.   Eyes: Negative for eye pain, photophobia, vision loss, double vision and blurred vision.   Gastrointestinal: Negative for nausea and vomiting.   Genitourinary: Negative for missed menses.   Musculoskeletal: Negative for trauma and muscle ache.   Skin: Negative for rash, wound and lesion.   Neurological: Positive for headaches. Negative for dizziness, history of vertigo, light-headedness, facial drooping, speech difficulty, coordination disturbances, loss " of balance, history of migraines, disorientation and loss of consciousness.   Psychiatric/Behavioral: Negative for disorientation, confusion, nervous/anxious, sleep disturbance and depression. The patient is not nervous/anxious.        Objective:      Physical Exam   Constitutional: She is oriented to person, place, and time. She appears well-developed and well-nourished. She is cooperative.  Non-toxic appearance. She does not appear ill. No distress.   HENT:   Head: Normocephalic and atraumatic.   Right Ear: Hearing, tympanic membrane, external ear and ear canal normal.   Left Ear: Hearing, tympanic membrane, external ear and ear canal normal.   Nose: Nose normal. No mucosal edema, rhinorrhea or nasal deformity. No epistaxis. Right sinus exhibits no maxillary sinus tenderness and no frontal sinus tenderness. Left sinus exhibits no maxillary sinus tenderness and no frontal sinus tenderness.   Mouth/Throat: Uvula is midline, oropharynx is clear and moist and mucous membranes are normal. No trismus in the jaw. Normal dentition. No uvula swelling. No posterior oropharyngeal erythema.   No temporal TTP   Eyes: Pupils are equal, round, and reactive to light. Conjunctivae, EOM and lids are normal. No scleral icterus.   Sclera clear bilat   Neck: Trachea normal, normal range of motion, full passive range of motion without pain and phonation normal. Neck supple. No neck rigidity.   Cardiovascular: Normal rate, regular rhythm, normal heart sounds, intact distal pulses and normal pulses.   Pulmonary/Chest: Effort normal and breath sounds normal. No respiratory distress.   Abdominal: Soft. Normal appearance and bowel sounds are normal. She exhibits no distension. There is no tenderness.   Musculoskeletal: Normal range of motion. She exhibits no edema or deformity.   Neurological: She is alert and oriented to person, place, and time. She has normal strength. She displays no atrophy and no tremor. No cranial nerve deficit or  sensory deficit. She exhibits normal muscle tone. Coordination and gait normal.   CN II- XII intact.    Skin: Skin is warm, dry and intact. She is not diaphoretic. No pallor.   Psychiatric: She has a normal mood and affect. Her speech is normal and behavior is normal. Judgment and thought content normal. Cognition and memory are normal.   Nursing note and vitals reviewed.      Assessment:       1. Migraine without aura and without status migrainosus, not intractable        Plan:         Migraine without aura and without status migrainosus, not intractable    Other orders  -     ibuprofen (ADVIL,MOTRIN) 800 MG tablet; Take 1 tablet (800 mg total) by mouth every 6 (six) hours as needed.  Dispense: 30 tablet; Refill: 0  -     butalbital-acetaminophen-caffeine -40 mg (FIORICET, ESGIC) -40 mg per tablet; Take 1 tablet by mouth every 6 (six) hours as needed for Headaches.  Dispense: 15 tablet; Refill: 0    Discussed no driving with fiorcet.    Patient Instructions     PLEASE READ YOUR DISCHARGE INSTRUCTIONS ENTIRELY AS IT CONTAINS IMPORTANT INFORMATION.  - Rest.    - Drink plenty of fluids.    - Follow up with your PCP or specialty clinic as directed in the next 1-2 weeks if not improved or as needed.  You can call [phone] to schedule an appointment with the appropriate provider.    - If you were prescribed antibiotics, please take them to completion.  - If you were prescribed a narcotic medication, do not drive or operate heavy equipment or machinery while taking these medications.  - If you  smoke, please stop smoking.  -You must understand that you've received an Urgent Care treatment only and that you may be released before all your medical problems are known or treated. You, the patient, will    arrange for follow up care as instructed.  - Please return to Urgent Care or to the Emergency Department if your symptoms worsen.    Patient aware and verbalized understanding.    What Are Migraine and  Tension Headaches?    Although there are several types of headaches, migraine and tension headaches affect the most people. When you have a headache, it isn't your brain that's hurting. Your head aches because nerves in the bones, blood vessels, meninges, and muscles of your head are irritated. These irritated nerves send pain signals to the brain, which identifies where you hurt and how bad the pain is.  Talk with your healthcare provider about a treatment plan that may help relieve pain and prevent future headaches.   What causes your headache?  The actual headache process is not yet understood. Only rarely are headaches a sign of a serious medical problem. Headache pain may be caused by abnormal interaction between the brain and the nerves and blood vessels in the head. Environmental stresses or certain foods and drinks may trigger headache pain.  What is referred pain?  Headache pain can be referred pain, which is pain that has its source in one place but is felt in another. For example, pain behind the eyes may actually be caused by tense muscles in the neck and shoulders. This means that the place that hurts may not be the part of the body that needs treatment.  Is it a migraine?  Migraine is a vascular headache that causes throbbing pain felt on one or both sides of the head. You may feel nauseated or vomit. This headache may also be preceded or associated with changes in sight (like seeing spots or flashes of light), ability to speak, or sensation (aura). There are a wide variety of environmental and food-related triggers for migraines. The pain may last for 4 to 72 hours. Afterward, you may feel shaky for a day or so. If this is the first time you experience these symptoms, you should immediately seek medical attention because you could be having a stroke.  Is it a tension headache?  This type of headache is usually a dull ache or a sensation of pressure on both sides of the head. It may be associated with  "pain or tension in the neck and shoulders. Depression, anxiety, and stress can cause a tension headache. The pain may not have a definite beginning or end. It may come and go, or seem never to go away.  When to call the healthcare provider  Call your healthcare provider for headaches that happen along with any of these symptoms:  · Sudden, severe headache that is different from your usual headache pain  · High fever along with a stiff neck  · Recurring headache in children   · Ongoing numbness or muscle weakness  · Loss of vision  · Pain following a head injury  · Convulsions, or a change in mental awareness  · A headache you would call "the worst headache you've ever had"   Date Last Reviewed: 9/14/2015  © 7314-1501 [employer]. [address] [place] [address], [place] [address]. All rights reserved. This information is not intended as a substitute for professional medical care. Always follow your healthcare professional's instructions.               "

## 2020-01-05 ENCOUNTER — HOSPITAL ENCOUNTER (EMERGENCY)
Facility: HOSPITAL | Age: 35
Discharge: HOME | End: 2020-01-05
Attending: EMERGENCY MEDICINE
Payer: MEDICAID

## 2020-01-05 VITALS
DIASTOLIC BLOOD PRESSURE: 83 MMHG | OXYGEN SATURATION: 100 % | BODY MASS INDEX: 26.58 KG/M2 | HEART RATE: 84 BPM | SYSTOLIC BLOOD PRESSURE: 137 MMHG | TEMPERATURE: 98 F | HEIGHT: 63 IN | RESPIRATION RATE: 16 BRPM | WEIGHT: 150 LBS

## 2020-01-05 DIAGNOSIS — E10.69 TYPE 1 DIABETES MELLITUS WITH OTHER SPECIFIED COMPLICATION: ICD-10-CM

## 2020-01-05 DIAGNOSIS — R10.2 SUPRAPUBIC PAIN, ACUTE: ICD-10-CM

## 2020-01-05 DIAGNOSIS — N12 PYELONEPHRITIS: Primary | ICD-10-CM

## 2020-01-05 DIAGNOSIS — R73.9 HYPERGLYCEMIA: ICD-10-CM

## 2020-01-05 LAB
B-HCG UR QL: NEGATIVE
BACTERIA #/AREA URNS AUTO: ABNORMAL /HPF
BILIRUB UR QL STRIP: NEGATIVE
CLARITY UR REFRACT.AUTO: ABNORMAL
COLOR UR AUTO: ABNORMAL
CTP QC/QA: YES
GLUCOSE UR QL STRIP: ABNORMAL
HGB UR QL STRIP: ABNORMAL
HYALINE CASTS UR QL AUTO: 0 /LPF
KETONES UR QL STRIP: NEGATIVE
LEUKOCYTE ESTERASE UR QL STRIP: ABNORMAL
MICROSCOPIC COMMENT: ABNORMAL
NITRITE UR QL STRIP: POSITIVE
PH UR STRIP: 5 [PH] (ref 5–8)
POCT GLUCOSE: 269 MG/DL (ref 70–110)
POCT GLUCOSE: 317 MG/DL (ref 70–110)
POCT GLUCOSE: 330 MG/DL (ref 70–110)
PROT UR QL STRIP: ABNORMAL
RBC #/AREA URNS AUTO: 5 /HPF (ref 0–4)
SP GR UR STRIP: 1.02 (ref 1–1.03)
SQUAMOUS #/AREA URNS AUTO: 11 /HPF
URN SPEC COLLECT METH UR: ABNORMAL
WBC #/AREA URNS AUTO: 40 /HPF (ref 0–5)
WBC CLUMPS UR QL AUTO: ABNORMAL
YEAST UR QL AUTO: ABNORMAL

## 2020-01-05 PROCEDURE — 99284 EMERGENCY DEPT VISIT MOD MDM: CPT | Mod: 25

## 2020-01-05 PROCEDURE — 81001 URINALYSIS AUTO W/SCOPE: CPT

## 2020-01-05 PROCEDURE — 99284 PR EMERGENCY DEPT VISIT,LEVEL IV: ICD-10-PCS | Mod: ,,, | Performed by: NURSE PRACTITIONER

## 2020-01-05 PROCEDURE — 87186 SC STD MICRODIL/AGAR DIL: CPT

## 2020-01-05 PROCEDURE — 82962 GLUCOSE BLOOD TEST: CPT

## 2020-01-05 PROCEDURE — 81025 URINE PREGNANCY TEST: CPT | Performed by: NURSE PRACTITIONER

## 2020-01-05 PROCEDURE — 96372 THER/PROPH/DIAG INJ SC/IM: CPT

## 2020-01-05 PROCEDURE — 99284 EMERGENCY DEPT VISIT MOD MDM: CPT | Mod: ,,, | Performed by: NURSE PRACTITIONER

## 2020-01-05 PROCEDURE — 87086 URINE CULTURE/COLONY COUNT: CPT

## 2020-01-05 PROCEDURE — 87077 CULTURE AEROBIC IDENTIFY: CPT

## 2020-01-05 PROCEDURE — 63600175 PHARM REV CODE 636 W HCPCS: Performed by: NURSE PRACTITIONER

## 2020-01-05 PROCEDURE — 87088 URINE BACTERIA CULTURE: CPT

## 2020-01-05 PROCEDURE — 96360 HYDRATION IV INFUSION INIT: CPT

## 2020-01-05 RX ORDER — CIPROFLOXACIN 500 MG/1
500 TABLET ORAL 2 TIMES DAILY
Qty: 14 TABLET | Refills: 0 | Status: SHIPPED | OUTPATIENT
Start: 2020-01-05 | End: 2020-01-12

## 2020-01-05 RX ADMIN — INSULIN HUMAN 5 UNITS: 100 INJECTION, SOLUTION PARENTERAL at 11:01

## 2020-01-05 RX ADMIN — SODIUM CHLORIDE 1000 ML: 0.9 INJECTION, SOLUTION INTRAVENOUS at 10:01

## 2020-01-05 NOTE — DISCHARGE INSTRUCTIONS
Discuss with your primary care or endocrinologist options related to getting an insulin pump  Return with fever or worsening back pain  Take all antibiotics even if you begin feeling better

## 2020-01-05 NOTE — ED NOTES
Patient identifiers verified and correct for Mary Sims 1985.  LOC: The patient is awake, alert and aware of environment with an appropriate affect, the patient is oriented x 3 and speaking appropriately.   APPEARANCE: Patient appears comfortable and in no acute distress, patient is clean and well groomed.  SKIN: The skin is warm and dry, color consistent with ethnicity, patient has normal skin turgor and moist mucus membranes, skin intact, no breakdown or bruising noted.   MUSCULOSKELETAL: Patient moving all extremities spontaneously, no swelling noted. C/O R BACK/SIDE pain  RESPIRATORY: Airway is open and patent, respirations are spontaneous, patient has a normal effort and rate, no accessory muscle use noted, with O2 sats noted at 98% on room air.  CARDIAC: Patient has a normal rate and regular rhythm, no edema noted, capillary refill < 3 seconds.   GASTRO: Soft and non tender to palpation, no distention noted, normoactive bowel sounds present in all four quadrants. Pt states bowel movements have been regular.  : Pt denies any pain or frequency with urination. Vaginal bleeding/spotting  NEURO: Pt opens eyes spontaneously, behavior appropriate to situation, follows commands, facial expression symmetrical, bilateral hand grasp equal and even, purposeful motor response noted, normal sensation in all extremities when touched with a finger.

## 2020-01-05 NOTE — ED PROVIDER NOTES
Encounter Date: 1/5/2020       History     Chief Complaint   Patient presents with    Back Pain    Vaginal Bleeding     34-year-old female with type 1 diabetes with presents the emergency room with back pain that started yesterday.  Patient thought that maybe she lifted up on something heavy at work but then she began to have lower abdominal pain. She attempted to take Tylenol but the symptoms did not improve.  Patient denies fever, vaginal discharge, painful urination or blood in her urine.  Patient does state that she has a Mirena and and she began to have some vaginal spotting this morning.  Patient denies any other symptoms    The history is provided by the patient.     Review of patient's allergies indicates:  No Known Allergies  Past Medical History:   Diagnosis Date    Diabetes mellitus     Hypertension      Past Surgical History:   Procedure Laterality Date    VITRECTOMY BY PARS PLANA APPROACH Right 4/24/2019    Procedure: VITRECTOMY, PARS PLANA APPROACH;  Surgeon: JAMI Schulz MD;  Location: [facility] OR [address] [place] [address];  Service: Ophthalmology;  Laterality: Right;  25g PPV/membrane stripping/ILM peel/EL/AFx/GFX/avastin OD    VITRECTOMY BY PARS PLANA APPROACH Left 5/29/2019    Procedure: VITRECTOMY, PARS PLANA  APPROACH  25g PPV/Membrane Stripping/IML peel/EL/AFX/Avastin left eye   60 mins;  Surgeon: JAMI Schulz MD;  Location: [facility] OR [address] [place] [address];  Service: Ophthalmology;  Laterality: Left;     Family History   Problem Relation Age of Onset    Diabetes Mother      Social History     Tobacco Use    Smoking status: Never Smoker    Smokeless tobacco: Never Used   Substance Use Topics    Alcohol use: Yes     Comment: social    Drug use: No     Review of Systems   Constitutional: Negative for chills and fever.   HENT: Negative for sore throat.    Respiratory: Negative for shortness of breath.    Cardiovascular: Negative for chest pain.   Gastrointestinal: Positive for abdominal pain (Suprapubic).  Negative for nausea.   Genitourinary: Positive for flank pain. Negative for dysuria.   Musculoskeletal: Positive for back pain ( right lower).   Skin: Negative for rash.   Neurological: Negative for weakness.   Hematological: Does not bruise/bleed easily.   All other systems reviewed and are negative.    Physical Exam     Initial Vitals [01/05/20 1005]   BP Pulse Resp Temp SpO2   137/87 103 15 98 °F (36.7 °C) 98 %      MAP       --         Physical Exam    Nursing note and vitals reviewed.  Constitutional: She appears well-developed and well-nourished.   HENT:   Head: Normocephalic and atraumatic.   Eyes: Conjunctivae and EOM are normal. Pupils are equal, round, and reactive to light.   Neck: Normal range of motion.   Cardiovascular: Normal rate, regular rhythm, normal heart sounds and intact distal pulses. Exam reveals no gallop and no friction rub.    No murmur heard.  Pulmonary/Chest: Breath sounds normal. No respiratory distress. She has no wheezes. She has no rhonchi. She has no rales. She exhibits no tenderness.   Abdominal: Soft. Bowel sounds are normal. She exhibits no distension and no mass. There is tenderness in the suprapubic area. There is no rebound and no guarding.   Musculoskeletal: Normal range of motion. She exhibits tenderness. She exhibits no edema.        Back:    Neurological: She is alert and oriented to person, place, and time. She has normal strength. GCS score is 15. GCS eye subscore is 4. GCS verbal subscore is 5. GCS motor subscore is 6.   Skin: Skin is warm. Capillary refill takes less than 2 seconds. No erythema.   Psychiatric: She has a normal mood and affect. Thought content normal.       ED Course   Procedures  Labs Reviewed   URINALYSIS, REFLEX TO URINE CULTURE - Abnormal; Notable for the following components:       Result Value    Appearance, UA Cloudy (*)     Protein, UA 3+ (*)     Glucose, UA 3+ (*)     Occult Blood UA 1+ (*)     Nitrite, UA Positive (*)     Leukocytes, UA 2+ (*)      All other components within normal limits    Narrative:     Preferred Collection Type->Urine, Clean Catch   URINALYSIS MICROSCOPIC - Abnormal; Notable for the following components:    RBC, UA 5 (*)     WBC, UA 40 (*)     WBC Clumps, UA Few (*)     Bacteria Few (*)     All other components within normal limits    Narrative:     Preferred Collection Type->Urine, Clean Catch   POCT GLUCOSE - Abnormal; Notable for the following components:    POCT Glucose 317 (*)     All other components within normal limits   POCT GLUCOSE - Abnormal; Notable for the following components:    POCT Glucose 330 (*)     All other components within normal limits   POCT GLUCOSE - Abnormal; Notable for the following components:    POCT Glucose 269 (*)     All other components within normal limits   POCT URINE PREGNANCY - Normal   CULTURE, URINE   POCT GLUCOSE MONITORING CONTINUOUS   POCT GLUCOSE MONITORING CONTINUOUS           Medical Decision Making:   Initial Assessment:   34-year-old female with type 1 diabetes with presents the emergency room with back pain that started yesterday.  Patient thought that maybe she lifted up on something heavy at work but then she began to have lower abdominal pain. She attempted to take Tylenol but the symptoms did not improve.  Patient denies fever, vaginal discharge, painful urination or blood in her urine.  Patient does state that she has a Mirena and and she began to have some vaginal spotting this morning.  Patient denies any other symptoms    Differential Diagnosis:   UTI, pyelonephritis, kidney stone, hyperglycemia, DKA  Clinical Tests:   Lab Tests: Ordered and Reviewed  The following lab test(s) were unremarkable: UPT       <> Summary of Lab: Urinalysis consistent with UTI  Patient noted to be hyperglycemic and was given a L of fluid along with insulin to decrease her BS  ED Management:  Patient examined noted to have suprapubic abdominal pain. Urinalysis consistent with UTI.  Patient  hyperglycemic to the low 300.  She was given a L of normal saline along with 5 units of regular insulin.  Patient blood sugar decreased to 260s and she has been advised to take the remainder of her insulin at home.  Patient given Cipro at discharge and will be treated for pyelonephritis as she is having right-sided back pain. Patient advised to follow up with primary care for repeat urinalysis and to determine if she is eligible for an insulin pump. Patient given strict return precautions and voiced understanding of all discharge instructions. Pt was stable at discharge.                        ED Course as of Jan 05 1234   Sun Jan 05, 2020   1008 BP: 137/87 [AT]   1008 Temp: 98 °F (36.7 °C) [AT]   1008 Temp src: Oral [AT]   1008 Pulse: 103 [AT]   1008 Resp: 15 [AT]   1008 SpO2: 98 % [AT]   1022 POCT Glucose(!): 317 [AT]   1034 Preg Test, Ur: Negative [AT]   1110 NITRITE UA(!): Positive [AT]   1121 RBC, UA(!): 5 [AT]   1122 WBC, UA(!): 40 [AT]   1122 WBC Clumps, UA(!): Few [AT]   1122 Bacteria, UA(!): Few [AT]   1137 Will given insulin 5 units   POCT Glucose(!): 330 [AT]   1230 POCT Glucose(!): 269 [AT]      ED Course User Index  [AT] CLOVIS Deshpande                Clinical Impression:       ICD-10-CM ICD-9-CM   1. Pyelonephritis N12 590.80   2. Hyperglycemia R73.9 790.29   3. Suprapubic pain, acute R10.2 789.09     338.19   4. Type 1 diabetes mellitus with other specified complication E10.69 250.81                             CLOVIS Deshpande  01/05/20 1238

## 2020-01-07 LAB — BACTERIA UR CULT: ABNORMAL

## 2020-04-10 ENCOUNTER — LAB VISIT (OUTPATIENT)
Dept: URGENT CARE | Facility: CLINIC | Age: 35
End: 2020-04-10
Payer: MEDICAID

## 2020-04-10 ENCOUNTER — OFFICE VISIT (OUTPATIENT)
Dept: URGENT CARE | Facility: CLINIC | Age: 35
End: 2020-04-10
Payer: MEDICAID

## 2020-04-10 VITALS
TEMPERATURE: 100 F | HEART RATE: 95 BPM | DIASTOLIC BLOOD PRESSURE: 95 MMHG | SYSTOLIC BLOOD PRESSURE: 145 MMHG | OXYGEN SATURATION: 97 %

## 2020-04-10 DIAGNOSIS — Z20.822 SUSPECTED COVID-19 VIRUS INFECTION: ICD-10-CM

## 2020-04-10 DIAGNOSIS — R11.0 NAUSEA: ICD-10-CM

## 2020-04-10 DIAGNOSIS — J18.9 PNEUMONIA OF RIGHT UPPER LOBE DUE TO INFECTIOUS ORGANISM: Primary | ICD-10-CM

## 2020-04-10 DIAGNOSIS — J18.9 PNEUMONIA OF RIGHT UPPER LOBE DUE TO INFECTIOUS ORGANISM: ICD-10-CM

## 2020-04-10 PROCEDURE — 71046 XR CHEST PA AND LATERAL: ICD-10-PCS | Mod: S$GLB,,, | Performed by: RADIOLOGY

## 2020-04-10 PROCEDURE — 99214 OFFICE O/P EST MOD 30 MIN: CPT | Mod: S$GLB,,, | Performed by: INTERNAL MEDICINE

## 2020-04-10 PROCEDURE — 71046 X-RAY EXAM CHEST 2 VIEWS: CPT | Mod: S$GLB,,, | Performed by: RADIOLOGY

## 2020-04-10 PROCEDURE — 99214 PR OFFICE/OUTPT VISIT, EST, LEVL IV, 30-39 MIN: ICD-10-PCS | Mod: S$GLB,,, | Performed by: INTERNAL MEDICINE

## 2020-04-10 PROCEDURE — U0002 COVID-19 LAB TEST NON-CDC: HCPCS

## 2020-04-10 PROCEDURE — S0119 PR ONDANSETRON, ORAL, 4MG: ICD-10-PCS | Mod: S$GLB,,, | Performed by: INTERNAL MEDICINE

## 2020-04-10 PROCEDURE — S0119 ONDANSETRON 4 MG: HCPCS | Mod: S$GLB,,, | Performed by: INTERNAL MEDICINE

## 2020-04-10 RX ORDER — ONDANSETRON 4 MG/1
4 TABLET, ORALLY DISINTEGRATING ORAL
Status: COMPLETED | OUTPATIENT
Start: 2020-04-10 | End: 2020-04-10

## 2020-04-10 RX ORDER — BENZONATATE 100 MG/1
100 CAPSULE ORAL 3 TIMES DAILY PRN
Qty: 15 CAPSULE | Refills: 0 | Status: ON HOLD | OUTPATIENT
Start: 2020-04-10 | End: 2020-04-18

## 2020-04-10 RX ORDER — PROMETHAZINE HYDROCHLORIDE AND DEXTROMETHORPHAN HYDROBROMIDE 6.25; 15 MG/5ML; MG/5ML
5 SYRUP ORAL NIGHTLY PRN
Qty: 25 ML | Refills: 0 | Status: ON HOLD | OUTPATIENT
Start: 2020-04-10 | End: 2020-04-18 | Stop reason: HOSPADM

## 2020-04-10 RX ORDER — CEFTRIAXONE 1 G/1
1 INJECTION, POWDER, FOR SOLUTION INTRAMUSCULAR; INTRAVENOUS
Status: COMPLETED | OUTPATIENT
Start: 2020-04-10 | End: 2020-04-10

## 2020-04-10 RX ORDER — AZITHROMYCIN 250 MG/1
TABLET, FILM COATED ORAL
Qty: 6 TABLET | Refills: 0 | Status: ON HOLD | OUTPATIENT
Start: 2020-04-10 | End: 2020-04-18 | Stop reason: HOSPADM

## 2020-04-10 RX ORDER — ONDANSETRON 4 MG/1
4 TABLET, ORALLY DISINTEGRATING ORAL EVERY 8 HOURS PRN
Qty: 15 TABLET | Refills: 0 | Status: ON HOLD | OUTPATIENT
Start: 2020-04-10 | End: 2020-04-18 | Stop reason: HOSPADM

## 2020-04-10 RX ORDER — ALBUTEROL SULFATE 90 UG/1
2 AEROSOL, METERED RESPIRATORY (INHALATION) EVERY 6 HOURS PRN
Qty: 18 G | Refills: 0 | Status: ON HOLD | OUTPATIENT
Start: 2020-04-10 | End: 2020-04-18 | Stop reason: SDUPTHER

## 2020-04-10 RX ADMIN — CEFTRIAXONE 1 G: 1 INJECTION, POWDER, FOR SOLUTION INTRAMUSCULAR; INTRAVENOUS at 05:04

## 2020-04-10 RX ADMIN — ONDANSETRON 4 MG: 4 TABLET, ORALLY DISINTEGRATING ORAL at 05:04

## 2020-04-10 NOTE — LETTER
[address] [facility], [address] ? [place], [address] ? Phone [phone] ? Fax [phone] ? [email or web address]          Return to Work/School    Patient: Mary Sims  YOB: 1985   Date: 04/10/2020      To Whom It May Concern:     Mary Sims was in contact with/seen in my office on 04/10/2020. COVID-19 is present in our communities across the state. There is limited testing for COVID at this time, so not all patients can be tested. In this situation, your employee meets the following criteria:     Mary Sims has met the criteria for COVID-19 testing based upon symptoms, travel, and/or potential exposure. The test has been completed and is pending results at this time. During this time the employee is not able to work and should be quarantined per the Centers for Disease Control timelines.      If you have any questions or concerns, or if I can be of further assistance, please do not hesitate to contact me.     Sincerely,    Catherine Wolff PA-C

## 2020-04-10 NOTE — PATIENT INSTRUCTIONS
*I strongly suspect that you have COVID-19. Please go directly to [facility] at [facility] on the corner of [facility] and [facility]. Please call [phone] upon leaving here to inform them you are on your way to be tested through the drive thru. Please go home immediately after being tested to quarantine yourself and protect yourself from others as if you have a confirmed COVID diagnosis since you are exhibiting all the symptoms for 2 weeks. All family members should stay quarantined in the house with you as well following same precautions. We will call you with your COVID test results within 2-5 days once they arrive.      *Please go immediately to the ED if you develop worsening shortness of breath, chest pain, and uncontrollable fever above 100.4F       Instructions for Patients Awaiting COVID-19 Test Results    You will either be called with your test result or it will be released to the patient portal.  If you have any questions about your test, please visit [email or web address] or call our COVID-19 information line at [phone].    Prevention steps for patients with confirmed or suspected COVID-19       Stay home and stay away from family members and friends. The CDC says, you can leave home after these three things have happened: 1) You have had no fever for at least 72 hours (that is three full days of no fever without the use of medicine that reduces fevers) 2) AND other symptoms have improved (for example, when your cough or shortness of breath have improved) 3) AND at least 7 days have passed since your symptoms first appeared.   Separate yourself from other people and animals in your home.   Call ahead before visiting your doctor.   Wear a facemask.   Cover your coughs and sneezes.   Wash your hands often with soap and water; hand  can be used, too.   Avoid sharing personal household items.   Wipe down surfaces used daily.   Monitor your  symptoms. Seek prompt medical attention if your illness is worsening (e.g., difficulty breathing).    Before seeking care, call your healthcare provider.   If you have a medical emergency and need to call 911, notify the dispatch personnel that you have, or are being evaluated for COVID-19. If possible, put on a facemask before emergency medical services arrive.        Recommended precautions for household members, intimate partners, and caregivers in a home setting of a patient with symptomatic laboratory-confirmed COVID-19 or a patient under investigation.  Household members, intimate partners, and caregivers in the home setting awaiting tests results have close contact with a person with symptomatic, laboratory-confirmed COVID-19 or a person under investigation. Close contacts should monitor their health; they should call their provider right away if they develop symptoms suggestive of COVID-19 (e.g., fever, cough, shortness of breath).    Close contacts should also follow these recommendations:   Make sure that you understand and can help the patient follow their provider's instructions for medication(s) and care. You should help the patient with basic needs in the home and provide support for getting groceries, prescriptions, and other personal needs.   Monitor the patient's symptoms. If the patient is getting sicker, call his or her healthcare provider and tell them that the patient has laboratory-confirmed COVID-19. If the patient has a medical emergency and you need to call 911, notify the dispatch personnel that the patient has, or is being evaluated for COVID-19.   Household members should stay in another room or be  from the patient. Household members should use a separate bedroom and bathroom, if available.   Prohibit visitors.   Household members should care for any pets in the home.   Make sure that shared spaces in the home have good air flow, such as by an air conditioner or an  opened window, weather permitting.   Perform hand hygiene frequently. Wash your hands often with soap and water for at least 20 seconds or use an alcohol-based hand  (that contains > 60% alcohol) covering all surfaces of your hands and rubbing them together until they feel dry. Soap and water should be used preferentially.   Avoid touching your eyes, nose, and mouth.   The patient should wear a facemask. If the patient is not able to wear a facemask (for example, because it causes trouble breathing), caregivers should wear a mask when they are in the same room as the patient.   Wear a disposable facemask and gloves when you touch or have contact with the patient's blood, stool, or body fluids, such as saliva, sputum, nasal mucus, vomit, urine.  o Throw out disposable facemasks and gloves after using them. Do not reuse.  o When removing personal protective equipment, first remove and dispose of gloves. Then, immediately clean your hands with soap and water or alcohol-based hand . Next, remove and dispose of facemask, and immediately clean your hands again with soap and water or alcohol-based hand .   You should not share dishes, drinking glasses, cups, eating utensils, towels, bedding, or other items with the patient. After the patient uses these items, you should wash them thoroughly (see below Wash laundry thoroughly).   Clean all high-touch surfaces, such as counters, tabletops, doorknobs, bathroom fixtures, toilets, phones, keyboards, tablets, and bedside tables, every day. Also, clean any surfaces that may have blood, stool, or body fluids on them.   Use a household cleaning spray or wipe, according to the label instructions. Labels contain instructions for safe and effective use of the cleaning product including precautions you should take when applying the product, such as wearing gloves and making sure you have good ventilation during use of the product.   Wash laundry  thoroughly.  o Immediately remove and wash clothes or bedding that have blood, stool, or body fluids on them.  o Wear disposable gloves while handling soiled items and keep soiled items away from your body. Clean your hands (with soap and water or an alcohol-based hand ) immediately after removing your gloves.  o Read and follow directions on labels of laundry or clothing items and detergent. In general, using a normal laundry detergent according to washing machine instructions and dry thoroughly using the warmest temperatures recommended on the clothing label.   Place all used disposable gloves, facemasks, and other contaminated items in a lined container before disposing of them with other household waste. Clean your hands (with soap and water or an alcohol-based hand ) immediately after handling these items. Soap and water should be used preferentially if hands are visibly dirty.   Discuss any additional questions with your state or local health department or healthcare provider. Check available hours when contacting your local health department.    For more information see CDC link below.      https://www.cdc.gov/coronavirus/2019-ncov/hcp/guidance-prevent-spread.html#precautions        Sources:  [facility], [facility] and [facility]      Pneumonia (Adult)  Pneumonia is an infection deep within the lungs. It is in the small air sacs (alveoli). Pneumonia may be caused by a virus or bacteria. Pneumonia caused by bacteria is usually treated with an antibiotic. Severe cases may need to be treated in the hospital. Milder cases can be treated at home. Symptoms usually start to get better during the first 2 days of treatment.    Home care  Follow these guidelines when caring for yourself at home:  · Rest at home for the first 2 to 3 days, or until you feel stronger. Dont let yourself get overly tired when you go back to your activities.  · Stay away from cigarette smoke - yours or other  peoples.  · You may use acetaminophen or ibuprofen to control fever or pain, unless another medicine was prescribed. If you have chronic liver or kidney disease, talk with your healthcare provider before using these medicines. Also talk with your provider if youve had a stomach ulcer or gastrointestinal bleeding. Dont give aspirin to anyone younger than 18 years of age who is ill with a fever. It may cause severe liver damage.  · Your appetite may be poor, so a light diet is fine.  · Drink 6 to 8 glasses of fluids every day to make sure you are getting enough fluids. Beverages can include water, sport drinks, sodas without caffeine, juices, tea, or soup. Fluids will help loosen secretions in the lung. This will make it easier for you to cough up the phlegm (sputum). If you also have heart or kidney disease, check with your healthcare provider before you drink extra fluids.  · Take antibiotic medicine prescribed until it is all gone, even if you are feeling better after a few days.  Follow-up care  Follow up with your healthcare provider in the next 2 to 3 days, or as advised. This is to be sure the medicine is helping you get better.  If you are 65 or older, you should get a pneumococcal vaccine and a yearly flu (influenza) shot. You should also get these vaccines if you have chronic lung disease like asthma, emphysema, or COPD. Recently, a second type of pneumonia vaccine has become available for everyone over 65 years old. This is in addition to the previous vaccine. Ask your provider about this.  When to seek medical advice  Call your healthcare provider right away if any of these occur:  · You dont get better within the first 48 hours of treatment  · Shortness of breath gets worse  · Rapid breathing (more than 25 breaths per minute)  · Coughing up blood  · Chest pain gets worse with breathing  · Fever of 100.4°F (38°C) or higher that doesnt get better with fever medicine  · Weakness, dizziness, or fainting  that gets worse  · Thirst or dry mouth that gets worse  · Sinus pain, headache, or a stiff neck  · Chest pain not caused by coughing  Date Last Reviewed: 1/1/2017 © 2000-2017 [employer]. [address] [place] [address], [place] [address]. All rights reserved. This information is not intended as a substitute for professional medical care. Always follow your healthcare professional's instructions.      Below are suggestions for symptomatic relief:    - Take Antibiotics as prescribed on a full stomach until you complete entire course  - Tylenol every 4 hours as needed for pain/fever/chills/body aches  - Salt water gargles to soothe throat pain.  - Chloroseptic spray also helps to numb throat pain.  - Warm face compresses to help with facial sinus pain/pressure.  - Vicks vapor rub at night.  - Flonase OTC nasal spray for nasal congestion.  - Simple foods like chicken noodle soup, smoothies, hot tea with lemon and honey  - If you were not given a prescription cough medication, then Delsym OTC helps with coughing at night  - *please only take cough syrup at night time as it causes drowsiness. Please only take when absolutely needed, as this is a controlled substance that can cause addiction. Do not drive or operate any machinery while taking this medication.   - take tessalon pearls for daytime cough suppressant   - Zyrtec/Claritin during the day & Benadryl at night may help with any allergies     If you DO NOT have Hypertension or any history of palpitations, it is ok to take over the counter Sudafed or Mucinex D or Allegra-D/Claritin-D/Zyrtec-D.  If you do take one of the above, it is ok to combine that with plain over the counter Mucinex or Allegra or Claritin or Zyrtec. If, for example, you are taking Zyrtec -D, you can combine that with Mucinex, but not Mucinex-D.  If you are taking Mucinex-D, you can combine that with plain Allegra or Claritin or Zyrtec.

## 2020-04-10 NOTE — PROGRESS NOTES
Subjective:       Patient ID: Mary Sims is a 34 y.o. female.    Vitals:  temperature is 99.5 °F (37.5 °C). Her blood pressure is 145/95 (abnormal) and her pulse is 95. Her oxygen saturation is 97%.     Chief Complaint: URI    35 y/o female with PMHx of HTN and Diabetes Mellitus presents to urgent care c/o chills and body aches since last night and chest tightness, shortness of breath, and dry cough that started this morning. Symptoms have been constant and severe since onset. Pt also reports having nausea, diarrhea, lower back pain and decreased appetite. 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 Pt denies recent illness/travel, fever, ear pain, sore throat, difficulty swallowing, leg pain/swelling, vomiting, abdominal pain, urinary changes, neck pain, headache, vision changes, numbness, and focal weakness.       URI    This is a new problem. The current episode started yesterday. There has been no fever. Pertinent negatives include no congestion, coughing, ear pain, nausea, rash, sinus pain, sore throat, vomiting or wheezing. She has tried nothing for the symptoms.       Constitution: Negative for chills, sweating, fatigue and fever.   HENT: Negative for ear pain, congestion, sinus pain, sinus pressure, sore throat and voice change.    Neck: Negative for painful lymph nodes.   Eyes: Negative for eye redness.   Respiratory: Negative for chest tightness, cough, sputum production, bloody sputum, COPD, shortness of breath, stridor, wheezing and asthma.    Gastrointestinal: Negative for nausea and vomiting.   Musculoskeletal: Negative for muscle ache.   Skin: Negative for rash.   Allergic/Immunologic: Negative for seasonal allergies and asthma.   Hematologic/Lymphatic: Negative for swollen lymph nodes.       Objective:      Physical Exam      Due to the state of emergency surrounding the COVID-19 pandemic, this exam was completed remotely per [facility]'s current protocol.  Assessment:       1.  Pneumonia of right upper lobe due to infectious organism    2. Suspected Covid-19 Virus Infection    3. Nausea        Plan:         Pneumonia of right upper lobe due to infectious organism  -     COVID-19 Routine Screening; Future; Expected date: 04/10/2020    Suspected Covid-19 Virus Infection  -     XR CHEST PA AND LATERAL; Future; Expected date: 04/10/2020  -     benzonatate (TESSALON) 100 MG capsule; Take 1 capsule (100 mg total) by mouth 3 (three) times daily as needed.  Dispense: 15 capsule; Refill: 0  -     promethazine-dextromethorphan (PROMETHAZINE-DM) 6.25-15 mg/5 mL Syrp; Take 5 mLs by mouth nightly as needed.  Dispense: 25 mL; Refill: 0  -     albuterol (PROAIR HFA) 90 mcg/actuation inhaler; Inhale 2 puffs into the lungs every 6 (six) hours as needed for Wheezing. Rescue  Dispense: 18 g; Refill: 0  -     cefTRIAXone injection 1 g  -     azithromycin (Z-ALEJANDRA) 250 MG tablet; Take 2 tablets by mouth on day 1; Take 1 tablet by mouth on days 2-5  Dispense: 6 tablet; Refill: 0  -     Cancel: COVID-19 Routine Screening  -     COVID-19 Routine Screening; Future; Expected date: 04/10/2020    Nausea  -     ondansetron disintegrating tablet 4 mg  -     ondansetron (ZOFRAN-ODT) 4 MG TbDL; Take 1 tablet (4 mg total) by mouth every 8 (eight) hours as needed.  Dispense: 15 tablet; Refill: 0    Xr Chest Pa And Lateral    Result Date: 4/10/2020  EXAMINATION: XR CHEST PA AND LATERAL CLINICAL HISTORY: Other chest pain TECHNIQUE: PA and lateral radiographic images of the chest were obtained. COMPARISON: Chest radiograph 09/02/2016. FINDINGS: Cardiac silhouette is stable in size.  Mediastinal contours are unremarkable. Trachea is midline.  New irregular ground-glass opacity in the right upper lung zone.  There is new, perihilar soft tissue opacity, worse on the right along with some central peribronchial cuffing and tubular bronchiectasis.  Mildly increased interstitial markings are seen in the bilateral lung bases.  There is  symmetric expansion.  No large pneumothorax or pleural effusion. Soft tissues are otherwise unremarkable.  There is mild lumbar levoscoliosis.  No acute osseous abnormality identified.     New irregular opacity in the right upper lung zone along with new perihilar soft tissue density and central predominant bronchiectasis/peribronchial cuffing.  Findings are nonspecific.  However infectious or other inflammatory etiology is suspected. This report was flagged in Epic as abnormal. Electronically signed by resident: Lucio Andrade Date:    04/10/2020 Time:    16:50     Patient Instructions   *I strongly suspect that you have COVID-19. Please go directly to [facility] at [address] [place] [address] on the corner of [facility]. Please call [phone] upon leaving here to inform them you are on your way to be tested through the drive thru. Please go home immediately after being tested to quarantine yourself and protect yourself from others as if you have a confirmed COVID diagnosis since you are exhibiting all the symptoms for 2 weeks. All family members should stay quarantined in the house with you as well following same precautions. We will call you with your COVID test results within 2-5 days once they arrive.      *Please go immediately to the ED if you develop worsening shortness of breath, chest pain, and uncontrollable fever above 100.4F       Instructions for Patients Awaiting COVID-19 Test Results    You will either be called with your test result or it will be released to the patient portal.  If you have any questions about your test, please visit [email or web address] or call our COVID-19 information line at [phone].    Prevention steps for patients with confirmed or suspected COVID-19       Stay home and stay away from family members and friends. The CDC says, you can leave home after these three things have happened: 1) You have had no fever for at least 72 hours (that  is three full days of no fever without the use of medicine that reduces fevers) 2) AND other symptoms have improved (for example, when your cough or shortness of breath have improved) 3) AND at least 7 days have passed since your symptoms first appeared.   Separate yourself from other people and animals in your home.   Call ahead before visiting your doctor.   Wear a facemask.   Cover your coughs and sneezes.   Wash your hands often with soap and water; hand  can be used, too.   Avoid sharing personal household items.   Wipe down surfaces used daily.   Monitor your symptoms. Seek prompt medical attention if your illness is worsening (e.g., difficulty breathing).    Before seeking care, call your healthcare provider.   If you have a medical emergency and need to call 911, notify the dispatch personnel that you have, or are being evaluated for COVID-19. If possible, put on a facemask before emergency medical services arrive.        Recommended precautions for household members, intimate partners, and caregivers in a home setting of a patient with symptomatic laboratory-confirmed COVID-19 or a patient under investigation.  Household members, intimate partners, and caregivers in the home setting awaiting tests results have close contact with a person with symptomatic, laboratory-confirmed COVID-19 or a person under investigation. Close contacts should monitor their health; they should call their provider right away if they develop symptoms suggestive of COVID-19 (e.g., fever, cough, shortness of breath).    Close contacts should also follow these recommendations:   Make sure that you understand and can help the patient follow their provider's instructions for medication(s) and care. You should help the patient with basic needs in the home and provide support for getting groceries, prescriptions, and other personal needs.   Monitor the patient's symptoms. If the patient is getting sicker, call his or  her healthcare provider and tell them that the patient has laboratory-confirmed COVID-19. If the patient has a medical emergency and you need to call 911, notify the dispatch personnel that the patient has, or is being evaluated for COVID-19.   Household members should stay in another room or be  from the patient. Household members should use a separate bedroom and bathroom, if available.   Prohibit visitors.   Household members should care for any pets in the home.   Make sure that shared spaces in the home have good air flow, such as by an air conditioner or an opened window, weather permitting.   Perform hand hygiene frequently. Wash your hands often with soap and water for at least 20 seconds or use an alcohol-based hand  (that contains > 60% alcohol) covering all surfaces of your hands and rubbing them together until they feel dry. Soap and water should be used preferentially.   Avoid touching your eyes, nose, and mouth.   The patient should wear a facemask. If the patient is not able to wear a facemask (for example, because it causes trouble breathing), caregivers should wear a mask when they are in the same room as the patient.   Wear a disposable facemask and gloves when you touch or have contact with the patient's blood, stool, or body fluids, such as saliva, sputum, nasal mucus, vomit, urine.  o Throw out disposable facemasks and gloves after using them. Do not reuse.  o When removing personal protective equipment, first remove and dispose of gloves. Then, immediately clean your hands with soap and water or alcohol-based hand . Next, remove and dispose of facemask, and immediately clean your hands again with soap and water or alcohol-based hand .   You should not share dishes, drinking glasses, cups, eating utensils, towels, bedding, or other items with the patient. After the patient uses these items, you should wash them thoroughly (see below Wash laundry  thoroughly).   Clean all high-touch surfaces, such as counters, tabletops, doorknobs, bathroom fixtures, toilets, phones, keyboards, tablets, and bedside tables, every day. Also, clean any surfaces that may have blood, stool, or body fluids on them.   Use a household cleaning spray or wipe, according to the label instructions. Labels contain instructions for safe and effective use of the cleaning product including precautions you should take when applying the product, such as wearing gloves and making sure you have good ventilation during use of the product.   Wash laundry thoroughly.  o Immediately remove and wash clothes or bedding that have blood, stool, or body fluids on them.  o Wear disposable gloves while handling soiled items and keep soiled items away from your body. Clean your hands (with soap and water or an alcohol-based hand ) immediately after removing your gloves.  o Read and follow directions on labels of laundry or clothing items and detergent. In general, using a normal laundry detergent according to washing machine instructions and dry thoroughly using the warmest temperatures recommended on the clothing label.   Place all used disposable gloves, facemasks, and other contaminated items in a lined container before disposing of them with other household waste. Clean your hands (with soap and water or an alcohol-based hand ) immediately after handling these items. Soap and water should be used preferentially if hands are visibly dirty.   Discuss any additional questions with your state or local health department or healthcare provider. Check available hours when contacting your local health department.    For more information see CDC link below.      https://www.cdc.gov/coronavirus/2019-ncov/hcp/guidance-prevent-spread.html#precautions        Sources:  [facility], [facility] and [facility]      Pneumonia (Adult)  Pneumonia is an infection deep within the lungs.  It is in the small air sacs (alveoli). Pneumonia may be caused by a virus or bacteria. Pneumonia caused by bacteria is usually treated with an antibiotic. Severe cases may need to be treated in the hospital. Milder cases can be treated at home. Symptoms usually start to get better during the first 2 days of treatment.    Home care  Follow these guidelines when caring for yourself at home:  · Rest at home for the first 2 to 3 days, or until you feel stronger. Dont let yourself get overly tired when you go back to your activities.  · Stay away from cigarette smoke - yours or other peoples.  · You may use acetaminophen or ibuprofen to control fever or pain, unless another medicine was prescribed. If you have chronic liver or kidney disease, talk with your healthcare provider before using these medicines. Also talk with your provider if youve had a stomach ulcer or gastrointestinal bleeding. Dont give aspirin to anyone younger than 18 years of age who is ill with a fever. It may cause severe liver damage.  · Your appetite may be poor, so a light diet is fine.  · Drink 6 to 8 glasses of fluids every day to make sure you are getting enough fluids. Beverages can include water, sport drinks, sodas without caffeine, juices, tea, or soup. Fluids will help loosen secretions in the lung. This will make it easier for you to cough up the phlegm (sputum). If you also have heart or kidney disease, check with your healthcare provider before you drink extra fluids.  · Take antibiotic medicine prescribed until it is all gone, even if you are feeling better after a few days.  Follow-up care  Follow up with your healthcare provider in the next 2 to 3 days, or as advised. This is to be sure the medicine is helping you get better.  If you are 65 or older, you should get a pneumococcal vaccine and a yearly flu (influenza) shot. You should also get these vaccines if you have chronic lung disease like asthma, emphysema, or COPD.  Recently, a second type of pneumonia vaccine has become available for everyone over 65 years old. This is in addition to the previous vaccine. Ask your provider about this.  When to seek medical advice  Call your healthcare provider right away if any of these occur:  · You dont get better within the first 48 hours of treatment  · Shortness of breath gets worse  · Rapid breathing (more than 25 breaths per minute)  · Coughing up blood  · Chest pain gets worse with breathing  · Fever of 100.4°F (38°C) or higher that doesnt get better with fever medicine  · Weakness, dizziness, or fainting that gets worse  · Thirst or dry mouth that gets worse  · Sinus pain, headache, or a stiff neck  · Chest pain not caused by coughing  Date Last Reviewed: 1/1/2017 © 2000-2017 [employer]. [address] [place] [address], [place] [address]. All rights reserved. This information is not intended as a substitute for professional medical care. Always follow your healthcare professional's instructions.      Below are suggestions for symptomatic relief:    - Take Antibiotics as prescribed on a full stomach until you complete entire course  - Tylenol every 4 hours as needed for pain/fever/chills/body aches  - Salt water gargles to soothe throat pain.  - Chloroseptic spray also helps to numb throat pain.  - Warm face compresses to help with facial sinus pain/pressure.  - Vicks vapor rub at night.  - Flonase OTC nasal spray for nasal congestion.  - Simple foods like chicken noodle soup, smoothies, hot tea with lemon and honey  - If you were not given a prescription cough medication, then Delsym OTC helps with coughing at night  - *please only take cough syrup at night time as it causes drowsiness. Please only take when absolutely needed, as this is a controlled substance that can cause addiction. Do not drive or operate any machinery while taking this medication.   - take tessalon pearls for daytime cough suppressant   - Zyrtec/Claritin  during the day & Benadryl at night may help with any allergies     If you DO NOT have Hypertension or any history of palpitations, it is ok to take over the counter Sudafed or Mucinex D or Allegra-D/Claritin-D/Zyrtec-D.  If you do take one of the above, it is ok to combine that with plain over the counter Mucinex or Allegra or Claritin or Zyrtec. If, for example, you are taking Zyrtec -D, you can combine that with Mucinex, but not Mucinex-D.  If you are taking Mucinex-D, you can combine that with plain Allegra or Claritin or Zyrtec.

## 2020-04-11 LAB — SARS-COV-2 RNA RESP QL NAA+PROBE: DETECTED

## 2020-04-13 ENCOUNTER — TELEPHONE (OUTPATIENT)
Dept: URGENT CARE | Facility: CLINIC | Age: 35
End: 2020-04-13

## 2020-04-13 DIAGNOSIS — U07.1 COVID-19 VIRUS DETECTED: Primary | ICD-10-CM

## 2020-04-13 NOTE — TELEPHONE ENCOUNTER
Spoke with pt about positive test results. She states she is still having body aches and chest tightness but symptoms have not worsened. We discussed CDC guidelines for returning to work. Pt verbalized understanding and denied further questions.

## 2020-04-13 NOTE — LETTER
April 13, 2020      [facility] [address] [facility], [place] [address]  Phone: [phone]  Fax: [phone]       Patient: Mary Sims   YOB: 1985  Date of Visit: 04/13/2020    To Whom It May Concern:    Wale Sims  was at [facility] on 04/13/2020. She may return to work/school on Monday April 20th  with no restrictions as long as she has had no fever in the past 72 hours. If you have any questions or concerns, or if I can be of further assistance, please do not hesitate to contact me.    Sincerely,      Leni Vallejo, NP

## 2020-04-16 ENCOUNTER — HOSPITAL ENCOUNTER (INPATIENT)
Facility: HOSPITAL | Age: 35
LOS: 2 days | Discharge: HOME | DRG: 177 | End: 2020-04-18
Attending: EMERGENCY MEDICINE | Admitting: HOSPITALIST
Payer: MEDICAID

## 2020-04-16 ENCOUNTER — NURSE TRIAGE (OUTPATIENT)
Dept: ADMINISTRATIVE | Facility: CLINIC | Age: 35
End: 2020-04-16

## 2020-04-16 DIAGNOSIS — Z20.822 SUSPECTED COVID-19 VIRUS INFECTION: ICD-10-CM

## 2020-04-16 DIAGNOSIS — R06.02 SHORTNESS OF BREATH: ICD-10-CM

## 2020-04-16 DIAGNOSIS — J18.9 PNEUMONIA OF BOTH LUNGS DUE TO INFECTIOUS ORGANISM, UNSPECIFIED PART OF LUNG: Primary | ICD-10-CM

## 2020-04-16 DIAGNOSIS — U07.1 COVID-19 VIRUS INFECTION: ICD-10-CM

## 2020-04-16 LAB
ALBUMIN SERPL BCP-MCNC: 2.1 G/DL (ref 3.5–5.2)
ALLENS TEST: ABNORMAL
ALP SERPL-CCNC: 87 U/L (ref 55–135)
ALT SERPL W/O P-5'-P-CCNC: 20 U/L (ref 10–44)
ANION GAP SERPL CALC-SCNC: 9 MMOL/L (ref 8–16)
AST SERPL-CCNC: 25 U/L (ref 10–40)
BACTERIA #/AREA URNS AUTO: ABNORMAL /HPF
BASOPHILS # BLD AUTO: 0.01 K/UL (ref 0–0.2)
BASOPHILS NFR BLD: 0.3 % (ref 0–1.9)
BILIRUB SERPL-MCNC: 0.3 MG/DL (ref 0.1–1)
BILIRUB UR QL STRIP: NEGATIVE
BNP SERPL-MCNC: 57 PG/ML (ref 0–99)
BUN SERPL-MCNC: 13 MG/DL (ref 6–20)
CALCIUM SERPL-MCNC: 8.2 MG/DL (ref 8.7–10.5)
CHLORIDE SERPL-SCNC: 106 MMOL/L (ref 95–110)
CK SERPL-CCNC: 230 U/L (ref 20–180)
CLARITY UR REFRACT.AUTO: ABNORMAL
CO2 SERPL-SCNC: 21 MMOL/L (ref 23–29)
COLOR UR AUTO: YELLOW
CREAT SERPL-MCNC: 1 MG/DL (ref 0.5–1.4)
CRP SERPL-MCNC: 71.9 MG/L (ref 0–8.2)
D DIMER PPP IA.FEU-MCNC: 1.13 MG/L FEU
DIFFERENTIAL METHOD: ABNORMAL
EOSINOPHIL # BLD AUTO: 0 K/UL (ref 0–0.5)
EOSINOPHIL NFR BLD: 0.6 % (ref 0–8)
ERYTHROCYTE [DISTWIDTH] IN BLOOD BY AUTOMATED COUNT: 13.9 % (ref 11.5–14.5)
ERYTHROCYTE [SEDIMENTATION RATE] IN BLOOD BY WESTERGREN METHOD: 60 MM/HR (ref 0–36)
EST. GFR  (AFRICAN AMERICAN): >60 ML/MIN/1.73 M^2
EST. GFR  (NON AFRICAN AMERICAN): >60 ML/MIN/1.73 M^2
ESTIMATED AVG GLUCOSE: ABNORMAL MG/DL (ref 68–131)
FERRITIN SERPL-MCNC: 237 NG/ML (ref 20–300)
GLUCOSE SERPL-MCNC: 288 MG/DL (ref 70–110)
GLUCOSE UR QL STRIP: ABNORMAL
HBA1C MFR BLD HPLC: >14 % (ref 4–5.6)
HCO3 UR-SCNC: 26.2 MMOL/L (ref 24–28)
HCT VFR BLD AUTO: 32.4 % (ref 37–48.5)
HGB BLD-MCNC: 10.4 G/DL (ref 12–16)
HGB UR QL STRIP: NEGATIVE
HYALINE CASTS UR QL AUTO: 1 /LPF
IMM GRANULOCYTES # BLD AUTO: 0.03 K/UL (ref 0–0.04)
IMM GRANULOCYTES NFR BLD AUTO: 0.9 % (ref 0–0.5)
KETONES UR QL STRIP: ABNORMAL
LACTATE SERPL-SCNC: 1.2 MMOL/L (ref 0.5–2.2)
LDH SERPL L TO P-CCNC: 490 U/L (ref 110–260)
LEUKOCYTE ESTERASE UR QL STRIP: ABNORMAL
LYMPHOCYTES # BLD AUTO: 1.2 K/UL (ref 1–4.8)
LYMPHOCYTES NFR BLD: 34.9 % (ref 18–48)
MCH RBC QN AUTO: 23.5 PG (ref 27–31)
MCHC RBC AUTO-ENTMCNC: 32.1 G/DL (ref 32–36)
MCV RBC AUTO: 73 FL (ref 82–98)
MICROSCOPIC COMMENT: ABNORMAL
MONOCYTES # BLD AUTO: 0.3 K/UL (ref 0.3–1)
MONOCYTES NFR BLD: 8.2 % (ref 4–15)
NEUTROPHILS # BLD AUTO: 1.9 K/UL (ref 1.8–7.7)
NEUTROPHILS NFR BLD: 55.1 % (ref 38–73)
NITRITE UR QL STRIP: POSITIVE
NRBC BLD-RTO: 0 /100 WBC
PCO2 BLDA: 54.9 MMHG (ref 35–45)
PH SMN: 7.29 [PH] (ref 7.35–7.45)
PH UR STRIP: 6 [PH] (ref 5–8)
PLATELET # BLD AUTO: 232 K/UL (ref 150–350)
PMV BLD AUTO: 11.9 FL (ref 9.2–12.9)
PO2 BLDA: 12 MMHG (ref 40–60)
POC BE: 0 MMOL/L
POC SATURATED O2: 11 % (ref 95–100)
POC TCO2: 28 MMOL/L (ref 24–29)
POTASSIUM SERPL-SCNC: 4.9 MMOL/L (ref 3.5–5.1)
PROCALCITONIN SERPL IA-MCNC: 0.03 NG/ML
PROT SERPL-MCNC: 6.9 G/DL (ref 6–8.4)
PROT UR QL STRIP: ABNORMAL
RBC # BLD AUTO: 4.43 M/UL (ref 4–5.4)
RBC #/AREA URNS AUTO: 21 /HPF (ref 0–4)
SAMPLE: ABNORMAL
SITE: ABNORMAL
SODIUM SERPL-SCNC: 136 MMOL/L (ref 136–145)
SP GR UR STRIP: 1.02 (ref 1–1.03)
SQUAMOUS #/AREA URNS AUTO: 2 /HPF
TROPONIN I SERPL DL<=0.01 NG/ML-MCNC: <0.006 NG/ML (ref 0–0.03)
URN SPEC COLLECT METH UR: ABNORMAL
WBC # BLD AUTO: 3.52 K/UL (ref 3.9–12.7)
WBC #/AREA URNS AUTO: 61 /HPF (ref 0–5)
YEAST UR QL AUTO: ABNORMAL

## 2020-04-16 PROCEDURE — 87077 CULTURE AEROBIC IDENTIFY: CPT

## 2020-04-16 PROCEDURE — 82803 BLOOD GASES ANY COMBINATION: CPT

## 2020-04-16 PROCEDURE — 99223 PR INITIAL HOSPITAL CARE,LEVL III: ICD-10-PCS | Mod: ,,, | Performed by: NURSE PRACTITIONER

## 2020-04-16 PROCEDURE — 83615 LACTATE (LD) (LDH) ENZYME: CPT

## 2020-04-16 PROCEDURE — 82962 GLUCOSE BLOOD TEST: CPT

## 2020-04-16 PROCEDURE — 99285 PR EMERGENCY DEPT VISIT,LEVEL V: ICD-10-PCS | Mod: ,,, | Performed by: EMERGENCY MEDICINE

## 2020-04-16 PROCEDURE — 84484 ASSAY OF TROPONIN QUANT: CPT

## 2020-04-16 PROCEDURE — 99285 EMERGENCY DEPT VISIT HI MDM: CPT | Mod: 25

## 2020-04-16 PROCEDURE — C9399 UNCLASSIFIED DRUGS OR BIOLOG: HCPCS | Performed by: NURSE PRACTITIONER

## 2020-04-16 PROCEDURE — 83036 HEMOGLOBIN GLYCOSYLATED A1C: CPT

## 2020-04-16 PROCEDURE — 99223 1ST HOSP IP/OBS HIGH 75: CPT | Mod: ,,, | Performed by: NURSE PRACTITIONER

## 2020-04-16 PROCEDURE — 93010 ELECTROCARDIOGRAM REPORT: CPT | Mod: ,,, | Performed by: INTERNAL MEDICINE

## 2020-04-16 PROCEDURE — 11000001 HC ACUTE MED/SURG PRIVATE ROOM

## 2020-04-16 PROCEDURE — 83880 ASSAY OF NATRIURETIC PEPTIDE: CPT

## 2020-04-16 PROCEDURE — 36415 COLL VENOUS BLD VENIPUNCTURE: CPT

## 2020-04-16 PROCEDURE — 80053 COMPREHEN METABOLIC PANEL: CPT

## 2020-04-16 PROCEDURE — 93010 EKG 12-LEAD: ICD-10-PCS | Mod: ,,, | Performed by: INTERNAL MEDICINE

## 2020-04-16 PROCEDURE — 93005 ELECTROCARDIOGRAM TRACING: CPT

## 2020-04-16 PROCEDURE — 63600175 PHARM REV CODE 636 W HCPCS: Performed by: NURSE PRACTITIONER

## 2020-04-16 PROCEDURE — 86140 C-REACTIVE PROTEIN: CPT

## 2020-04-16 PROCEDURE — 82960 TEST FOR G6PD ENZYME: CPT

## 2020-04-16 PROCEDURE — 87088 URINE BACTERIA CULTURE: CPT

## 2020-04-16 PROCEDURE — 87040 BLOOD CULTURE FOR BACTERIA: CPT

## 2020-04-16 PROCEDURE — 82728 ASSAY OF FERRITIN: CPT

## 2020-04-16 PROCEDURE — 99285 EMERGENCY DEPT VISIT HI MDM: CPT | Mod: ,,, | Performed by: EMERGENCY MEDICINE

## 2020-04-16 PROCEDURE — 85652 RBC SED RATE AUTOMATED: CPT

## 2020-04-16 PROCEDURE — 87186 SC STD MICRODIL/AGAR DIL: CPT

## 2020-04-16 PROCEDURE — 63600175 PHARM REV CODE 636 W HCPCS: Performed by: PHYSICIAN ASSISTANT

## 2020-04-16 PROCEDURE — 81001 URINALYSIS AUTO W/SCOPE: CPT

## 2020-04-16 PROCEDURE — 87086 URINE CULTURE/COLONY COUNT: CPT

## 2020-04-16 PROCEDURE — 83605 ASSAY OF LACTIC ACID: CPT

## 2020-04-16 PROCEDURE — 82550 ASSAY OF CK (CPK): CPT

## 2020-04-16 PROCEDURE — 99900035 HC TECH TIME PER 15 MIN (STAT)

## 2020-04-16 PROCEDURE — 96374 THER/PROPH/DIAG INJ IV PUSH: CPT

## 2020-04-16 PROCEDURE — 85379 FIBRIN DEGRADATION QUANT: CPT

## 2020-04-16 PROCEDURE — 84145 PROCALCITONIN (PCT): CPT

## 2020-04-16 PROCEDURE — 85025 COMPLETE CBC W/AUTO DIFF WBC: CPT

## 2020-04-16 RX ORDER — LOPERAMIDE HYDROCHLORIDE 2 MG/1
2 CAPSULE ORAL 4 TIMES DAILY PRN
Status: DISCONTINUED | OUTPATIENT
Start: 2020-04-16 | End: 2020-04-18 | Stop reason: HOSPADM

## 2020-04-16 RX ORDER — ENOXAPARIN SODIUM 100 MG/ML
40 INJECTION SUBCUTANEOUS EVERY 24 HOURS
Status: DISCONTINUED | OUTPATIENT
Start: 2020-04-16 | End: 2020-04-18 | Stop reason: HOSPADM

## 2020-04-16 RX ORDER — ACETAMINOPHEN 325 MG/1
650 TABLET ORAL EVERY 8 HOURS PRN
Status: DISCONTINUED | OUTPATIENT
Start: 2020-04-16 | End: 2020-04-18 | Stop reason: HOSPADM

## 2020-04-16 RX ORDER — INSULIN ASPART 100 [IU]/ML
10 INJECTION, SOLUTION INTRAVENOUS; SUBCUTANEOUS
Status: DISCONTINUED | OUTPATIENT
Start: 2020-04-17 | End: 2020-04-17

## 2020-04-16 RX ORDER — SODIUM CHLORIDE 0.9 % (FLUSH) 0.9 %
10 SYRINGE (ML) INJECTION
Status: DISCONTINUED | OUTPATIENT
Start: 2020-04-16 | End: 2020-04-18 | Stop reason: HOSPADM

## 2020-04-16 RX ORDER — CEFTRIAXONE 1 G/1
1 INJECTION, POWDER, FOR SOLUTION INTRAMUSCULAR; INTRAVENOUS
Status: COMPLETED | OUTPATIENT
Start: 2020-04-16 | End: 2020-04-16

## 2020-04-16 RX ORDER — ONDANSETRON 4 MG/1
4 TABLET, FILM COATED ORAL EVERY 6 HOURS PRN
Status: DISCONTINUED | OUTPATIENT
Start: 2020-04-16 | End: 2020-04-18 | Stop reason: HOSPADM

## 2020-04-16 RX ORDER — IBUPROFEN 200 MG
24 TABLET ORAL
Status: DISCONTINUED | OUTPATIENT
Start: 2020-04-16 | End: 2020-04-17

## 2020-04-16 RX ORDER — IBUPROFEN 200 MG
16 TABLET ORAL
Status: DISCONTINUED | OUTPATIENT
Start: 2020-04-16 | End: 2020-04-17

## 2020-04-16 RX ORDER — ALBUTEROL SULFATE 90 UG/1
2 AEROSOL, METERED RESPIRATORY (INHALATION) EVERY 8 HOURS
Status: DISCONTINUED | OUTPATIENT
Start: 2020-04-17 | End: 2020-04-18

## 2020-04-16 RX ORDER — GLUCAGON 1 MG
1 KIT INJECTION
Status: DISCONTINUED | OUTPATIENT
Start: 2020-04-16 | End: 2020-04-17

## 2020-04-16 RX ORDER — TALC
6 POWDER (GRAM) TOPICAL NIGHTLY PRN
Status: DISCONTINUED | OUTPATIENT
Start: 2020-04-16 | End: 2020-04-18 | Stop reason: HOSPADM

## 2020-04-16 RX ORDER — BENZONATATE 100 MG/1
100 CAPSULE ORAL 3 TIMES DAILY PRN
Status: DISCONTINUED | OUTPATIENT
Start: 2020-04-16 | End: 2020-04-18 | Stop reason: HOSPADM

## 2020-04-16 RX ADMIN — CEFTRIAXONE SODIUM 1 G: 1 INJECTION, POWDER, FOR SOLUTION INTRAMUSCULAR; INTRAVENOUS at 05:04

## 2020-04-16 RX ADMIN — INSULIN DETEMIR 10 UNITS: 100 INJECTION, SOLUTION SUBCUTANEOUS at 09:04

## 2020-04-16 RX ADMIN — ENOXAPARIN SODIUM 40 MG: 100 INJECTION SUBCUTANEOUS at 09:04

## 2020-04-16 NOTE — ED PROVIDER NOTES
Encounter Date: 4/16/2020       History     Chief Complaint   Patient presents with    Shortness of Breath     Pt with increased SOB.  + Covid. 100% r/a     34-year-old female with type 1 diabetes, hypertension presents to the ED for evaluation of shortness of breath.  She reports that her symptoms began this morning.  Patient has had cough, chills, body aches, nausea, vomiting, diarrhea, chest tightness for the past week.  She was diagnosed with COVID on 4/10.  Patient was also noted to have right upper lobe pneumonia.  She was seen in urgent care which her symptoms initially began.  She was treated with ceftriaxone and discharged with azithromycin as well as symptomatic treatment for vomiting and cough. N/V has been controlled with prescription meds.  Patient reports that began to feel short of breath this morning.  Patient was given albuterol inhaler from urgent care which he reports did not improve her symptoms today.  She does also report some pain in bilateral calf muscles.  She denies a history of DVT/PE, leg swelling, long travel, recent surgeries, hemoptysis.         Review of patient's allergies indicates:  No Known Allergies  Past Medical History:   Diagnosis Date    Diabetes mellitus     Hypertension      Past Surgical History:   Procedure Laterality Date    VITRECTOMY BY PARS PLANA APPROACH Right 4/24/2019    Procedure: VITRECTOMY, PARS PLANA APPROACH;  Surgeon: JAMI Schulz MD;  Location: [facility] OR [address] [place] [address];  Service: Ophthalmology;  Laterality: Right;  25g PPV/membrane stripping/ILM peel/EL/AFx/GFX/avastin OD    VITRECTOMY BY PARS PLANA APPROACH Left 5/29/2019    Procedure: VITRECTOMY, PARS PLANA  APPROACH  25g PPV/Membrane Stripping/IML peel/EL/AFX/Avastin left eye   60 mins;  Surgeon: JAMI Schulz MD;  Location: [facility] OR [address] [place] [address];  Service: Ophthalmology;  Laterality: Left;     Family History   Problem Relation Age of Onset    Diabetes Mother      Social History     Tobacco Use     Smoking status: Never Smoker    Smokeless tobacco: Never Used   Substance Use Topics    Alcohol use: Yes     Comment: social    Drug use: No     Review of Systems   Constitutional: Positive for chills. Negative for fever.   HENT: Negative for sore throat.    Respiratory: Positive for cough and shortness of breath.    Cardiovascular: Positive for chest pain. Negative for leg swelling.   Gastrointestinal: Negative for nausea and vomiting.   Genitourinary: Negative for dysuria.   Musculoskeletal: Negative for back pain.   Skin: Negative for rash.   Neurological: Negative for weakness.   Hematological: Does not bruise/bleed easily.       Physical Exam     Initial Vitals [04/16/20 1304]   BP Pulse Resp Temp SpO2   138/88 90 20 98.3 °F (36.8 °C) 100 %      MAP       --         Physical Exam    Nursing note and vitals reviewed.  Constitutional: She appears well-developed and well-nourished. She is not diaphoretic.  Non-toxic appearance. She does not appear ill. No distress.   HENT:   Head: Normocephalic and atraumatic.   Neck: Neck supple.   Cardiovascular: Normal rate and regular rhythm. Exam reveals no gallop and no friction rub.    No murmur heard.  Pulmonary/Chest: Effort normal and breath sounds normal. No accessory muscle usage. No tachypnea. No respiratory distress. She has no decreased breath sounds. She has no wheezes. She has no rhonchi. She has no rales.   Abdominal: She exhibits no distension.   Musculoskeletal:   Mild tenderness to the bilateral lower legs.  No leg swelling or skin color changes.    Neurological: She is alert.   Skin: No rash noted.   Psychiatric: She has a normal mood and affect. Her behavior is normal.         ED Course   Procedures  Labs Reviewed   CBC W/ AUTO DIFFERENTIAL - Abnormal; Notable for the following components:       Result Value    WBC 3.52 (*)     Hemoglobin 10.4 (*)     Hematocrit 32.4 (*)     Mean Corpuscular Volume 73 (*)     Mean Corpuscular Hemoglobin 23.5 (*)      Immature Granulocytes 0.9 (*)     All other components within normal limits   COMPREHENSIVE METABOLIC PANEL - Abnormal; Notable for the following components:    CO2 21 (*)     Glucose 288 (*)     Calcium 8.2 (*)     Albumin 2.1 (*)     All other components within normal limits   C-REACTIVE PROTEIN - Abnormal; Notable for the following components:    CRP 71.9 (*)     All other components within normal limits   CK - Abnormal; Notable for the following components:     (*)     All other components within normal limits   URINALYSIS, REFLEX TO URINE CULTURE - Abnormal; Notable for the following components:    Appearance, UA Hazy (*)     Protein, UA 3+ (*)     Glucose, UA 3+ (*)     Ketones, UA Trace (*)     Nitrite, UA Positive (*)     Leukocytes, UA Trace (*)     All other components within normal limits    Narrative:     Preferred Collection Type->Urine, Clean Catch   LACTATE DEHYDROGENASE - Abnormal; Notable for the following components:     (*)     All other components within normal limits   URINALYSIS MICROSCOPIC - Abnormal; Notable for the following components:    RBC, UA 21 (*)     WBC, UA 61 (*)     Bacteria Many (*)     All other components within normal limits    Narrative:     Preferred Collection Type->Urine, Clean Catch   HEMOGLOBIN A1C - Abnormal; Notable for the following components:    Hemoglobin A1C >14.0 (*)     All other components within normal limits    Narrative:     ADD ON [facility] [phone] PER DR. CABRERA.  04/16/2020  18:27    ISTAT PROCEDURE - Abnormal; Notable for the following components:    POC PH 7.287 (*)     POC PCO2 54.9 (*)     POC PO2 12 (*)     POC SATURATED O2 11 (*)     All other components within normal limits   CULTURE, URINE   CULTURE, BLOOD   CULTURE, BLOOD   CULTURE, RESPIRATORY   INFLUENZA A & B BY MOLECULAR   FERRITIN   TROPONIN I   LACTIC ACID, PLASMA   HEMOGLOBIN A1C   B-TYPE NATRIURETIC PEPTIDE   SEDIMENTATION RATE   PROCALCITONIN   URINALYSIS, REFLEX TO URINE  CULTURE   LEGIONELLA ANTIGEN, URINE RANDOM   CULTURE, LEGIONELLA   D DIMER, QUANTITATIVE   GLUCOSE 6 PHOSPHATE DEHYDROGENASE   POCT GLUCOSE MONITORING CONTINUOUS        ECG Results          EKG 12-lead (In process)  Result time 04/16/20 17:29:07    In process by Interface, Lab In [facility] (04/16/20 17:29:07)                 Narrative:    Test Reason : R06.02,    Vent. Rate : 085 BPM     Atrial Rate : 085 BPM     P-R Int : 160 ms          QRS Dur : 070 ms      QT Int : 374 ms       P-R-T Axes : 061 054 040 degrees     QTc Int : 445 ms    Normal sinus rhythm  Normal ECG  When compared with ECG of 11-NOV-2008 18:58,  No significant change was found    Referred By: [email or web address]   SELF           Confirmed By:                             Imaging Results          X-Ray Chest AP Portable (Final result)  Result time 04/16/20 14:15:59    Final result by Rashard Erickson III, MD (04/16/20 14:15:59)                 Impression:      Pulmonary edema pneumonia aspiration sepsis or ARDS.      Electronically signed by: Rashard Erickson MD  Date:    04/16/2020  Time:    14:15             Narrative:    EXAMINATION:  XR CHEST AP PORTABLE    CLINICAL HISTORY:  Shortness of breath    FINDINGS:  One view: Heart size is normal.  There is mild perihilar edema versus infiltrate.  Bones and bowel gas are noncontributory.                                 Medical Decision Making:   History:   Old Medical Records: I decided to obtain old medical records.  Differential Diagnosis:   My differential diagnosis includes but is not limited to:  COVID-19 infection, pneumonia, respiratory failure, pulmonary edema  Clinical Tests:   Lab Tests: Ordered  Radiological Study: Ordered  Medical Tests: Ordered  Other:   I have discussed this case with another health care provider.       <> Summary of the Discussion: Hospital Medicine       APC / Resident Notes:   34-year-old type 1 diabetic presents to the ED for shortness of breath.  Patient is COVID positive and has  had various other symptoms over the past week.  Recently treated with Rocephin and azithromycin for pneumonia.  Vitals within normal limits.  Patient is afebrile.  O2 sats 100% on room air.  Ambulatory sats remain above 96%.        I considered PE in this patient, but she is PERC negative.  Chest x-ray appears significantly worsened from previous 6 days ago revealing bilateral perihilar infiltrates. VENOUS blood gas shows a slight acidosis with pH at 7.28, PO2 12.  EKG without ischemia or other life-threatening arrhythmia.  Discussed this patient with [facility] hospitalist who recommended at least observation given her chest x-ray and uncontrolled diabetes.  Additional labs pending at this time.  Patient signed out to fellow PA pending results and admission.    I have reviewed the patient's records and discussed this case with my supervising physician. Please be advised that this text was dictated with PingSome software and may contain dictation errors.                                   Clinical Impression:       ICD-10-CM ICD-9-CM   1. Pneumonia of both lungs due to infectious organism, unspecified part of lung J18.9 483.8   2. Shortness of breath R06.02 786.05   3. COVID-19 virus infection U07.1          Disposition:   Disposition: Admitted  Condition: Fair     ED Disposition Condition    Admit                           Kristi Gonsalez PA-C  04/16/20 1930

## 2020-04-16 NOTE — TELEPHONE ENCOUNTER
Patient expresses severe difficulty breathing starting yesterday and worsening today. Pt also experiencing tightness in chest even without exertion and laying down. Pt has general body aches especially in the back, headache.    Pt advised to call  immediately for ambulance.      Reason for Disposition   SEVERE difficulty breathing (e.g., struggling for each breath, speaks in single words)   MILD difficulty breathing (e.g., minimal/no SOB at rest, SOB with walking, pulse <100)   Chest pain   [1] COVID-19 suspected (e.g., cough, fever, shortness of breath) AND [2] public health department recommends testing   SEVERE difficulty breathing (e.g., struggling for each breath, speak in single words, bluish lips)    Additional Information   Negative: Bluish (or gray) lips or face now   Negative: Difficult to awaken or acting confused (e.g., disoriented, slurred speech)   Negative: Shock suspected (e.g., cold/pale/clammy skin, too weak to stand, low BP, rapid pulse)   Negative: Fever > 103 F (39.4 C)   Negative: [1] Fever > 101 F (38.3 C) AND [2] age > 60   Negative: [1] Fever > 100.0 F (37.8 C) AND [2] bedridden (e.g., nursing home patient, CVA, chronic illness, recovering from surgery)   Negative: HIGH RISK patient (e.g., age > 64 years, diabetes, heart or lung disease, weak immune system)   Negative: Fever present > 3 days (72 hours)   Negative: [1] Fever returns after gone for over 24 hours AND [2] symptoms worse or not improved   Negative: [1] Continuous (nonstop) coughing interferes with work or school AND [2] no improvement using cough treatment per protocol   Negative: Sounds like a life-threatening emergency to the triager   Negative: [1] COVID-19 exposure AND [2] no symptoms   Negative: COVID-19 and Breastfeeding, questions about    Protocols used: CORONAVIRUS (COVID-19) DIAGNOSED OR [id number]-H-OS, CORONAVIRUS (COVID-19) EXPOSURE-A-AH

## 2020-04-16 NOTE — NURSING
"Pt ambulated down the [place], ~20 yard distance 3-4 times, was not able to walk anymore because she felt "unsteady." O2 sats were 96%  "

## 2020-04-16 NOTE — H&P
[facility] Medicine  History and Physical  [facility]      Patient Name: Mary Sims  MRN:  [id number]  [facility] Medicine Team: [facility] LASHONDA Hartmann NP  Date of Admission:  4/16/2020     Length of Stay:  LOS: 0 days     Principal Problem: Suspected Covid-19 Virus Infection    Chief complaint    Shortness of breath  HPI    34-year-old female with type 1 diabetes, hypertension presents to the ED for evaluation of shortness of breath.  She reports that her symptoms began this morning.  Patient has had cough, chills, body aches, nausea, vomiting, diarrhea, chest tightness for the past week.  She was diagnosed with COVID on 4/10.  Patient was also noted to have right upper lobe pneumonia.  She was seen in urgent care which her symptoms initially began.  She was treated with ceftriaxone and discharged with azithromycin as well as symptomatic treatment for vomiting and cough. N/V has been controlled with prescription meds.  Patient reports that began to feel short of breath this morning.  Patient was given albuterol inhaler from urgent care which she reports did not improve her symptoms today.  She does also report some pain in bilateral calf muscles.  She denies a history of DVT/PE, leg swelling, long travel, recent surgeries, hemoptysis.     Review of Systems    Constitutional: Positive for fever, chills, fatigue, poor appetite   HENT: Negative for sore throat, negative for trouble swallowing.    Eyes: Negative for photophobia, visual disturbance.   Respiratory: Positive for cough, shortness of breath  Cardiovascular: Negative for chest pain, palpitations, leg swelling.   Gastrointestinal:Positive for diarrhea & nausea. Negative for abdominal pain, constipation, vomiting.   Endocrine: Negative for cold intolerance, heat intolerance.   Genitourinary: Negative for dysuria, frequency.   Musculoskeletal: positive for arthralgias in legs and lower back, myalgias.   Skin: dry patch skin of  Lt posterior thigh  Neurological: Negative for dizziness, syncope, light-headedness.   Psychiatric/Behavioral: Negative for confusion, hallucinations, anxiety    Past Medical History:   Diagnosis Date    Diabetes mellitus     Hypertension      Past Surgical History:   Procedure Laterality Date    VITRECTOMY BY PARS PLANA APPROACH Right 4/24/2019    Procedure: VITRECTOMY, PARS PLANA APPROACH;  Surgeon: JAMI Schulz MD;  Location: [facility] OR [address] [place] [address];  Service: Ophthalmology;  Laterality: Right;  25g PPV/membrane stripping/ILM peel/EL/AFx/GFX/avastin OD    VITRECTOMY BY PARS PLANA APPROACH Left 5/29/2019    Procedure: VITRECTOMY, PARS PLANA  APPROACH  25g PPV/Membrane Stripping/IML peel/EL/AFX/Avastin left eye   60 mins;  Surgeon: JAMI Schulz MD;  Location: [facility] OR [address] [place] [address];  Service: Ophthalmology;  Laterality: Left;     Family History   Problem Relation Age of Onset    Diabetes Mother      Social History     Socioeconomic History    Marital status:      Spouse name: Not on file    Number of children: Not on file    Years of education: Not on file    Highest education level: Not on file   Occupational History    Not on file   Social Needs    Financial resource strain: Not on file    Food insecurity:     Worry: Not on file     Inability: Not on file    Transportation needs:     Medical: Not on file     Non-medical: Not on file   Tobacco Use    Smoking status: Never Smoker    Smokeless tobacco: Never Used   Substance and Sexual Activity    Alcohol use: Yes     Comment: social    Drug use: No    Sexual activity: Yes     Partners: Male   Lifestyle    Physical activity:     Days per week: Not on file     Minutes per session: Not on file    Stress: Not on file   Relationships    Social connections:     Talks on phone: Not on file     Gets together: Not on file     Attends Holiness service: Not on file     Active member of club or organization: Not on file     Attends meetings of  "clubs or organizations: Not on file     Relationship status: Not on file   Other Topics Concern    Not on file   Social History Narrative    Not on file       Medications  Current Facility-Administered Medications on File Prior to Encounter   Medication Dose Route Frequency Provider Last Rate Last Dose    cyclopentolate 1% ophthalmic solution 1 drop  1 drop Right Eye On Call Procedure Eduard Magdaleno MD   1 drop at 19 1055    homatropine 5 % ophthalmic solution 1 drop  1 drop Right Eye On Call Procedure Eduard Magdaleno MD   1 drop at 19 1055    moxifloxacin 0.5 % ophthalmic solution 1 drop  1 drop Right Eye On Call Procedure Eduard Magdaleno MD   1 drop at 19 1055    phenylephrine HCL 2.5% ophthalmic solution 1 drop  1 drop Right Eye On Call Procedure Eduard Magdaleno MD   1 drop at 19 1055    prednisoLONE acetate 1 % ophthalmic suspension 1 drop  1 drop Right Eye On Call Procedure Eduard Magdaleno MD   1 drop at 19 1056    tetracaine HCl (PF) 0.5 % Drop 1 drop  1 drop Right Eye On Call Procedure Eduard Magdaleno MD   1 drop at 19 1040    tropicamide 1% ophthalmic solution 1 drop  1 drop Right Eye On Call Procedure Eduard Magdaleno MD   1 drop at 19 1056     Current Outpatient Medications on File Prior to Encounter   Medication Sig Dispense Refill    ADMELOG U-100 INSULIN LISPRO 100 unit/mL injection ADM 10 UNI SC TID  4    albuterol (PROAIR HFA) 90 mcg/actuation inhaler Inhale 2 puffs into the lungs every 6 (six) hours as needed for Wheezing. Rescue 18 g 0    [] azithromycin (Z-ALEJANDRA) 250 MG tablet Take 2 tablets by mouth on day 1; Take 1 tablet by mouth on days 2-5 6 tablet 0    BD ULTRA-FINE SHORT PEN NEEDLE 31 gauge x 5/16" Ndle USE UTD  0    [] benzonatate (TESSALON) 100 MG capsule Take 1 capsule (100 mg total) by mouth 3 (three) times daily as needed. 15 capsule 0    ergocalciferol (ERGOCALCIFEROL) 50,000 unit Cap TK 1 C PO Q WK  6 " "   ergocalciferol, vitamin D2, (VITAMIN D ORAL) Take 50,000 Units by mouth every 7 days.       ibuprofen (ADVIL,MOTRIN) 800 MG tablet Take 1 tablet (800 mg total) by mouth every 6 (six) hours as needed. (Patient not taking: Reported on 4/10/2020) 30 tablet 0    insulin (BASAGLAR KWIKPEN U-100 INSULIN) glargine 100 units/mL (3mL) SubQ pen Inject 45 Units into the skin every evening.      insulin aspart (NOVOLOG) 100 unit/mL InPn pen Inject 5 Units into the skin 3 (three) times daily with meals. (Patient taking differently: Inject 10 Units into the skin 3 (three) times daily with meals. ) 1 Box 1    insulin syringe-needle U-100 1 mL 30 gauge X 7/16" Syrg USE AS DIRECTED  0    LISINOPRIL ORAL Take by mouth.      nystatin (MYCOSTATIN) powder Apply topically 2 (two) times daily. for 10 days 60 g 0    ondansetron (ZOFRAN) 4 MG tablet Take 1 tablet (4 mg total) by mouth every 8 (eight) hours as needed for Nausea. 12 tablet 0    [] ondansetron (ZOFRAN-ODT) 4 MG TbDL Take 1 tablet (4 mg total) by mouth every 8 (eight) hours as needed. 15 tablet 0    ONETOUCH DELICA LANCETS 33 gauge Misc USE AS DIRECTED  0    ONETOUCH ULTRA BLUE TEST STRIP Strp USE TO TEST BLOOD GLUCOSE BID  11    pen needle, diabetic (BD ULTRA-FINE ISAAC PEN NEEDLES) 32 gauge x 5/32" Ndle Use to inject insulin 5x/day 100 each 1    [] promethazine-dextromethorphan (PROMETHAZINE-DM) 6.25-15 mg/5 mL Syrp Take 5 mLs by mouth nightly as needed. 25 mL 0       Allergies  Patient has no known allergies.    Physical Examination  Temp:  [98.3 °F (36.8 °C)]   Pulse:  [86-90]   Resp:  [18-20]   BP: (120-138)/(79-88)   SpO2:  [100 %]     Gen: NAD, conversant  Head: NC, AT  Eyes: PERRLA, EOMI  Throat: MMM, OP clear  CV: RRR, no M/R/G, no peripheral edema, no JVD  Resp: clear bilateral breath sounds, no increased work of breathing on RA except with exertion  GI: Soft, NT, ND, +BS  Ext: MAEW, no c/c/e  Neuro: AAOx3, CN grossly intact, no focal " neurologic deficits  Psychiatry: Normal mood, normal affect    Laboratory:  Recent Labs   Lab 04/16/20  1517   WBC 3.52*   LYMPH 34.9  1.2   HGB 10.4*   HCT 32.4*        Recent Labs   Lab 04/16/20  1517      K 4.9      CO2 21*   BUN 13   CREATININE 1.0   *   CALCIUM 8.2*     Recent Labs   Lab 04/16/20  1517   ALKPHOS 87   ALT 20   AST 25   ALBUMIN 2.1*   PROT 6.9   BILITOT 0.3      Recent Labs     04/16/20  1517 04/16/20  1540 04/16/20  1659   FERRITIN 237  --   --    CRP 71.9*  --   --    LDH  --   --  490*   TROPONINI <0.006  --   --    LACTATE  --  1.2  --        All labs within the last 24 hours were reviewed.     Microbiology:  Lab Results   Component Value Date    [id number] Detected (A) 04/10/2020       Microbiology Results (last 7 days)     Procedure Component Value Units Date/Time    Influenza A & B by Molecular [[id number]]     Order Status:  No result Specimen:  Nasopharyngeal Swab     Blood culture (site 1) [[id number]]     Order Status:  No result Specimen:  Blood     Blood culture (site 2) [[id number]]     Order Status:  No result Specimen:  Blood     Culture, Respiratory with Gram Stain [[id number]]     Order Status:  No result Specimen:  Sputum, Expectorated     Urine culture [[id number]] Collected:  04/16/20 1652    Order Status:  No result Specimen:  Urine Updated:  04/16/20 1731            Imaging  ECG Results          EKG 12-lead (In process)  Result time 04/16/20 17:29:07    In process by Interface, Lab In [facility] (04/16/20 17:29:07)                 Narrative:    Test Reason : R06.02,    Vent. Rate : 085 BPM     Atrial Rate : 085 BPM     P-R Int : 160 ms          QRS Dur : 070 ms      QT Int : 374 ms       P-R-T Axes : 061 054 040 degrees     QTc Int : 445 ms    Normal sinus rhythm  Normal ECG  When compared with ECG of 11-NOV-2008 18:58,  No significant change was found    Referred By: [email or web address]   SELF           Confirmed By:                           .    X-Ray Chest AP  Portable  Narrative: EXAMINATION:  XR CHEST AP PORTABLE    CLINICAL HISTORY:  Shortness of breath    FINDINGS:  One view: Heart size is normal.  There is mild perihilar edema versus infiltrate.  Bones and bowel gas are noncontributory.  Impression: Pulmonary edema pneumonia aspiration sepsis or ARDS.    Electronically signed by: Rashard Erickson MD  Date:    04/16/2020  Time:    14:15      All imaging within the last 24 hours was reviewed.       Assessment and Plan:    Active Hospital Problems    Diagnosis  POA    Pneumonia of both lungs due to infectious organism [J18.9]  Yes      Resolved Hospital Problems   No resolved problems to display.       Suspected COVID-19 Virus Infection  Viral Pneumonia due to COVID-19  - COVID-19 testing: Collection Date: 4/10/2020 Collection Time:   6:16 PM  - Isolation: Airborne/Droplet. Surgical mask on patient. Notify Infection Control  - Diagnostics: Trend Q48hrs if stable, more frequently if patient decompensating     Laboratory/Study Frequency Result   CBC Admit & Q48h    CMP Admit & Q48h    Magnesium level Admit    D-dimer Admit & Q48h    Ferritin Admit & Q48h    CRP Admit & Q48h    CPK Admit & Q24h if elevated    LDH Admit & Q48h    Vitamin D Admit    BNP Admit    Troponin Admit & Q6h if elevated    Glucose-6-phos dehydrogenase Admit    Procalcitonin Admit    Lipid Panel If starting statin    Rapid influenza Admit    Resp Infection Panel Admit if BMT/organ transplant    Legionella Antigen Admit    Sputum Culture Admit    Blood Culture Admit    Urinalysis & Culture Admit    ECG Admit & Q48h if on HCQ    CXR Admit    Lymphopenia, hyponatremia, hyperferritinemia, elevated troponin, elevated d-dimer, age, and medical comorbidities are significant predictors of poor clinical outcome    - Management: [facility] Protocol (4/15/20)    - Monitoring:   - Telemetry & Continuous Pulse Oximetry    - Nutrition:    - Multivitamin PO daily   - Add Boost supplement   - Vitamin  D 1000IU daily if deficient   - Ascorbic acid 500mg PO bid    - Supportive Care:   - acetaminophen 650mg PO Q6hr PRN fever/headache   - loperamide PRN viral diarrhea   - IVF if indicated, restrictive strategy preferred, no maintenance IV if able   - VTE PPx: enoxaparin or heparin SQ unless contraindicated    - Antibiotics:  - if indications, CXR findings, elevated procal. See protocol for alternatives.   - Discontinue early if low concern for bacterial co-infection   - ceftriaxone 1g Q24h x 5 days  - azithromycin 500mg po x1, then 250mg po daily x 4 days    - Investigational Therapies:   - If patient meets criteria  - atorvastatin 40mg po daily  - Hydroxychloroquine 400mg PO BID x1 day, then 400mg PO x4 days   - Check glucose-6-phos dehydrogenase   - Check blood sugar Qmeal or Qshift        Acute Hypoxemic Respiratory Failure  - Order RT consult via Respiratory Communication for COVID Protocols  - Order Incentive Spirometer Q4h  - Order Flutter Valve Q4h  - Continuous Pulse Oximetry  - Goal SpO2 92-96%  - Supplemental O2 via LFNC, VentiMask, or HFNC (see Respiratory Support Oxygen Therapies)  - If wheezing, albuterol INH Q6h scheduled & PRN  - Proning Protocol if patient is a candidate (see HM Proning Protocol)   - GCS >13, able to self-prone  - If deterioration, may warrant trial of NIPPV and transfer to [facility] pressure room or immediate ICU consult       T1DM  -restart long acting insulin at starting dose as pt has not taken for at least 1 M  -continue mealtime insulin with glucose checks  -HgA1C pending    HTN-  -pt off medication for months  -will follow bp    Advance Care Planning  Goals of care, counseling/discussion discussed diagnosis and prognosis with patient, she wishes to remain a full code  Advance Care Planning     Code Status: FULL CODE          VTE High Risk Prophylaxis:   VTE Risk Mitigation (From admission, onward)         Ordered     enoxaparin injection 40 mg  Daily      04/16/20 1757     IP VTE  HIGH RISK PATIENT  Once      04/16/20 1757                    Soco Hartmann NP-C  [facility]  [phone]

## 2020-04-16 NOTE — PROVIDER PROGRESS NOTES - EMERGENCY DEPT.
Encounter Date: 4/16/2020    ED Physician Progress Notes           Patient signed out to me by my colleague with instructions to follow-up pending work-up. Please see main ED note for previous ED stay documentation. Patient signed out to me with labs pending.    Patient tested positive for COVID-19 on 4/10/2020. CXR showing worsening bilateral pneumonia. Mild elevation in inflammatory markers. VBG pH 7.287. Lactate 1.2. CMP unremarkable. Previous provider discussed with COVID-19 hotline who suggested hospitalization given worsening CXR findings and newly developed SOB. No oxygen requirements during ED stay. Will admit to medicine for further management. Rocephin IV given in the ED given CXR findings. Patient expresses understanding and agreeable to the plan.

## 2020-04-16 NOTE — HPI
34-year-old female with type 1 diabetes, hypertension presents to the ED for evaluation of shortness of breath.  She reports that her symptoms began this morning.  Patient has had cough, chills, body aches, nausea, vomiting, diarrhea, chest tightness for the past week.  She was diagnosed with COVID on 4/10.  Patient was also noted to have right upper lobe pneumonia.  She was seen in urgent care which her symptoms initially began.  She was treated with ceftriaxone and discharged with azithromycin as well as symptomatic treatment for vomiting and cough. N/V has been controlled with prescription meds.  Patient reports that she began to feel short of breath this morning.  Patient was given albuterol inhaler from urgent care which she reports did not improve her symptoms today.  She does also report some pain in bilateral calf muscles.  She denies a history of DVT/PE, leg swelling, long travel, recent surgeries, hemoptysis.

## 2020-04-16 NOTE — ED TRIAGE NOTES
Pt reports feeling worsening SOB. Notified urgent care first, instructed her to go to the ER. Was sick since Friday.

## 2020-04-17 LAB
BACTERIA #/AREA URNS AUTO: ABNORMAL /HPF
BILIRUB UR QL STRIP: NEGATIVE
CLARITY UR REFRACT.AUTO: ABNORMAL
COLOR UR AUTO: YELLOW
GLUCOSE UR QL STRIP: ABNORMAL
GLUCOSE-6-PHOSPHATE DEHYDROGENASE QUAL: NORMAL
HGB UR QL STRIP: ABNORMAL
HYALINE CASTS UR QL AUTO: 0 /LPF
KETONES UR QL STRIP: ABNORMAL
LEUKOCYTE ESTERASE UR QL STRIP: ABNORMAL
MICROSCOPIC COMMENT: ABNORMAL
NITRITE UR QL STRIP: POSITIVE
PH UR STRIP: 6 [PH] (ref 5–8)
POCT GLUCOSE: 119 MG/DL (ref 70–110)
POCT GLUCOSE: 178 MG/DL (ref 70–110)
POCT GLUCOSE: 198 MG/DL (ref 70–110)
POCT GLUCOSE: 234 MG/DL (ref 70–110)
POCT GLUCOSE: 267 MG/DL (ref 70–110)
PROT UR QL STRIP: ABNORMAL
RBC #/AREA URNS AUTO: 3 /HPF (ref 0–4)
SP GR UR STRIP: 1.02 (ref 1–1.03)
SQUAMOUS #/AREA URNS AUTO: 1 /HPF
URN SPEC COLLECT METH UR: ABNORMAL
WBC #/AREA URNS AUTO: 21 /HPF (ref 0–5)
YEAST UR QL AUTO: ABNORMAL

## 2020-04-17 PROCEDURE — 99233 SBSQ HOSP IP/OBS HIGH 50: CPT | Mod: ,,, | Performed by: NURSE PRACTITIONER

## 2020-04-17 PROCEDURE — 63600175 PHARM REV CODE 636 W HCPCS: Performed by: NURSE PRACTITIONER

## 2020-04-17 PROCEDURE — 87086 URINE CULTURE/COLONY COUNT: CPT

## 2020-04-17 PROCEDURE — 87077 CULTURE AEROBIC IDENTIFY: CPT

## 2020-04-17 PROCEDURE — 25000003 PHARM REV CODE 250: Performed by: NURSE PRACTITIONER

## 2020-04-17 PROCEDURE — 99232 PR SUBSEQUENT HOSPITAL CARE,LEVL II: ICD-10-PCS | Mod: ,,, | Performed by: NURSE PRACTITIONER

## 2020-04-17 PROCEDURE — 25000242 PHARM REV CODE 250 ALT 637 W/ HCPCS: Performed by: NURSE PRACTITIONER

## 2020-04-17 PROCEDURE — 87088 URINE BACTERIA CULTURE: CPT

## 2020-04-17 PROCEDURE — 93005 ELECTROCARDIOGRAM TRACING: CPT

## 2020-04-17 PROCEDURE — 11000001 HC ACUTE MED/SURG PRIVATE ROOM

## 2020-04-17 PROCEDURE — 87449 NOS EACH ORGANISM AG IA: CPT

## 2020-04-17 PROCEDURE — 81001 URINALYSIS AUTO W/SCOPE: CPT

## 2020-04-17 PROCEDURE — C9399 UNCLASSIFIED DRUGS OR BIOLOG: HCPCS | Performed by: NURSE PRACTITIONER

## 2020-04-17 PROCEDURE — 94761 N-INVAS EAR/PLS OXIMETRY MLT: CPT

## 2020-04-17 PROCEDURE — 87186 SC STD MICRODIL/AGAR DIL: CPT

## 2020-04-17 PROCEDURE — 93010 EKG 12-LEAD: ICD-10-PCS | Mod: ,,, | Performed by: INTERNAL MEDICINE

## 2020-04-17 PROCEDURE — 99233 PR SUBSEQUENT HOSPITAL CARE,LEVL III: ICD-10-PCS | Mod: ,,, | Performed by: NURSE PRACTITIONER

## 2020-04-17 PROCEDURE — 93010 ELECTROCARDIOGRAM REPORT: CPT | Mod: ,,, | Performed by: INTERNAL MEDICINE

## 2020-04-17 PROCEDURE — 99232 SBSQ HOSP IP/OBS MODERATE 35: CPT | Mod: ,,, | Performed by: NURSE PRACTITIONER

## 2020-04-17 RX ORDER — IBUPROFEN 200 MG
16 TABLET ORAL
Status: DISCONTINUED | OUTPATIENT
Start: 2020-04-17 | End: 2020-04-18 | Stop reason: HOSPADM

## 2020-04-17 RX ORDER — IBUPROFEN 200 MG
16 TABLET ORAL
Status: DISCONTINUED | OUTPATIENT
Start: 2020-04-17 | End: 2020-04-17

## 2020-04-17 RX ORDER — IBUPROFEN 200 MG
24 TABLET ORAL
Status: DISCONTINUED | OUTPATIENT
Start: 2020-04-17 | End: 2020-04-18 | Stop reason: HOSPADM

## 2020-04-17 RX ORDER — INSULIN ASPART 100 [IU]/ML
10 INJECTION, SOLUTION INTRAVENOUS; SUBCUTANEOUS
Status: DISCONTINUED | OUTPATIENT
Start: 2020-04-17 | End: 2020-04-17

## 2020-04-17 RX ORDER — IBUPROFEN 200 MG
24 TABLET ORAL
Status: DISCONTINUED | OUTPATIENT
Start: 2020-04-17 | End: 2020-04-17

## 2020-04-17 RX ORDER — INSULIN ASPART 100 [IU]/ML
5 INJECTION, SOLUTION INTRAVENOUS; SUBCUTANEOUS
Status: DISCONTINUED | OUTPATIENT
Start: 2020-04-18 | End: 2020-04-18

## 2020-04-17 RX ORDER — INSULIN ASPART 100 [IU]/ML
0-5 INJECTION, SOLUTION INTRAVENOUS; SUBCUTANEOUS
Status: DISCONTINUED | OUTPATIENT
Start: 2020-04-17 | End: 2020-04-18

## 2020-04-17 RX ORDER — INSULIN ASPART 100 [IU]/ML
1-10 INJECTION, SOLUTION INTRAVENOUS; SUBCUTANEOUS
Status: DISCONTINUED | OUTPATIENT
Start: 2020-04-17 | End: 2020-04-17

## 2020-04-17 RX ORDER — GLUCAGON 1 MG
1 KIT INJECTION
Status: DISCONTINUED | OUTPATIENT
Start: 2020-04-17 | End: 2020-04-18 | Stop reason: HOSPADM

## 2020-04-17 RX ADMIN — ENOXAPARIN SODIUM 40 MG: 100 INJECTION SUBCUTANEOUS at 07:04

## 2020-04-17 RX ADMIN — ALBUTEROL SULFATE 2 PUFF: 90 AEROSOL, METERED RESPIRATORY (INHALATION) at 08:04

## 2020-04-17 RX ADMIN — INSULIN DETEMIR 14 UNITS: 100 INJECTION, SOLUTION SUBCUTANEOUS at 07:04

## 2020-04-17 RX ADMIN — INSULIN ASPART 10 UNITS: 100 INJECTION, SOLUTION INTRAVENOUS; SUBCUTANEOUS at 11:04

## 2020-04-17 RX ADMIN — INSULIN ASPART 2 UNITS: 100 INJECTION, SOLUTION INTRAVENOUS; SUBCUTANEOUS at 04:04

## 2020-04-17 RX ADMIN — INSULIN ASPART 10 UNITS: 100 INJECTION, SOLUTION INTRAVENOUS; SUBCUTANEOUS at 08:04

## 2020-04-17 RX ADMIN — ACETAMINOPHEN 650 MG: 325 TABLET ORAL at 02:04

## 2020-04-17 RX ADMIN — ALBUTEROL SULFATE 2 PUFF: 90 AEROSOL, METERED RESPIRATORY (INHALATION) at 04:04

## 2020-04-17 RX ADMIN — INSULIN ASPART 10 UNITS: 100 INJECTION, SOLUTION INTRAVENOUS; SUBCUTANEOUS at 05:04

## 2020-04-17 NOTE — CONSULTS
Mary Sims  has been accepted for transfer to [facility] and will be followed through telemedicine services beginning 04/18/20  at 7am.    Genet Thompson

## 2020-04-17 NOTE — PROGRESS NOTES
Hospital Medicine  Progress Note  [facility]      Patient Name: Mary Sims  MRN:  [id number]  [facility] Medicine Team: [facility] HOSP MED LASHONDA Hartmann NP  Date of Admission:  4/16/2020     Length of Stay:  LOS: 1 day       Principal Problem:  COVID-19 virus infection      HPI:  34-year-old female with type 1 diabetes, hypertension presents to the ED for evaluation of shortness of breath.  She reports that her symptoms began this morning.  Patient has had cough, chills, body aches, nausea, vomiting, diarrhea, chest tightness for the past week.  She was diagnosed with COVID on 4/10.  Patient was also noted to have right upper lobe pneumonia.  She was seen in urgent care which her symptoms initially began.  She was treated with ceftriaxone and discharged with azithromycin as well as symptomatic treatment for vomiting and cough. N/V has been controlled with prescription meds.  Patient reports that began to feel short of breath this morning.  Patient was given albuterol inhaler from urgent care which she reports did not improve her symptoms today.  She does also report some pain in bilateral calf muscles.  She denies a history of DVT/PE, leg swelling, long travel, recent surgeries, hemoptysis.   She reports that she has not taken her long acting insulin for over a month because her pharmacy did not have in stock, but that she had been taking her mealtime insulin as directed.    Hospital Course:  Admitted last night c/o feeling SOB earlier yesterday morning along with fevers, chills, fatigue, and poor appetite but by this morning states that her SOB/RIVAS has improved significantly and that she ate breakfast without nausea.  Denies cough or fevers. Long acting insulin restarted at 10U, mealtime insulin continued at 10u TID AC.      Interval History:     See above.Plus added Endocrine and nutrition consult for very poorly controlled DM. Pt has been stable today on RA and reports improved SOB.   Placed consult for transfer to Telemedicine.    Review of Systems:  Respiratory: Slight cough, no increased WOB  Cardiovascular: Negative chest pain, palpitations, leg swelling  GI: Negative for diarrhea, nausea, abdominal pain    Inpatient Medications:    Current Facility-Administered Medications:     acetaminophen tablet 650 mg, 650 mg, Oral, Q8H PRN, [facility]. May, MAGALIE, 650 mg at 04/17/20 1444    albuterol inhaler 2 puff, 2 puff, Inhalation, Q8H, 2 puff at 04/17/20 0806 **AND** MDI Q6H, , , Q6H, [facility], MAGALIE    benzonatate capsule 100 mg, 100 mg, Oral, TID PRN, [facility], NP    dextrose 10% (D10W) Bolus, 12.5 g, Intravenous, PRN, [facility] May, NP    dextrose 10% (D10W) Bolus, 25 g, Intravenous, PRN, [facility], MAGALIE    enoxaparin injection 40 mg, 40 mg, Subcutaneous, Daily, [facility] May[facility], 40 mg at 04/16/20 2145    glucagon (human recombinant) injection 1 mg, 1 mg, Intramuscular, PRN, [facility], NP    glucose chewable tablet 16 g, 16 g, Oral, PRN, [facility] May, NP    glucose chewable tablet 24 g, 24 g, Oral, PRN, [facility] May, MAGALIE    insulin aspart U-100 pen 10 Units, 10 Units, Subcutaneous, TIDWM, [facility] May[facility], 10 Units at 04/17/20 1134    insulin detemir U-100 pen 10 Units, 10 Units, Subcutaneous, QHS, [facility] MayMAGALIE, 10 Units at 04/16/20 2149    loperamide capsule 2 mg, 2 mg, Oral, QID PRN, [facility] May, MAGALIE    melatonin tablet 6 mg, 6 mg, Oral, Nightly PRN, [facility] May, MAGALIE    ondansetron tablet 4 mg, 4 mg, Oral, Q6H PRN, [facility], NP    sodium chloride 0.9% flush 10 mL, 10 mL, Intravenous, PRN, Alfonso Llamas PA-C    sodium chloride 0.9% flush 10 mL, 10 mL, Intravenous, PRN, Soco Hartmann NP    Facility-Administered Medications Ordered in Other Encounters:     cyclopentolate 1% ophthalmic solution 1 drop, 1 drop, Right Eye, On Call Procedure, Eduard Magdaleno MD, 1 drop at 04/24/19 1055    homatropine 5 % ophthalmic solution 1  "drop, 1 drop, Right Eye, On Call Procedure, Eduard Magdaleno MD, 1 drop at 04/24/19 1055    moxifloxacin 0.5 % ophthalmic solution 1 drop, 1 drop, Right Eye, On Call Procedure, Eduard Magdaleno MD, 1 drop at 04/24/19 1055    phenylephrine HCL 2.5% ophthalmic solution 1 drop, 1 drop, Right Eye, On Call Procedure, Eduard Magdaleno MD, 1 drop at 04/24/19 1055    prednisoLONE acetate 1 % ophthalmic suspension 1 drop, 1 drop, Right Eye, On Call Procedure, Eduard Magdaleno MD, 1 drop at 04/24/19 1056    tetracaine HCl (PF) 0.5 % Drop 1 drop, 1 drop, Right Eye, On Call Procedure, Eduard Magdaleno MD, 1 drop at 04/24/19 1040    tropicamide 1% ophthalmic solution 1 drop, 1 drop, Right Eye, On Call Procedure, Eduard Magdaleno MD, 1 drop at 04/24/19 1056      Physical Exam:    No intake or output data in the 24 hours ending 04/17/20 1550  Wt Readings from Last 3 Encounters:   04/16/20 67.5 kg (148 lb 13 oz)   01/05/20 68 kg (150 lb)   09/23/19 68 kg (150 lb)       /74 (BP Location: Right arm, Patient Position: Lying)   Pulse 84   Temp 97.3 °F (36.3 °C) (Oral)   Resp 18   Ht 5' 3" (1.6 m)   Wt 67.5 kg (148 lb 13 oz)   SpO2 98%   Breastfeeding? No   BMI 26.36 kg/m²     GEN: NAD, conversant  Resp:unable to auscultate, O2 sats 95-98% on RA, normal work of breathing   CV: Unable to auscultate, EKG NSR, QTc 466, no edema, no JVD  GI: soft, NTND  Skin: no rash  Neuro: AAOx3, CN grossly intact, no focal neurologic deficits    Laboratory:  Lab Results   Component Value Date    [id number] Detected (A) 04/10/2020       Recent Labs   Lab 04/16/20  1517   WBC 3.52*   LYMPH 34.9  1.2   HGB 10.4*   HCT 32.4*        Recent Labs   Lab 04/16/20  1517      K 4.9      CO2 21*   BUN 13   CREATININE 1.0   *   CALCIUM 8.2*     Recent Labs   Lab 04/16/20  1517   ALKPHOS 87   ALT 20   AST 25   ALBUMIN 2.1*   PROT 6.9   BILITOT 0.3        Recent Labs     04/16/20  1517 04/16/20  1659 04/16/20  2018 "   DDIMER  --   --  1.13*   FERRITIN 237  --   --    CRP 71.9*  --   --    LDH  --  490*  --    BNP  --   --  57   TROPONINI <0.006  --   --    *  --   --        All labs within the last 24 hours were reviewed.     Microbiology:  Microbiology Results (last 7 days)     Procedure Component Value Units Date/Time    Urine culture [[id number]]  (Abnormal) Collected:  04/16/20 1652    Order Status:  Completed Specimen:  Urine Updated:  04/17/20 1408     Urine Culture, Routine GRAM NEGATIVE BOBBY  >100,000 cfu/ml  Identification and susceptibility pending      Narrative:       Preferred Collection Type->Urine, Clean Catch    Blood culture (site 1) [[id number]] Collected:  04/16/20 2018    Order Status:  Completed Specimen:  Blood Updated:  04/17/20 0315     Blood Culture, Routine No Growth to date    Narrative:       Site # 1, aerobic and anaerobic    Blood culture (site 2) [[id number]] Collected:  04/16/20 2018    Order Status:  Completed Specimen:  Blood Updated:  04/17/20 0315     Blood Culture, Routine No Growth to date    Narrative:       Site # 2, aerobic only    Urine culture [[id number]] Collected:  04/17/20 0106    Order Status:  No result Specimen:  Urine Updated:  04/17/20 0149    Influenza A & B by Molecular [[id number]]     Order Status:  No result Specimen:  Nasopharyngeal Swab     Culture, Respiratory with Gram Stain [[id number]]     Order Status:  No result Specimen:  Sputum, Expectorated             Imaging  ECG Results          EKG 12-lead (Final result)  Result time 04/16/20 20:28:47    Final result by Interface, Lab In [facility] (04/16/20 20:28:47)                 Narrative:    Test Reason : R06.02,    Vent. Rate : 085 BPM     Atrial Rate : 085 BPM     P-R Int : 160 ms          QRS Dur : 070 ms      QT Int : 374 ms       P-R-T Axes : 061 054 040 degrees     QTc Int : 445 ms    Normal sinus rhythm  Normal ECG  When compared with ECG of 11-NOV-2008 18:58,  No significant change was found  Confirmed by SANTA  Scar CLEMONS ([id number]) on 4/16/2020 8:28:37 PM    Referred By: FAHAD   SELF           Confirmed By:Scar PRATT MD                              No results found for this or any previous visit.    X-Ray Chest AP Portable  Narrative: EXAMINATION:  XR CHEST AP PORTABLE    CLINICAL HISTORY:  Shortness of breath    FINDINGS:  One view: Heart size is normal.  There is mild perihilar edema versus infiltrate.  Bones and bowel gas are noncontributory.  Impression: Pulmonary edema pneumonia aspiration sepsis or ARDS.    Electronically signed by: Rashard Erickson MD  Date:    04/16/2020  Time:    14:15      All imaging within the last 24 hours was reviewed.     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         Suspected COVID-19 Virus Infection  Viral Pneumonia due to COVID-19  - COVID-19 testing: Collection Date: 4/10/2020 Collection Time:   6:16 PM  - Isolation: Airborne/Droplet. Surgical mask on patient. Notify Infection Control  - Diagnostics: Trend Q48hrs if stable, more frequently if patient decompensating     Laboratory/Study Frequency Result   CBC Admit & Q48h    CMP Admit & Q48h    Magnesium level Admit    D-dimer Admit & Q48h    Ferritin Admit & Q48h    CRP Admit & Q48h    CPK Admit & Q24h if elevated    LDH Admit & Q48h    Vitamin D Admit    BNP Admit    Troponin Admit & Q6h if elevated    Glucose-6-phos dehydrogenase Admit    Procalcitonin Admit    Lipid Panel If using statin    Rapid influenza Admit    Resp Infection Panel Admit if BMT/organ transplant    Legionella Antigen Admit    Sputum Culture Admit    Blood Culture Admit     Urinalysis & Culture Admit    ECG Admit & Q48h if on HCQ    CXR Admit    Lymphopenia, hyponatremia, hyperferritinemia, elevated troponin, elevated d-dimer, age, and medical comorbidities are significant predictors of poor clinical outcome    - [facility] (4/15/20)    - Monitoring:   - Telemetry & Continuous Pulse Oximetry    - Nutrition:    - Multivitamin PO daily   - Add Boost supplement   - Vitamin D 1000IU daily if deficient   - Ascorbic acid 500mg PO bid    - Supportive Care:   - acetaminophen 650mg PO Q6hr PRN fever/headache   - loperamide PRN viral diarrhea   - IVF if indicated, restrictive strategy preferred, no maintenance IV if able   - VTE PPx: enoxaparin or heparin SQ unless contraindicated    - Antibiotics:  - if indications, CXR findings, elevated procal. See protocol for alternatives.   - Discontinue early if low concern for bacterial co-infection   - ceftriaxone 1g Q24h x 5 days  - azithromycin 500mg po x1, then 250mg po daily x 4 days    - Investigational Therapies:   - If patient meets criteria  - atorvastatin 40mg po daily  - Hydroxychloroquine 400mg PO BID x1 day, then 400mg PO x4 days   - Check glucose-6-phos dehydrogenase   - Check blood sugar Qmeal or Qshift        -COVID 19 infection viral PNA    - Incentive Spirometer Q4h  - Continuous Pulse Oximetry  - Goal SpO2 92-96%  - Supplemental O2 PRN via LFNC, VentiMask, or HFNC (see Respiratory Support Oxygen Therapies)  - If wheezing, albuterol INH Q6h scheduled & PRN  - Proning Protocol if patient is a candidate (see  Proning Protocol)   - GCS >13, able to self-prone  - If deterioration, may warrant trial of NIPPV and transfer to [facility] pressure room or immediate ICU consult    -pt has been stable on room air for 24 hours. Placed consult to TeleMed    -T1DM  -HgA1C 14.0  -has not been compliant with basaglar insulin regimen d/t difficulty obtaining it, so restarted last night at low dose: 10U, increased  tonight to 20U  -restarted home aspart at same dose: 10U AC,   -nutrition consult  -endocrine consult    Advance Care Planning  Goals of care, counseling/discussion discussed diagnosis and prognosis with patient, she wishes to remain a full code  Advance Care Planning     Code Status: FULL CODE    VTE High Risk Prophylaxis:   VTE Risk Mitigation (From admission, onward)         Ordered     enoxaparin injection 40 mg  Daily      04/16/20 1757     IP VTE HIGH RISK PATIENT  Once      04/16/20 1947     Place ANDREAS hose  Until discontinued      04/16/20 1947                  Subsequent Inpatient Hospital Care    Level 3 18152 Total visit time was 35 minutes or greater with greater than 50% of time spent in counseling and coordination of care.     Soco Hartmann, NP-C  [facility]  [phone]

## 2020-04-17 NOTE — PROGRESS NOTES
Nutrition-Related Diabetes Education      Time Spent: 0 minutes    Learners patient  Current HbA1c: >14%    Is patient aware of their A1c and their goal A1c?     unknown    Home diabetes medication(s): insulin    Nutrition Education with handouts:   Comments:  Pt with DM1 has h/o uncontrolled DM. Unable to reach pt remotely after multiple attempts.          Barriers to Learning:     Follow up: yes    Please consult as needed.  Thank you!    Kirsten Cadena RD  Ext [phone]

## 2020-04-17 NOTE — PLAN OF CARE
Plan of care discussed with pt. Pt AOx4, ambulatory. No issues overnight. VS stable, on telemetry and room air with O2 >93%. Urine sample collected. Blood cultures drawn. Pt report dry cough- unable to collect sputum collection. Pt denies CP, SOB, or pain/discomfort at this time.  Pt free of injuries this shift.  All questions addressed.  Will continue to monitor.

## 2020-04-17 NOTE — PLAN OF CARE
Due to COVID 19 restrictions limiting patient contact and visits, Discharge Planning Assessment completed via phone with significant other. Patient and mother were not available.   Patient's significant other to answer questions.  Per S.O.,  patient lives with him in an apartment with 0 step(s) to enter.   Per S.O., patient was independent with ADLS and did not use DME for ambulation.  Patient will have assistance from S.O. upon discharge. All questions addressed.  CM will follow for needs.     04/17/20 1027   Discharge Assessment   Assessment Type Discharge Planning Assessment   Confirmed/corrected address and phone number on facesheet? Yes   Assessment information obtained from? Other  (significant other)   Expected Length of Stay (days) 5   Communicated expected length of stay with patient/caregiver yes   Prior to hospitilization cognitive status: Alert/Oriented   Prior to hospitalization functional status: Independent   Current cognitive status: Alert/Oriented   Current Functional Status: Independent  (fall risk)   Lives With significant other   Able to Return to Prior Arrangements yes   Is patient able to care for self after discharge? Unable to determine at this time (comments)   Who are your caregiver(s) and their phone number(s)? Jonh Farias [phone]   Patient's perception of discharge disposition other (comments)  (BERE)   Readmission Within the Last 30 Days unable to assess   Patient currently being followed by outpatient case management? Unable to determine (comments)  (Significant other did not know)   Equipment Currently Used at Home none   Do you have any problems affording any of your prescribed medications? TBD   Is the patient taking medications as prescribed? yes   Does the patient have transportation home? Yes   Does the patient receive services at the [facility]mad[facility] Clinic? No   DME Needed Upon Discharge  other (see comments)  (TBD)   Patient/Family in Agreement with Plan yes               PCP:  Leeanne An MD        [facility]    [id number] - [place], [place] - [address] AT [facility] [address]  [address]  [facility] [address]  Phone: [phone] Fax: [phone]        Emergency Contacts:  Extended Emergency Contact Information  Primary Emergency Contact: Pricilla Sims  Address: [facility]  Home Phone: [phone]  Relation: Mother  Secondary Emergency Contact: Jonh Farias  Mobile Phone: [phone]  Relation: Significant other  Preferred language: English   needed? No      Insurance:    Payor: MEDICAID / Plan: [facility] / Product Type: Managed Medicaid /       04/17/2020  10:33 AM      Jazzmine Mendez RN, CM   Ext: [phone]

## 2020-04-18 VITALS
RESPIRATION RATE: 18 BRPM | WEIGHT: 148.81 LBS | SYSTOLIC BLOOD PRESSURE: 129 MMHG | OXYGEN SATURATION: 100 % | DIASTOLIC BLOOD PRESSURE: 75 MMHG | BODY MASS INDEX: 26.37 KG/M2 | HEIGHT: 63 IN | HEART RATE: 86 BPM | TEMPERATURE: 97 F

## 2020-04-18 LAB
ALBUMIN SERPL BCP-MCNC: 1.6 G/DL (ref 3.5–5.2)
ALP SERPL-CCNC: 65 U/L (ref 55–135)
ALT SERPL W/O P-5'-P-CCNC: 15 U/L (ref 10–44)
ANION GAP SERPL CALC-SCNC: 7 MMOL/L (ref 8–16)
AST SERPL-CCNC: 17 U/L (ref 10–40)
BACTERIA UR CULT: ABNORMAL
BASOPHILS # BLD AUTO: 0.02 K/UL (ref 0–0.2)
BASOPHILS NFR BLD: 0.5 % (ref 0–1.9)
BILIRUB SERPL-MCNC: 0.2 MG/DL (ref 0.1–1)
BUN SERPL-MCNC: 13 MG/DL (ref 6–20)
CALCIUM SERPL-MCNC: 7.6 MG/DL (ref 8.7–10.5)
CHLORIDE SERPL-SCNC: 106 MMOL/L (ref 95–110)
CO2 SERPL-SCNC: 24 MMOL/L (ref 23–29)
CREAT SERPL-MCNC: 0.9 MG/DL (ref 0.5–1.4)
CRP SERPL-MCNC: 24.9 MG/L (ref 0–8.2)
DIFFERENTIAL METHOD: ABNORMAL
EOSINOPHIL # BLD AUTO: 0.1 K/UL (ref 0–0.5)
EOSINOPHIL NFR BLD: 1.5 % (ref 0–8)
ERYTHROCYTE [DISTWIDTH] IN BLOOD BY AUTOMATED COUNT: 13.6 % (ref 11.5–14.5)
EST. GFR  (AFRICAN AMERICAN): >60 ML/MIN/1.73 M^2
EST. GFR  (NON AFRICAN AMERICAN): >60 ML/MIN/1.73 M^2
GLUCOSE SERPL-MCNC: 279 MG/DL (ref 70–110)
HCT VFR BLD AUTO: 26.3 % (ref 37–48.5)
HGB BLD-MCNC: 8.4 G/DL (ref 12–16)
IMM GRANULOCYTES # BLD AUTO: 0.04 K/UL (ref 0–0.04)
IMM GRANULOCYTES NFR BLD AUTO: 1 % (ref 0–0.5)
LYMPHOCYTES # BLD AUTO: 1.8 K/UL (ref 1–4.8)
LYMPHOCYTES NFR BLD: 44.5 % (ref 18–48)
MAGNESIUM SERPL-MCNC: 2.3 MG/DL (ref 1.6–2.6)
MCH RBC QN AUTO: 22.8 PG (ref 27–31)
MCHC RBC AUTO-ENTMCNC: 31.9 G/DL (ref 32–36)
MCV RBC AUTO: 72 FL (ref 82–98)
MONOCYTES # BLD AUTO: 0.3 K/UL (ref 0.3–1)
MONOCYTES NFR BLD: 7.5 % (ref 4–15)
NEUTROPHILS # BLD AUTO: 1.8 K/UL (ref 1.8–7.7)
NEUTROPHILS NFR BLD: 45 % (ref 38–73)
NRBC BLD-RTO: 0 /100 WBC
PHOSPHATE SERPL-MCNC: 3.5 MG/DL (ref 2.7–4.5)
PLATELET # BLD AUTO: 289 K/UL (ref 150–350)
PMV BLD AUTO: 10.8 FL (ref 9.2–12.9)
POCT GLUCOSE: 254 MG/DL (ref 70–110)
POCT GLUCOSE: 274 MG/DL (ref 70–110)
POCT GLUCOSE: 281 MG/DL (ref 70–110)
POCT GLUCOSE: 320 MG/DL (ref 70–110)
POTASSIUM SERPL-SCNC: 4.5 MMOL/L (ref 3.5–5.1)
PROT SERPL-MCNC: 5.2 G/DL (ref 6–8.4)
RBC # BLD AUTO: 3.68 M/UL (ref 4–5.4)
SODIUM SERPL-SCNC: 137 MMOL/L (ref 136–145)
WBC # BLD AUTO: 4 K/UL (ref 3.9–12.7)

## 2020-04-18 PROCEDURE — 86140 C-REACTIVE PROTEIN: CPT

## 2020-04-18 PROCEDURE — 99231 SBSQ HOSP IP/OBS SF/LOW 25: CPT | Mod: ,,, | Performed by: NURSE PRACTITIONER

## 2020-04-18 PROCEDURE — 93005 ELECTROCARDIOGRAM TRACING: CPT

## 2020-04-18 PROCEDURE — 63600175 PHARM REV CODE 636 W HCPCS: Performed by: NURSE PRACTITIONER

## 2020-04-18 PROCEDURE — 94640 AIRWAY INHALATION TREATMENT: CPT

## 2020-04-18 PROCEDURE — 99239 PR HOSPITAL DISCHARGE DAY,>30 MIN: ICD-10-PCS | Mod: ,,, | Performed by: INTERNAL MEDICINE

## 2020-04-18 PROCEDURE — 93010 EKG 12-LEAD: ICD-10-PCS | Mod: ,,, | Performed by: INTERNAL MEDICINE

## 2020-04-18 PROCEDURE — 99239 HOSP IP/OBS DSCHRG MGMT >30: CPT | Mod: ,,, | Performed by: INTERNAL MEDICINE

## 2020-04-18 PROCEDURE — 36415 COLL VENOUS BLD VENIPUNCTURE: CPT

## 2020-04-18 PROCEDURE — 99231 PR SUBSEQUENT HOSPITAL CARE,LEVL I: ICD-10-PCS | Mod: ,,, | Performed by: NURSE PRACTITIONER

## 2020-04-18 PROCEDURE — 94761 N-INVAS EAR/PLS OXIMETRY MLT: CPT

## 2020-04-18 PROCEDURE — 99900035 HC TECH TIME PER 15 MIN (STAT)

## 2020-04-18 PROCEDURE — 25000003 PHARM REV CODE 250: Performed by: NURSE PRACTITIONER

## 2020-04-18 PROCEDURE — 80053 COMPREHEN METABOLIC PANEL: CPT

## 2020-04-18 PROCEDURE — 85025 COMPLETE CBC W/AUTO DIFF WBC: CPT

## 2020-04-18 PROCEDURE — 84100 ASSAY OF PHOSPHORUS: CPT

## 2020-04-18 PROCEDURE — 93010 ELECTROCARDIOGRAM REPORT: CPT | Mod: ,,, | Performed by: INTERNAL MEDICINE

## 2020-04-18 PROCEDURE — 83735 ASSAY OF MAGNESIUM: CPT

## 2020-04-18 RX ORDER — ALBUTEROL SULFATE 90 UG/1
2 AEROSOL, METERED RESPIRATORY (INHALATION) EVERY 6 HOURS PRN
Qty: 18 G | Refills: 0 | Status: SHIPPED | OUTPATIENT
Start: 2020-04-18 | End: 2021-04-18

## 2020-04-18 RX ORDER — ACETAMINOPHEN 325 MG/1
650 TABLET ORAL EVERY 8 HOURS PRN
Refills: 0
Start: 2020-04-18 | End: 2021-04-01

## 2020-04-18 RX ORDER — INSULIN ASPART 100 [IU]/ML
9 INJECTION, SOLUTION INTRAVENOUS; SUBCUTANEOUS
Status: DISCONTINUED | OUTPATIENT
Start: 2020-04-18 | End: 2020-04-18 | Stop reason: HOSPADM

## 2020-04-18 RX ORDER — INSULIN ASPART 100 [IU]/ML
1-10 INJECTION, SOLUTION INTRAVENOUS; SUBCUTANEOUS
Status: DISCONTINUED | OUTPATIENT
Start: 2020-04-18 | End: 2020-04-18 | Stop reason: HOSPADM

## 2020-04-18 RX ORDER — ALBUTEROL SULFATE 90 UG/1
2 AEROSOL, METERED RESPIRATORY (INHALATION) EVERY 8 HOURS
Status: DISCONTINUED | OUTPATIENT
Start: 2020-04-18 | End: 2020-04-18 | Stop reason: HOSPADM

## 2020-04-18 RX ORDER — BENZONATATE 100 MG/1
100 CAPSULE ORAL 3 TIMES DAILY PRN
Qty: 15 CAPSULE | Refills: 0 | Status: SHIPPED | OUTPATIENT
Start: 2020-04-18 | End: 2020-04-23

## 2020-04-18 RX ADMIN — INSULIN ASPART 3 UNITS: 100 INJECTION, SOLUTION INTRAVENOUS; SUBCUTANEOUS at 08:04

## 2020-04-18 RX ADMIN — INSULIN ASPART 5 UNITS: 100 INJECTION, SOLUTION INTRAVENOUS; SUBCUTANEOUS at 08:04

## 2020-04-18 RX ADMIN — ALBUTEROL SULFATE 2 PUFF: 90 AEROSOL, METERED RESPIRATORY (INHALATION) at 08:04

## 2020-04-18 RX ADMIN — ACETAMINOPHEN 650 MG: 325 TABLET ORAL at 11:04

## 2020-04-18 RX ADMIN — ALBUTEROL SULFATE 2 PUFF: 90 AEROSOL, METERED RESPIRATORY (INHALATION) at 12:04

## 2020-04-18 RX ADMIN — INSULIN ASPART 8 UNITS: 100 INJECTION, SOLUTION INTRAVENOUS; SUBCUTANEOUS at 11:04

## 2020-04-18 RX ADMIN — INSULIN ASPART 9 UNITS: 100 INJECTION, SOLUTION INTRAVENOUS; SUBCUTANEOUS at 11:04

## 2020-04-18 NOTE — PLAN OF CARE
04/18/20 1227   Post-Acute Status   Post-Acute Authorization Other   Other Status No Post-Acute Service Needs       No SW needs noted upon D/C.  SW in contact with CM and Medical staff. Will continue to follow and offer support as needed.     Speedy Salgado, EVER  [facility]   Ext. [phone]

## 2020-04-18 NOTE — PLAN OF CARE
Patient ambulating in room on room air with oxygen sats %. Patient denies any discomfort at this time. Discharge instructions reviewed by patient. All questions answered. Nurse educated patient on the importance of diabetes compliance. No other issues at this time.

## 2020-04-18 NOTE — ASSESSMENT & PLAN NOTE
BG goal 140 - 180     - Increase Levemir to 26 units HS first dose tonight. 35% reduction from home dosing.   - Increase Novolog to 9 units TIDWM. Basal/Prandial physiologic matching.  - Increase to Moderate Dose SQ Insulin Correction Scale.  - Continue ADA diet to 1500 speedy to optimize glycemic control.   - BG Monitoring AC/HS/0200    ** Please call Endocrine for any BG related issues **  ** Please notify Endocrine for any change and/or advance in diet**    Discharge Planning:    - after discussing POC with Dr. Torres. Patient is stable for discharge, and should continue previous home DM medication regimen. She is to follow-up with her PCP, Dr. An, in [place]

## 2020-04-18 NOTE — CONSULTS
[facility]  Endocrinology  Diabetes Consult Note    Consult Requested by: Galen Herr MD   Reason for admit: COVID-19 virus infection    HISTORY OF PRESENT ILLNESS:  Reason for Consult: Management of T1DM, Hyperglycemia     Surgical Procedure and Date: n/a    Diabetes diagnosis year: 16 years old     Home Diabetes Medications:    Basaglar 40 units HS  Novolog 10 units TIDWM  TDD: 70 units daily.    How often checking glucose at home? 1-3 x day   BG readings on regimen: 200's  Hypoglycemia on the regimen?  No  Missed doses on regimen?  Yes    Diabetes Complications include:     Hyperglycemia and Hypoglycemia        Complicating diabetes co morbidities:    HTN    HPI:   Patient is a 34 y.o. female with a diagnosis of type 1 diabetes, hypertension presents to the ED for evaluation of shortness of breath.  She reports that her symptoms began this morning.  Patient has had cough, chills, body aches, nausea, vomiting, diarrhea, chest tightness for the past week.  She was diagnosed with COVID on 4/10.  Patient was also noted to have right upper lobe pneumonia.  She was seen in urgent care which her symptoms initially began.  She was treated with ceftriaxone and discharged with azithromycin as well as symptomatic treatment for vomiting and cough. N/V has been controlled with prescription meds.  Patient reports that began to feel short of breath this morning 04/16/2020. Patient is now admitted to [facility]. Endocrinology consulted to manage DM2/hyperglycemia during admission to [facility].      Lab Results   Component Value Date    HGBA1C >14.0 (H) 04/16/2020       Interval HPI:   Overnight events:  BG is slightly above goal on current SQ insulin regimen at time of consult. I spoke with patient via telephone at 1915 to conduct consult interview. Patient updated on POC in regards to insulin therapy. All questions answered to patients satisfaction.   Diet diabetic [facility]; 2000 Calorie       Eating:    50% - 100%  Nausea: No  Hypoglycemia and intervention: No  Fever: No  TPN and/or TF: No  If yes, type of TF/TPN and rate: None    PMH, PSH, FH, SH reviewed       Review of Systems   Constitutional: Negative for weight changes.  Eyes: Negative for visual disturbance.  Respiratory: Positive for cough.   Cardiovascular: Negative for chest pain.  Gastrointestinal: Negative for nausea.  Endocrine: Positive for polyuria, polydipsia.  Musculoskeletal: Positive for back pain.  Skin: Negative for rash.  Neurological: Negative for syncope.  Psychiatric/Behavioral: Negative for depression.    Current Medications and/or Treatments Impacting Glycemic Control  Immunotherapy:    Immunosuppressants     None        Steroids:   Hormones (From admission, onward)    Start     Stop Route Frequency Ordered    04/16/20 1851  melatonin tablet 6 mg      -- Oral Nightly PRN 04/16/20 1757        Pressors:    Autonomic Drugs (From admission, onward)    None        Hyperglycemia/Diabetes Medications:   Antihyperglycemics (From admission, onward)    Start     Stop Route Frequency Ordered    04/17/20 2100  insulin detemir U-100 pen 20 Units      -- SubQ Nightly 04/17/20 1631    04/17/20 1745  insulin aspart U-100 pen 10 Units      -- SubQ 3 times daily with meals 04/17/20 1641             PHYSICAL EXAMINATION:  Vitals:    04/17/20 1823   BP:    Pulse: 83   Resp:    Temp:      Body mass index is 26.36 kg/m².    Physical Exam   Deferred secondary to COVID-19 pandemic precautions.        Labs Reviewed and Include   No results for input(s): GLU, CALCIUM, ALBUMIN, PROT, NA, K, CO2, CL, BUN, CREATININE, ALKPHOS, ALT, AST, BILITOT in the last 24 hours.  Lab Results   Component Value Date    WBC 3.52 (L) 04/16/2020    HGB 10.4 (L) 04/16/2020    HCT 32.4 (L) 04/16/2020    MCV 73 (L) 04/16/2020     04/16/2020     No results for input(s): TSH, FREET4 in the last 168 hours.  Lab Results   Component Value Date    HGBA1C >14.0 (H) 04/16/2020        Nutritional status:   Body mass index is 26.36 kg/m².  Lab Results   Component Value Date    ALBUMIN 2.1 (L) 04/16/2020    ALBUMIN 2.6 (L) 06/14/2019    ALBUMIN 3.0 (L) 05/19/2019     No results found for: PREALBUMIN    Estimated Creatinine Clearance: 73.8 mL/min (based on SCr of 1 mg/dL).    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 Recommend the following regimen.   - Start Levemir 14 units HS first dose tonight. 0.4 unit/kg/day dosing. Patient is Type 1.  - Start Novolog 5 units TIDWM. Basal/Prandial physiologic matching at 0.4 units/kg/day dosing.   - Low Dose SQ Insulin Correction Scale.  - ADA diet to 1500 speedy to optimize glycemic control.   - BG Monitoring AC/HS/0200    ** Please call Endocrine for any BG related issues **  ** Please notify Endocrine for any change and/or advance in diet**    Discharge Planning:    TBD. Please notify endocrinology prior to discharge.         Pneumonia of both lungs due to infectious organism  Infection may elevate BG readings  Managed per primary team  Avoid hypoglycemia            Plan discussed with patient via telephone.     Nasir Mata, MAGALIE  Endocrinology  [facility]

## 2020-04-18 NOTE — PLAN OF CARE
04/18/20 1228   Final Note   Anticipated Discharge Disposition Home   Hospital Follow Up  Appt(s) scheduled? Yes   Discharge plans and expectations educations in teach back method with documentation complete? Yes   Right Care Referral Info   Post Acute Recommendation No Care

## 2020-04-18 NOTE — SUBJECTIVE & OBJECTIVE
"Interval HPI:   Overnight events:  BG is now above goal on current SQ MDI regimen.   Diet diabetic [facility]; 1500 Calorie; Consistent Carbohydrate; Isolation Tray - Regular [place]       Eatin% - 100%  Nausea: No  Hypoglycemia and intervention: No  Fever: No  TPN and/or TF: No  If yes, type of TF/TPN and rate: None    /75 (Patient Position: Lying)   Pulse 78   Temp 97 °F (36.1 °C) (Oral)   Resp 18   Ht 5' 3" (1.6 m)   Wt 67.5 kg (148 lb 13 oz)   SpO2 96%   Breastfeeding? No   BMI 26.36 kg/m²     Labs Reviewed and Include    Recent Labs   Lab 20  0100   *   CALCIUM 7.6*   ALBUMIN 1.6*   PROT 5.2*      K 4.5   CO2 24      BUN 13   CREATININE 0.9   ALKPHOS 65   ALT 15   AST 17   BILITOT 0.2     Lab Results   Component Value Date    WBC 4.00 2020    HGB 8.4 (L) 2020    HCT 26.3 (L) 2020    MCV 72 (L) 2020     2020     No results for input(s): TSH, FREET4 in the last 168 hours.  Lab Results   Component Value Date    HGBA1C >14.0 (H) 2020       Nutritional status:   Body mass index is 26.36 kg/m².  Lab Results   Component Value Date    ALBUMIN 1.6 (L) 2020    ALBUMIN 2.1 (L) 2020    ALBUMIN 2.6 (L) 2019     No results found for: PREALBUMIN    Estimated Creatinine Clearance: 82 mL/min (based on SCr of 0.9 mg/dL).    Accu-Checks  Recent Labs     20  2148 20  0803 20  1133 20  1631 20  1949 20  0211 20  0754 20  1056   POCTGLUCOSE 267* 234* 178* 198* 119* 254* 274* 320*       Current Medications and/or Treatments Impacting Glycemic Control  Immunotherapy:    Immunosuppressants     None        Steroids:   Hormones (From admission, onward)    Start     Stop Route Frequency Ordered    20 1851  melatonin tablet 6 mg      -- Oral Nightly PRN 20 6106        Pressors:    Autonomic Drugs (From admission, onward)    None        Hyperglycemia/Diabetes Medications: "   Antihyperglycemics (From admission, onward)    Start     Stop Route Frequency Ordered    04/18/20 2100  insulin detemir U-100 pen 20 Units      -- SubQ Nightly 04/18/20 1049    04/18/20 0715  insulin aspart U-100 pen 5 Units      -- SubQ 3 times daily with meals 04/17/20 1926 04/17/20 2026  insulin aspart U-100 pen 0-5 Units      -- SubQ Before meals & nightly PRN 04/17/20 1926

## 2020-04-18 NOTE — PROGRESS NOTES
[facility]  Endocrinology  Progress Note    Admit Date: 2020     Reason for Consult: Management of T1DM, Hyperglycemia     Surgical Procedure and Date: n/a    Diabetes diagnosis year: 16 years old     Home Diabetes Medications:  (Per PCP Dr. An in [place])  Basaglar 40 units HS  Novolog 10 units TIDWM  TDD: 70 units daily.    How often checking glucose at home? 1-3 x day   BG readings on regimen: 200's  Hypoglycemia on the regimen?  No  Missed doses on regimen?  Yes    Diabetes Complications include:     Hyperglycemia and Hypoglycemia        Complicating diabetes co morbidities:    HTN    HPI:   Patient is a 34 y.o. female with a diagnosis of type 1 diabetes, hypertension presents to the ED for evaluation of shortness of breath.  She reports that her symptoms began this morning.  Patient has had cough, chills, body aches, nausea, vomiting, diarrhea, chest tightness for the past week.  She was diagnosed with COVID on 4/10.  Patient was also noted to have right upper lobe pneumonia.  She was seen in urgent care which her symptoms initially began.  She was treated with ceftriaxone and discharged with azithromycin as well as symptomatic treatment for vomiting and cough. N/V has been controlled with prescription meds.  Patient reports that began to feel short of breath this morning 2020. Patient is now admitted to [facility]. Endocrinology consulted to manage DM2/hyperglycemia during admission to [facility].      Lab Results   Component Value Date    HGBA1C >14.0 (H) 2020       Interval HPI:   Overnight events:  BG is now above goal on current SQ MDI regimen.   Diet diabetic [facility]; 1500 Calorie; Consistent Carbohydrate; Isolation Tray - Regular [place]       Eatin% - 100%  Nausea: No  Hypoglycemia and intervention: No  Fever: No  TPN and/or TF: No  If yes, type of TF/TPN and rate: None    /75 (Patient Position: Lying)   Pulse 78   Temp 97 °F (36.1 °C) (Oral)   Resp 18    "Ht 5' 3" (1.6 m)   Wt 67.5 kg (148 lb 13 oz)   SpO2 96%   Breastfeeding? No   BMI 26.36 kg/m²      Labs Reviewed and Include    Recent Labs   Lab 04/18/20  0100   *   CALCIUM 7.6*   ALBUMIN 1.6*   PROT 5.2*      K 4.5   CO2 24      BUN 13   CREATININE 0.9   ALKPHOS 65   ALT 15   AST 17   BILITOT 0.2     Lab Results   Component Value Date    WBC 4.00 04/18/2020    HGB 8.4 (L) 04/18/2020    HCT 26.3 (L) 04/18/2020    MCV 72 (L) 04/18/2020     04/18/2020     No results for input(s): TSH, FREET4 in the last 168 hours.  Lab Results   Component Value Date    HGBA1C >14.0 (H) 04/16/2020       Nutritional status:   Body mass index is 26.36 kg/m².  Lab Results   Component Value Date    ALBUMIN 1.6 (L) 04/18/2020    ALBUMIN 2.1 (L) 04/16/2020    ALBUMIN 2.6 (L) 06/14/2019     No results found for: PREALBUMIN    Estimated Creatinine Clearance: 82 mL/min (based on SCr of 0.9 mg/dL).    Accu-Checks  Recent Labs     04/16/20  2148 04/17/20  0803 04/17/20  1133 04/17/20  1631 04/17/20  1949 04/18/20  0211 04/18/20  0754 04/18/20  1056   POCTGLUCOSE 267* 234* 178* 198* 119* 254* 274* 320*       Current Medications and/or Treatments Impacting Glycemic Control  Immunotherapy:    Immunosuppressants     None        Steroids:   Hormones (From admission, onward)    Start     Stop Route Frequency Ordered    04/16/20 1851  melatonin tablet 6 mg      -- Oral Nightly PRN 04/16/20 1757        Pressors:    Autonomic Drugs (From admission, onward)    None        Hyperglycemia/Diabetes Medications:   Antihyperglycemics (From admission, onward)    Start     Stop Route Frequency Ordered    04/18/20 2100  insulin detemir U-100 pen 20 Units      -- SubQ Nightly 04/18/20 1049    04/18/20 0715  insulin aspart U-100 pen 5 Units      -- SubQ 3 times daily with meals 04/17/20 1926 04/17/20 2026  insulin aspart U-100 pen 0-5 Units      -- SubQ Before meals & nightly PRN 04/17/20 1926          ASSESSMENT and PLAN    * " COVID-19 virus infection  Managed per primary team  Avoid hypoglycemia        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 35% reduction from home dosing.   - Increase Novolog to 9 units TIDWM. Basal/Prandial physiologic matching.  - Increase to Moderate Dose SQ Insulin Correction Scale.  - Continue ADA diet to 1500 speedy to optimize glycemic control.   - BG Monitoring AC/HS/0200    ** Please call Endocrine for any BG related issues **  ** Please notify Endocrine for any change and/or advance in diet**    Discharge Planning:    - after discussing POC with Dr. Torres. Patient is stable for discharge, and should continue previous home DM medication regimen. She is to follow-up with her PCP, Dr. An, in [place].         Pneumonia of both lungs due to infectious organism  Infection may elevate BG readings  Managed per primary team  Avoid hypoglycemia            Nasir Mata NP  Endocrinology  [facility]

## 2020-04-18 NOTE — ASSESSMENT & PLAN NOTE
BG goal 140 - 180     Spoke with patient via telephone for HPI interview. Recommend the following regimen.   - Start Levemir 14 units HS first dose tonight. 0.4 unit/kg/day dosing. Patient is Type 1.  - Start Novolog 5 units TIDWM. Basal/Prandial physiologic matching at 0.4 units/kg/day dosing.   - Low Dose SQ Insulin Correction Scale.  - ADA diet to 1500 speedy to optimize glycemic control.   - BG Monitoring AC/HS/0200    ** Please call Endocrine for any BG related issues **  ** Please notify Endocrine for any change and/or advance in diet**    Discharge Planning:    TBD. Please notify endocrinology prior to discharge.

## 2020-04-18 NOTE — PLAN OF CARE
04/18/20 1227   Final Note   Assessment Type Final Discharge Note   Anticipated Discharge Disposition Home-Health   Hospital Follow Up  Appt(s) scheduled? Yes   Discharge plans and expectations educations in teach back method with documentation complete? Yes   Right Care Referral Info   Post Acute Recommendation Home-care   Referral Type Home Care   Facility Name [place], [place]   Post-Acute Status   Post-Acute Authorization Other   Other Status No Post-Acute Service Needs

## 2020-04-18 NOTE — SUBJECTIVE & OBJECTIVE
Interval HPI:   Overnight events:  BG is slightly above goal on current SQ insulin regimen at time of consult. I spoke with patient via telephone at 1915 to conduct consult interview. Patient updated on POC in regards to insulin therapy. All questions answered to patients satisfaction.   Diet diabetic [facility];  Calorie       Eatin% - 100%  Nausea: No  Hypoglycemia and intervention: No  Fever: No  TPN and/or TF: No  If yes, type of TF/TPN and rate: None    PMH, PSH, FH, SH reviewed       Review of Systems   Constitutional: Negative for weight changes.  Eyes: Negative for visual disturbance.  Respiratory: Positive for cough.   Cardiovascular: Negative for chest pain.  Gastrointestinal: Negative for nausea.  Endocrine: Positive for polyuria, polydipsia.  Musculoskeletal: Positive for back pain.  Skin: Negative for rash.  Neurological: Negative for syncope.  Psychiatric/Behavioral: Negative for depression.    Current Medications and/or Treatments Impacting Glycemic Control  Immunotherapy:    Immunosuppressants     None        Steroids:   Hormones (From admission, onward)    Start     Stop Route Frequency Ordered    20 1851  melatonin tablet 6 mg      -- Oral Nightly PRN 20 1757        Pressors:    Autonomic Drugs (From admission, onward)    None        Hyperglycemia/Diabetes Medications:   Antihyperglycemics (From admission, onward)    Start     Stop Route Frequency Ordered    20 2100  insulin detemir U-100 pen 20 Units      -- SubQ Nightly 20 1631    20 1745  insulin aspart U-100 pen 10 Units      -- SubQ 3 times daily with meals 20 1641             PHYSICAL EXAMINATION:  Vitals:    20 1823   BP:    Pulse: 83   Resp:    Temp:      Body mass index is 26.36 kg/m².    Physical Exam   Deferred secondary to COVID-19 pandemic precautions.

## 2020-04-19 NOTE — DISCHARGE SUMMARY
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       HPI:34-year-old female with type 1 diabetes, hypertension presents to the ED for evaluation of shortness of breath.  She reports that her symptoms began this morning.  Patient has had cough, chills, body aches, nausea, vomiting, diarrhea, chest tightness for the past week.  She was diagnosed with COVID on 4/10.  Patient was also noted to have right upper lobe pneumonia.  She was seen in urgent care which her symptoms initially began.  She was treated with ceftriaxone and discharged with azithromycin as well as symptomatic treatment for vomiting and cough. N/V has been controlled with prescription meds.  Patient reports that began to feel short of breath this morning.  Patient was given albuterol inhaler from urgent care which she reports did not improve her symptoms today.  She does also report some pain in bilateral calf muscles.  She denies a history of DVT/PE, leg swelling, long travel, recent surgeries, hemoptysis.   She reports that she has not taken her long acting insulin for over a month because her pharmacy did not have in stock, but that she had been taking her mealtime insulin as directed.    Hospital Course:  Admitted with dyspnea along with  fevers, chills, fatigue, and poor appetite. Subsequent day her SOB/RIVAS has improved significantly and that she ate breakfast without nausea.  Denies cough or fevers. Long acting insulin restarted at 10U, mealtime insulin continued at 10u TID AC.  Endocrine consults. Saturating well on RA. Transferred to telemed. Otherwise stable. Increased insulin back to her home regimen. Home glucose run 200-300 chronically, and patient is very non compliant. Discussed with endocrine and they agree with discharge. Discharge home. No oxygen needs. Highly endorsed insulin and dietary compliance. PCP and endo visit in 1 week.    On the day of discharge, isolation precautions were reviewed at length verbally. The patient was also provided with written isolation guidelines modified from the [place] [facility] and [facility] as well as the [facility], as part of discharge paperwork. An updated phone number was obtained, which will be used to contact the patient when results are available, and positive patients will be enrolled in both the COVID-19 Home Symptom Monitoring program.      Labs:  Recent Labs   Lab 04/16/20  1517 04/18/20  0100   WBC 3.52* 4.00   HGB 10.4* 8.4*   HCT 32.4* 26.3*    289     Recent Labs   Lab 04/16/20  1517 04/18/20  0100    137   K 4.9 4.5    106   CO2 21* 24   BUN 13 13   CREATININE 1.0 0.9   * 279*   CALCIUM 8.2* 7.6*   MG  --  2.3   PHOS  --  3.5     Recent Labs   Lab 04/16/20  1517 04/18/20  0100   ALKPHOS 87 65   ALT 20 15   AST 25 17   ALBUMIN 2.1* 1.6*   PROT 6.9 5.2*   BILITOT 0.3 0.2      Recent Labs   Lab 04/17/20  1133 04/17/20  1631 04/17/20  1949 04/18/20  0211 04/18/20  0754 04/18/20  1056   POCTGLUCOSE 178* 198* 119* 254* 274* 320*     Recent Labs     04/16/20  1517   *   TROPONINI <0.006       ROS (Positive in Bold, otherwise negative)  Constitutional: fever, chills, night sweats  CV: chest pain, edema, palpitations  Resp: SOB, cough, sputum production  GI:  "changes in appetite, NVDC, pain, melena, hematochezia, GERD, hematemesis  : Dysuria, hematuria, urinary urgency, frequency  MSK: arthralgia/myalgia, joint swelling  Neuro/Psych: anxiety, depression    Discharge Exam: Patient was seen and examined on the date of discharge and determined to be suitable for discharge.    Procedures: CXR    Consultants: HM, endocrine     Discharge Medication List as of 4/18/2020  1:01 PM      START taking these medications    Details   acetaminophen (TYLENOL) 325 MG tablet Take 2 tablets (650 mg total) by mouth every 8 (eight) hours as needed for Temperature greater than (or equal to 101 degree F)., Starting Sat 4/18/2020, No Print         CONTINUE these medications which have CHANGED    Details   albuterol (PROAIR HFA) 90 mcg/actuation inhaler Inhale 2 puffs into the lungs every 6 (six) hours as needed for Wheezing. Rescue, Starting Sat 4/18/2020, Until Sun 4/18/2021, Normal      benzonatate (TESSALON) 100 MG capsule Take 1 capsule (100 mg total) by mouth 3 (three) times daily as needed., Starting Sat 4/18/2020, Until Thu 4/23/2020, Normal         CONTINUE these medications which have NOT CHANGED    Details   ADMELOG U-100 INSULIN LISPRO 100 unit/mL injection ADM 10 UNI SC TID, Historical Med      BD ULTRA-FINE SHORT PEN NEEDLE 31 gauge x 5/16" Ndle USE UTD, Historical Med      insulin (BASAGLAR KWIKPEN U-100 INSULIN) glargine 100 units/mL (3mL) SubQ pen Inject 45 Units into the skin every evening., Historical Med      insulin aspart (NOVOLOG) 100 unit/mL InPn pen Inject 5 Units into the skin 3 (three) times daily with meals., Starting Mon 5/9/2016, Until Fri 6/14/2019, Print      insulin syringe-needle U-100 1 mL 30 gauge X 7/16" Syrg USE AS DIRECTED, Historical Med      nystatin (MYCOSTATIN) powder Apply topically 2 (two) times daily. for 10 days, Starting Fri 7/26/2019, Until Mon 8/5/2019, Normal      ondansetron (ZOFRAN) 4 MG tablet Take 1 tablet (4 mg total) by mouth every 8 " "(eight) hours as needed for Nausea., Starting Wed 4/24/2019, Print      ONETOUCH DELICA LANCETS 33 gauge Misc USE AS DIRECTED, Historical Med      ONETOUCH ULTRA BLUE TEST STRIP Strp USE TO TEST BLOOD GLUCOSE BID, Historical Med      pen needle, diabetic (BD ULTRA-FINE ISAAC PEN NEEDLES) 32 gauge x 5/32" Ndle Use to inject insulin 5x/day, Normal         STOP taking these medications       azithromycin (Z-ALEJANDRA) 250 MG tablet Comments:   Reason for Stopping:         ergocalciferol (ERGOCALCIFEROL) 50,000 unit Cap Comments:   Reason for Stopping:         ergocalciferol, vitamin D2, (VITAMIN D ORAL) Comments:   Reason for Stopping:         ibuprofen (ADVIL,MOTRIN) 800 MG tablet Comments:   Reason for Stopping:         LISINOPRIL ORAL Comments:   Reason for Stopping:         ondansetron (ZOFRAN-ODT) 4 MG TbDL Comments:   Reason for Stopping:         promethazine-dextromethorphan (PROMETHAZINE-DM) 6.25-15 mg/5 mL Syrp Comments:   Reason for Stopping:               The relevant and important risks, side effects, and benefits of their medications were reviewed with patient during hospitalization and at discharge. The patient was given the opportunity to discuss and ask questions about their medications, including target symptoms, potential risks, side effects and benefits of their medications, as well as their expected prognosis if non-medication treatment options were chosen.  The patient expresses understanding of all these options and information and voluntarily consents to treatment.    Discharge Diet: diabetic diet     Activity: activity as tolerated     Discharge Condition: stable    Disposition: Home or Self Care     Time spent  on the discharge of the patient including review of hospital course with the patient. reviewing discharge medications and arranging follow-up care: 39 mins    Discharge examination Patient was seen and examined on the date of discharge and determined to be suitable for discharge.    Discharge " plan and follow up:  It is critical that you make your follow-up appointment(s). If you are discharged on the weekend or after business hours, or if we are unable to schedule these appointments for you for any reason, you or a family member need to call during the next business day to schedule your appointment(s).    -Follow up with your PCP, Leeanne An MD within 1-2 weeks as arranged with  and treatment team prior to discharge  -Follow up with [place] [id number] home monitoring program   -Take all medications as prescribed and listed above.     - Please return to ED or call your physician if you have:         1. Fevers > 101.5 unresponsive to tylenol.       2. Abdominal pain and/or distention       3. Intractable nausea, vomiting or diarrhea       4. Inability to tolerate adequate oral intake of food       5. Neurologic changes, chest pain or shortness of breath     6. Worsening shortness of breath        No future appointments.      Darryl Vázquez MD  Hospital Medicine Staff  [phone] pager

## 2020-04-20 LAB
BACTERIA BLD CULT: NORMAL
BACTERIA BLD CULT: NORMAL
BACTERIA UR CULT: ABNORMAL
L PNEUMO AG UR QL IA: NOT DETECTED

## 2020-04-21 ENCOUNTER — PATIENT OUTREACH (OUTPATIENT)
Dept: ADMINISTRATIVE | Facility: CLINIC | Age: 35
End: 2020-04-21

## 2020-04-21 NOTE — PATIENT INSTRUCTIONS
Prevention steps for patients with confirmed COVID-19       Stay home and stay away from family members and friends. The CDC says, you can leave home after these three things have happened: 1) You have had no fever for at least 72 hours (that is three full days of no fever without the use of medicine that reduces fevers) 2) AND other symptoms have improved (for example, when your cough or shortness of breath have improved) 3) AND at least 7 days have passed since your symptoms first appeared.   Separate yourself from other people and animals in your home.   Call ahead before visiting your doctor.   Wear a facemask.   Cover your coughs and sneezes.   Wash your hands often with soap and water; hand  can be used, too.   Avoid sharing personal household items.   Wipe down surfaces used daily.   Monitor your symptoms. Seek prompt medical attention if your illness is worsening (e.g., difficulty breathing).    Before seeking care, call your healthcare provider.   If you have a medical emergency and need to call 911, notify the dispatch personnel that you have, or are being evaluated for COVID-19. If possible, put on a facemask before emergency medical services arrive.        Recommended precautions for household members, intimate partners, and caregivers in a home setting of a patient with symptomatic laboratory-confirmed COVID-19 or a patient under investigation.  Household members, intimate partners, and caregivers in the home setting awaiting tests results have close contact with a person with symptomatic, laboratory-confirmed COVID-19 or a person under investigation. Close contacts should monitor their health; they should call their provider right away if they develop symptoms suggestive of COVID-19 (e.g., fever, cough, shortness of breath).    Close contacts should also follow these recommendations:   Make sure that you understand and can help the patient follow their provider's instructions for  medication(s) and care. You should help the patient with basic needs in the home and provide support for getting groceries, prescriptions, and other personal needs.   Monitor the patient's symptoms. If the patient is getting sicker, call his or her healthcare provider and tell them that the patient has laboratory-confirmed COVID-19. If the patient has a medical emergency and you need to call 911, notify the dispatch personnel that the patient has, or is being evaluated for COVID-19.   Household members should stay in another room or be  from the patient. Household members should use a separate bedroom and bathroom, if available.   Prohibit visitors.   Household members should care for any pets in the home.   Make sure that shared spaces in the home have good air flow, such as by an air conditioner or an opened window, weather permitting.   Perform hand hygiene frequently. Wash your hands often with soap and water for at least 20 seconds or use an alcohol-based hand  (that contains > 60% alcohol) covering all surfaces of your hands and rubbing them together until they feel dry. Soap and water should be used preferentially.   Avoid touching your eyes, nose, and mouth.   The patient should wear a facemask. If the patient is not able to wear a facemask (for example, because it causes trouble breathing), caregivers should wear a mask when they are in the same room as the patient.   Wear a disposable facemask and gloves when you touch or have contact with the patient's blood, stool, or body fluids, such as saliva, sputum, nasal mucus, vomit, urine.  o Throw out disposable facemasks and gloves after using them. Do not reuse.  o When removing personal protective equipment, first remove and dispose of gloves. Then, immediately clean your hands with soap and water or alcohol-based hand . Next, remove and dispose of facemask, and immediately clean your hands again with soap and water or  alcohol-based hand .   You should not share dishes, drinking glasses, cups, eating utensils, towels, bedding, or other items with the patient. After the patient uses these items, you should wash them thoroughly (see below Wash laundry thoroughly).   Clean all high-touch surfaces, such as counters, tabletops, doorknobs, bathroom fixtures, toilets, phones, keyboards, tablets, and bedside tables, every day. Also, clean any surfaces that may have blood, stool, or body fluids on them.   Use a household cleaning spray or wipe, according to the label instructions. Labels contain instructions for safe and effective use of the cleaning product including precautions you should take when applying the product, such as wearing gloves and making sure you have good ventilation during use of the product.   Wash laundry thoroughly.  o Immediately remove and wash clothes or bedding that have blood, stool, or body fluids on them.  o Wear disposable gloves while handling soiled items and keep soiled items away from your body. Clean your hands (with soap and water or an alcohol-based hand ) immediately after removing your gloves.  o Read and follow directions on labels of laundry or clothing items and detergent. In general, using a normal laundry detergent according to washing machine instructions and dry thoroughly using the warmest temperatures recommended on the clothing label.   Place all used disposable gloves, facemasks, and other contaminated items in a lined container before disposing of them with other household waste. Clean your hands (with soap and water or an alcohol-based hand ) immediately after handling these items. Soap and water should be used preferentially if hands are visibly dirty.   Discuss any additional questions with your state or local health department or healthcare provider. Check available hours when contacting your local health department.    For more information see CDC  link below.      https://www.cdc.gov/coronavirus/2019-ncov/hcp/guidance-prevent-spread.html#precautions        Sources:  [facility], [place] [facility]

## 2020-04-21 NOTE — TELEPHONE ENCOUNTER
C3 nurse attempted to contact patient. No answer. The following message was left for the patient to return the call:  Good afternoon, my name is Rosalina and I am a registered nurse calling on behalf of [facility]s post discharge unit.  This is a Transitional Care call for Mary Sims.  When you have a moment please contact me at [phone] between 8 and 4, Monday through Friday. If you have questions or issues, a nurse is available 24 hours every day at our ON CALL # thats [phone]. On behalf of [facility], thank you, and have a nice day.    The patient does not have a scheduled HOSFU appointment within 7-14 days post hospital discharge date 4/18/2020. Patient PCP not within [facility].

## 2020-06-17 ENCOUNTER — HOSPITAL ENCOUNTER (EMERGENCY)
Facility: HOSPITAL | Age: 35
Discharge: HOME | End: 2020-06-17
Attending: EMERGENCY MEDICINE
Payer: MEDICAID

## 2020-06-17 VITALS
TEMPERATURE: 98 F | HEIGHT: 63 IN | RESPIRATION RATE: 13 BRPM | OXYGEN SATURATION: 99 % | SYSTOLIC BLOOD PRESSURE: 148 MMHG | WEIGHT: 150 LBS | HEART RATE: 96 BPM | DIASTOLIC BLOOD PRESSURE: 73 MMHG | BODY MASS INDEX: 26.58 KG/M2

## 2020-06-17 DIAGNOSIS — R11.2 NAUSEA VOMITING AND DIARRHEA: Primary | ICD-10-CM

## 2020-06-17 DIAGNOSIS — R19.7 NAUSEA VOMITING AND DIARRHEA: Primary | ICD-10-CM

## 2020-06-17 DIAGNOSIS — R55 SYNCOPE: ICD-10-CM

## 2020-06-17 DIAGNOSIS — R73.9 HYPERGLYCEMIA: ICD-10-CM

## 2020-06-17 LAB
ALBUMIN SERPL BCP-MCNC: 2.5 G/DL (ref 3.5–5.2)
ALP SERPL-CCNC: 80 U/L (ref 55–135)
ALT SERPL W/O P-5'-P-CCNC: 11 U/L (ref 10–44)
ANION GAP SERPL CALC-SCNC: 9 MMOL/L (ref 8–16)
AST SERPL-CCNC: 9 U/L (ref 10–40)
B-HCG UR QL: NEGATIVE
BACTERIA #/AREA URNS AUTO: ABNORMAL /HPF
BASOPHILS # BLD AUTO: 0.02 K/UL (ref 0–0.2)
BASOPHILS NFR BLD: 0.3 % (ref 0–1.9)
BILIRUB SERPL-MCNC: 0.3 MG/DL (ref 0.1–1)
BILIRUB UR QL STRIP: NEGATIVE
BUN SERPL-MCNC: 13 MG/DL (ref 6–20)
CALCIUM SERPL-MCNC: 9.2 MG/DL (ref 8.7–10.5)
CHLORIDE SERPL-SCNC: 102 MMOL/L (ref 95–110)
CLARITY UR REFRACT.AUTO: ABNORMAL
CO2 SERPL-SCNC: 23 MMOL/L (ref 23–29)
COLOR UR AUTO: YELLOW
CREAT SERPL-MCNC: 1.2 MG/DL (ref 0.5–1.4)
CTP QC/QA: YES
DIFFERENTIAL METHOD: ABNORMAL
EOSINOPHIL # BLD AUTO: 0.1 K/UL (ref 0–0.5)
EOSINOPHIL NFR BLD: 0.6 % (ref 0–8)
ERYTHROCYTE [DISTWIDTH] IN BLOOD BY AUTOMATED COUNT: 14.3 % (ref 11.5–14.5)
EST. GFR  (AFRICAN AMERICAN): >60 ML/MIN/1.73 M^2
EST. GFR  (NON AFRICAN AMERICAN): 59.1 ML/MIN/1.73 M^2
GLUCOSE SERPL-MCNC: 541 MG/DL (ref 70–110)
GLUCOSE UR QL STRIP: ABNORMAL
HCT VFR BLD AUTO: 32.8 % (ref 37–48.5)
HGB BLD-MCNC: 10.7 G/DL (ref 12–16)
HGB UR QL STRIP: ABNORMAL
HYALINE CASTS UR QL AUTO: 0 /LPF
IMM GRANULOCYTES # BLD AUTO: 0.02 K/UL (ref 0–0.04)
IMM GRANULOCYTES NFR BLD AUTO: 0.3 % (ref 0–0.5)
KETONES UR QL STRIP: ABNORMAL
LEUKOCYTE ESTERASE UR QL STRIP: NEGATIVE
LIPASE SERPL-CCNC: 23 U/L (ref 4–60)
LYMPHOCYTES # BLD AUTO: 0.9 K/UL (ref 1–4.8)
LYMPHOCYTES NFR BLD: 11.4 % (ref 18–48)
MAGNESIUM SERPL-MCNC: 1.7 MG/DL (ref 1.6–2.6)
MCH RBC QN AUTO: 23.3 PG (ref 27–31)
MCHC RBC AUTO-ENTMCNC: 32.6 G/DL (ref 32–36)
MCV RBC AUTO: 72 FL (ref 82–98)
MICROSCOPIC COMMENT: ABNORMAL
MONOCYTES # BLD AUTO: 0.5 K/UL (ref 0.3–1)
MONOCYTES NFR BLD: 6.3 % (ref 4–15)
NEUTROPHILS # BLD AUTO: 6.4 K/UL (ref 1.8–7.7)
NEUTROPHILS NFR BLD: 81.1 % (ref 38–73)
NITRITE UR QL STRIP: NEGATIVE
NRBC BLD-RTO: 0 /100 WBC
PH UR STRIP: 5 [PH] (ref 5–8)
PLATELET # BLD AUTO: 227 K/UL (ref 150–350)
PMV BLD AUTO: 12.8 FL (ref 9.2–12.9)
POCT GLUCOSE: 208 MG/DL (ref 70–110)
POCT GLUCOSE: 342 MG/DL (ref 70–110)
POCT GLUCOSE: 429 MG/DL (ref 70–110)
POTASSIUM SERPL-SCNC: 4.4 MMOL/L (ref 3.5–5.1)
PROT SERPL-MCNC: 6.6 G/DL (ref 6–8.4)
PROT UR QL STRIP: ABNORMAL
RBC # BLD AUTO: 4.59 M/UL (ref 4–5.4)
RBC #/AREA URNS AUTO: 0 /HPF (ref 0–4)
SODIUM SERPL-SCNC: 134 MMOL/L (ref 136–145)
SP GR UR STRIP: 1.03 (ref 1–1.03)
SQUAMOUS #/AREA URNS AUTO: 3 /HPF
URN SPEC COLLECT METH UR: ABNORMAL
WBC # BLD AUTO: 7.83 K/UL (ref 3.9–12.7)
WBC #/AREA URNS AUTO: 0 /HPF (ref 0–5)
YEAST UR QL AUTO: ABNORMAL

## 2020-06-17 PROCEDURE — 96374 THER/PROPH/DIAG INJ IV PUSH: CPT

## 2020-06-17 PROCEDURE — 99284 EMERGENCY DEPT VISIT MOD MDM: CPT | Mod: 25

## 2020-06-17 PROCEDURE — 80053 COMPREHEN METABOLIC PANEL: CPT

## 2020-06-17 PROCEDURE — 81025 URINE PREGNANCY TEST: CPT | Performed by: PHYSICIAN ASSISTANT

## 2020-06-17 PROCEDURE — 96376 TX/PRO/DX INJ SAME DRUG ADON: CPT

## 2020-06-17 PROCEDURE — 83735 ASSAY OF MAGNESIUM: CPT

## 2020-06-17 PROCEDURE — 81001 URINALYSIS AUTO W/SCOPE: CPT

## 2020-06-17 PROCEDURE — 85025 COMPLETE CBC W/AUTO DIFF WBC: CPT

## 2020-06-17 PROCEDURE — 93005 ELECTROCARDIOGRAM TRACING: CPT

## 2020-06-17 PROCEDURE — 99285 PR EMERGENCY DEPT VISIT,LEVEL V: ICD-10-PCS | Mod: ,,, | Performed by: PHYSICIAN ASSISTANT

## 2020-06-17 PROCEDURE — 63600175 PHARM REV CODE 636 W HCPCS: Performed by: PHYSICIAN ASSISTANT

## 2020-06-17 PROCEDURE — 99285 EMERGENCY DEPT VISIT HI MDM: CPT | Mod: ,,, | Performed by: PHYSICIAN ASSISTANT

## 2020-06-17 PROCEDURE — 25000003 PHARM REV CODE 250: Performed by: PHYSICIAN ASSISTANT

## 2020-06-17 PROCEDURE — 96375 TX/PRO/DX INJ NEW DRUG ADDON: CPT

## 2020-06-17 PROCEDURE — 83690 ASSAY OF LIPASE: CPT

## 2020-06-17 PROCEDURE — 96361 HYDRATE IV INFUSION ADD-ON: CPT

## 2020-06-17 RX ORDER — DICYCLOMINE HYDROCHLORIDE 20 MG/1
20 TABLET ORAL 2 TIMES DAILY PRN
Qty: 20 TABLET | Refills: 0 | Status: SHIPPED | OUTPATIENT
Start: 2020-06-17 | End: 2020-07-17

## 2020-06-17 RX ORDER — ONDANSETRON 2 MG/ML
8 INJECTION INTRAMUSCULAR; INTRAVENOUS
Status: DISCONTINUED | OUTPATIENT
Start: 2020-06-17 | End: 2020-06-17

## 2020-06-17 RX ORDER — ONDANSETRON 2 MG/ML
4 INJECTION INTRAMUSCULAR; INTRAVENOUS
Status: COMPLETED | OUTPATIENT
Start: 2020-06-17 | End: 2020-06-17

## 2020-06-17 RX ORDER — DICYCLOMINE HYDROCHLORIDE 20 MG/1
20 TABLET ORAL 2 TIMES DAILY PRN
Qty: 20 TABLET | Refills: 0 | Status: SHIPPED | OUTPATIENT
Start: 2020-06-17 | End: 2020-06-17 | Stop reason: SDUPTHER

## 2020-06-17 RX ORDER — ONDANSETRON 4 MG/1
4 TABLET, ORALLY DISINTEGRATING ORAL EVERY 6 HOURS PRN
Qty: 15 TABLET | Refills: 0 | Status: SHIPPED | OUTPATIENT
Start: 2020-06-17 | End: 2022-04-12 | Stop reason: CLARIF

## 2020-06-17 RX ORDER — POTASSIUM CHLORIDE 20 MEQ/1
40 TABLET, EXTENDED RELEASE ORAL
Status: COMPLETED | OUTPATIENT
Start: 2020-06-17 | End: 2020-06-17

## 2020-06-17 RX ORDER — DICYCLOMINE HYDROCHLORIDE 10 MG/1
20 CAPSULE ORAL
Status: COMPLETED | OUTPATIENT
Start: 2020-06-17 | End: 2020-06-17

## 2020-06-17 RX ORDER — ONDANSETRON 4 MG/1
4 TABLET, ORALLY DISINTEGRATING ORAL EVERY 6 HOURS PRN
Qty: 15 TABLET | Refills: 0 | Status: SHIPPED | OUTPATIENT
Start: 2020-06-17 | End: 2020-06-17 | Stop reason: SDUPTHER

## 2020-06-17 RX ADMIN — DICYCLOMINE HYDROCHLORIDE 20 MG: 10 CAPSULE ORAL at 07:06

## 2020-06-17 RX ADMIN — SODIUM CHLORIDE, SODIUM LACTATE, POTASSIUM CHLORIDE, AND CALCIUM CHLORIDE 1000 ML: .6; .31; .03; .02 INJECTION, SOLUTION INTRAVENOUS at 08:06

## 2020-06-17 RX ADMIN — INSULIN HUMAN 6 UNITS: 100 INJECTION, SOLUTION PARENTERAL at 09:06

## 2020-06-17 RX ADMIN — SODIUM CHLORIDE 1000 ML: 0.9 INJECTION, SOLUTION INTRAVENOUS at 07:06

## 2020-06-17 RX ADMIN — ONDANSETRON 4 MG: 2 INJECTION INTRAMUSCULAR; INTRAVENOUS at 09:06

## 2020-06-17 RX ADMIN — ONDANSETRON 4 MG: 2 INJECTION INTRAMUSCULAR; INTRAVENOUS at 07:06

## 2020-06-17 RX ADMIN — POTASSIUM CHLORIDE 40 MEQ: 1500 TABLET, EXTENDED RELEASE ORAL at 09:06

## 2020-06-17 NOTE — ED PROVIDER NOTES
Encounter Date: 6/17/2020       History     Chief Complaint   Patient presents with    Abdominal Pain     nausea , vomiting,      35-year-old female with dm 1 and hypertension presents for nausea, vomiting and diarrhea for 2 days.  Symptoms started after she ate some shrimp in [employer].  She reports that initially she was vomiting followed by watery nonbloody diarrhea.  She reports associated abdominal cramping worse on the right side.  Is felt weak and dizzy and significant other told her that she passed out last night which she does not remember.  She passed out again this morning.  She reports that her blood sugar has been elevated at home in the 400s but she did not check it today.  Endorses increased urinary frequency but no dysuria or hematuria. She denies fever/chills chest pain, shortness of breath, palpitations or back pain.  Her family members who also ate the shrimp are not ill.  She has never had DKA.  She was recently admitted several months ago for COVID-19.        Review of patient's allergies indicates:  No Known Allergies  Past Medical History:   Diagnosis Date    Diabetes mellitus     Hypertension      Past Surgical History:   Procedure Laterality Date    VITRECTOMY BY PARS PLANA APPROACH Right 4/24/2019    Procedure: VITRECTOMY, PARS PLANA APPROACH;  Surgeon: JAMI Schulz MD;  Location: [facility] OR [address] [place] [address];  Service: Ophthalmology;  Laterality: Right;  25g PPV/membrane stripping/ILM peel/EL/AFx/GFX/avastin OD    VITRECTOMY BY PARS PLANA APPROACH Left 5/29/2019    Procedure: VITRECTOMY, PARS PLANA  APPROACH  25g PPV/Membrane Stripping/IML peel/EL/AFX/Avastin left eye   60 mins;  Surgeon: JAMI Schulz MD;  Location: [facility] OR [address] [place] [address];  Service: Ophthalmology;  Laterality: Left;     Family History   Problem Relation Age of Onset    Diabetes Mother      Social History     Tobacco Use    Smoking status: Never Smoker    Smokeless tobacco: Never Used   Substance Use Topics    Alcohol  use: Yes     Comment: social    Drug use: No     Review of Systems   Constitutional: Negative for fever.   HENT: Negative for sore throat.    Respiratory: Negative for shortness of breath.    Cardiovascular: Negative for chest pain, palpitations and leg swelling.   Gastrointestinal: Positive for abdominal pain, diarrhea, nausea and vomiting. Negative for abdominal distention, anal bleeding, blood in stool, constipation and rectal pain.   Endocrine: Positive for polyuria. Negative for polydipsia and polyphagia.   Genitourinary: Positive for frequency. Negative for dysuria, hematuria and urgency.   Musculoskeletal: Negative for back pain.   Skin: Negative for rash.   Neurological: Positive for dizziness, syncope and light-headedness. Negative for tremors, seizures, facial asymmetry, speech difficulty, weakness, numbness and headaches.   Hematological: Does not bruise/bleed easily.       Physical Exam     Initial Vitals [06/17/20 0703]   BP Pulse Resp Temp SpO2   (!) 142/79 102 18 98.5 °F (36.9 °C) 100 %      MAP       --         Physical Exam    Nursing note and vitals reviewed.  Constitutional: She appears well-developed and well-nourished.   HENT:   Head: Normocephalic and atraumatic.   Eyes: EOM are normal. Pupils are equal, round, and reactive to light.   Neck: Normal range of motion. Neck supple.   Cardiovascular: Normal rate, regular rhythm, normal heart sounds and intact distal pulses. Exam reveals no gallop and no friction rub.    No murmur heard.  Pulmonary/Chest: Breath sounds normal. No respiratory distress. She has no wheezes. She has no rhonchi. She has no rales. She exhibits no tenderness.   Abdominal: Soft. Normal appearance. She exhibits no distension and no mass. Bowel sounds are increased. There is no abdominal tenderness. There is no rebound and no guarding.   Musculoskeletal: Normal range of motion.   Neurological: She is alert and oriented to person, place, and time.   Skin: Skin is warm and dry.    Psychiatric: She has a normal mood and affect.         ED Course   Procedures  Labs Reviewed   URINALYSIS, REFLEX TO URINE CULTURE - Abnormal; Notable for the following components:       Result Value    Appearance, UA Hazy (*)     Protein, UA 2+ (*)     Glucose, UA 3+ (*)     Ketones, UA 1+ (*)     Occult Blood UA 1+ (*)     All other components within normal limits    Narrative:     Preferred Collection Type->Urine, Clean Catch  Specimen Source->Urine   CBC W/ AUTO DIFFERENTIAL - Abnormal; Notable for the following components:    Hemoglobin 10.7 (*)     Hematocrit 32.8 (*)     Mean Corpuscular Volume 72 (*)     Mean Corpuscular Hemoglobin 23.3 (*)     Lymph # 0.9 (*)     Gran% 81.1 (*)     Lymph% 11.4 (*)     All other components within normal limits   COMPREHENSIVE METABOLIC PANEL - Abnormal; Notable for the following components:    Sodium 134 (*)     Glucose 541 (*)     Albumin 2.5 (*)     AST 9 (*)     eGFR if non  59.1 (*)     All other components within normal limits   URINALYSIS MICROSCOPIC - Abnormal; Notable for the following components:    Bacteria Many (*)     All other components within normal limits    Narrative:     Preferred Collection Type->Urine, Clean Catch  Specimen Source->Urine   POCT GLUCOSE - Abnormal; Notable for the following components:    POCT Glucose 429 (*)     All other components within normal limits   POCT GLUCOSE - Abnormal; Notable for the following components:    POCT Glucose 342 (*)     All other components within normal limits   POCT GLUCOSE - Abnormal; Notable for the following components:    POCT Glucose 208 (*)     All other components within normal limits   LIPASE   MAGNESIUM   POCT URINE PREGNANCY        ECG Results          EKG 12-lead (Final result)  Result time 06/18/20 09:34:10    Final result by Interface, Lab In [facility] (06/18/20 09:34:10)                 Narrative:    Test Reason : R55,    Vent. Rate : 091 BPM     Atrial Rate : 091 BPM     P-R Int :  156 ms          QRS Dur : 072 ms      QT Int : 360 ms       P-R-T Axes : 066 071 019 degrees     QTc Int : 442 ms    Normal sinus rhythm  Normal ECG  When compared with ECG of 18-APR-2020 02:14,  No significant change was found  Confirmed by SONIA AMBROSIO MD ([id number]) on 6/18/2020 9:34:00 AM    Referred By: FAHAD   SELF           Confirmed By:SONIA AMBROSIO MD                            Imaging Results    None          Medical Decision Making:   History:   Old Medical Records: I decided to obtain old medical records.  Old Records Summarized: records from previous admission(s).       <> Summary of Records: Patient discharged on 04/19/2020 after 3 day admission for COVID-19.  She was treated ceftriaxone azithromycin  Initial Assessment:   35-year-old female presenting for nausea, vomiting, diarrhea and 2 syncopal episodes.  She is mildly hypertensive at 142/79 with otherwise normal vitals.  She appears uncomfortable but nontoxic.  Abdomen is soft and nontender with normoactive bowel sounds.  Differential Diagnosis:   Gastroenteritis  Hyperglycemia  Low clinical suspicion for DKA  Patient does have some risk factor for C diff colitis given recent admission and antibiotic use  Suspect syncope due to orthostatic hypotension/dehydration  Independently Interpreted Test(s):   I have ordered and independently interpreted EKG Reading(s) - see prior notes  Clinical Tests:   Lab Tests: Ordered and Reviewed  Radiological Study: Ordered and Reviewed  Medical Tests: Ordered and Reviewed  ED Management:  Will check labs, give fluids, Zofran, Bentyl and reassess.    Patient reports improvement of nausea and cramping after medications. Labs notable for hyperglycemia with glucose of 541.  No anion gap.  Otherwise unremarkable.  Will continue to hydrate, give insulin and reassess.  Patient has not had any bowel movements for several hours of observation in the ED, therefore I feel C diff is unlikely.    Repeat glucose improved significantly to  200s.  Patient is tolerating p.o. intake.  Given her reassuring workup and clinical improvement, do not believe that she requires further ED treatment and is stable for discharge.  Counseled the patient on avoiding sugary foods and drinks, staying hydrated and checking her sugar frequently.  Will discharge with Zofran and Bentyl.                  Attending Attestation:     Physician Attestation Statement for NP/PA:       Other NP/PA Attestation Additions:      Medical Decision Making: I did not evaluate this patient at bedside.  The ABIEL completed the face to face portion of the encounter.  I was available throughout this patient's ED course for immediate consultation.                               Clinical Impression:       ICD-10-CM ICD-9-CM   1. Nausea vomiting and diarrhea  R11.2 787.91    R19.7 787.01   2. Syncope  R55 780.2   3. Hyperglycemia  R73.9 790.29         Disposition:   Disposition: Discharged  Condition: Stable     ED Disposition Condition    Discharge Stable        ED Prescriptions     Medication Sig Dispense Start Date End Date Auth. Provider    ondansetron (ZOFRAN-ODT) 4 MG TbDL  (Status: Discontinued) Take 1 tablet (4 mg total) by mouth every 6 (six) hours as needed (Nausea). 15 tablet 6/17/2020 6/17/2020 Macy Carosone-Link, PA-C    dicyclomine (BENTYL) 20 mg tablet  (Status: Discontinued) Take 1 tablet (20 mg total) by mouth 2 (two) times daily as needed (Abdominal cramping). 20 tablet 6/17/2020 6/17/2020 Macy Carosone-Link, PA-C    dicyclomine (BENTYL) 20 mg tablet Take 1 tablet (20 mg total) by mouth 2 (two) times daily as needed (Abdominal cramping). 20 tablet 6/17/2020 7/17/2020 Macy Carosone-Link, PA-C    ondansetron (ZOFRAN-ODT) 4 MG TbDL Take 1 tablet (4 mg total) by mouth every 6 (six) hours as needed (Nausea). 15 tablet 6/17/2020  Macy Carosone-Link, PA-C        Follow-up Information     Follow up With Specialties Details Why Contact Info    Leeanne An MD Internal Medicine  Schedule an appointment as soon as possible for a visit in 1 week  [address] [facility] [address]  [phone]      [facility] Emergency Medicine Go to  If symptoms worsen [address]  [facility] [id number] [phone]                                     Macy Braxton PA-C  06/17/20 1918       Lopez Mosley MD  06/18/20 1019

## 2020-06-17 NOTE — EKG INTERPRETATIONS - EMERGENCY DEPT.
Independently interpreted by MD:  Rate 91, NSR, no stemi, no ectopy, no hypertrophy, normal ecg, flipped T in III

## 2020-06-17 NOTE — ED TRIAGE NOTES
Pt was vomiting, nauseous and feeling chills since Friday. Last night she passed out. Sugar was in the 400s upon admission.

## 2020-06-17 NOTE — DISCHARGE INSTRUCTIONS
Diagnosis:  Hyperglycemia (High Blood Sugar), gastroenteritis, fainting    I suspect that your nausea, vomiting and diarrhea are caused by either a virus or food poisoning.  Your lab tests show high blood sugar which may be because of this.  Make sure to keep checking her blood sugar at home and stay hydrated.  Please avoid sugary foods and drinks.  Suspect that you passed out because of dehydration and high sugar.    Please schedule a follow-up appointment with your primary care doctor within the next week.  If you start to have any worsening symptoms, severe abdominal pain, high fever or bloody diarrhea please return to the emergency department.

## 2020-06-17 NOTE — ED NOTES
LOC: Patient name and date of birth verified. The patient is awake, alert and aware of environment with an appropriate affect, the patient is oriented x 3 and speaking appropriately.   APPEARANCE: Patient resting comfortably, patient is clean and well groomed, patient's clothing is properly fastened.  SKIN: The skin is warm and dry, color consistent with ethnicity, patient has normal skin turgor and moist mucus membranes, skin intact, no breakdown or bruising noted.  MUSCULOSKELETAL: Patient moving all extremities well, no obvious swelling or deformities noted. Pt complains of recent muscle weakness.   RESPIRATORY: Respirations are spontaneous, patient has a normal effort and rate, no accessory muscle use noted.  CARDIAC: Patient has a normal rate and rhythm, no periphreal edema noted, capillary refill < 3 seconds.  ABDOMEN: Soft and non tender to palpation, no distention noted. Bowel sounds present in all four quadrants. Pt complains of nausea, vomiting.   NEUROLOGIC: Eyes open spontaneously, behavior appropriate to situation, follows commands, facial expression symmetrical, bilateral hand grasp equal and even, purposeful motor response noted, normal sensation in all extremities when touched with a finger.

## 2020-06-17 NOTE — Clinical Note
Tyler Farias accompanied Mary Sims to the emergency department on 6/17/2020. They may return to work on 06/18/2020.  Mr. Farias brought the patient, Mary Sims, to the [facility] Room on 6/17/20.    If you have any questions or concerns, please don't hesitate to call.      Lu Messina, RN RN

## 2020-11-11 ENCOUNTER — HOSPITAL ENCOUNTER (EMERGENCY)
Facility: HOSPITAL | Age: 35
Discharge: HOME | End: 2020-11-11
Attending: EMERGENCY MEDICINE
Payer: MEDICAID

## 2020-11-11 VITALS
BODY MASS INDEX: 28.35 KG/M2 | RESPIRATION RATE: 16 BRPM | HEART RATE: 102 BPM | WEIGHT: 160 LBS | SYSTOLIC BLOOD PRESSURE: 110 MMHG | TEMPERATURE: 99 F | OXYGEN SATURATION: 100 % | HEIGHT: 63 IN | DIASTOLIC BLOOD PRESSURE: 79 MMHG

## 2020-11-11 DIAGNOSIS — R11.2 NON-INTRACTABLE VOMITING WITH NAUSEA, UNSPECIFIED VOMITING TYPE: Primary | ICD-10-CM

## 2020-11-11 DIAGNOSIS — R73.9 HYPERGLYCEMIA: ICD-10-CM

## 2020-11-11 LAB
ALBUMIN SERPL BCP-MCNC: 2.9 G/DL (ref 3.5–5.2)
ALP SERPL-CCNC: 81 U/L (ref 55–135)
ALT SERPL W/O P-5'-P-CCNC: 14 U/L (ref 10–44)
ANION GAP SERPL CALC-SCNC: 9 MMOL/L (ref 8–16)
AST SERPL-CCNC: 13 U/L (ref 10–40)
BACTERIA #/AREA URNS AUTO: ABNORMAL /HPF
BASOPHILS # BLD AUTO: 0.05 K/UL (ref 0–0.2)
BASOPHILS NFR BLD: 0.7 % (ref 0–1.9)
BILIRUB SERPL-MCNC: 0.3 MG/DL (ref 0.1–1)
BILIRUB UR QL STRIP: NEGATIVE
BUN SERPL-MCNC: 23 MG/DL (ref 6–20)
CALCIUM SERPL-MCNC: 9.6 MG/DL (ref 8.7–10.5)
CHLORIDE SERPL-SCNC: 104 MMOL/L (ref 95–110)
CLARITY UR REFRACT.AUTO: ABNORMAL
CO2 SERPL-SCNC: 23 MMOL/L (ref 23–29)
COLOR UR AUTO: YELLOW
CREAT SERPL-MCNC: 1.2 MG/DL (ref 0.5–1.4)
DIFFERENTIAL METHOD: ABNORMAL
EOSINOPHIL # BLD AUTO: 0.1 K/UL (ref 0–0.5)
EOSINOPHIL NFR BLD: 1 % (ref 0–8)
ERYTHROCYTE [DISTWIDTH] IN BLOOD BY AUTOMATED COUNT: 14 % (ref 11.5–14.5)
EST. GFR  (AFRICAN AMERICAN): >60 ML/MIN/1.73 M^2
EST. GFR  (NON AFRICAN AMERICAN): 59.1 ML/MIN/1.73 M^2
GLUCOSE SERPL-MCNC: 250 MG/DL (ref 70–110)
GLUCOSE SERPL-MCNC: 265 MG/DL (ref 70–110)
GLUCOSE UR QL STRIP: ABNORMAL
HCT VFR BLD AUTO: 32.5 % (ref 37–48.5)
HGB BLD-MCNC: 10.8 G/DL (ref 12–16)
HGB UR QL STRIP: ABNORMAL
HYALINE CASTS UR QL AUTO: 1 /LPF
IMM GRANULOCYTES # BLD AUTO: 0.01 K/UL (ref 0–0.04)
IMM GRANULOCYTES NFR BLD AUTO: 0.1 % (ref 0–0.5)
KETONES UR QL STRIP: ABNORMAL
LACTATE SERPL-SCNC: 0.7 MMOL/L (ref 0.5–2.2)
LEUKOCYTE ESTERASE UR QL STRIP: NEGATIVE
LIPASE SERPL-CCNC: 22 U/L (ref 4–60)
LYMPHOCYTES # BLD AUTO: 1.3 K/UL (ref 1–4.8)
LYMPHOCYTES NFR BLD: 18 % (ref 18–48)
MCH RBC QN AUTO: 23.6 PG (ref 27–31)
MCHC RBC AUTO-ENTMCNC: 33.2 G/DL (ref 32–36)
MCV RBC AUTO: 71 FL (ref 82–98)
MICROSCOPIC COMMENT: ABNORMAL
MONOCYTES # BLD AUTO: 0.5 K/UL (ref 0.3–1)
MONOCYTES NFR BLD: 6.5 % (ref 4–15)
NEUTROPHILS # BLD AUTO: 5.3 K/UL (ref 1.8–7.7)
NEUTROPHILS NFR BLD: 73.7 % (ref 38–73)
NITRITE UR QL STRIP: NEGATIVE
NRBC BLD-RTO: 0 /100 WBC
PH UR STRIP: 5 [PH] (ref 5–8)
PLATELET # BLD AUTO: 280 K/UL (ref 150–350)
PMV BLD AUTO: 11.6 FL (ref 9.2–12.9)
POCT GLUCOSE: 250 MG/DL (ref 70–110)
POTASSIUM SERPL-SCNC: 5.1 MMOL/L (ref 3.5–5.1)
PROT SERPL-MCNC: 6.9 G/DL (ref 6–8.4)
PROT UR QL STRIP: ABNORMAL
RBC # BLD AUTO: 4.57 M/UL (ref 4–5.4)
RBC #/AREA URNS AUTO: 4 /HPF (ref 0–4)
SODIUM SERPL-SCNC: 136 MMOL/L (ref 136–145)
SP GR UR STRIP: 1.01 (ref 1–1.03)
SQUAMOUS #/AREA URNS AUTO: 1 /HPF
URN SPEC COLLECT METH UR: ABNORMAL
WBC # BLD AUTO: 7.21 K/UL (ref 3.9–12.7)
WBC #/AREA URNS AUTO: 5 /HPF (ref 0–5)
YEAST UR QL AUTO: ABNORMAL

## 2020-11-11 PROCEDURE — 25000003 PHARM REV CODE 250: Performed by: EMERGENCY MEDICINE

## 2020-11-11 PROCEDURE — 99284 EMERGENCY DEPT VISIT MOD MDM: CPT | Mod: 25

## 2020-11-11 PROCEDURE — 99284 EMERGENCY DEPT VISIT MOD MDM: CPT | Mod: ,,, | Performed by: EMERGENCY MEDICINE

## 2020-11-11 PROCEDURE — 99284 PR EMERGENCY DEPT VISIT,LEVEL IV: ICD-10-PCS | Mod: ,,, | Performed by: EMERGENCY MEDICINE

## 2020-11-11 PROCEDURE — 81001 URINALYSIS AUTO W/SCOPE: CPT

## 2020-11-11 PROCEDURE — 85025 COMPLETE CBC W/AUTO DIFF WBC: CPT

## 2020-11-11 PROCEDURE — 83605 ASSAY OF LACTIC ACID: CPT

## 2020-11-11 PROCEDURE — 82962 GLUCOSE BLOOD TEST: CPT

## 2020-11-11 PROCEDURE — 63600175 PHARM REV CODE 636 W HCPCS: Performed by: EMERGENCY MEDICINE

## 2020-11-11 PROCEDURE — 83690 ASSAY OF LIPASE: CPT

## 2020-11-11 PROCEDURE — 80053 COMPREHEN METABOLIC PANEL: CPT

## 2020-11-11 RX ORDER — SULFAMETHOXAZOLE AND TRIMETHOPRIM 400; 80 MG/1; MG/1
1 TABLET ORAL
COMMUNITY
End: 2021-04-01

## 2020-11-11 RX ORDER — ONDANSETRON 4 MG/1
4 TABLET, FILM COATED ORAL EVERY 6 HOURS
Qty: 12 TABLET | Refills: 0 | Status: SHIPPED | OUTPATIENT
Start: 2020-11-11 | End: 2022-04-12 | Stop reason: CLARIF

## 2020-11-11 RX ORDER — ONDANSETRON 2 MG/ML
4 INJECTION INTRAMUSCULAR; INTRAVENOUS
Status: COMPLETED | OUTPATIENT
Start: 2020-11-11 | End: 2020-11-11

## 2020-11-11 RX ADMIN — SODIUM CHLORIDE 1000 ML: 0.9 INJECTION, SOLUTION INTRAVENOUS at 10:11

## 2020-11-11 RX ADMIN — ONDANSETRON 4 MG: 2 INJECTION INTRAMUSCULAR; INTRAVENOUS at 10:11

## 2020-11-11 NOTE — ED TRIAGE NOTES
Patient states lower abd pain and SOB, onset this am at work, states she thinks its her blood sugar, states BG 250s high for her. Did not take insulin this am Currently on Bactrim

## 2020-11-11 NOTE — ED NOTES
Pt resting in recliner. No acute distress noted. RR even and unlabored. Updated on POC- waiting on labs, still need urine specimen, and NPO status-verbalizes understanding. Will continue to monitor.

## 2020-11-11 NOTE — ED PROVIDER NOTES
Encounter Date: 11/11/2020    SCRIBE #1 NOTE: I, Silvia Sesay, am scribing for, and in the presence of,  Dr. Brooks. I have scribed the entire note.       History     Chief Complaint   Patient presents with    Emesis     states episode of dizziness/ BG elevated at work- vomited/ diabetic    Abdominal Pain     The patient is a 35 year old female with a PMHx of diabetes mellitus and HTN, who presents to the ED with a chief complaint of suprapubic abdominal pain. Patient reports that this morning, she began experiencing abdominal pain accompanied by nausea, vomiting, dizziness, urinary frequency, shortness of breath, and feeling hot. States that she vomited food from yesterday. A coworker checked her glucose that was 250. Patient reports that she currently has a UTI for which she has been taking OTC medications but not antibiotics. Denies fever, chills, diarrhea, hematuria, bloody stools. She also noted that her daughter recently had a stomach bug.     The history is provided by the patient and medical records.     Review of patient's allergies indicates:  No Known Allergies  Past Medical History:   Diagnosis Date    Cellulitis     Diabetes mellitus     Hypertension     Retinal detachment      Past Surgical History:   Procedure Laterality Date    VITRECTOMY BY PARS PLANA APPROACH Right 4/24/2019    Procedure: VITRECTOMY, PARS PLANA APPROACH;  Surgeon: JAMI Schulz MD;  Location: [facility] OR [address] [place] [address];  Service: Ophthalmology;  Laterality: Right;  25g PPV/membrane stripping/ILM peel/EL/AFx/GFX/avastin OD    VITRECTOMY BY PARS PLANA APPROACH Left 5/29/2019    Procedure: VITRECTOMY, PARS PLANA  APPROACH  25g PPV/Membrane Stripping/IML peel/EL/AFX/Avastin left eye   60 mins;  Surgeon: JAMI Schulz MD;  Location: [facility] OR [address] [place] [address];  Service: Ophthalmology;  Laterality: Left;     Family History   Problem Relation Age of Onset    Diabetes Mother      Social History     Tobacco Use    Smoking status:  "Never Smoker    Smokeless tobacco: Never Used   Substance Use Topics    Alcohol use: Yes     Comment: social    Drug use: No     Review of Systems   Constitutional: Negative for chills and fever.        +"feeling hot"   HENT: Negative for congestion and rhinorrhea.    Eyes: Negative for visual disturbance.   Respiratory: Positive for shortness of breath. Negative for cough.    Cardiovascular: Negative for chest pain and palpitations.   Gastrointestinal: Positive for abdominal pain (suprapubic), nausea and vomiting. Negative for blood in stool and diarrhea.   Genitourinary: Positive for frequency. Negative for hematuria.   Musculoskeletal: Negative for back pain and myalgias.   Skin: Negative for color change and rash.   Neurological: Positive for dizziness. Negative for headaches.       Physical Exam     Initial Vitals [11/11/20 0924]   BP Pulse Resp Temp SpO2   134/84 106 17 98.8 °F (37.1 °C) 98 %      MAP       --         Physical Exam    Nursing note and vitals reviewed.    Gen/Constitutional: Interactive. No acute distress  Head: Normocephalic, Atraumatic  Neck: supple, no masses or LAD  Eyes: PERRLA, conjunctiva clear  Ears, Nose and Throat: No rhinorrhea or stridor.  Cardiac:  Tachycardic rate, Reg Rhythm, No murmur  Pulmonary: CTA Bilat, no wheezes, rhonchi, rales.  GI: Abdomen soft, +suprapubic tenderness, non-distended; no rebound or guarding  : No CVA tenderness.  Musculoskeletal: Extremities warm, well perfused, no erythema, no edema  Skin: No rashes  Neuro: Alert and Oriented x 3; No focal motor or sensory deficits.    Psych: Normal affect      ED Course   Procedures  Labs Reviewed   CBC W/ AUTO DIFFERENTIAL - Abnormal; Notable for the following components:       Result Value    Hemoglobin 10.8 (*)     Hematocrit 32.5 (*)     MCV 71 (*)     MCH 23.6 (*)     Gran % 73.7 (*)     All other components within normal limits   COMPREHENSIVE METABOLIC PANEL - Abnormal; Notable for the following components: "    Glucose 265 (*)     BUN 23 (*)     Albumin 2.9 (*)     eGFR if non  59.1 (*)     All other components within normal limits   URINALYSIS, REFLEX TO URINE CULTURE - Abnormal; Notable for the following components:    Appearance, UA Hazy (*)     Protein, UA 3+ (*)     Glucose, UA 3+ (*)     Ketones, UA Trace (*)     Occult Blood UA 1+ (*)     All other components within normal limits    Narrative:     Specimen Source->Urine   URINALYSIS MICROSCOPIC - Abnormal; Notable for the following components:    Bacteria Many (*)     All other components within normal limits    Narrative:     Specimen Source->Urine   POCT GLUCOSE MONITORING CONTINUOUS - Abnormal; Notable for the following components:    POC Glucose 250 (*)     All other components within normal limits   POCT GLUCOSE - Abnormal; Notable for the following components:    POCT Glucose 250 (*)     All other components within normal limits   LIPASE   LACTIC ACID, PLASMA          Imaging Results    None          Medical Decision Making:   History:   Old Medical Records: I decided to obtain old medical records.  Initial Assessment:   The patient is a 35 year old female with a PMHx of diabetes mellitus and HTN, who presents to the ED with a chief complaint of suprapubic abdominal pain.   Differential Diagnosis:   Differential diagnosis includes but is not limited to: UTI, pyelonephritis, enteritis, colitis, obstruction, perforation        Clinical Tests:   Lab Tests: Ordered and Reviewed    Urgent evaluation of patient presenting with suprapubic tenderness, bladder fullness and concern for UTI associated with nausea.  She is currently afebrile, vital signs are stable.  Physical exam findings unremarkable with slight suprapubic tenderness but no peritoneal signs on my exam.  Lung sounds clear, cardiac exam unremarkable, no focal neurologic deficits.  Patient was given IV fluids, labs were obtained, which show no significant abnormality with no  leukocytosis, no acidosis, no evidence of UTI.  Patient's symptoms improved in the ED with anti-inflammatory.  No lactic acidosis.  I discussed further imaging however patient declined as symptoms improved, plan for outpatient follow-up, patient amenable to plan. Patient agreeable to discharge plan. Strict ED precautions and return instructions discussed at length and patient verbalized understanding. All questions were answered and ample time was given for questions.      Complexity:  High risk          Scribe Attestation:   Scribe #1: I performed the above scribed service and the documentation accurately describes the services I performed. I attest to the accuracy of the note.    I, Dr. Maxx Brooks, personally performed the services described in this documentation. All medical record entries made by the scribe were at my direction and in my presence.  I have reviewed the chart and agree that the record reflects my personal performance and is accurate and complete.                     Clinical Impression:     ICD-10-CM ICD-9-CM   1. Non-intractable vomiting with nausea, unspecified vomiting type  R11.2 787.01   2. Hyperglycemia  R73.9 790.29                      Disposition:   Disposition: Discharged  Condition: Stable     ED Disposition Condition    Discharge Stable          ED Prescriptions     Medication Sig Dispense Start Date End Date Auth. Provider    ondansetron (ZOFRAN) 4 MG tablet Take 1 tablet (4 mg total) by mouth every 6 (six) hours. 12 tablet 11/11/2020  Maxx Brooks DO        Follow-up Information     Follow up With Specialties Details Why Contact Info    Leeanne An MD Internal Medicine Schedule an appointment as soon as possible for a visit in 1 week  [address] [facility] [address]  [phone]                      Maxx Brooks DO, FAAEM  Emergency Staff Physician   Dept of Emergency Medicine   [facility]  Spectralink: [id number]                     Maxx HAYWOOD  DO Cecilia  11/13/20 1044

## 2020-11-11 NOTE — ED NOTES
Patient identifiers verified and correct for Ms Sims  C/C: Lower abd pain , SOB,  dizziness SEE NN  APPEARANCE: awake and alert in NAD.  SKIN: warm, dry and intact. No breakdown or bruising.  MUSCULOSKELETAL: Patient moving all extremities spontaneously, no obvious swelling or deformities noted. Ambulates independently.  RESPIRATORY: Positive shortness of breath.Respirations unlabored. Positive cough, Denies fevers, reports chills  CARDIAC: Denies CP, 2+ distal pulses; no peripheral edema  ABDOMEN: Glen lower abd pain , positive nausea, emesis this am,   : voids spontaneously, states known UTI, no pain with urination  Neurologic: AAO x 4; follows commands equal strength in all extremities; denies numbness/tingling. Denies dizziness Denies weakness, Positive lightheaded PTA,

## 2020-11-11 NOTE — ED NOTES
Pt resting in recliner, no distress noted. Denies any current complaints of nausea or abdominal pain. IVF remain infusing. Will continue to monitor.

## 2020-11-11 NOTE — Clinical Note
"Mary Lemus"Levi was seen and treated in our emergency department on 11/11/2020.  She may return to work on 11/13/2020.       If you have any questions or concerns, please don't hesitate to call.      Maxx Brooks, DO"

## 2020-11-11 NOTE — DISCHARGE INSTRUCTIONS
Today, your laboratory evaluation did not show any abnormalities.  You have been given nausea medicine, and please follow-up with your primary care if your symptoms do not improve.  You may use the nausea medicine as needed.  Keep her blood sugars under control, by taking her insulin as prescribed.    Our goal in the emergency department is to always give you outstanding care and exceptional service. You may receive a survey by mail or e-mail in the next week regarding your experience in our ED. We would greatly appreciate your completing and returning the survey. Your feedback provides us with a way to recognize our staff who give very good care and it helps us learn how to improve when your experience was below our aspiration of excellence.

## 2021-04-01 ENCOUNTER — OFFICE VISIT (OUTPATIENT)
Dept: URGENT CARE | Facility: CLINIC | Age: 36
End: 2021-04-01
Payer: MEDICAID

## 2021-04-01 VITALS
DIASTOLIC BLOOD PRESSURE: 83 MMHG | HEART RATE: 96 BPM | OXYGEN SATURATION: 97 % | BODY MASS INDEX: 28.35 KG/M2 | TEMPERATURE: 98 F | RESPIRATION RATE: 18 BRPM | WEIGHT: 160 LBS | HEIGHT: 63 IN | SYSTOLIC BLOOD PRESSURE: 128 MMHG

## 2021-04-01 DIAGNOSIS — L97.929 DIABETIC ULCER OF LEFT LOWER LEG: Primary | ICD-10-CM

## 2021-04-01 DIAGNOSIS — E11.622 DIABETIC ULCER OF LEFT LOWER LEG: Primary | ICD-10-CM

## 2021-04-01 LAB
BILIRUB UR QL STRIP: NEGATIVE
GLUCOSE SERPL-MCNC: 468 MG/DL (ref 70–110)
GLUCOSE UR QL STRIP: POSITIVE
KETONES UR QL STRIP: NEGATIVE
LEUKOCYTE ESTERASE UR QL STRIP: NEGATIVE
PH, POC UA: 7 (ref 5–8)
POC BLOOD, URINE: NEGATIVE
POC NITRATES, URINE: NEGATIVE
PROT UR QL STRIP: POSITIVE
SP GR UR STRIP: 1.01 (ref 1–1.03)
UROBILINOGEN UR STRIP-ACNC: NORMAL (ref 0.1–1.1)

## 2021-04-01 PROCEDURE — 99213 OFFICE O/P EST LOW 20 MIN: CPT | Mod: 25,S$GLB,, | Performed by: NURSE PRACTITIONER

## 2021-04-01 PROCEDURE — 82962 GLUCOSE BLOOD TEST: CPT | Mod: S$GLB,,, | Performed by: NURSE PRACTITIONER

## 2021-04-01 PROCEDURE — 81003 URINALYSIS AUTO W/O SCOPE: CPT | Mod: QW,S$GLB,, | Performed by: NURSE PRACTITIONER

## 2021-04-01 PROCEDURE — 82962 POCT GLUCOSE, HAND-HELD DEVICE: ICD-10-PCS | Mod: S$GLB,,, | Performed by: NURSE PRACTITIONER

## 2021-04-01 PROCEDURE — 81003 POCT URINALYSIS, DIPSTICK, AUTOMATED, W/O SCOPE: ICD-10-PCS | Mod: QW,S$GLB,, | Performed by: NURSE PRACTITIONER

## 2021-04-01 PROCEDURE — 99213 PR OFFICE/OUTPT VISIT, EST, LEVL III, 20-29 MIN: ICD-10-PCS | Mod: 25,S$GLB,, | Performed by: NURSE PRACTITIONER

## 2021-04-01 RX ORDER — SULFAMETHOXAZOLE AND TRIMETHOPRIM 800; 160 MG/1; MG/1
1 TABLET ORAL 2 TIMES DAILY
Qty: 7 TABLET | Refills: 0 | Status: SHIPPED | OUTPATIENT
Start: 2021-04-01 | End: 2022-04-12 | Stop reason: CLARIF

## 2021-04-01 RX ORDER — MUPIROCIN 20 MG/G
OINTMENT TOPICAL 3 TIMES DAILY
Qty: 1 TUBE | Refills: 0 | Status: SHIPPED | OUTPATIENT
Start: 2021-04-01 | End: 2022-04-12 | Stop reason: CLARIF

## 2021-06-23 ENCOUNTER — HOSPITAL ENCOUNTER (EMERGENCY)
Facility: HOSPITAL | Age: 36
Discharge: HOME | End: 2021-06-23
Attending: EMERGENCY MEDICINE
Payer: MEDICAID

## 2021-06-23 VITALS
WEIGHT: 150 LBS | BODY MASS INDEX: 26.57 KG/M2 | TEMPERATURE: 99 F | SYSTOLIC BLOOD PRESSURE: 138 MMHG | DIASTOLIC BLOOD PRESSURE: 84 MMHG | RESPIRATION RATE: 19 BRPM | OXYGEN SATURATION: 99 % | HEART RATE: 100 BPM

## 2021-06-23 DIAGNOSIS — R73.9 HYPERGLYCEMIA: Primary | ICD-10-CM

## 2021-06-23 LAB
ALBUMIN SERPL BCP-MCNC: 2.7 G/DL (ref 3.5–5.2)
ALP SERPL-CCNC: 89 U/L (ref 55–135)
ALT SERPL W/O P-5'-P-CCNC: 12 U/L (ref 10–44)
ANION GAP SERPL CALC-SCNC: 9 MMOL/L (ref 8–16)
AST SERPL-CCNC: 11 U/L (ref 10–40)
B-OH-BUTYR BLD STRIP-SCNC: 0.1 MMOL/L (ref 0–0.5)
BACTERIA #/AREA URNS AUTO: ABNORMAL /HPF
BASOPHILS # BLD AUTO: 0.07 K/UL (ref 0–0.2)
BASOPHILS NFR BLD: 1 % (ref 0–1.9)
BILIRUB SERPL-MCNC: 0.1 MG/DL (ref 0.1–1)
BILIRUB UR QL STRIP: NEGATIVE
BUN SERPL-MCNC: 19 MG/DL (ref 6–20)
CALCIUM SERPL-MCNC: 9 MG/DL (ref 8.7–10.5)
CHLORIDE SERPL-SCNC: 104 MMOL/L (ref 95–110)
CLARITY UR REFRACT.AUTO: ABNORMAL
CO2 SERPL-SCNC: 22 MMOL/L (ref 23–29)
COLOR UR AUTO: YELLOW
CREAT SERPL-MCNC: 1.1 MG/DL (ref 0.5–1.4)
DIFFERENTIAL METHOD: ABNORMAL
EOSINOPHIL # BLD AUTO: 0.2 K/UL (ref 0–0.5)
EOSINOPHIL NFR BLD: 2.9 % (ref 0–8)
ERYTHROCYTE [DISTWIDTH] IN BLOOD BY AUTOMATED COUNT: 14.1 % (ref 11.5–14.5)
EST. GFR  (AFRICAN AMERICAN): >60 ML/MIN/1.73 M^2
EST. GFR  (NON AFRICAN AMERICAN): >60 ML/MIN/1.73 M^2
GLUCOSE SERPL-MCNC: 368 MG/DL (ref 70–110)
GLUCOSE UR QL STRIP: ABNORMAL
HCT VFR BLD AUTO: 33.6 % (ref 37–48.5)
HCV AB SERPL QL IA: NEGATIVE
HGB BLD-MCNC: 11.2 G/DL (ref 12–16)
HGB UR QL STRIP: NEGATIVE
HIV 1+2 AB+HIV1 P24 AG SERPL QL IA: NEGATIVE
HYALINE CASTS UR QL AUTO: 0 /LPF
IMM GRANULOCYTES # BLD AUTO: 0.03 K/UL (ref 0–0.04)
IMM GRANULOCYTES NFR BLD AUTO: 0.4 % (ref 0–0.5)
KETONES UR QL STRIP: NEGATIVE
LEUKOCYTE ESTERASE UR QL STRIP: NEGATIVE
LYMPHOCYTES # BLD AUTO: 1.6 K/UL (ref 1–4.8)
LYMPHOCYTES NFR BLD: 22.8 % (ref 18–48)
MCH RBC QN AUTO: 23.1 PG (ref 27–31)
MCHC RBC AUTO-ENTMCNC: 33.3 G/DL (ref 32–36)
MCV RBC AUTO: 69 FL (ref 82–98)
MICROSCOPIC COMMENT: ABNORMAL
MONOCYTES # BLD AUTO: 0.4 K/UL (ref 0.3–1)
MONOCYTES NFR BLD: 5.2 % (ref 4–15)
NEUTROPHILS # BLD AUTO: 4.7 K/UL (ref 1.8–7.7)
NEUTROPHILS NFR BLD: 67.7 % (ref 38–73)
NITRITE UR QL STRIP: NEGATIVE
NRBC BLD-RTO: 0 /100 WBC
PH UR STRIP: 6 [PH] (ref 5–8)
PLATELET # BLD AUTO: 272 K/UL (ref 150–450)
PMV BLD AUTO: 12.3 FL (ref 9.2–12.9)
POCT GLUCOSE: 311 MG/DL (ref 70–110)
POCT GLUCOSE: 381 MG/DL (ref 70–110)
POTASSIUM SERPL-SCNC: 4.8 MMOL/L (ref 3.5–5.1)
PROT SERPL-MCNC: 6.7 G/DL (ref 6–8.4)
PROT UR QL STRIP: ABNORMAL
RBC # BLD AUTO: 4.85 M/UL (ref 4–5.4)
RBC #/AREA URNS AUTO: 1 /HPF (ref 0–4)
SODIUM SERPL-SCNC: 135 MMOL/L (ref 136–145)
SP GR UR STRIP: 1.02 (ref 1–1.03)
SQUAMOUS #/AREA URNS AUTO: 1 /HPF
URN SPEC COLLECT METH UR: ABNORMAL
WBC # BLD AUTO: 6.9 K/UL (ref 3.9–12.7)
WBC #/AREA URNS AUTO: 6 /HPF (ref 0–5)
YEAST UR QL AUTO: ABNORMAL

## 2021-06-23 PROCEDURE — 99284 EMERGENCY DEPT VISIT MOD MDM: CPT | Mod: ,,, | Performed by: EMERGENCY MEDICINE

## 2021-06-23 PROCEDURE — 86703 HIV-1/HIV-2 1 RESULT ANTBDY: CPT | Performed by: EMERGENCY MEDICINE

## 2021-06-23 PROCEDURE — 82010 KETONE BODYS QUAN: CPT | Performed by: PHYSICIAN ASSISTANT

## 2021-06-23 PROCEDURE — 25000003 PHARM REV CODE 250: Performed by: PHYSICIAN ASSISTANT

## 2021-06-23 PROCEDURE — 96361 HYDRATE IV INFUSION ADD-ON: CPT

## 2021-06-23 PROCEDURE — 99284 EMERGENCY DEPT VISIT MOD MDM: CPT | Mod: 25

## 2021-06-23 PROCEDURE — 96374 THER/PROPH/DIAG INJ IV PUSH: CPT

## 2021-06-23 PROCEDURE — 85025 COMPLETE CBC W/AUTO DIFF WBC: CPT | Performed by: PHYSICIAN ASSISTANT

## 2021-06-23 PROCEDURE — 99284 PR EMERGENCY DEPT VISIT,LEVEL IV: ICD-10-PCS | Mod: ,,, | Performed by: EMERGENCY MEDICINE

## 2021-06-23 PROCEDURE — 82962 GLUCOSE BLOOD TEST: CPT

## 2021-06-23 PROCEDURE — 80053 COMPREHEN METABOLIC PANEL: CPT | Performed by: PHYSICIAN ASSISTANT

## 2021-06-23 PROCEDURE — 86803 HEPATITIS C AB TEST: CPT | Performed by: EMERGENCY MEDICINE

## 2021-06-23 PROCEDURE — 81001 URINALYSIS AUTO W/SCOPE: CPT | Performed by: PHYSICIAN ASSISTANT

## 2021-06-23 PROCEDURE — 63600175 PHARM REV CODE 636 W HCPCS: Performed by: PHYSICIAN ASSISTANT

## 2021-06-23 RX ORDER — INSULIN LISPRO 100 [IU]/ML
10 INJECTION, SOLUTION INTRAVENOUS; SUBCUTANEOUS
COMMUNITY

## 2021-06-23 RX ORDER — ONDANSETRON 2 MG/ML
4 INJECTION INTRAMUSCULAR; INTRAVENOUS
Status: COMPLETED | OUTPATIENT
Start: 2021-06-23 | End: 2021-06-23

## 2021-06-23 RX ORDER — INSULIN GLARGINE 100 [IU]/ML
20 INJECTION, SOLUTION SUBCUTANEOUS NIGHTLY
COMMUNITY

## 2021-06-23 RX ADMIN — SODIUM CHLORIDE 1000 ML: 0.9 INJECTION, SOLUTION INTRAVENOUS at 11:06

## 2021-06-23 RX ADMIN — ONDANSETRON 4 MG: 2 INJECTION INTRAMUSCULAR; INTRAVENOUS at 11:06

## 2021-07-01 ENCOUNTER — LAB VISIT (OUTPATIENT)
Dept: LAB | Facility: HOSPITAL | Age: 36
End: 2021-07-01
Attending: INTERNAL MEDICINE
Payer: MEDICAID

## 2021-07-01 DIAGNOSIS — E55.9 AVITAMINOSIS D: ICD-10-CM

## 2021-07-01 DIAGNOSIS — D64.9 ANEMIA, UNSPECIFIED: ICD-10-CM

## 2021-07-01 DIAGNOSIS — E53.8 BIOTIN-(PROPIONYL-COA-CARBOXYLASE) LIGASE DEFICIENCY: ICD-10-CM

## 2021-07-01 DIAGNOSIS — E78.2 MIXED HYPERLIPIDEMIA: Primary | ICD-10-CM

## 2021-07-01 DIAGNOSIS — E11.9 DIABETES MELLITUS WITHOUT COMPLICATION: ICD-10-CM

## 2021-07-01 DIAGNOSIS — Z11.59 SCREENING EXAMINATION FOR POLIOMYELITIS: ICD-10-CM

## 2021-07-01 LAB
ALBUMIN SERPL BCP-MCNC: 2.8 G/DL (ref 3.5–5.2)
ALBUMIN/CREAT UR: 4591.2 UG/MG (ref 0–30)
ALP SERPL-CCNC: 94 U/L (ref 55–135)
ALT SERPL W/O P-5'-P-CCNC: 18 U/L (ref 10–44)
ANION GAP SERPL CALC-SCNC: 7 MMOL/L (ref 8–16)
AST SERPL-CCNC: 18 U/L (ref 10–40)
BASOPHILS # BLD AUTO: 0.06 K/UL (ref 0–0.2)
BASOPHILS NFR BLD: 1.1 % (ref 0–1.9)
BILIRUB SERPL-MCNC: 0.2 MG/DL (ref 0.1–1)
BUN SERPL-MCNC: 19 MG/DL (ref 6–20)
CALCIUM SERPL-MCNC: 8.9 MG/DL (ref 8.7–10.5)
CHLORIDE SERPL-SCNC: 102 MMOL/L (ref 95–110)
CHOLEST SERPL-MCNC: 242 MG/DL (ref 120–199)
CHOLEST/HDLC SERPL: 4.4 {RATIO} (ref 2–5)
CO2 SERPL-SCNC: 25 MMOL/L (ref 23–29)
CREAT SERPL-MCNC: 1.2 MG/DL (ref 0.5–1.4)
CREAT UR-MCNC: 34 MG/DL (ref 15–325)
DIFFERENTIAL METHOD: ABNORMAL
EOSINOPHIL # BLD AUTO: 0.1 K/UL (ref 0–0.5)
EOSINOPHIL NFR BLD: 2.3 % (ref 0–8)
ERYTHROCYTE [DISTWIDTH] IN BLOOD BY AUTOMATED COUNT: 13.7 % (ref 11.5–14.5)
EST. GFR  (AFRICAN AMERICAN): >60 ML/MIN/1.73 M^2
EST. GFR  (NON AFRICAN AMERICAN): 59 ML/MIN/1.73 M^2
ESTIMATED AVG GLUCOSE: ABNORMAL MG/DL (ref 68–131)
FERRITIN SERPL-MCNC: 76 NG/ML (ref 20–300)
GLUCOSE SERPL-MCNC: 373 MG/DL (ref 70–110)
HBA1C MFR BLD: >14 % (ref 4–5.6)
HCT VFR BLD AUTO: 32.1 % (ref 37–48.5)
HDLC SERPL-MCNC: 55 MG/DL (ref 40–75)
HDLC SERPL: 22.7 % (ref 20–50)
HGB BLD-MCNC: 10.9 G/DL (ref 12–16)
IMM GRANULOCYTES # BLD AUTO: 0.02 K/UL (ref 0–0.04)
IMM GRANULOCYTES NFR BLD AUTO: 0.4 % (ref 0–0.5)
LDLC SERPL CALC-MCNC: 172.8 MG/DL (ref 63–159)
LYMPHOCYTES # BLD AUTO: 1.2 K/UL (ref 1–4.8)
LYMPHOCYTES NFR BLD: 22 % (ref 18–48)
MCH RBC QN AUTO: 23.5 PG (ref 27–31)
MCHC RBC AUTO-ENTMCNC: 34 G/DL (ref 32–36)
MCV RBC AUTO: 69 FL (ref 82–98)
MICROALBUMIN UR DL<=1MG/L-MCNC: 1561 UG/ML
MONOCYTES # BLD AUTO: 0.3 K/UL (ref 0.3–1)
MONOCYTES NFR BLD: 5.7 % (ref 4–15)
NEUTROPHILS # BLD AUTO: 3.9 K/UL (ref 1.8–7.7)
NEUTROPHILS NFR BLD: 68.5 % (ref 38–73)
NONHDLC SERPL-MCNC: 187 MG/DL
NRBC BLD-RTO: 0 /100 WBC
PLATELET # BLD AUTO: 266 K/UL (ref 150–450)
PMV BLD AUTO: 11.4 FL (ref 9.2–12.9)
POTASSIUM SERPL-SCNC: 4.8 MMOL/L (ref 3.5–5.1)
PROT SERPL-MCNC: 7 G/DL (ref 6–8.4)
RBC # BLD AUTO: 4.64 M/UL (ref 4–5.4)
SODIUM SERPL-SCNC: 134 MMOL/L (ref 136–145)
T4 FREE SERPL-MCNC: 0.97 NG/DL (ref 0.71–1.51)
TRIGL SERPL-MCNC: 71 MG/DL (ref 30–150)
TSH SERPL DL<=0.005 MIU/L-ACNC: 1.54 UIU/ML (ref 0.4–4)
WBC # BLD AUTO: 5.64 K/UL (ref 3.9–12.7)

## 2021-07-01 PROCEDURE — 82043 UR ALBUMIN QUANTITATIVE: CPT | Performed by: INTERNAL MEDICINE

## 2021-07-01 PROCEDURE — 84439 ASSAY OF FREE THYROXINE: CPT | Performed by: INTERNAL MEDICINE

## 2021-07-01 PROCEDURE — 82728 ASSAY OF FERRITIN: CPT | Performed by: INTERNAL MEDICINE

## 2021-07-01 PROCEDURE — 82570 ASSAY OF URINE CREATININE: CPT | Performed by: INTERNAL MEDICINE

## 2021-07-01 PROCEDURE — 80061 LIPID PANEL: CPT | Performed by: INTERNAL MEDICINE

## 2021-07-01 PROCEDURE — 84443 ASSAY THYROID STIM HORMONE: CPT | Performed by: INTERNAL MEDICINE

## 2021-07-01 PROCEDURE — 85025 COMPLETE CBC W/AUTO DIFF WBC: CPT | Performed by: INTERNAL MEDICINE

## 2021-07-01 PROCEDURE — 83036 HEMOGLOBIN GLYCOSYLATED A1C: CPT | Performed by: INTERNAL MEDICINE

## 2021-07-01 PROCEDURE — 36415 COLL VENOUS BLD VENIPUNCTURE: CPT | Performed by: INTERNAL MEDICINE

## 2021-07-01 PROCEDURE — 86803 HEPATITIS C AB TEST: CPT | Performed by: INTERNAL MEDICINE

## 2021-07-01 PROCEDURE — 82306 VITAMIN D 25 HYDROXY: CPT | Performed by: INTERNAL MEDICINE

## 2021-07-01 PROCEDURE — 82607 VITAMIN B-12: CPT | Performed by: INTERNAL MEDICINE

## 2021-07-01 PROCEDURE — 87340 HEPATITIS B SURFACE AG IA: CPT | Performed by: INTERNAL MEDICINE

## 2021-07-01 PROCEDURE — 80053 COMPREHEN METABOLIC PANEL: CPT | Performed by: INTERNAL MEDICINE

## 2021-07-02 LAB
25(OH)D3+25(OH)D2 SERPL-MCNC: 11 NG/ML (ref 30–96)
HBV SURFACE AG SERPL QL IA: NEGATIVE
HCV AB SERPL QL IA: NEGATIVE
VIT B12 SERPL-MCNC: 939 PG/ML (ref 210–950)

## 2021-07-07 ENCOUNTER — HOSPITAL ENCOUNTER (OUTPATIENT)
Dept: RADIOLOGY | Facility: HOSPITAL | Age: 36
Discharge: HOME | End: 2021-07-07
Attending: INTERNAL MEDICINE
Payer: MEDICAID

## 2021-07-07 DIAGNOSIS — R10.84 GENERALIZED ABDOMINAL PAIN: ICD-10-CM

## 2021-07-07 PROCEDURE — 76700 US EXAM ABDOM COMPLETE: CPT | Mod: 26,,, | Performed by: RADIOLOGY

## 2021-07-07 PROCEDURE — 76700 US EXAM ABDOM COMPLETE: CPT | Mod: TC

## 2021-07-07 PROCEDURE — 76700 US ABDOMEN COMPLETE: ICD-10-PCS | Mod: 26,,, | Performed by: RADIOLOGY

## 2021-08-05 ENCOUNTER — CLINICAL SUPPORT (OUTPATIENT)
Dept: URGENT CARE | Facility: CLINIC | Age: 36
End: 2021-08-05
Payer: MEDICAID

## 2021-08-05 DIAGNOSIS — Z11.9 ENCOUNTER FOR SCREENING EXAMINATION FOR INFECTIOUS DISEASE: Primary | ICD-10-CM

## 2021-08-05 LAB
CTP QC/QA: YES
SARS-COV-2 RDRP RESP QL NAA+PROBE: NEGATIVE

## 2021-08-05 PROCEDURE — U0002: ICD-10-PCS | Mod: QW,S$GLB,, | Performed by: NURSE PRACTITIONER

## 2021-08-05 PROCEDURE — U0002 COVID-19 LAB TEST NON-CDC: HCPCS | Mod: QW,S$GLB,, | Performed by: NURSE PRACTITIONER

## 2021-08-10 ENCOUNTER — HOSPITAL ENCOUNTER (EMERGENCY)
Facility: HOSPITAL | Age: 36
Discharge: HOME | End: 2021-08-10
Attending: EMERGENCY MEDICINE
Payer: MEDICAID

## 2021-08-10 VITALS
HEART RATE: 93 BPM | HEIGHT: 63 IN | OXYGEN SATURATION: 100 % | BODY MASS INDEX: 26.58 KG/M2 | RESPIRATION RATE: 18 BRPM | TEMPERATURE: 99 F | WEIGHT: 150 LBS | SYSTOLIC BLOOD PRESSURE: 175 MMHG | DIASTOLIC BLOOD PRESSURE: 76 MMHG

## 2021-08-10 DIAGNOSIS — R05.9 COUGH IN ADULT PATIENT: ICD-10-CM

## 2021-08-10 DIAGNOSIS — J06.9 VIRAL URI WITH COUGH: Primary | ICD-10-CM

## 2021-08-10 LAB
B-HCG UR QL: NEGATIVE
CTP QC/QA: YES
CTP QC/QA: YES
SARS-COV-2 RDRP RESP QL NAA+PROBE: NEGATIVE

## 2021-08-10 PROCEDURE — 81025 URINE PREGNANCY TEST: CPT | Performed by: EMERGENCY MEDICINE

## 2021-08-10 PROCEDURE — 99284 EMERGENCY DEPT VISIT MOD MDM: CPT | Mod: CR,CS,, | Performed by: EMERGENCY MEDICINE

## 2021-08-10 PROCEDURE — 91300 PHARM REV CODE 636 W HCPCS: CPT | Performed by: EMERGENCY MEDICINE

## 2021-08-10 PROCEDURE — 63600175 PHARM REV CODE 636 W HCPCS: Performed by: EMERGENCY MEDICINE

## 2021-08-10 PROCEDURE — 0001A HC IMMUNIZ ADMIN, SARS-COV-2 COVID-19 VACC, 30MCG/0.3ML, 1ST DOSE: CPT | Performed by: EMERGENCY MEDICINE

## 2021-08-10 PROCEDURE — 99284 PR EMERGENCY DEPT VISIT,LEVEL IV: ICD-10-PCS | Mod: CR,CS,, | Performed by: EMERGENCY MEDICINE

## 2021-08-10 PROCEDURE — 99283 EMERGENCY DEPT VISIT LOW MDM: CPT | Mod: 25

## 2021-08-10 PROCEDURE — U0002 COVID-19 LAB TEST NON-CDC: HCPCS | Performed by: EMERGENCY MEDICINE

## 2021-08-10 RX ORDER — BENZONATATE 100 MG/1
100 CAPSULE ORAL 3 TIMES DAILY PRN
Qty: 20 CAPSULE | Refills: 0 | Status: SHIPPED | OUTPATIENT
Start: 2021-08-10 | End: 2021-08-10 | Stop reason: SDUPTHER

## 2021-08-10 RX ORDER — BENZONATATE 100 MG/1
100 CAPSULE ORAL 3 TIMES DAILY PRN
Qty: 20 CAPSULE | Refills: 0 | Status: SHIPPED | OUTPATIENT
Start: 2021-08-10 | End: 2021-08-20

## 2021-08-10 RX ADMIN — RNA INGREDIENT BNT-162B2 0.3 ML: 0.23 INJECTION, SUSPENSION INTRAMUSCULAR at 02:08

## 2021-09-28 ENCOUNTER — IMMUNIZATION (OUTPATIENT)
Dept: PRIMARY CARE CLINIC | Facility: CLINIC | Age: 36
End: 2021-09-28
Payer: MEDICAID

## 2021-09-28 DIAGNOSIS — Z23 NEED FOR VACCINATION: Primary | ICD-10-CM

## 2021-09-28 PROCEDURE — 0002A COVID-19, MRNA, LNP-S, PF, 30 MCG/0.3 ML DOSE VACCINE: CPT | Mod: CV19,PBBFAC | Performed by: INTERNAL MEDICINE

## 2021-09-28 PROCEDURE — 91300 COVID-19, MRNA, LNP-S, PF, 30 MCG/0.3 ML DOSE VACCINE: CPT | Mod: PBBFAC | Performed by: INTERNAL MEDICINE

## 2021-10-27 ENCOUNTER — LAB VISIT (OUTPATIENT)
Dept: LAB | Facility: HOSPITAL | Age: 36
End: 2021-10-27
Attending: INTERNAL MEDICINE
Payer: MEDICAID

## 2021-10-27 DIAGNOSIS — D64.9 ANEMIA, UNSPECIFIED: Primary | ICD-10-CM

## 2021-10-27 DIAGNOSIS — E11.9 DIABETES MELLITUS WITHOUT COMPLICATION: ICD-10-CM

## 2021-10-27 DIAGNOSIS — E55.9 VITAMIN D DEFICIENCY: ICD-10-CM

## 2021-10-27 DIAGNOSIS — Z11.59 SCREENING EXAMINATION FOR POLIOMYELITIS: ICD-10-CM

## 2021-10-27 LAB
25(OH)D3+25(OH)D2 SERPL-MCNC: 14 NG/ML (ref 30–96)
ALBUMIN SERPL BCP-MCNC: 2.5 G/DL (ref 3.5–5.2)
ALP SERPL-CCNC: 79 U/L (ref 55–135)
ALT SERPL W/O P-5'-P-CCNC: 14 U/L (ref 10–44)
AST SERPL-CCNC: 12 U/L (ref 10–40)
BILIRUB DIRECT SERPL-MCNC: 0.1 MG/DL (ref 0.1–0.3)
BILIRUB SERPL-MCNC: 0.2 MG/DL (ref 0.1–1)
ESTIMATED AVG GLUCOSE: 341 MG/DL (ref 68–131)
HBA1C MFR BLD: 13.5 % (ref 4–5.6)
PROT SERPL-MCNC: 6.1 G/DL (ref 6–8.4)

## 2021-10-27 PROCEDURE — 87340 HEPATITIS B SURFACE AG IA: CPT | Performed by: INTERNAL MEDICINE

## 2021-10-27 PROCEDURE — 83036 HEMOGLOBIN GLYCOSYLATED A1C: CPT | Performed by: INTERNAL MEDICINE

## 2021-10-27 PROCEDURE — 83020 HEMOGLOBIN ELECTROPHORESIS: CPT | Mod: 91 | Performed by: INTERNAL MEDICINE

## 2021-10-27 PROCEDURE — 86803 HEPATITIS C AB TEST: CPT | Performed by: INTERNAL MEDICINE

## 2021-10-27 PROCEDURE — 80076 HEPATIC FUNCTION PANEL: CPT | Performed by: INTERNAL MEDICINE

## 2021-10-27 PROCEDURE — 82306 VITAMIN D 25 HYDROXY: CPT | Performed by: INTERNAL MEDICINE

## 2021-10-28 LAB
HBV SURFACE AG SERPL QL IA: NEGATIVE
HCV AB SERPL QL IA: NEGATIVE

## 2021-10-30 LAB
HB ELECTROPHORESIS INTERP CANCEL: ABNORMAL
HB ELECTROPHORESIS INTERPRETATION: ABNORMAL
HGB A MFR BLD ELPH: 64.5 % (ref 95.8–98)
HGB A2 MFR BLD: 3 % (ref 2–3.3)
HGB A2+XXX MFR BLD ELPH: ABNORMAL %
HGB F MFR BLD: 0 % (ref 0–0.9)
HGB XXX MFR BLD ELPH: ABNORMAL %
HPLC HB VARIANT: ABNORMAL

## 2021-11-01 LAB
HB ELECTROPHORESIS SUMMARY INTERPRETATION: NORMAL
PROVIDER SIGNING NAME: NORMAL

## 2021-12-03 ENCOUNTER — HOSPITAL ENCOUNTER (EMERGENCY)
Facility: HOSPITAL | Age: 36
Discharge: HOME | End: 2021-12-03
Attending: EMERGENCY MEDICINE
Payer: MEDICAID

## 2021-12-03 VITALS
SYSTOLIC BLOOD PRESSURE: 123 MMHG | HEART RATE: 94 BPM | TEMPERATURE: 99 F | DIASTOLIC BLOOD PRESSURE: 76 MMHG | RESPIRATION RATE: 17 BRPM | OXYGEN SATURATION: 100 %

## 2021-12-03 DIAGNOSIS — N30.90 CYSTITIS: Primary | ICD-10-CM

## 2021-12-03 DIAGNOSIS — E10.65 TYPE 1 DIABETES MELLITUS WITH HYPERGLYCEMIA: ICD-10-CM

## 2021-12-03 DIAGNOSIS — R11.2 NON-INTRACTABLE VOMITING WITH NAUSEA, UNSPECIFIED VOMITING TYPE: ICD-10-CM

## 2021-12-03 LAB
ALBUMIN SERPL BCP-MCNC: 2.9 G/DL (ref 3.5–5.2)
ALP SERPL-CCNC: 107 U/L (ref 55–135)
ALT SERPL W/O P-5'-P-CCNC: 20 U/L (ref 10–44)
ANION GAP SERPL CALC-SCNC: 10 MMOL/L (ref 8–16)
AST SERPL-CCNC: 13 U/L (ref 10–40)
B-HCG UR QL: NEGATIVE
B-OH-BUTYR BLD STRIP-SCNC: 0 MMOL/L (ref 0–0.5)
BACTERIA #/AREA URNS AUTO: ABNORMAL /HPF
BASOPHILS # BLD AUTO: 0.06 K/UL (ref 0–0.2)
BASOPHILS NFR BLD: 0.7 % (ref 0–1.9)
BILIRUB SERPL-MCNC: 0.2 MG/DL (ref 0.1–1)
BILIRUB UR QL STRIP: NEGATIVE
BUN SERPL-MCNC: 13 MG/DL (ref 6–20)
CALCIUM SERPL-MCNC: 10.1 MG/DL (ref 8.7–10.5)
CHLORIDE SERPL-SCNC: 105 MMOL/L (ref 95–110)
CLARITY UR REFRACT.AUTO: ABNORMAL
CO2 SERPL-SCNC: 21 MMOL/L (ref 23–29)
COLOR UR AUTO: YELLOW
CREAT SERPL-MCNC: 1.1 MG/DL (ref 0.5–1.4)
CTP QC/QA: YES
CTP QC/QA: YES
DIFFERENTIAL METHOD: ABNORMAL
EOSINOPHIL # BLD AUTO: 0.1 K/UL (ref 0–0.5)
EOSINOPHIL NFR BLD: 1 % (ref 0–8)
ERYTHROCYTE [DISTWIDTH] IN BLOOD BY AUTOMATED COUNT: 14 % (ref 11.5–14.5)
EST. GFR  (AFRICAN AMERICAN): >60 ML/MIN/1.73 M^2
EST. GFR  (NON AFRICAN AMERICAN): >60 ML/MIN/1.73 M^2
GLUCOSE SERPL-MCNC: 275 MG/DL (ref 70–110)
GLUCOSE UR QL STRIP: ABNORMAL
HCT VFR BLD AUTO: 31.5 % (ref 37–48.5)
HGB BLD-MCNC: 10.9 G/DL (ref 12–16)
HGB UR QL STRIP: ABNORMAL
HYALINE CASTS UR QL AUTO: 0 /LPF
IMM GRANULOCYTES # BLD AUTO: 0.03 K/UL (ref 0–0.04)
IMM GRANULOCYTES NFR BLD AUTO: 0.3 % (ref 0–0.5)
KETONES UR QL STRIP: NEGATIVE
LEUKOCYTE ESTERASE UR QL STRIP: ABNORMAL
LYMPHOCYTES # BLD AUTO: 1.2 K/UL (ref 1–4.8)
LYMPHOCYTES NFR BLD: 13.7 % (ref 18–48)
MCH RBC QN AUTO: 23.9 PG (ref 27–31)
MCHC RBC AUTO-ENTMCNC: 34.6 G/DL (ref 32–36)
MCV RBC AUTO: 69 FL (ref 82–98)
MICROSCOPIC COMMENT: ABNORMAL
MONOCYTES # BLD AUTO: 0.3 K/UL (ref 0.3–1)
MONOCYTES NFR BLD: 3.5 % (ref 4–15)
NEUTROPHILS # BLD AUTO: 7.1 K/UL (ref 1.8–7.7)
NEUTROPHILS NFR BLD: 80.8 % (ref 38–73)
NITRITE UR QL STRIP: NEGATIVE
NRBC BLD-RTO: 0 /100 WBC
PH UR STRIP: 5 [PH] (ref 5–8)
PLATELET # BLD AUTO: 317 K/UL (ref 150–450)
PMV BLD AUTO: 11.2 FL (ref 9.2–12.9)
POCT GLUCOSE: 280 MG/DL (ref 70–110)
POTASSIUM SERPL-SCNC: 4.5 MMOL/L (ref 3.5–5.1)
PROT SERPL-MCNC: 7.1 G/DL (ref 6–8.4)
PROT UR QL STRIP: ABNORMAL
RBC # BLD AUTO: 4.56 M/UL (ref 4–5.4)
RBC #/AREA URNS AUTO: 33 /HPF (ref 0–4)
SARS-COV-2 RDRP RESP QL NAA+PROBE: NEGATIVE
SODIUM SERPL-SCNC: 136 MMOL/L (ref 136–145)
SP GR UR STRIP: 1.02 (ref 1–1.03)
SQUAMOUS #/AREA URNS AUTO: 2 /HPF
URN SPEC COLLECT METH UR: ABNORMAL
WBC # BLD AUTO: 8.78 K/UL (ref 3.9–12.7)
WBC #/AREA URNS AUTO: 82 /HPF (ref 0–5)
WBC CLUMPS UR QL AUTO: ABNORMAL
YEAST UR QL AUTO: ABNORMAL

## 2021-12-03 PROCEDURE — 87077 CULTURE AEROBIC IDENTIFY: CPT | Performed by: PHYSICIAN ASSISTANT

## 2021-12-03 PROCEDURE — 82962 GLUCOSE BLOOD TEST: CPT

## 2021-12-03 PROCEDURE — 96361 HYDRATE IV INFUSION ADD-ON: CPT

## 2021-12-03 PROCEDURE — 63600175 PHARM REV CODE 636 W HCPCS: Performed by: PHYSICIAN ASSISTANT

## 2021-12-03 PROCEDURE — 81001 URINALYSIS AUTO W/SCOPE: CPT | Performed by: PHYSICIAN ASSISTANT

## 2021-12-03 PROCEDURE — U0002 COVID-19 LAB TEST NON-CDC: HCPCS | Performed by: PHYSICIAN ASSISTANT

## 2021-12-03 PROCEDURE — 99285 EMERGENCY DEPT VISIT HI MDM: CPT | Mod: CS,,, | Performed by: PHYSICIAN ASSISTANT

## 2021-12-03 PROCEDURE — 96374 THER/PROPH/DIAG INJ IV PUSH: CPT

## 2021-12-03 PROCEDURE — 99285 PR EMERGENCY DEPT VISIT,LEVEL V: ICD-10-PCS | Mod: CS,,, | Performed by: PHYSICIAN ASSISTANT

## 2021-12-03 PROCEDURE — 99284 EMERGENCY DEPT VISIT MOD MDM: CPT | Mod: 25

## 2021-12-03 PROCEDURE — 82010 KETONE BODYS QUAN: CPT | Performed by: PHYSICIAN ASSISTANT

## 2021-12-03 PROCEDURE — 85025 COMPLETE CBC W/AUTO DIFF WBC: CPT | Performed by: PHYSICIAN ASSISTANT

## 2021-12-03 PROCEDURE — 87186 SC STD MICRODIL/AGAR DIL: CPT | Performed by: PHYSICIAN ASSISTANT

## 2021-12-03 PROCEDURE — 80053 COMPREHEN METABOLIC PANEL: CPT | Performed by: PHYSICIAN ASSISTANT

## 2021-12-03 PROCEDURE — 81025 URINE PREGNANCY TEST: CPT | Performed by: PHYSICIAN ASSISTANT

## 2021-12-03 PROCEDURE — 87086 URINE CULTURE/COLONY COUNT: CPT | Performed by: PHYSICIAN ASSISTANT

## 2021-12-03 PROCEDURE — 25000003 PHARM REV CODE 250: Performed by: PHYSICIAN ASSISTANT

## 2021-12-03 PROCEDURE — 87088 URINE BACTERIA CULTURE: CPT | Performed by: PHYSICIAN ASSISTANT

## 2021-12-03 RX ORDER — PROCHLORPERAZINE MALEATE 10 MG
10 TABLET ORAL EVERY 6 HOURS PRN
Qty: 12 TABLET | Refills: 0 | Status: SHIPPED | OUTPATIENT
Start: 2021-12-03 | End: 2022-04-12 | Stop reason: CLARIF

## 2021-12-03 RX ORDER — AMOXICILLIN AND CLAVULANATE POTASSIUM 875; 125 MG/1; MG/1
1 TABLET, FILM COATED ORAL
Status: COMPLETED | OUTPATIENT
Start: 2021-12-03 | End: 2021-12-03

## 2021-12-03 RX ORDER — MAG HYDROX/ALUMINUM HYD/SIMETH 200-200-20
30 SUSPENSION, ORAL (FINAL DOSE FORM) ORAL 3 TIMES DAILY PRN
Qty: 354 ML | Refills: 1 | Status: SHIPPED | OUTPATIENT
Start: 2021-12-03 | End: 2022-04-12 | Stop reason: CLARIF

## 2021-12-03 RX ORDER — PROCHLORPERAZINE EDISYLATE 5 MG/ML
5 INJECTION INTRAMUSCULAR; INTRAVENOUS
Status: COMPLETED | OUTPATIENT
Start: 2021-12-03 | End: 2021-12-03

## 2021-12-03 RX ORDER — MAG HYDROX/ALUMINUM HYD/SIMETH 200-200-20
30 SUSPENSION, ORAL (FINAL DOSE FORM) ORAL
Status: COMPLETED | OUTPATIENT
Start: 2021-12-03 | End: 2021-12-03

## 2021-12-03 RX ORDER — ONDANSETRON 2 MG/ML
4 INJECTION INTRAMUSCULAR; INTRAVENOUS
Status: DISCONTINUED | OUTPATIENT
Start: 2021-12-03 | End: 2021-12-03

## 2021-12-03 RX ORDER — AMOXICILLIN AND CLAVULANATE POTASSIUM 875; 125 MG/1; MG/1
1 TABLET, FILM COATED ORAL 2 TIMES DAILY
Qty: 13 TABLET | Refills: 0 | Status: SHIPPED | OUTPATIENT
Start: 2021-12-03 | End: 2022-04-12 | Stop reason: CLARIF

## 2021-12-03 RX ADMIN — PROCHLORPERAZINE EDISYLATE 5 MG: 5 INJECTION INTRAMUSCULAR; INTRAVENOUS at 04:12

## 2021-12-03 RX ADMIN — ALUMINUM HYDROXIDE, MAGNESIUM HYDROXIDE, AND SIMETHICONE 30 ML: 200; 200; 20 SUSPENSION ORAL at 04:12

## 2021-12-03 RX ADMIN — SODIUM CHLORIDE 1000 ML: 0.9 INJECTION, SOLUTION INTRAVENOUS at 04:12

## 2021-12-03 RX ADMIN — AMOXICILLIN AND CLAVULANATE POTASSIUM 1 TABLET: 875; 125 TABLET, FILM COATED ORAL at 04:12

## 2021-12-07 LAB — BACTERIA UR CULT: ABNORMAL

## 2021-12-21 NOTE — PROGRESS NOTES
OK to approve the quantity of 30    José Miguel Wallace MD, MD    POC blood glucose level is 178.  Patient not found by glucometer.

## 2022-02-01 ENCOUNTER — HOSPITAL ENCOUNTER (OUTPATIENT)
Dept: CARDIOLOGY | Facility: HOSPITAL | Age: 37
Discharge: HOME | End: 2022-02-01
Attending: INTERNAL MEDICINE
Payer: MEDICAID

## 2022-02-01 VITALS — WEIGHT: 150 LBS | HEIGHT: 63 IN | BODY MASS INDEX: 26.58 KG/M2

## 2022-02-01 DIAGNOSIS — R06.02 SHORTNESS OF BREATH: ICD-10-CM

## 2022-02-01 PROCEDURE — 93306 ECHO (CUPID ONLY): ICD-10-PCS | Mod: 26,,, | Performed by: INTERNAL MEDICINE

## 2022-02-01 PROCEDURE — 93306 TTE W/DOPPLER COMPLETE: CPT

## 2022-02-01 PROCEDURE — 93306 TTE W/DOPPLER COMPLETE: CPT | Mod: 26,,, | Performed by: INTERNAL MEDICINE

## 2022-02-02 LAB
AORTIC ROOT ANNULUS: 2.93 CM
AV INDEX (PROSTH): 0.77
AV MEAN GRADIENT: 5 MMHG
AV PEAK GRADIENT: 10 MMHG
AV VALVE AREA: 2.06 CM2
AV VELOCITY RATIO: 0.63
BSA FOR ECHO PROCEDURE: 1.74 M2
CV ECHO LV RWT: 0.6 CM
DOP CALC AO PEAK VEL: 1.6 M/S
DOP CALC AO VTI: 27.83 CM
DOP CALC LVOT AREA: 2.7 CM2
DOP CALC LVOT DIAMETER: 1.85 CM
DOP CALC LVOT PEAK VEL: 1 M/S
DOP CALC LVOT STROKE VOLUME: 57.2 CM3
DOP CALC MV VTI: 17.81 CM
DOP CALCLVOT PEAK VEL VTI: 21.29 CM
E WAVE DECELERATION TIME: 103 MSEC
E/A RATIO: 1.36
E/E' RATIO: 9 M/S
ECHO LV POSTERIOR WALL: 1.12 CM (ref 0.6–1.1)
EJECTION FRACTION: 60 %
FRACTIONAL SHORTENING: 36 % (ref 28–44)
INTERVENTRICULAR SEPTUM: 1.09 CM (ref 0.6–1.1)
LA MAJOR: 4.59 CM
LA MINOR: 4.59 CM
LA WIDTH: 3.65 CM
LEFT ATRIUM SIZE: 2.87 CM
LEFT ATRIUM VOLUME INDEX MOD: 23.4 ML/M2
LEFT ATRIUM VOLUME INDEX: 23.9 ML/M2
LEFT ATRIUM VOLUME MOD: 40 CM3
LEFT ATRIUM VOLUME: 40.87 CM3
LEFT INTERNAL DIMENSION IN SYSTOLE: 2.4 CM (ref 2.1–4)
LEFT VENTRICLE DIASTOLIC VOLUME INDEX: 35.26 ML/M2
LEFT VENTRICLE DIASTOLIC VOLUME: 60.29 ML
LEFT VENTRICLE MASS INDEX: 78 G/M2
LEFT VENTRICLE SYSTOLIC VOLUME INDEX: 11.8 ML/M2
LEFT VENTRICLE SYSTOLIC VOLUME: 20.2 ML
LEFT VENTRICULAR INTERNAL DIMENSION IN DIASTOLE: 3.76 CM (ref 3.5–6)
LEFT VENTRICULAR MASS: 133.4 G
LV LATERAL E/E' RATIO: 7.07 M/S
LV SEPTAL E/E' RATIO: 12.38 M/S
MV MEAN GRADIENT: 1 MMHG
MV PEAK A VEL: 0.73 M/S
MV PEAK E VEL: 0.99 M/S
MV PEAK GRADIENT: 4 MMHG
MV STENOSIS PRESSURE HALF TIME: 29.87 MS
MV VALVE AREA BY CONTINUITY EQUATION: 3.21 CM2
MV VALVE AREA P 1/2 METHOD: 7.37 CM2
PV PEAK VELOCITY: 1.02 CM/S
RA MAJOR: 4.19 CM
RA PRESSURE: 3 MMHG
RA WIDTH: 3.08 CM
RIGHT VENTRICULAR END-DIASTOLIC DIMENSION: 3.17 CM
RV TISSUE DOPPLER FREE WALL SYSTOLIC VELOCITY 1 (APICAL 4 CHAMBER VIEW): 8.39 CM/S
TDI LATERAL: 0.14 M/S
TDI SEPTAL: 0.08 M/S
TDI: 0.11 M/S
TRICUSPID ANNULAR PLANE SYSTOLIC EXCURSION: 1.78 CM

## 2022-03-11 ENCOUNTER — HOSPITAL ENCOUNTER (OUTPATIENT)
Dept: RADIOLOGY | Facility: HOSPITAL | Age: 37
Discharge: HOME | End: 2022-03-11
Attending: INTERNAL MEDICINE
Payer: MEDICAID

## 2022-03-11 DIAGNOSIS — R10.84 ABDOMINAL PAIN, GENERALIZED: ICD-10-CM

## 2022-03-11 DIAGNOSIS — R10.84 ABDOMINAL PAIN, GENERALIZED: Primary | ICD-10-CM

## 2022-03-11 PROCEDURE — 74018 RADEX ABDOMEN 1 VIEW: CPT | Mod: TC,FY

## 2022-03-11 PROCEDURE — 74018 XR KUB: ICD-10-PCS | Mod: 26,,, | Performed by: RADIOLOGY

## 2022-03-11 PROCEDURE — 74018 RADEX ABDOMEN 1 VIEW: CPT | Mod: 26,,, | Performed by: RADIOLOGY

## 2022-04-11 PROCEDURE — 99281 PR EMERGENCY DEPT VISIT,LEVEL I: ICD-10-PCS | Mod: ,,, | Performed by: EMERGENCY MEDICINE

## 2022-04-11 PROCEDURE — 99281 EMR DPT VST MAYX REQ PHY/QHP: CPT | Mod: ,,, | Performed by: EMERGENCY MEDICINE

## 2022-04-11 PROCEDURE — 99281 EMR DPT VST MAYX REQ PHY/QHP: CPT

## 2022-04-12 ENCOUNTER — HOSPITAL ENCOUNTER (EMERGENCY)
Facility: HOSPITAL | Age: 37
Discharge: HOME | End: 2022-04-12
Attending: EMERGENCY MEDICINE
Payer: MEDICAID

## 2022-04-12 VITALS
SYSTOLIC BLOOD PRESSURE: 156 MMHG | RESPIRATION RATE: 18 BRPM | BODY MASS INDEX: 26.58 KG/M2 | WEIGHT: 150 LBS | TEMPERATURE: 98 F | HEIGHT: 63 IN | DIASTOLIC BLOOD PRESSURE: 83 MMHG | HEART RATE: 98 BPM | OXYGEN SATURATION: 98 %

## 2022-04-12 DIAGNOSIS — K62.89 ANAL IRRITATION: Primary | ICD-10-CM

## 2022-04-12 RX ORDER — DULAGLUTIDE 1.5 MG/.5ML
INJECTION, SOLUTION SUBCUTANEOUS
COMMUNITY

## 2022-04-12 NOTE — ED TRIAGE NOTES
Pt states that she developed a rash on her buttocks that started a week ago. Pt states that's he has been trying creams and paste and frequent showers. Pt states rash is constant and not spreading. Pt states that it itches but has not taken any OTC meds.

## 2022-04-12 NOTE — ED NOTES
Patient identifiers for Mary Sims checked and correct.  LOC: Patient is awake, alert, and aware of environment with an appropriate affect. Patient is oriented x 3 and speaking appropriately.  APPEARANCE: Patient resting comfortably and in no acute distress. Patient is clean and well groomed, patient's clothing is properly fastened.  SKIN: The skin is warm and dry. Patient has normal skin turgor and moist mucus membrances. Skin is intact; no bruising or breakdown noted.  MUSKULOSKELETAL: Patient is moving all extremities well, no obvious deformities noted. Pulses intact.   RESPIRATORY: Airway is open and patent. Respirations are spontaneous and non-labored with normal effort and rate.  CARDIAC: Patient has a normal rate and rhythm. No peripheral edema noted. Capillary refill < 3 seconds.  ABDOMEN: No distention noted. Bowel sounds active in all 4 quadrants. Soft and non-tender upon palpation.  NEUROLOGICAL: PERRL. Facial expression is symmetrical. Hand grasps are equal bilaterally. Normal sensation in all extremities when touched with finger.  Allergies reported: Review of patient's allergies indicates:  No Known Allergies

## 2022-04-12 NOTE — ED PROVIDER NOTES
Encounter Date: 4/11/2022       History     Chief Complaint   Patient presents with    Rash     Rash on her buttock x 2 days       36-year-old female presents with itching to her buttocks for the past week.  She has been using a barrier cream and triamcinolone ointment.  The symptoms persist.  There has been no fever.  There is no pain or swelling.        Review of patient's allergies indicates:  No Known Allergies  Past Medical History:   Diagnosis Date    Cellulitis     Diabetes mellitus     Hypertension     Retinal detachment      Past Surgical History:   Procedure Laterality Date    VITRECTOMY BY PARS PLANA APPROACH Right 4/24/2019    Procedure: VITRECTOMY, PARS PLANA APPROACH;  Surgeon: JAMI Schulz MD;  Location: [facility] OR [address] [place] [address];  Service: Ophthalmology;  Laterality: Right;  25g PPV/membrane stripping/ILM peel/EL/AFx/GFX/avastin OD    VITRECTOMY BY PARS PLANA APPROACH Left 5/29/2019    Procedure: VITRECTOMY, PARS PLANA  APPROACH  25g PPV/Membrane Stripping/IML peel/EL/AFX/Avastin left eye   60 mins;  Surgeon: JAMI Schulz MD;  Location: [facility] OR [address] [place] [address];  Service: Ophthalmology;  Laterality: Left;     Family History   Problem Relation Age of Onset    Diabetes Mother      Social History     Tobacco Use    Smoking status: Never Smoker    Smokeless tobacco: Never Used   Substance Use Topics    Alcohol use: Yes     Comment: social    Drug use: No     Review of Systems   Constitutional: Negative for chills and fever.   Genitourinary: Negative for pelvic pain.       Physical Exam     Initial Vitals [04/11/22 2256]   BP Pulse Resp Temp SpO2   (!) 156/83 98 18 98.1 °F (36.7 °C) 98 %      MAP       --         Physical Exam    Constitutional: She appears well-developed and well-nourished. No distress.   Musculoskeletal:      Comments: Examination of the buttocks reveals no swelling erythema abscess or irritation.  I detect no rash.     Skin: Skin is warm. No rash noted.         ED Course    Procedures  Labs Reviewed - No data to display       Imaging Results    None          Medications - No data to display  Medical Decision Making:   Initial Assessment:     Patient with anal irritation and no abnormal physical exam findings.  Would recommend she continue her barrier cream and steroid cream.  Recommend follow-up with PCP.  I highly doubt emergent medical condition such as abscess or infection.  Will discharge home in stable condition.                      Clinical Impression:   Final diagnoses:  [K62.89] Anal irritation (Primary)          ED Disposition Condition    Discharge Stable        ED Prescriptions     None        Follow-up Information     Follow up With Specialties Details Why Contact Info    Dagoberto Ott MD Internal Medicine Call in 1 day  [address]  [address]  [facility] [address] [phone]      [facility] - Emergency Dept Emergency Medicine  If symptoms worsen [address] [facility] [address] [phone]           Derrell Valverde MD  04/12/22 0201

## 2022-05-30 ENCOUNTER — LAB VISIT (OUTPATIENT)
Dept: LAB | Facility: HOSPITAL | Age: 37
End: 2022-05-30
Attending: INTERNAL MEDICINE
Payer: MEDICAID

## 2022-05-30 DIAGNOSIS — R80.9 PROTEINURIA: Primary | ICD-10-CM

## 2022-05-30 PROCEDURE — 84166 PROTEIN E-PHORESIS/URINE/CSF: CPT | Performed by: INTERNAL MEDICINE

## 2022-05-30 PROCEDURE — 84166 PROTEIN E-PHORESIS/URINE/CSF: CPT | Mod: 26,,, | Performed by: PATHOLOGY

## 2022-05-30 PROCEDURE — 84166 PATHOLOGIST INTERPRETATION UPE: ICD-10-PCS | Mod: 26,,, | Performed by: PATHOLOGY

## 2022-05-30 PROCEDURE — 84156 ASSAY OF PROTEIN URINE: CPT | Performed by: INTERNAL MEDICINE

## 2022-05-31 LAB
PROT 24H UR-MRATE: 4470 MG/SPEC (ref 0–100)
PROT UR-MCNC: 149 MG/DL (ref 0–15)
URINE COLLECTION DURATION: 24 HR
URINE VOLUME: 3000 ML

## 2022-06-02 LAB
PATHOLOGIST INTERPRETATION UPE: NORMAL
PROT PATTERN UR ELPH-IMP: NORMAL

## 2022-06-04 ENCOUNTER — HOSPITAL ENCOUNTER (EMERGENCY)
Facility: HOSPITAL | Age: 37
Discharge: HOME | End: 2022-06-04
Attending: EMERGENCY MEDICINE
Payer: MEDICAID

## 2022-06-04 VITALS
WEIGHT: 160 LBS | OXYGEN SATURATION: 100 % | HEIGHT: 63 IN | SYSTOLIC BLOOD PRESSURE: 175 MMHG | BODY MASS INDEX: 28.35 KG/M2 | HEART RATE: 78 BPM | DIASTOLIC BLOOD PRESSURE: 93 MMHG | RESPIRATION RATE: 18 BRPM | TEMPERATURE: 98 F

## 2022-06-04 DIAGNOSIS — L08.9 WOUND INFECTION: Primary | ICD-10-CM

## 2022-06-04 DIAGNOSIS — T14.8XXA WOUND INFECTION: Primary | ICD-10-CM

## 2022-06-04 PROCEDURE — 99284 EMERGENCY DEPT VISIT MOD MDM: CPT

## 2022-06-04 PROCEDURE — 25000003 PHARM REV CODE 250: Performed by: EMERGENCY MEDICINE

## 2022-06-04 RX ORDER — SULFAMETHOXAZOLE AND TRIMETHOPRIM 800; 160 MG/1; MG/1
1 TABLET ORAL
Status: COMPLETED | OUTPATIENT
Start: 2022-06-04 | End: 2022-06-04

## 2022-06-04 RX ORDER — SULFAMETHOXAZOLE AND TRIMETHOPRIM 800; 160 MG/1; MG/1
1 TABLET ORAL 2 TIMES DAILY
Qty: 14 TABLET | Refills: 0 | Status: SHIPPED | OUTPATIENT
Start: 2022-06-04 | End: 2022-06-04 | Stop reason: SDUPTHER

## 2022-06-04 RX ORDER — BACITRACIN ZINC 500 UNIT/G
OINTMENT (GRAM) TOPICAL 2 TIMES DAILY
Qty: 30 G | Refills: 0 | Status: SHIPPED | OUTPATIENT
Start: 2022-06-04 | End: 2022-06-04 | Stop reason: SDUPTHER

## 2022-06-04 RX ORDER — SULFAMETHOXAZOLE AND TRIMETHOPRIM 800; 160 MG/1; MG/1
1 TABLET ORAL 2 TIMES DAILY
Qty: 14 TABLET | Refills: 0 | Status: SHIPPED | OUTPATIENT
Start: 2022-06-04 | End: 2022-06-11

## 2022-06-04 RX ORDER — CEPHALEXIN 500 MG/1
500 CAPSULE ORAL
Status: COMPLETED | OUTPATIENT
Start: 2022-06-04 | End: 2022-06-04

## 2022-06-04 RX ORDER — BACITRACIN ZINC 500 UNIT/G
OINTMENT (GRAM) TOPICAL
Status: COMPLETED | OUTPATIENT
Start: 2022-06-04 | End: 2022-06-04

## 2022-06-04 RX ORDER — CEPHALEXIN 500 MG/1
500 CAPSULE ORAL EVERY 12 HOURS
Qty: 14 CAPSULE | Refills: 0 | Status: SHIPPED | OUTPATIENT
Start: 2022-06-04 | End: 2022-06-04 | Stop reason: SDUPTHER

## 2022-06-04 RX ORDER — CEPHALEXIN 500 MG/1
500 CAPSULE ORAL EVERY 12 HOURS
Qty: 14 CAPSULE | Refills: 0 | Status: SHIPPED | OUTPATIENT
Start: 2022-06-04 | End: 2022-06-11

## 2022-06-04 RX ORDER — BACITRACIN ZINC 500 UNIT/G
OINTMENT (GRAM) TOPICAL 2 TIMES DAILY
Qty: 30 G | Refills: 0 | Status: SHIPPED | OUTPATIENT
Start: 2022-06-04

## 2022-06-04 RX ADMIN — SULFAMETHOXAZOLE AND TRIMETHOPRIM 1 TABLET: 800; 160 TABLET ORAL at 01:06

## 2022-06-04 RX ADMIN — CEPHALEXIN 500 MG: 500 CAPSULE ORAL at 01:06

## 2022-06-04 RX ADMIN — BACITRACIN: 500 OINTMENT TOPICAL at 01:06

## 2022-06-04 NOTE — Clinical Note
"Mary Lemus" Levi was seen and treated in our emergency department on 6/4/2022.  She may return to work on 06/06/2022.       If you have any questions or concerns, please don't hesitate to call.      Lillie Camarena RN    "
English

## 2022-06-04 NOTE — DISCHARGE INSTRUCTIONS
Thank you for coming in to see us at [facility]! It was nice to meet you, and I hope you feel better soon. Please feel free to return to the ER at any time should your symptoms get worse, or if you have different emergent concerns.    Our goal in the emergency department is to always give you outstanding care and exceptional service. You may receive a survey by mail or e-mail in the next week regarding your experience in our ED. We would greatly appreciate your completing and returning the survey. Your feedback provides us with a way to recognize our staff who give very good care and it helps us learn how to improve when your experience was below our aspiration of excellence.       Sincerely,    Jersey Mcintyre MD  Medical Director  Emergency Department  [facility]

## 2022-06-04 NOTE — ED PROVIDER NOTES
Encounter Date: 6/4/2022       History     Chief Complaint   Patient presents with    Leg Swelling     36y F ambulatory to ED with c/o RLE edema and pain. Pt a laser procedure done by dermatology 2 weeks ago. Now has increased swelling and blisters with purulent drainage starting tonight     HPI   This is a 36 y.o. female who has a past medical history of Cellulitis, Diabetes mellitus, Hypertension, and Retinal detachment.     The patient presents to the Emergency Department with infected wound to her right leg.  Patient had laser pre seizure done by dermatologist 2 weeks ago to remove dark spots from her leg.  States that it subsequently got swollen and she had a couple of wounds there.  She noticed today that the right leg wound has been draining pus.  States that the wound tingles and is tender with deep palpation.  Denies any fever or chills.      Review of patient's allergies indicates:  No Known Allergies  Past Medical History:   Diagnosis Date    Cellulitis     Diabetes mellitus     Hypertension     Retinal detachment      Past Surgical History:   Procedure Laterality Date    VITRECTOMY BY PARS PLANA APPROACH Right 4/24/2019    Procedure: VITRECTOMY, PARS PLANA APPROACH;  Surgeon: JAMI Schulz MD;  Location: [facility] OR [address] [place] [address];  Service: Ophthalmology;  Laterality: Right;  25g PPV/membrane stripping/ILM peel/EL/AFx/GFX/avastin OD    VITRECTOMY BY PARS PLANA APPROACH Left 5/29/2019    Procedure: VITRECTOMY, PARS PLANA  APPROACH  25g PPV/Membrane Stripping/IML peel/EL/AFX/Avastin left eye   60 mins;  Surgeon: JAMI Schulz MD;  Location: [facility] OR [address] [place] [address];  Service: Ophthalmology;  Laterality: Left;     Family History   Problem Relation Age of Onset    Diabetes Mother      Social History     Tobacco Use    Smoking status: Never Smoker    Smokeless tobacco: Never Used   Substance Use Topics    Alcohol use: Yes     Comment: social    Drug use: No     Review of Systems   All other systems  reviewed and are negative.      Physical Exam     Initial Vitals [06/04/22 0043]   BP Pulse Resp Temp SpO2   (!) 175/93 78 18 98.3 °F (36.8 °C) 100 %      MAP       --         Physical Exam    Nursing note and vitals reviewed.  Constitutional: She appears well-developed and well-nourished. She is not diaphoretic. No distress.   HENT:   Head: Normocephalic and atraumatic.   Mouth/Throat: Oropharynx is clear and moist.   Eyes: Conjunctivae are normal.   Cardiovascular: Normal rate, regular rhythm and intact distal pulses.   Pulmonary/Chest: No respiratory distress.   Musculoskeletal:         General: Edema (2+ RLE, 1+ LLE) present. Normal range of motion.     Neurological: She is alert and oriented to person, place, and time.   Skin: Skin is warm and dry. Capillary refill takes less than 2 seconds. No rash noted. No erythema.   ulcrated wound to the anterior right leg, 2-3cm in diameter, granulation tissue but also purulence noted. Mildly tender to palpation.     There are also localized raised areas of skin swelling diffusely on the leg, each about 1-2 cm in diameter, round.    No erythema to the leg.    Psychiatric: She has a normal mood and affect.         ED Course   Procedures  Labs Reviewed - No data to display       Imaging Results    None          Medications   cephALEXin capsule 500 mg (has no administration in time range)   sulfamethoxazole-trimethoprim 800-160mg per tablet 1 tablet (has no administration in time range)   bacitracin ointment (has no administration in time range)     Medical Decision Making:   Initial Assessment:   Patient presents with localized wound infection of the right leg after dermatologic procedure which ended up with blister, then ulcerated wound to the right anterior leg.  There is some granulation tissue, however there is also purulence noted on examination.  Once cleaned and palpated, further purulence was expressed.  Does not appear to be fluctuant such as abscess.  Will place  patient on both Bactrim and Keflex, bacitracin ointment.  Wound cleansed here by nurse with chlorhexidine soap prior to dressing.  Patient to follow-up with PCP in 1 week.                      Clinical Impression:   Final diagnoses:  [T14.8XXA, L08.9] Wound infection (Primary)          ED Disposition Condition    Discharge Stable        ED Prescriptions     Medication Sig Dispense Start Date End Date Auth. Provider    sulfamethoxazole-trimethoprim 800-160mg (BACTRIM DS) 800-160 mg Tab Take 1 tablet by mouth 2 (two) times daily. for 7 days 14 tablet 6/4/2022 6/11/2022 Jersey Mcintyre MD    cephALEXin (KEFLEX) 500 MG capsule Take 1 capsule (500 mg total) by mouth every 12 (twelve) hours. for 7 days 14 capsule 6/4/2022 6/11/2022 Jersey Mcintyre MD    bacitracin 500 unit/gram Oint Apply topically 2 (two) times daily. 30 g 6/4/2022  Jersey Mcintyre MD        Follow-up Information     Follow up With Specialties Details Why Contact Info    Dagoberto Ott MD Internal Medicine Schedule an appointment as soon as possible for a visit   [address] [facility]  [address]  [facility] [address] [phone]             Jersey Mcintyre MD  06/04/22 0126

## 2022-06-05 ENCOUNTER — HOSPITAL ENCOUNTER (EMERGENCY)
Facility: HOSPITAL | Age: 37
Discharge: HOME | End: 2022-06-05
Attending: EMERGENCY MEDICINE
Payer: MEDICAID

## 2022-06-05 VITALS
WEIGHT: 160 LBS | BODY MASS INDEX: 28.35 KG/M2 | OXYGEN SATURATION: 96 % | SYSTOLIC BLOOD PRESSURE: 121 MMHG | RESPIRATION RATE: 18 BRPM | TEMPERATURE: 100 F | DIASTOLIC BLOOD PRESSURE: 76 MMHG | HEIGHT: 63 IN | HEART RATE: 105 BPM

## 2022-06-05 DIAGNOSIS — B34.9 ACUTE VIRAL SYNDROME: Primary | ICD-10-CM

## 2022-06-05 DIAGNOSIS — Z20.822 CLOSE EXPOSURE TO COVID-19 VIRUS: ICD-10-CM

## 2022-06-05 LAB — SARS-COV-2 RDRP RESP QL NAA+PROBE: NEGATIVE

## 2022-06-05 PROCEDURE — U0002 COVID-19 LAB TEST NON-CDC: HCPCS | Performed by: EMERGENCY MEDICINE

## 2022-06-05 PROCEDURE — 25000003 PHARM REV CODE 250: Performed by: EMERGENCY MEDICINE

## 2022-06-05 PROCEDURE — 99284 EMERGENCY DEPT VISIT MOD MDM: CPT

## 2022-06-05 RX ORDER — ALBUTEROL SULFATE 90 UG/1
1-2 AEROSOL, METERED RESPIRATORY (INHALATION) EVERY 6 HOURS PRN
Qty: 8 G | Refills: 0 | Status: SHIPPED | OUTPATIENT
Start: 2022-06-05 | End: 2023-06-05

## 2022-06-05 RX ORDER — ALBUTEROL SULFATE 90 UG/1
1-2 AEROSOL, METERED RESPIRATORY (INHALATION) EVERY 6 HOURS PRN
Qty: 8 G | Refills: 0 | Status: SHIPPED | OUTPATIENT
Start: 2022-06-05 | End: 2022-06-05 | Stop reason: SDUPTHER

## 2022-06-05 RX ORDER — ACETAMINOPHEN 500 MG
1000 TABLET ORAL
Status: COMPLETED | OUTPATIENT
Start: 2022-06-05 | End: 2022-06-05

## 2022-06-05 RX ORDER — BENZONATATE 100 MG/1
100 CAPSULE ORAL 3 TIMES DAILY PRN
Qty: 20 CAPSULE | Refills: 0 | Status: SHIPPED | OUTPATIENT
Start: 2022-06-05 | End: 2022-06-05 | Stop reason: SDUPTHER

## 2022-06-05 RX ORDER — BENZONATATE 100 MG/1
100 CAPSULE ORAL 3 TIMES DAILY PRN
Qty: 20 CAPSULE | Refills: 0 | Status: SHIPPED | OUTPATIENT
Start: 2022-06-05 | End: 2022-06-15

## 2022-06-05 RX ORDER — ONDANSETRON 4 MG/1
4 TABLET, ORALLY DISINTEGRATING ORAL EVERY 6 HOURS PRN
Qty: 20 TABLET | Refills: 0 | Status: SHIPPED | OUTPATIENT
Start: 2022-06-05

## 2022-06-05 RX ORDER — ONDANSETRON 4 MG/1
4 TABLET, ORALLY DISINTEGRATING ORAL EVERY 6 HOURS PRN
Qty: 20 TABLET | Refills: 0 | Status: SHIPPED | OUTPATIENT
Start: 2022-06-05 | End: 2022-06-05 | Stop reason: SDUPTHER

## 2022-06-05 RX ADMIN — ACETAMINOPHEN 1000 MG: 500 TABLET ORAL at 08:06

## 2022-06-05 NOTE — Clinical Note
"Mary DELGADO "Mile Sims was seen and treated in our emergency department on 6/5/2022.     COVID-19 is present in our communities across the state. There is limited testing for COVID at this time, so not all patients can be tested. In this situation, your employee meets the following criteria:    Mary Sims has met the criteria for COVID-19 testing and has a POSITIVE result. She can return to work once they are asymptomatic for 24 hours without the use of fever reducing medications AND at least five days from the first positive result. A mask is recommended for 5 days post quarantine.     If you have any questions or concerns, or if I can be of further assistance, please do not hesitate to contact me.    Sincerely,             Yodit Greco MD"

## 2022-06-06 NOTE — DISCHARGE INSTRUCTIONS
You have a - COVID test. But have been around someone who tested positive and have similar symptoms. You likely have COVID, You should home quarantine according to the CDC guidelines. You should expect to have some or all of the following:  Cough, shortness of breath, fever, body aches, headache, diarrhea, loss of sense of taste or smell.  You have been enrolled in the COVID [facility] program a service of [facility].  You will need to home quarantine, avoid contact with others in order not to spread COVID-19.  If your feeling excessively short of breath, have fever that does not respond to medications or feel that you are getting dehydrated from severe diarrhea, chest pain please return to the emergency department for further evaluation.  Please follow-up with your primary care physician in 1 week as needed.  Please refer to additional material provided for further instructions.  Please take your medications as prescribed.  Please stay properly hydrated.  You can take Tylenol 1000 mg every 6 hr as needed for body aches or fever.      Current CDC Guidelines:  You have been diagnosed with COVID 19. You will need to home quarantine according to the current CDC guidelines as follows:   I think or know I had COVID-19, and I had symptoms  WHEN CAN I GO BACK TO SCHOOL OR WORK  You can end isolation after 5 full days if you are fever-free for 24 hours without the use of fever-reducing medication and your other symptoms have improved (Loss of taste and smell may persist for weeks or months after recovery and need not delay the end of isolation).  You should continue to wear a well-fitting mask around others at home and in public for 5 additional days (day 6 through day 10) after the end of your 5-day isolation period. If you are unable to wear a mask when around others, you should continue to isolate for a full 10 days. Avoid people who have weakened immune systems or are more likely to get very sick from COVID-19, and  nursing homes and other high-risk settings, until after at least 10 days.  If you continue to have fever or your other symptoms have not improved after 5 days of isolation, you should wait to end your isolation until you are fever-free for 24 hours without the use of fever-reducing medication and your other symptoms have improved. Continue to wear a well-fitting mask through day 10. Contact your healthcare provider if you have questions.  See additional information about travel.  Do not go to places where you are unable to wear a mask, such as restaurants and some gyms, and avoid eating around others at home and at work until a full 10 days after your first day of symptoms.

## 2022-06-06 NOTE — ED PROVIDER NOTES
Chief Complaint   Patient presents with    COVID-19 Concerns     Pt to the Er with c/o increasing SOB, bodyaches, headache, and lost of taste. Pt reports she has been having SOB and under the care of her PCP. She reports the SOB worse. Pt has not taken anything for fever or pain.          History of Present Illness:    Mary Sims 37 y.o.  with a  has a past medical history of  has a past medical history of Cellulitis, Diabetes mellitus, Hypertension, and Retinal detachment. who presents to the emergency department today with a complaint of body aches, headache, loss of taste, slight cough, fever at home.  She reports that her significant other has very similar symptoms.  She occasionally has shortness of breath.  Symptoms started 2 days ago.  No hemopytsis, no leg swelling, no chest pain, no nausea or vomiting.      ROS    Constitutional: See above   Eyes: No discharge. No pain.  HENT: No nasal drainage. No ear ache. No sore throat.  Cardiovascular: No chest pain, no palpitations.  Respiratory: See above.  Gastrointestinal: No abdominal pain, no vomiting.   Genitourinary: No hematuria, dysuria, urgency.  Musculoskeletal: No back pain.   Skin: No rashes, no lesions.  Neurological: No headache, no focal weakness.    Otherwise remaining ROS negative     The history is provided by the patient      ALLERGIES REVIEWED  MEDICATIONS REVIEWED  PMH/PSH/SOC/FH REVIEWED       Past Medical History:   Diagnosis Date    Cellulitis     Diabetes mellitus     Hypertension     Retinal detachment       Past Surgical History:   Procedure Laterality Date    VITRECTOMY BY PARS PLANA APPROACH Right 4/24/2019    Procedure: VITRECTOMY, PARS PLANA APPROACH;  Surgeon: JAMI Schulz MD;  Location: [facility] OR [address] [place] [address];  Service: Ophthalmology;  Laterality: Right;  25g PPV/membrane stripping/ILM peel/EL/AFx/GFX/avastin OD    VITRECTOMY BY PARS PLANA APPROACH Left 5/29/2019    Procedure: VITRECTOMY, PARS PLANA  APPROACH  25g  "PPV/Membrane Stripping/IML peel/EL/AFX/Avastin left eye   60 mins;  Surgeon: JAMI Schulz MD;  Location: [facility] OR [address] [place] [address];  Service: Ophthalmology;  Laterality: Left;     Family History   Problem Relation Age of Onset    Diabetes Mother      Social History     Tobacco Use    Smoking status: Never Smoker    Smokeless tobacco: Never Used   Substance Use Topics    Alcohol use: Yes     Comment: social    Drug use: No       Nursing/Ancillary staff note reviewed.  VS reviewed         Physical Exam   /76   Pulse 105   Temp 100 °F (37.8 °C) (Oral)   Resp 18   Ht 5' 3" (1.6 m)   Wt 72.6 kg (160 lb)   SpO2 96%   Breastfeeding No   BMI 28.34 kg/m²     Physical Exam    General Appearance: The patient is alert, has no immediate need for airway protection and no signs of toxicity. No acute distress. Lying in bed but able to sit up without difficulty.   HEENT: Head: NCAT       Eyes: Pupils equal and round no pallor or injection. Extra ocular movements intact. No drainage.       Mouth: Mucous membranes are moist. Oropharynx clear.   Neck: Neck is supple non-tender. No lymphadenopathy. No stridor.   Respiratory: There are no retractions, lungs are clear to auscultation. No wheezing, no crackles. Chest wall nontender to palpation.   Cardiovascular: Regular rate and rhythm. No murmurs, rubs or gallops.  Gastrointestinal:  Abdomen is soft and non-tender in all quadrants, no masses, bowel sounds normal. No guarding, no rebound.  No pulsatile mass.   Neurological: Alert and oriented x 4. CN II-XII grossly intact. No focal weakness. Strength intact 5/5 bilaterally in upper and lower extremities.   Skin: Warm and dry, no rashes.  Musculoskeletal: Extremities are non-tender, non-swollen and have full range of motion. Back NTTP along the midline.     DIFFERENTIAL DIAGNOSIS: After history and physical exam a differential diagnosis was considered, but was not limited to, covid-19 infection, bronchitis, URI, cough, " laryngitis, tracheitis, asthma, sinusitis, pneumonia, viral, COPD, sepsis.           I independently interpreted the lab results and it showed the following abnormalities:  COVID negative    Labs Reviewed   SARS-COV-2 RNA AMPLIFICATION, QUAL              ED Course                   Medical Decision Making:   History:   I obtained history from:  The patient  Old Medical Records: I decided to obtain old medical records.       ED Management:  Mary Sims  presents to the emergency Department today with symptoms consistent with COVID.  Her significant other did test positive and although the patient tested negative her symptoms are so similar to his that I do believe that she does have COVID-19.  I have discussed this with her.  She understands the need to home quarantine. Workup today does not show any abnormalities that would require admission. They are oxygenating appropriately on room air. They will need to home quarantine according to the current guidelines by the CDC which were discuasses at length with the pt. Current guidelines for home isolation of others in the home discussed as well. Mary Sims is appropriate for discharge home under home quarantine.   Pt will also be enrolled in the COVID Surveillance program and an order for a pulse ox has been placed. I have discussed the warning signs for return with her at length. The pt is comfortable with this plan and comfortable going home at this time. After taking into careful account the historical factors and physical exam findings of the patient's presentation today, in conjunction with the empirical and objective data obtained on ED workup, no acute emergent medical condition requiring admission has been identified. The patient appears to be low risk for an emergent medical condition and I feel it is safe and appropriate at this time for the patient to be discharged to follow-up as detailed in their discharge instructions for reevaluation and  possible continued outpatient workup and management. Regardless, an unremarkable evaluation in the ED does not preclude the development or presence of a serious or life threatening condition. As such, patient was instructed to return immediately for any worsening or change in current symptoms. Precautions for return discussed at length.  Discharge and follow-up instructions discussed with the patient who expressed understanding and willingness to comply with my recommendations.    Voice recognition software utilized in this note.              Impression      The primary encounter diagnosis was Acute viral syndrome. A diagnosis of Close exposure to COVID-19 virus was also pertinent to this visit.      Discharge Medication List as of 6/5/2022  9:13 PM           Follow-up Information     Dagoberto Ott MD In 1 week.    Specialty: Internal Medicine  Why: As needed  Contact information:  [address]  [address]  [facility] [address]  [phone]             [facility] - Emergency Dept.    Specialty: Emergency Medicine  Why: If symptoms worsen  Contact information:  [address]  [facility] [id number] [phone]                            Yodit Greco MD  06/06/22 0119

## 2022-06-07 ENCOUNTER — PATIENT OUTREACH (OUTPATIENT)
Dept: EMERGENCY MEDICINE | Facility: HOSPITAL | Age: 37
End: 2022-06-07
Payer: MEDICAID

## 2022-06-07 NOTE — PROGRESS NOTES
Goldie Tavares  ED Navigator  Emergency Department    Project: [facility] [place] Navigator  Role: Community Health Worker    Date: 06/07/2022  Patient Name: Mary Sims  MRN: [id number]  PCP: Dagoberto Ott MD    Assessment:     Mary Sims is a 37 y.o. female who has presented to ED for covid concerns. Patient has visited the ED 3 times in the past 3 months. Patient did not contact PCP.     ED Navigator Initial Assessment    ED Navigator Enrollment Documentation  Consent to Services  Does patient consent to completing the assessment?: Yes  Contact  Method of Initial Contact: Phone  Transportation  Does the patient have issues with Transportation?: No  Does the patient have transportation to and from healthcare appointments?: Yes  Insurance Coverage  Do you have coverage/adequate coverage?: Yes  Type/kind of coverage: MEDICAID [employer] BETTER HEALTH  Is patient able to afford co-pays/deductibles?: Yes  Is patient able to afford HME or supplies?: Yes  Does patient have an established [facility] PCP?: Yes  Able to access?: Yes  Does the patient have a lack of adequate coverage?: No  Specialist Appointment  Did the patient come to the ED to see a specialist?: No  Does the patient have a pending specialist referral?: No  Does the patient have a specialist appointment made?: No  PCP Follow Up Appointment  Has the patient had an appointment with a primary care provider in the past year?: Yes  Approximate date: 5/24/22  Provider: Dagoberto Ott MD  Does the patient have a follow up appontment with a PCP?: No  When was the last time you saw your PCP?: 5/24/22  Why does the patient not have a follow up scheduled?: Inconvenient appointment times  Medications  Is patient able to afford medication?: Yes  Is patient unable to get medication due to lack of transportation?: No  Psychological  Does the patient have psycho-social concerns?: No  Food  Does the patient have concerns about food?: No  Communication/Education  Does the patient have  limited English proficiency/English not primary language?: No  Does patient have low literacy and/or low health literacy?: No  Does patient have concerns with care?: No  Does patient have dissatisfaction with care?: No  Other Financial Concerns  Does the patient have immediate financial distress?: No  Does the patient have general financial concerns?: No  Other Social Barriers/Concerns  Does the patient have any additional barriers or concerns?: Work  Primary Barrier  Barriers identified: Structural barrier (service availability, waiting times, etc.)  Root Cause of ED Utilization: Lack of Access to Primary Care  Plan to address Lack of Access to Primary Care: Provided patient with information about the [facility] in their area, Provided [facility] PCP assistance line [phone], Provided information for [facility] ([facility], etc[facility] Provided information for [facility] On Call 24/7 Nurse Triage line [phone] or 1-866-[facility] (1-121.315.2078), Provided information on COVID-19 resources and hotline (608-096-3010)  Next steps: Provided Education  Was education/educational materials provided surrounding PCP services/creating a medical home?: No    Was education/educational materials provided surrounding low cost, healthy foods?: No    Was education/educational materials provided surrounding other items? If so, use comment to explain.: No    Plan: Provided information for [facility] On Call 24/7 Nurse triage line, [phone] or 1-866-[facility] ([phone])  Expected Date of Follow Up 1: 6/21/22  Additional Documentation: Spoke with patient that presented to the ED for Covid concerns. Patient stated she was not feeling well. Patient was asked if she had a PCP to follow up with she stated she does and was see about 2 weeks ago, but no follow up scheduled. Patient denied needing any other assistance at this time.          Social History     Socioeconomic History    Marital status:    Tobacco Use    Smoking status: Never Smoker    Smokeless tobacco: Never Used   Substance and Sexual Activity    Alcohol use: Yes     Comment: social    Drug use: No    Sexual activity: Yes     Partners: Male     Birth control/protection: I.U.D.     Social Determinants of Health     Financial Resource Strain: Low Risk     Difficulty of Paying Living Expenses: Not very hard   Food Insecurity: No Food Insecurity    Worried About Running Out of Food in the Last Year: Never true    Ran Out of Food in the Last Year: Never true   Transportation Needs: No Transportation Needs    Lack of Transportation (Medical): No    Lack of Transportation (Non-Medical): No   Physical Activity: Inactive    Days of Exercise per Week: 0 days    Minutes of Exercise per Session: 0 min   Stress: Stress Concern Present    Feeling of Stress : To some extent   Social Connections: Moderately Integrated    Frequency of Communication with Friends and Family: More than three times a week    Frequency of Social Gatherings with Friends and Family: More than three times a week    Attends [employer] Services: More than 4 times per year    Active Member of Clubs or Organizations: No    Attends Club or Organization Meetings: Never    Marital Status: Living with partner   Housing Stability: Unknown    Unable to Pay for Housing in the Last Year: No    Unstable Housing in the Last Year: No       Plan:     Spoke with patient that presented to the ED for Covid concerns. Patient stated she was not feeling well. Patient was asked if she had a PCP to follow up with she stated she does and was see about 2 weeks ago, but no follow up scheduled. Patient denied needing any other assistance at this time.      Appointment made with: Dagoberto Ott MD

## 2022-06-21 ENCOUNTER — PATIENT OUTREACH (OUTPATIENT)
Dept: EMERGENCY MEDICINE | Facility: HOSPITAL | Age: 37
End: 2022-06-21
Payer: MEDICAID

## 2022-06-21 NOTE — PROGRESS NOTES
Spoke with patient and she stated she was feeling much better. Patient was asked if she had been to see her PCP she stated she has an appointment coming up. Patient denied needing any other assistance at this time.

## 2022-07-12 ENCOUNTER — PATIENT OUTREACH (OUTPATIENT)
Dept: EMERGENCY MEDICINE | Facility: HOSPITAL | Age: 37
End: 2022-07-12
Payer: MEDICAID

## 2022-07-12 NOTE — PROGRESS NOTES
Spoke with patient and she stated she was doing fine. Patient was asked if she had been to see her PCP she stated she had been. Patient denied needing any other assistance at this time.

## 2022-08-02 ENCOUNTER — PATIENT OUTREACH (OUTPATIENT)
Dept: EMERGENCY MEDICINE | Facility: HOSPITAL | Age: 37
End: 2022-08-02
Payer: MEDICAID

## 2022-08-12 ENCOUNTER — PATIENT OUTREACH (OUTPATIENT)
Dept: EMERGENCY MEDICINE | Facility: HOSPITAL | Age: 37
End: 2022-08-12
Payer: MEDICAID

## 2022-08-12 NOTE — PROGRESS NOTES
Spoke with patient and she stated she was doing ok in spite of all that is happening. Patient denied needing any other assistance at this time.

## 2022-09-05 ENCOUNTER — HOSPITAL ENCOUNTER (EMERGENCY)
Facility: HOSPITAL | Age: 37
Discharge: HOME | End: 2022-09-06
Attending: STUDENT IN AN ORGANIZED HEALTH CARE EDUCATION/TRAINING PROGRAM
Payer: MEDICAID

## 2022-09-05 VITALS
SYSTOLIC BLOOD PRESSURE: 179 MMHG | HEIGHT: 63 IN | HEART RATE: 102 BPM | OXYGEN SATURATION: 99 % | RESPIRATION RATE: 18 BRPM | WEIGHT: 150 LBS | BODY MASS INDEX: 26.58 KG/M2 | DIASTOLIC BLOOD PRESSURE: 100 MMHG | TEMPERATURE: 98 F

## 2022-09-05 DIAGNOSIS — B37.2 YEAST DERMATITIS: Primary | ICD-10-CM

## 2022-09-05 PROCEDURE — 99284 EMERGENCY DEPT VISIT MOD MDM: CPT

## 2022-09-05 PROCEDURE — 99284 EMERGENCY DEPT VISIT MOD MDM: CPT | Mod: ,,, | Performed by: STUDENT IN AN ORGANIZED HEALTH CARE EDUCATION/TRAINING PROGRAM

## 2022-09-05 PROCEDURE — 99284 PR EMERGENCY DEPT VISIT,LEVEL IV: ICD-10-PCS | Mod: ,,, | Performed by: STUDENT IN AN ORGANIZED HEALTH CARE EDUCATION/TRAINING PROGRAM

## 2022-09-05 NOTE — Clinical Note
"Mary Callowaydona Sims was seen and treated in our emergency department on 9/5/2022.  She may return to work on 09/06/2022.       If you have any questions or concerns, please don't hesitate to call.      Kenya Miner MD"

## 2022-09-06 RX ORDER — FLUCONAZOLE 150 MG/1
150 TABLET ORAL DAILY
Qty: 1 TABLET | Refills: 1 | Status: SHIPPED | OUTPATIENT
Start: 2022-09-06 | End: 2022-09-07

## 2022-09-06 RX ORDER — NYSTATIN 100000 U/G
CREAM TOPICAL 2 TIMES DAILY
Qty: 30 G | Refills: 1 | OUTPATIENT
Start: 2022-09-06 | End: 2022-09-20

## 2022-09-06 NOTE — ED PROVIDER NOTES
Encounter Date: 9/5/2022    SCRIBE #1 NOTE: I, Smita Oliveira, am scribing for, and in the presence of,  Kenya Miner MD. I have scribed the following portions of the note - Other sections scribed: HPI, ROS.     History     Chief Complaint   Patient presents with    Vaginal Itching     Pt c/o vaginal itching and discomfort x 2 days.      The patient is a 37 y.o. female with history of type 2 diabetes and hypertension who presents for rash.  Rash is located in the bilateral going.  She states that it feels irritated and itchy.  No fevers, chills.  No rashes in the mouth or vaginal mucosa.  She tells me that she has had several rashes like this in the past and has been diagnosed with yeast infections.  She has been putting nystatin ointment on the area for the last day is not yet improved.  Despite triage note, patient denies vaginal discharge and vaginal itching, and she states it is only in the groin area.        The history is provided by the patient and medical records. No  was used.   Review of patient's allergies indicates:  No Known Allergies  Past Medical History:   Diagnosis Date    Cellulitis     Diabetes mellitus     Hypertension     Retinal detachment      Past Surgical History:   Procedure Laterality Date    VITRECTOMY BY PARS PLANA APPROACH Right 4/24/2019    Procedure: VITRECTOMY, PARS PLANA APPROACH;  Surgeon: JAMI Schulz MD;  Location: [facility] OR [address] [place] [address];  Service: Ophthalmology;  Laterality: Right;  25g PPV/membrane stripping/ILM peel/EL/AFx/GFX/avastin OD    VITRECTOMY BY PARS PLANA APPROACH Left 5/29/2019    Procedure: VITRECTOMY, PARS PLANA  APPROACH  25g PPV/Membrane Stripping/IML peel/EL/AFX/Avastin left eye   60 mins;  Surgeon: JAMI Schulz MD;  Location: [facility] OR [address] [place] [address];  Service: Ophthalmology;  Laterality: Left;     Family History   Problem Relation Age of Onset    Diabetes Mother      Social History     Tobacco Use    Smoking status: Never    Smokeless  tobacco: Never   Substance Use Topics    Alcohol use: Yes     Comment: social    Drug use: No     Review of Systems    Constitutional: No fever  HENT: No sore throat  Eyes: No eye pain  Respiratory: No shortness of breath  Cardiovascular: No chest pain  Gastrointestinal: No abdominal pain  Genitourinary: No dysuria  Musculoskeletal: No back pain  Neurological: No headache  Psychiatric: No agitation   Skin:  Positive rash    Physical Exam     Initial Vitals [09/05/22 2216]   BP Pulse Resp Temp SpO2   (!) 179/100 102 18 98.1 °F (36.7 °C) 99 %      MAP       --         Physical Exam    Constitutional: No acute distress, well-appearing  Respiratory: Non-labored  Cardiovascular: Well perfused  Gastrointestinal: Soft, non-tender, non-distended  Integumentary: Warm and dry, erythematous well-demarcated rash in the bilateral growing that looks consistent with fungal yeast infection  Musculoskeletal: No deformity  Neurological: Awake and alert  Psychiatric: Cooperative     ED Course   Procedures  Labs Reviewed - No data to display         Imaging Results    None          Medications - No data to display  Medical Decision Making:   History:   Old Medical Records: I decided to obtain old medical records.  ED Management:  Patient presents for yeast infection in the bilateral groin area.  No mucosal involvement.  No vaginal itching or discharge.  She has a history of type 1 diabetes and multiple yeast infections.  She is nontoxic appearing.  Will prescribe nystatin topical and fluconazole orally.  Patient was counseled on return precautions and instructed to follow up with her primary care doctor.        Scribe Attestation:   Scribe #1: I performed the above scribed service and the documentation accurately describes the services I performed. I attest to the accuracy of the note.               Clinical Impression:   Final diagnoses:  [B37.2] Yeast dermatitis (Primary)      ED Disposition Condition    Discharge Stable          ED  Prescriptions       Medication Sig Dispense Start Date End Date Auth. Provider    fluconazole (DIFLUCAN) 150 MG Tab (Expires today) Take 1 tablet (150 mg total) by mouth once daily. for 1 day 1 tablet 9/6/2022 9/7/2022 Kenya Miner MD    nystatin (MYCOSTATIN) cream Apply topically 2 (two) times daily. 30 g 9/6/2022 -- Kenya Miner MD          Follow-up Information       Follow up With Specialties Details Why Contact Info    Dagoberto Ott MD Internal Medicine Schedule an appointment as soon as possible for a visit   [address] [facility] [address]  [address]  [facility] [address] [phone]      [facility] - Emergency Dept Emergency Medicine  As needed, If symptoms worsen [address] [address] [phone]             Kenya Miner MD  09/07/22 8231

## 2022-09-06 NOTE — ED TRIAGE NOTES
"Mary Sims, a 37 y.o. female presents to the ED w/ complaint of itching. Upon further questioning, pt reports that itching, "rawness", and discomfort is along her pelvic creases in the skin folds, not actually within her vagina. Pt states that sx have been persistent for 2 days. Pt denies having used any topical or PO medications PTA. Denies dysuria or blood in urine. Denies any other complaints.     Triage note:  Chief Complaint   Patient presents with    Vaginal Itching     Pt c/o vaginal itching and discomfort x 2 days.      Review of patient's allergies indicates:  No Known Allergies  Past Medical History:   Diagnosis Date    Cellulitis     Diabetes mellitus     Hypertension     Retinal detachment      LOC: The patient is awake, alert, and oriented to self, place, time, and situation. Pt is calm and cooperative. Affect is appropriate.  Speech is appropriate and clear.     APPEARANCE: Patient resting comfortably in no acute distress.  Patient is clean and well groomed.    SKIN: The skin is warm and dry; color consistent with ethnicity.  Patient has normal skin turgor and moist mucus membranes. Erythema, itching, chafing to skin folds around outside of pelvic area.     MUSCULOSKELETAL: Patient moving upper and lower extremities without difficulty; denies pain in the extremities or back.  Denies weakness.     RESPIRATORY: Airway is open and patent. Respirations spontaneous, even, easy, and non-labored.  Patient has a normal effort and rate.  No accessory muscle use noted. Denies cough.     CARDIAC:  Normal rate noted.  No peripheral edema noted. No complaints of chest pain.      ABDOMEN: Soft and non tender to palpation.  No distention noted. Pt denies abdominal pain; denies nausea, vomiting, diarrhea, or constipation.    NEUROLOGIC: Eyes open spontaneously.  Behavior appropriate to situation.  Follows commands; facial expression symmetrical.  Purposeful motor response noted; normal sensation in all " extremities. Pt denies headache; denies lightheadedness or dizziness; denies visual disturbances; denies loss of balance; denies unilateral weakness.

## 2022-09-06 NOTE — DISCHARGE INSTRUCTIONS
Please follow-up with your primary care doctor.  If your symptoms worsen or you develop fever or if any other concerns, you should return to the emergency department for re-evaluation.

## 2022-09-12 ENCOUNTER — OFFICE VISIT (OUTPATIENT)
Dept: URGENT CARE | Facility: CLINIC | Age: 37
End: 2022-09-12
Payer: MEDICAID

## 2022-09-12 VITALS
SYSTOLIC BLOOD PRESSURE: 130 MMHG | RESPIRATION RATE: 20 BRPM | WEIGHT: 150 LBS | DIASTOLIC BLOOD PRESSURE: 70 MMHG | BODY MASS INDEX: 26.58 KG/M2 | OXYGEN SATURATION: 99 % | HEIGHT: 63 IN | HEART RATE: 68 BPM | TEMPERATURE: 97 F

## 2022-09-12 DIAGNOSIS — B37.2 CANDIDAL INTERTRIGO: Primary | ICD-10-CM

## 2022-09-12 PROCEDURE — 3008F BODY MASS INDEX DOCD: CPT | Mod: CPTII,S$GLB,, | Performed by: PHYSICIAN ASSISTANT

## 2022-09-12 PROCEDURE — 3075F PR MOST RECENT SYSTOLIC BLOOD PRESS GE 130-139MM HG: ICD-10-PCS | Mod: CPTII,S$GLB,, | Performed by: PHYSICIAN ASSISTANT

## 2022-09-12 PROCEDURE — 1160F RVW MEDS BY RX/DR IN RCRD: CPT | Mod: CPTII,S$GLB,, | Performed by: PHYSICIAN ASSISTANT

## 2022-09-12 PROCEDURE — 3078F DIAST BP <80 MM HG: CPT | Mod: CPTII,S$GLB,, | Performed by: PHYSICIAN ASSISTANT

## 2022-09-12 PROCEDURE — 3008F PR BODY MASS INDEX (BMI) DOCUMENTED: ICD-10-PCS | Mod: CPTII,S$GLB,, | Performed by: PHYSICIAN ASSISTANT

## 2022-09-12 PROCEDURE — 99213 OFFICE O/P EST LOW 20 MIN: CPT | Mod: S$GLB,,, | Performed by: PHYSICIAN ASSISTANT

## 2022-09-12 PROCEDURE — 1159F PR MEDICATION LIST DOCUMENTED IN MEDICAL RECORD: ICD-10-PCS | Mod: CPTII,S$GLB,, | Performed by: PHYSICIAN ASSISTANT

## 2022-09-12 PROCEDURE — 3046F PR MOST RECENT HEMOGLOBIN A1C LEVEL > 9.0%: ICD-10-PCS | Mod: CPTII,S$GLB,, | Performed by: PHYSICIAN ASSISTANT

## 2022-09-12 PROCEDURE — 4010F PR ACE/ARB THEARPY RXD/TAKEN: ICD-10-PCS | Mod: CPTII,S$GLB,, | Performed by: PHYSICIAN ASSISTANT

## 2022-09-12 PROCEDURE — 99213 PR OFFICE/OUTPT VISIT, EST, LEVL III, 20-29 MIN: ICD-10-PCS | Mod: S$GLB,,, | Performed by: PHYSICIAN ASSISTANT

## 2022-09-12 PROCEDURE — 3075F SYST BP GE 130 - 139MM HG: CPT | Mod: CPTII,S$GLB,, | Performed by: PHYSICIAN ASSISTANT

## 2022-09-12 PROCEDURE — 3078F PR MOST RECENT DIASTOLIC BLOOD PRESSURE < 80 MM HG: ICD-10-PCS | Mod: CPTII,S$GLB,, | Performed by: PHYSICIAN ASSISTANT

## 2022-09-12 PROCEDURE — 1159F MED LIST DOCD IN RCRD: CPT | Mod: CPTII,S$GLB,, | Performed by: PHYSICIAN ASSISTANT

## 2022-09-12 PROCEDURE — 3046F HEMOGLOBIN A1C LEVEL >9.0%: CPT | Mod: CPTII,S$GLB,, | Performed by: PHYSICIAN ASSISTANT

## 2022-09-12 PROCEDURE — 4010F ACE/ARB THERAPY RXD/TAKEN: CPT | Mod: CPTII,S$GLB,, | Performed by: PHYSICIAN ASSISTANT

## 2022-09-12 PROCEDURE — 1160F PR REVIEW ALL MEDS BY PRESCRIBER/CLIN PHARMACIST DOCUMENTED: ICD-10-PCS | Mod: CPTII,S$GLB,, | Performed by: PHYSICIAN ASSISTANT

## 2022-09-12 RX ORDER — HYDROCORTISONE 25 MG/G
CREAM TOPICAL 2 TIMES DAILY
Qty: 28 G | Refills: 0 | Status: SHIPPED | OUTPATIENT
Start: 2022-09-12 | End: 2022-09-19

## 2022-09-12 RX ORDER — KETOCONAZOLE 20 MG/G
CREAM TOPICAL 2 TIMES DAILY
Qty: 60 G | Refills: 0 | Status: SHIPPED | OUTPATIENT
Start: 2022-09-12 | End: 2022-09-19

## 2022-09-12 RX ORDER — FLUCONAZOLE 150 MG/1
150 TABLET ORAL ONCE
Qty: 1 TABLET | Refills: 0 | Status: SHIPPED | OUTPATIENT
Start: 2022-09-12 | End: 2022-09-12

## 2022-09-12 NOTE — PROGRESS NOTES
Subjective:       Patient ID: Mary Sims is a 37 y.o. female.    Chief Complaint: No chief complaint on file.    HPI  ROS     Objective:      Physical Exam    Assessment:       No diagnosis found.    Plan:                   No follow-ups on file.

## 2022-09-12 NOTE — PROGRESS NOTES
"Subjective:       Patient ID: Mary Sims is a 37 y.o. female.    Vitals:  height is 5' 3" (1.6 m) and weight is 68 kg (150 lb). Her temperature is 97.1 °F (36.2 °C). Her blood pressure is 130/70 and her pulse is 68. Her respiration is 20 and oxygen saturation is 99%.     Chief Complaint: Rash (Seen in ED ) and pubic crease /  seen in ED    Ms. Sims presents for evaluation of rash.  She reports a history of diabetes and frequently gets yeast intertrigo.  She reports this started over week ago.  The rash is in her groin creases, bilateral buttocks and beneath her abdominal skin fold.  The rash is extremely itchy.  She was seen in the ED for this rash in prescribed 1 time Diflucan and nystatin cream.  She reports she did get 1 more Diflucan pill last week.  Her symptoms have not improved.  She denies any fevers or chills.  She denies any vesicles, drainage from the rash.  She reports her blood sugars are uncontrolled, commonly in the 300s.  She has tried keeping the area clean and dry.    Rash  This is a recurrent problem. The current episode started in the past 7 days. The problem has been gradually worsening since onset. Pertinent negatives include no congestion, cough, diarrhea, eye pain, fatigue, fever, shortness of breath, sore throat or vomiting.     Constitution: Negative for appetite change, chills, sweating, fatigue and fever.   HENT:  Negative for ear pain, ear discharge, hearing loss, drooling, congestion, postnasal drip, sinus pain, sinus pressure and sore throat.    Neck: Negative for neck stiffness and painful lymph nodes.   Cardiovascular:  Negative for chest trauma, chest pain, leg swelling, palpitations, sob on exertion and passing out.   Eyes:  Negative for eye pain and blurred vision.   Respiratory:  Negative for chest tightness, cough, sputum production, shortness of breath and wheezing.    Gastrointestinal:  Negative for abdominal pain, nausea, vomiting and diarrhea.   Genitourinary:  " Negative for dysuria, frequency and urgency.   Musculoskeletal:  Negative for joint pain, joint swelling, muscle cramps and muscle ache.   Skin:  Positive for rash. Negative for erythema.   Allergic/Immunologic: Negative for itching and sneezing.   Neurological:  Negative for dizziness, history of vertigo, light-headedness, passing out, facial drooping, speech difficulty, coordination disturbances, loss of balance, headaches and altered mental status.   Hematologic/Lymphatic: Negative for swollen lymph nodes and easy bruising/bleeding. Does not bruise/bleed easily.   Psychiatric/Behavioral:  Negative for altered mental status.      Objective:      Physical Exam   Constitutional: She is oriented to person, place, and time. She appears well-developed.   HENT:   Head: Normocephalic and atraumatic. Head is without abrasion, without contusion and without laceration.   Ears:   Right Ear: External ear normal.   Left Ear: External ear normal.   Nose: Nose normal.   Mouth/Throat: Oropharynx is clear and moist and mucous membranes are normal.   Eyes: Conjunctivae, EOM and lids are normal. Pupils are equal, round, and reactive to light.   Neck: Trachea normal and phonation normal. Neck supple.   Cardiovascular: Normal rate, regular rhythm and normal heart sounds.   Pulmonary/Chest: Effort normal and breath sounds normal. No stridor. No respiratory distress.   Musculoskeletal: Normal range of motion.         General: Normal range of motion.   Neurological: She is alert and oriented to person, place, and time.   Skin: Skin is warm, dry, intact and no rash. Capillary refill takes less than 2 seconds. No abrasion, No burn, No bruising, No erythema and No ecchymosis        Psychiatric: Her speech is normal and behavior is normal. Judgment and thought content normal.   Nursing note and vitals reviewed.      Assessment:       1. Candidal intertrigo          Plan:         Candidal intertrigo  -     Ambulatory referral/consult to  Dermatology    Other orders  -     ketoconazole (NIZORAL) 2 % cream; Apply topically 2 (two) times daily. for 7 days  Dispense: 60 g; Refill: 0  -     hydrocortisone 2.5 % cream; Apply topically 2 (two) times daily. for 7 days  Dispense: 28 g; Refill: 0  -     fluconazole (DIFLUCAN) 150 MG Tab; Take 1 tablet (150 mg total) by mouth once. for 1 dose  Dispense: 1 tablet; Refill: 0    Advised she really needs to work on getting her glucose controlled.  Will give one more dose PO diflucan & start ketoconazole cream, d/c nystatin.  She may need multiple weeks tx with diflucan once weekly, but will defer further tx with dermatology.  Referral placed.  Diagnoses and plan discussed with the patient, as well as the expected course and duration of her symptoms. All questions and concerns were addressed prior to discharge.  She was advised to follow up with her PCP within 1 week if symptoms do not improve. Emergency department precautions were given. Patient verbalized understanding and was happy with the plan of care.   Note dictated with voice recognition software, please excuse any grammatical errors.    Patient Instructions   PLEASE READ YOUR DISCHARGE INSTRUCTIONS ENTIRELY AS IT CONTAINS IMPORTANT INFORMATION.  A referral to dermatology was placed for you.  You should receive a phone call in the next few days to schedule an appt.  Please call [phone] to schedule an appt if have not gotten a phone call in the next few days.    - Rest.    - Drink plenty of fluids.    - Tylenol or Ibuprofen as directed as needed for fever/pain.    - If you were prescribed antibiotics, please take them to completion.  - If you are female and on birth control pills - please use additional methods of contraception to prevent pregnancy while on antibiotics and for one cycle after.   - If you were prescribed a narcotic medication, a cough syrup, or a muscle relaxer, do not drive or operate heavy equipment or machinery while taking these  medications, as they can cause drowsiness.   - If a referral to a specialty was made today, you should receive a phone call in the next few days to schedule an appt.  Please call [phone] to schedule an appt if have not gotten a phone call in the next few days.  - If you smoke, please stop smoking.  -You must understand that you've received an Urgent Care treatment only and that you may be released before all your medical problems are known or treated. You, the patient, will arrange for follow up care as instructed. Please arrange follow up with your primary medical clinic as soon as possible.   - Follow up with your PCP or specialty clinic as directed in the next 1-2 weeks if not improved or as needed.  You can call [phone] to schedule an appointment with the appropriate provider.    - Please return to Urgent Care or to the Emergency Department if your symptoms worsen.    Patient aware and verbalized understanding.

## 2022-09-12 NOTE — LETTER
September 12, 2022      [facility]  [address] [address]  Phone: [phone]  Fax: [phone]       Patient: Mary Sims   YOB: 1985  Date of Visit: 09/12/2022    To Whom It May Concern:    Wale Sims  was at [facility] on 09/12/2022. The patient may return to work/school on 9/13/2022 with no restrictions. If you have any questions or concerns, or if I can be of further assistance, please do not hesitate to contact me.    Sincerely,    Angela Walker PA-C

## 2022-09-12 NOTE — PATIENT INSTRUCTIONS
PLEASE READ YOUR DISCHARGE INSTRUCTIONS ENTIRELY AS IT CONTAINS IMPORTANT INFORMATION.  A referral to dermatology was placed for you.  You should receive a phone call in the next few days to schedule an appt.  Please call [phone] to schedule an appt if have not gotten a phone call in the next few days.    - Rest.    - Drink plenty of fluids.    - Tylenol or Ibuprofen as directed as needed for fever/pain.    - If you were prescribed antibiotics, please take them to completion.  - If you are female and on birth control pills - please use additional methods of contraception to prevent pregnancy while on antibiotics and for one cycle after.   - If you were prescribed a narcotic medication, a cough syrup, or a muscle relaxer, do not drive or operate heavy equipment or machinery while taking these medications, as they can cause drowsiness.   - If a referral to a specialty was made today, you should receive a phone call in the next few days to schedule an appt.  Please call [phone] to schedule an appt if have not gotten a phone call in the next few days.  - If you smoke, please stop smoking.  -You must understand that you've received an Urgent Care treatment only and that you may be released before all your medical problems are known or treated. You, the patient, will arrange for follow up care as instructed. Please arrange follow up with your primary medical clinic as soon as possible.   - Follow up with your PCP or specialty clinic as directed in the next 1-2 weeks if not improved or as needed.  You can call [phone] to schedule an appointment with the appropriate provider.    - Please return to Urgent Care or to the Emergency Department if your symptoms worsen.    Patient aware and verbalized understanding.

## 2022-09-20 ENCOUNTER — HOSPITAL ENCOUNTER (EMERGENCY)
Facility: HOSPITAL | Age: 37
Discharge: HOME | End: 2022-09-20
Attending: STUDENT IN AN ORGANIZED HEALTH CARE EDUCATION/TRAINING PROGRAM
Payer: MEDICAID

## 2022-09-20 VITALS
SYSTOLIC BLOOD PRESSURE: 136 MMHG | RESPIRATION RATE: 18 BRPM | HEART RATE: 102 BPM | DIASTOLIC BLOOD PRESSURE: 89 MMHG | OXYGEN SATURATION: 99 % | TEMPERATURE: 98 F | BODY MASS INDEX: 26.57 KG/M2 | WEIGHT: 150 LBS

## 2022-09-20 DIAGNOSIS — E11.65 TYPE 2 DIABETES MELLITUS WITH HYPERGLYCEMIA, WITH LONG-TERM CURRENT USE OF INSULIN: ICD-10-CM

## 2022-09-20 DIAGNOSIS — R21 RASH AND NONSPECIFIC SKIN ERUPTION: Primary | ICD-10-CM

## 2022-09-20 DIAGNOSIS — Z79.4 TYPE 2 DIABETES MELLITUS WITH HYPERGLYCEMIA, WITH LONG-TERM CURRENT USE OF INSULIN: ICD-10-CM

## 2022-09-20 LAB
ALBUMIN SERPL BCP-MCNC: 2.6 G/DL (ref 3.5–5.2)
ALP SERPL-CCNC: 81 U/L (ref 55–135)
ALT SERPL W/O P-5'-P-CCNC: 11 U/L (ref 10–44)
ANION GAP SERPL CALC-SCNC: 6 MMOL/L (ref 8–16)
ANISOCYTOSIS BLD QL SMEAR: SLIGHT
AST SERPL-CCNC: 11 U/L (ref 10–40)
B-HCG UR QL: NEGATIVE
B-OH-BUTYR BLD STRIP-SCNC: 0.3 MMOL/L (ref 0–0.5)
BASOPHILS NFR BLD: 0 % (ref 0–1.9)
BILIRUB SERPL-MCNC: 0.3 MG/DL (ref 0.1–1)
BUN SERPL-MCNC: 14 MG/DL (ref 6–20)
CALCIUM SERPL-MCNC: 9.2 MG/DL (ref 8.7–10.5)
CHLORIDE SERPL-SCNC: 103 MMOL/L (ref 95–110)
CO2 SERPL-SCNC: 26 MMOL/L (ref 23–29)
CREAT SERPL-MCNC: 1 MG/DL (ref 0.5–1.4)
CTP QC/QA: YES
DIFFERENTIAL METHOD: ABNORMAL
EOSINOPHIL NFR BLD: 3 % (ref 0–8)
ERYTHROCYTE [DISTWIDTH] IN BLOOD BY AUTOMATED COUNT: 14.7 % (ref 11.5–14.5)
EST. GFR  (NO RACE VARIABLE): >60 ML/MIN/1.73 M^2
GLUCOSE SERPL-MCNC: 345 MG/DL (ref 70–110)
HCT VFR BLD AUTO: 35.4 % (ref 37–48.5)
HGB BLD-MCNC: 11.8 G/DL (ref 12–16)
IMM GRANULOCYTES # BLD AUTO: ABNORMAL K/UL (ref 0–0.04)
IMM GRANULOCYTES NFR BLD AUTO: ABNORMAL % (ref 0–0.5)
LYMPHOCYTES NFR BLD: 33 % (ref 18–48)
MCH RBC QN AUTO: 23.4 PG (ref 27–31)
MCHC RBC AUTO-ENTMCNC: 33.3 G/DL (ref 32–36)
MCV RBC AUTO: 70 FL (ref 82–98)
MONOCYTES NFR BLD: 8 % (ref 4–15)
NEUTROPHILS NFR BLD: 56 % (ref 38–73)
NRBC BLD-RTO: 0 /100 WBC
PLATELET # BLD AUTO: 181 K/UL (ref 150–450)
PLATELET BLD QL SMEAR: ABNORMAL
PMV BLD AUTO: ABNORMAL FL (ref 9.2–12.9)
POCT GLUCOSE: 227 MG/DL (ref 70–110)
POCT GLUCOSE: 332 MG/DL (ref 70–110)
POCT GLUCOSE: 389 MG/DL (ref 70–110)
POTASSIUM SERPL-SCNC: 4.9 MMOL/L (ref 3.5–5.1)
PROT SERPL-MCNC: 6.3 G/DL (ref 6–8.4)
RBC # BLD AUTO: 5.05 M/UL (ref 4–5.4)
SODIUM SERPL-SCNC: 135 MMOL/L (ref 136–145)
WBC # BLD AUTO: 6.15 K/UL (ref 3.9–12.7)

## 2022-09-20 PROCEDURE — 99284 EMERGENCY DEPT VISIT MOD MDM: CPT | Mod: 25

## 2022-09-20 PROCEDURE — 25000003 PHARM REV CODE 250: Performed by: NURSE PRACTITIONER

## 2022-09-20 PROCEDURE — 96361 HYDRATE IV INFUSION ADD-ON: CPT

## 2022-09-20 PROCEDURE — 85007 BL SMEAR W/DIFF WBC COUNT: CPT | Performed by: NURSE PRACTITIONER

## 2022-09-20 PROCEDURE — 85027 COMPLETE CBC AUTOMATED: CPT | Performed by: NURSE PRACTITIONER

## 2022-09-20 PROCEDURE — 81025 URINE PREGNANCY TEST: CPT | Performed by: EMERGENCY MEDICINE

## 2022-09-20 PROCEDURE — 80053 COMPREHEN METABOLIC PANEL: CPT | Performed by: NURSE PRACTITIONER

## 2022-09-20 PROCEDURE — 82010 KETONE BODYS QUAN: CPT | Performed by: NURSE PRACTITIONER

## 2022-09-20 PROCEDURE — 96374 THER/PROPH/DIAG INJ IV PUSH: CPT

## 2022-09-20 PROCEDURE — 63600175 PHARM REV CODE 636 W HCPCS: Performed by: NURSE PRACTITIONER

## 2022-09-20 RX ORDER — HYDROXYZINE HYDROCHLORIDE 25 MG/1
25 TABLET, FILM COATED ORAL 4 TIMES DAILY PRN
Qty: 15 TABLET | Refills: 0 | Status: SHIPPED | OUTPATIENT
Start: 2022-09-20

## 2022-09-20 RX ORDER — NYSTATIN 100000 [USP'U]/G
POWDER TOPICAL 2 TIMES DAILY
Qty: 15 G | Refills: 0 | Status: SHIPPED | OUTPATIENT
Start: 2022-09-20

## 2022-09-20 RX ORDER — HYDROXYZINE PAMOATE 25 MG/1
25 CAPSULE ORAL
Status: COMPLETED | OUTPATIENT
Start: 2022-09-20 | End: 2022-09-20

## 2022-09-20 RX ADMIN — HYDROXYZINE PAMOATE 25 MG: 25 CAPSULE ORAL at 10:09

## 2022-09-20 RX ADMIN — INSULIN HUMAN 10 UNITS: 100 INJECTION, SOLUTION PARENTERAL at 12:09

## 2022-09-20 RX ADMIN — SODIUM CHLORIDE 1000 ML: 0.9 INJECTION, SOLUTION INTRAVENOUS at 09:09

## 2022-09-20 NOTE — Clinical Note
"Mary Lemus" Levi was seen and treated in our emergency department on 9/20/2022.  She may return to work on 09/21/2022.       If you have any questions or concerns, please don't hesitate to call.      Mitesh HALL    "

## 2022-09-20 NOTE — DISCHARGE INSTRUCTIONS

## 2022-09-20 NOTE — ED PROVIDER NOTES
Encounter Date: 9/20/2022       History     Chief Complaint   Patient presents with    Rash     X 3 weeks, pt was dx with candidal intertrigo, pt seen by derm, and pt states no relief from RX med,  + itching reported      37 yr old female presents to the ER with reports of a rash that began weeks ago. Pt states she was treated with steroid cream, fungal cream and Elidel with no improvement in condition. Pt states she was seen by derm however would like a second opinion as the rash is still itching and has not resolved. Pt is diabetic and states her blood sugar has been running around 200 range. Denies fever or neck stiffness. No new lotions or detergents, no new meds, no living changes. PMH of cellulitis, HTN, DM, retinal detachment.     The history is provided by the patient. No  was used.   Review of patient's allergies indicates:  No Known Allergies  Past Medical History:   Diagnosis Date    Cellulitis     Diabetes mellitus     Hypertension     Retinal detachment      Past Surgical History:   Procedure Laterality Date    VITRECTOMY BY PARS PLANA APPROACH Right 4/24/2019    Procedure: VITRECTOMY, PARS PLANA APPROACH;  Surgeon: JAMI Schulz MD;  Location: [facility] OR [address] [place] [address];  Service: Ophthalmology;  Laterality: Right;  25g PPV/membrane stripping/ILM peel/EL/AFx/GFX/avastin OD    VITRECTOMY BY PARS PLANA APPROACH Left 5/29/2019    Procedure: VITRECTOMY, PARS PLANA  APPROACH  25g PPV/Membrane Stripping/IML peel/EL/AFX/Avastin left eye   60 mins;  Surgeon: JAMI Schulz MD;  Location: [facility] OR [address] [place] [address];  Service: Ophthalmology;  Laterality: Left;     Family History   Problem Relation Age of Onset    Diabetes Mother      Social History     Tobacco Use    Smoking status: Never    Smokeless tobacco: Never   Substance Use Topics    Alcohol use: Yes     Comment: social    Drug use: No     Review of Systems   Constitutional: Negative.    HENT: Negative.     Eyes: Negative.    Respiratory:  Negative.     Cardiovascular: Negative.    Gastrointestinal: Negative.    Endocrine: Negative.    Genitourinary: Negative.    Musculoskeletal: Negative.    Skin:  Positive for rash.   Neurological: Negative.    Hematological: Negative.    Psychiatric/Behavioral: Negative.       Physical Exam     Initial Vitals [09/20/22 0822]   BP Pulse Resp Temp SpO2   136/89 102 18 98.2 °F (36.8 °C) 99 %      MAP       --         Physical Exam    Constitutional: She appears well-developed and well-nourished.   HENT:   Head: Normocephalic.   Right Ear: Hearing and tympanic membrane normal.   Left Ear: Hearing and tympanic membrane normal.   Nose: Nose normal.   Mouth/Throat: Oropharynx is clear and moist.   No oral lesions noted. No sloughing of skin.    Eyes: Lids are normal. Pupils are equal, round, and reactive to light.   Neck:   Normal range of motion.  Cardiovascular:  Normal rate.           Pulmonary/Chest: Breath sounds normal. No respiratory distress. She has no wheezes. She has no rhonchi.   Abdominal: Abdomen is soft. There is no abdominal tenderness.   Musculoskeletal:         General: Normal range of motion.      Cervical back: Normal range of motion. No rigidity.     Neurological: She is alert and oriented to person, place, and time.   Skin: Skin is warm and dry. Rash noted. No bruising, no laceration and no petechiae noted. Rash is maculopapular. Rash is not pustular, not vesicular and not urticarial. No erythema.   There are no open lesions noted, no vesicles, no cellulitis noted. Rash is noted to arms, inner thigh and buttock. No gross abscess noted. Rash noted between the buttock folds appears to be consistent with yeast as it is beefy red in nature.    Psychiatric: She has a normal mood and affect. Her behavior is normal. Judgment and thought content normal.       ED Course   Procedures  Labs Reviewed   CBC W/ AUTO DIFFERENTIAL - Abnormal; Notable for the following components:       Result Value    Hemoglobin  11.8 (*)     Hematocrit 35.4 (*)     MCV 70 (*)     MCH 23.4 (*)     RDW 14.7 (*)     All other components within normal limits   COMPREHENSIVE METABOLIC PANEL - Abnormal; Notable for the following components:    Sodium 135 (*)     Glucose 345 (*)     Albumin 2.6 (*)     Anion Gap 6 (*)     All other components within normal limits   POCT GLUCOSE - Abnormal; Notable for the following components:    POCT Glucose 389 (*)     All other components within normal limits   POCT GLUCOSE - Abnormal; Notable for the following components:    POCT Glucose 332 (*)     All other components within normal limits   POCT GLUCOSE - Abnormal; Notable for the following components:    POCT Glucose 227 (*)     All other components within normal limits   BETA - HYDROXYBUTYRATE, SERUM   POCT URINE PREGNANCY   POCT GLUCOSE MONITORING CONTINUOUS   POCT GLUCOSE MONITORING CONTINUOUS   POCT GLUCOSE MONITORING CONTINUOUS          Imaging Results    None          Medications   sodium chloride 0.9% bolus 1,000 mL (0 mLs Intravenous Stopped 9/20/22 1146)   hydrOXYzine pamoate capsule 25 mg (25 mg Oral Given 9/20/22 1052)   insulin regular injection 10 Units 0.1 mL (10 Units Intravenous Given 9/20/22 1204)                 ED Course as of 09/20/22 1255   Tue Sep 20, 2022   1153 Insulin given at this time. Pt tolerated fluids well however only dropped from 345 to 332 [DT]   1254 Patient notified of the need for follow-up care with the primary care physician in addition to the referral being sent to Dermatology.  After fluids and insulin, the patient's glucose is panel at 227 The patient with nystatin powder verses the creams that she used in the past.  She has been instructed to keep close eye on her blood sugars and keep them within normal range.  Strict return precautions have been given.  She stable at this time for discharge. [DT]      ED Course User Index  [DT] Nina Fung NP                 Clinical Impression:   Final diagnoses:  [R21]  Rash and nonspecific skin eruption (Primary)  [E11.65, Z79.4] Type 2 diabetes mellitus with hyperglycemia, with long-term current use of insulin        ED Disposition Condition    Discharge Stable          ED Prescriptions       Medication Sig Dispense Start Date End Date Auth. Provider    hydrOXYzine HCL (ATARAX) 25 MG tablet Take 1 tablet (25 mg total) by mouth 4 (four) times daily as needed for Itching. 15 tablet 9/20/2022 -- Nina Fung NP    nystatin (MYCOSTATIN) powder Apply topically 2 (two) times daily. 15 g 9/20/2022 -- Nina uFng NP          Follow-up Information    None          Nina Fung NP  09/20/22 1141

## 2022-09-20 NOTE — Clinical Note
"Mary Lemus" Levi was seen and treated in our emergency department on 9/20/2022.  She may return to work on 09/21/2022.       If you have any questions or concerns, please don't hesitate to call.       RN    "

## 2022-09-20 NOTE — ED TRIAGE NOTES
Review of patient's allergies indicates:  No Known Allergies  Chief Complaint   Patient presents with    Rash     X 3 weeks, pt was dx with candidal intertrigo, pt seen by derm, and pt states no relief from RX med,  + itching reported      Past Medical History:   Diagnosis Date    Cellulitis     Diabetes mellitus     Hypertension     Retinal detachment        Patient identifiers verified and correct.     LOC: The patient is awake, alert and aware of environment with an appropriate affect, the patient is oriented x 3 and speaking appropriately.     APPEARANCE: Patient appears comfortable and in no acute distress, patient is clean and well groomed.    HEENT: Head symmetrical. Eyes bilateral.  Bilateral ears without drainage. Bilateral nares patent, throat clear.    SKIN: The skin is warm and dry, color consistent with ethnicity, patient has normal skin turgor and moist mucus membranes, skin intact, no breakdown or bruising noted.     MUSCULOSKELETAL: Patient moving all extremities spontaneously, no swelling noted.    RESPIRATORY: Airway is open and patent, respirations are spontaneous, patient has a normal effort and rate, no accessory muscle use noted.     CARDIAC: Patient has a normal rate and regular rhythm, no edema noted, capillary refill < 3 seconds.     GASTRO: Abdomen soft and non-distended.     NEURO: Pt opens eyes spontaneously pupils equal, round, and reactive. behavior appropriate to situation, follows commands, facial expression symmetrical,    NEUROVASCULAR: All extremities are warm and pink.     Will continue to monitor.

## 2022-09-22 ENCOUNTER — HOSPITAL ENCOUNTER (EMERGENCY)
Facility: HOSPITAL | Age: 37
Discharge: HOME | End: 2022-09-23
Attending: EMERGENCY MEDICINE
Payer: MEDICAID

## 2022-09-22 DIAGNOSIS — L28.2 PRURITIC RASH: Primary | ICD-10-CM

## 2022-09-22 DIAGNOSIS — R73.9 HYPERGLYCEMIA: ICD-10-CM

## 2022-09-22 LAB
ALBUMIN SERPL BCP-MCNC: 2.7 G/DL (ref 3.5–5.2)
ALP SERPL-CCNC: 83 U/L (ref 55–135)
ALT SERPL W/O P-5'-P-CCNC: 14 U/L (ref 10–44)
ANION GAP SERPL CALC-SCNC: 11 MMOL/L (ref 8–16)
AST SERPL-CCNC: 23 U/L (ref 10–40)
BASOPHILS # BLD AUTO: 0.07 K/UL (ref 0–0.2)
BASOPHILS NFR BLD: 0.9 % (ref 0–1.9)
BILIRUB SERPL-MCNC: 0.2 MG/DL (ref 0.1–1)
BUN SERPL-MCNC: 22 MG/DL (ref 6–20)
CALCIUM SERPL-MCNC: 9.7 MG/DL (ref 8.7–10.5)
CHLORIDE SERPL-SCNC: 101 MMOL/L (ref 95–110)
CO2 SERPL-SCNC: 24 MMOL/L (ref 23–29)
CREAT SERPL-MCNC: 1.6 MG/DL (ref 0.5–1.4)
DIFFERENTIAL METHOD: ABNORMAL
EOSINOPHIL # BLD AUTO: 0.2 K/UL (ref 0–0.5)
EOSINOPHIL NFR BLD: 2.4 % (ref 0–8)
ERYTHROCYTE [DISTWIDTH] IN BLOOD BY AUTOMATED COUNT: 14.6 % (ref 11.5–14.5)
EST. GFR  (NO RACE VARIABLE): 43 ML/MIN/1.73 M^2
GLUCOSE SERPL-MCNC: 355 MG/DL (ref 70–110)
HCT VFR BLD AUTO: 31.1 % (ref 37–48.5)
HGB BLD-MCNC: 10.6 G/DL (ref 12–16)
IMM GRANULOCYTES # BLD AUTO: 0.03 K/UL (ref 0–0.04)
IMM GRANULOCYTES NFR BLD AUTO: 0.4 % (ref 0–0.5)
LYMPHOCYTES # BLD AUTO: 1.9 K/UL (ref 1–4.8)
LYMPHOCYTES NFR BLD: 23.3 % (ref 18–48)
MAGNESIUM SERPL-MCNC: 2 MG/DL (ref 1.6–2.6)
MCH RBC QN AUTO: 23.6 PG (ref 27–31)
MCHC RBC AUTO-ENTMCNC: 34.1 G/DL (ref 32–36)
MCV RBC AUTO: 69 FL (ref 82–98)
MONOCYTES # BLD AUTO: 0.5 K/UL (ref 0.3–1)
MONOCYTES NFR BLD: 6.4 % (ref 4–15)
NEUTROPHILS # BLD AUTO: 5.5 K/UL (ref 1.8–7.7)
NEUTROPHILS NFR BLD: 66.6 % (ref 38–73)
NRBC BLD-RTO: 0 /100 WBC
PLATELET # BLD AUTO: 245 K/UL (ref 150–450)
PMV BLD AUTO: 11.6 FL (ref 9.2–12.9)
POCT GLUCOSE: 386 MG/DL (ref 70–110)
POTASSIUM SERPL-SCNC: 4.3 MMOL/L (ref 3.5–5.1)
PROT SERPL-MCNC: 6.4 G/DL (ref 6–8.4)
RBC # BLD AUTO: 4.5 M/UL (ref 4–5.4)
SODIUM SERPL-SCNC: 136 MMOL/L (ref 136–145)
WBC # BLD AUTO: 8.23 K/UL (ref 3.9–12.7)

## 2022-09-22 PROCEDURE — 96361 HYDRATE IV INFUSION ADD-ON: CPT

## 2022-09-22 PROCEDURE — 99284 EMERGENCY DEPT VISIT MOD MDM: CPT | Mod: 25

## 2022-09-22 PROCEDURE — 25000003 PHARM REV CODE 250: Performed by: EMERGENCY MEDICINE

## 2022-09-22 PROCEDURE — 81025 URINE PREGNANCY TEST: CPT | Performed by: EMERGENCY MEDICINE

## 2022-09-22 PROCEDURE — 83735 ASSAY OF MAGNESIUM: CPT | Performed by: EMERGENCY MEDICINE

## 2022-09-22 PROCEDURE — 96374 THER/PROPH/DIAG INJ IV PUSH: CPT

## 2022-09-22 PROCEDURE — 63600175 PHARM REV CODE 636 W HCPCS: Performed by: EMERGENCY MEDICINE

## 2022-09-22 PROCEDURE — 82962 GLUCOSE BLOOD TEST: CPT

## 2022-09-22 PROCEDURE — 85025 COMPLETE CBC W/AUTO DIFF WBC: CPT | Performed by: EMERGENCY MEDICINE

## 2022-09-22 PROCEDURE — 80053 COMPREHEN METABOLIC PANEL: CPT | Performed by: EMERGENCY MEDICINE

## 2022-09-22 RX ORDER — HYDROXYZINE PAMOATE 25 MG/1
25 CAPSULE ORAL
Status: COMPLETED | OUTPATIENT
Start: 2022-09-22 | End: 2022-09-22

## 2022-09-22 RX ADMIN — INSULIN HUMAN 6 UNITS: 100 INJECTION, SOLUTION PARENTERAL at 11:09

## 2022-09-22 RX ADMIN — HYDROXYZINE PAMOATE 25 MG: 25 CAPSULE ORAL at 11:09

## 2022-09-22 RX ADMIN — SODIUM CHLORIDE, SODIUM LACTATE, POTASSIUM CHLORIDE, AND CALCIUM CHLORIDE 1000 ML: .6; .31; .03; .02 INJECTION, SOLUTION INTRAVENOUS at 11:09

## 2022-09-22 RX ADMIN — SODIUM CHLORIDE 1000 ML: 0.9 INJECTION, SOLUTION INTRAVENOUS at 10:09

## 2022-09-22 NOTE — Clinical Note
"Mary Lemus" Levi was seen and treated in our emergency department on 9/22/2022.  She may return to work on 09/24/2022.       If you have any questions or concerns, please don't hesitate to call.       RN    "

## 2022-09-23 VITALS
BODY MASS INDEX: 26.58 KG/M2 | WEIGHT: 150 LBS | SYSTOLIC BLOOD PRESSURE: 164 MMHG | RESPIRATION RATE: 18 BRPM | HEART RATE: 100 BPM | DIASTOLIC BLOOD PRESSURE: 92 MMHG | HEIGHT: 63 IN | TEMPERATURE: 99 F | OXYGEN SATURATION: 100 %

## 2022-09-23 LAB
B-HCG UR QL: NEGATIVE
BACTERIA #/AREA URNS HPF: NORMAL /HPF
BILIRUB UR QL STRIP: NEGATIVE
CLARITY UR: ABNORMAL
COLOR UR: YELLOW
CTP QC/QA: YES
GLUCOSE UR QL STRIP: ABNORMAL
HGB UR QL STRIP: ABNORMAL
HYALINE CASTS #/AREA URNS LPF: 0 /LPF
KETONES UR QL STRIP: NEGATIVE
LEUKOCYTE ESTERASE UR QL STRIP: NEGATIVE
MICROSCOPIC COMMENT: NORMAL
NITRITE UR QL STRIP: NEGATIVE
PH UR STRIP: 6 [PH] (ref 5–8)
POCT GLUCOSE: 267 MG/DL (ref 70–110)
PROT UR QL STRIP: ABNORMAL
RBC #/AREA URNS HPF: 4 /HPF (ref 0–4)
SP GR UR STRIP: 1.02 (ref 1–1.03)
URN SPEC COLLECT METH UR: ABNORMAL
UROBILINOGEN UR STRIP-ACNC: NEGATIVE EU/DL
WBC #/AREA URNS HPF: 2 /HPF (ref 0–5)
YEAST URNS QL MICRO: NORMAL

## 2022-09-23 PROCEDURE — 82962 GLUCOSE BLOOD TEST: CPT

## 2022-09-23 PROCEDURE — 96361 HYDRATE IV INFUSION ADD-ON: CPT

## 2022-09-23 PROCEDURE — 81000 URINALYSIS NONAUTO W/SCOPE: CPT | Performed by: EMERGENCY MEDICINE

## 2022-09-23 PROCEDURE — 96376 TX/PRO/DX INJ SAME DRUG ADON: CPT

## 2022-09-23 PROCEDURE — 63600175 PHARM REV CODE 636 W HCPCS: Performed by: EMERGENCY MEDICINE

## 2022-09-23 RX ORDER — HYDROXYZINE PAMOATE 25 MG/1
25 CAPSULE ORAL EVERY 8 HOURS PRN
Qty: 14 CAPSULE | Refills: 0 | Status: SHIPPED | OUTPATIENT
Start: 2022-09-23

## 2022-09-23 RX ORDER — CHLORDIAZEPOXIDE HYDROCHLORIDE 25 MG/1
25 CAPSULE, GELATIN COATED ORAL EVERY 8 HOURS PRN
Qty: 14 CAPSULE | Refills: 0 | Status: SHIPPED | OUTPATIENT
Start: 2022-09-23 | End: 2022-10-23

## 2022-09-23 RX ADMIN — INSULIN HUMAN 2 UNITS: 100 INJECTION, SOLUTION PARENTERAL at 01:09

## 2022-09-23 NOTE — ED NOTES
Pt c/o rash to various parts of body x one month. Pt has been seen by several doctors and also is using OTC meds/ creams with no relief. Pt A/O x 4 w/ ABCs intact, NAD. VSS.   Pt has rased bumps and redness to areas. +Itch. No obvious vesicles.     Review of patient's allergies indicates:  No Known Allergies     Patient has verified the spelling of their name and  on armband.   APPEARANCE: Patient is alert, calm, oriented x 4, and does not appear distressed.  SKIN: Skin is normal for race, warm, and dry. Normal skin turgor and mucous membranes moist. +Rash, no urticaria.  CARDIAC: Normal rate and rhythm, no murmur heard.   RESPIRATORY:Normal rate and effort. Breath sounds clear bilaterally throughout chest. Respirations are equal and unlabored.    GASTRO: Bowel sounds normal, abdomen is soft, no tenderness, and no abdominal distention.  MUSCLE: Full ROM. No bony tenderness or soft tissue tenderness. No obvious deformity.  PERIPHERAL VASCULAR: peripheral pulses present. Normal cap refill. No edema. Warm to touch.  NEURO: 5/5 strength major flexors/extensors bilaterally. Sensory intact to light touch bilaterally. Kristy coma scale: eyes open spontaneously-4, oriented & converses-5, obeys commands-6. No neurological abnormalities.   MENTAL STATUS: awake, alert and aware of environment.  : Voids without complication    ED orders in progress.

## 2022-09-23 NOTE — ED PROVIDER NOTES
Encounter Date: 9/22/2022       History     Chief Complaint   Patient presents with    Rash     Complaining of rash x 4 weeks.  States she went to dermatologist.  States she is not getting any relief.     36 y/o F presents to the ED c/o itching rash. Onset a few weeks ago. Patient has been seen by urgent care, ER, and dermatology. States she's been given topical and oral antifungal medications without relief. Denies any changes in symptoms or new symptoms. Denies any fever. Just reports persistent itching. She's been taking OTC benadryl without relief.     Review of patient's allergies indicates:  No Known Allergies  Past Medical History:   Diagnosis Date    Cellulitis     Diabetes mellitus     Hypertension     Retinal detachment      Past Surgical History:   Procedure Laterality Date    VITRECTOMY BY PARS PLANA APPROACH Right 4/24/2019    Procedure: VITRECTOMY, PARS PLANA APPROACH;  Surgeon: JAMI Schulz MD;  Location: [facility] OR [address] [place] [address];  Service: Ophthalmology;  Laterality: Right;  25g PPV/membrane stripping/ILM peel/EL/AFx/GFX/avastin OD    VITRECTOMY BY PARS PLANA APPROACH Left 5/29/2019    Procedure: VITRECTOMY, PARS PLANA  APPROACH  25g PPV/Membrane Stripping/IML peel/EL/AFX/Avastin left eye   60 mins;  Surgeon: JAMI Schulz MD;  Location: [facility] OR [address] [place] [address];  Service: Ophthalmology;  Laterality: Left;     Family History   Problem Relation Age of Onset    Diabetes Mother      Social History     Tobacco Use    Smoking status: Never    Smokeless tobacco: Never   Substance Use Topics    Alcohol use: Yes     Comment: social    Drug use: No     Review of Systems   Constitutional:  Negative for chills, fatigue and fever.   HENT:  Negative for congestion and sore throat.    Eyes:  Negative for photophobia and visual disturbance.   Respiratory:  Negative for cough and shortness of breath.    Cardiovascular:  Negative for chest pain and palpitations.   Gastrointestinal:  Negative for abdominal pain, diarrhea  and vomiting.   Musculoskeletal:  Negative for back pain, neck pain and neck stiffness.   Neurological:  Negative for light-headedness, numbness and headaches.     Physical Exam     Initial Vitals [09/22/22 2200]   BP Pulse Resp Temp SpO2   (!) 159/100 (!) 112 18 98.8 °F (37.1 °C) 99 %      MAP       --         Physical Exam    Nursing note and vitals reviewed.  Constitutional: She appears well-developed and well-nourished. No distress.   HENT:   Head: Normocephalic and atraumatic.   Eyes: Conjunctivae and EOM are normal. Pupils are equal, round, and reactive to light.   Neck: Neck supple. No tracheal deviation present.   Normal range of motion.  Cardiovascular:  Normal rate and intact distal pulses.           Pulmonary/Chest: No respiratory distress.   Musculoskeletal:         General: No tenderness or edema. Normal range of motion.      Cervical back: Normal range of motion and neck supple.     Neurological: She is alert and oriented to person, place, and time. She has normal strength. No cranial nerve deficit. GCS score is 15. GCS eye subscore is 4. GCS verbal subscore is 5. GCS motor subscore is 6.   Skin: Skin is warm and dry.   Diffuse fine pruritic rash, scant erythema, no sores or vesicles, rash is diffuse to B/L upper and lower extremities as well as trunk; No urticarial lesions, no induration or fluctuance        ED Course   Procedures  Labs Reviewed   CBC W/ AUTO DIFFERENTIAL - Abnormal; Notable for the following components:       Result Value    Hemoglobin 10.6 (*)     Hematocrit 31.1 (*)     MCV 69 (*)     MCH 23.6 (*)     RDW 14.6 (*)     All other components within normal limits   COMPREHENSIVE METABOLIC PANEL - Abnormal; Notable for the following components:    Glucose 355 (*)     BUN 22 (*)     Creatinine 1.6 (*)     Albumin 2.7 (*)     eGFR 43 (*)     All other components within normal limits   URINALYSIS - Abnormal; Notable for the following components:    Appearance, UA Hazy (*)     Protein, UA  3+ (*)     Glucose, UA 4+ (*)     Occult Blood UA 1+ (*)     All other components within normal limits   POCT GLUCOSE - Abnormal; Notable for the following components:    POCT Glucose 386 (*)     All other components within normal limits   POCT GLUCOSE - Abnormal; Notable for the following components:    POCT Glucose 267 (*)     All other components within normal limits   MAGNESIUM   URINALYSIS MICROSCOPIC   POCT URINE PREGNANCY   POCT GLUCOSE MONITORING CONTINUOUS                 Medications   sodium chloride 0.9% bolus 1,000 mL (0 mLs Intravenous Stopped 9/22/22 2348)   insulin regular injection 6 Units 0.06 mL (6 Units Intravenous Given 9/22/22 2341)   lactated ringers bolus 1,000 mL (0 mLs Intravenous Stopped 9/23/22 0047)   hydrOXYzine pamoate capsule 25 mg (25 mg Oral Given 9/22/22 2346)   insulin regular injection 2 Units 0.02 mL (2 Units Intravenous Given 9/23/22 0113)     Medical Decision Making:   Initial Assessment:   38 y/o F presents to the ED c/o pruritic rash  Differential Diagnosis:   Allergic reaction, pruritis, urticaria  Clinical Tests:   Lab Tests: Reviewed       <> Summary of Lab: Hyperglycemia; Otherwise benign  ED Management:  Patient given 2L IVF, 6 U insulin IV, PO vistaril. CBG and VS improved. Minimal improvement in itching. Discussed disposition with patient including discharge with rx for vistaril and librium, instructions on home mgmt, f/u with PCP, strict return precautions given. Px comfortable with discharge at this time.                         Clinical Impression:   Final diagnoses:  [L28.2] Pruritic rash (Primary)  [R73.9] Hyperglycemia      ED Disposition Condition    Discharge Stable          ED Prescriptions       Medication Sig Dispense Start Date End Date Auth. Provider    chlordiazepoxide (LIBRIUM) 25 MG Cap Take 1 capsule (25 mg total) by mouth every 8 (eight) hours as needed (Itching). 14 capsule 9/23/2022 10/23/2022 Vincent Maldonado MD    hydrOXYzine pamoate (VISTARIL)  25 MG Cap Take 1 capsule (25 mg total) by mouth every 8 (eight) hours as needed (Itching). 14 capsule 9/23/2022 -- Vincent Maldonado MD          Follow-up Information       Follow up With Specialties Details Why Contact Info    Dagoberto Ott MD Internal Medicine Schedule an appointment as soon as possible for a visit  And with your dermatologist [address]  [address]  [facility] [address]  [phone]               Vincent Maldonado MD  09/23/22 0138

## 2022-09-23 NOTE — ED NOTES
Bedside report performed by ISABEL Lanier. Patient sitting up in chair awake, alert, oriented x 4. Stating she needs to void. IV fluids infusing.

## 2022-09-23 NOTE — DISCHARGE INSTRUCTIONS
Please keep a close eye on your blood sugar, which was elevated during your emergency department visit. Please follow up with your dermatologist and primary care physician for further evaluation and management. Please return with any new or worsening symptoms.

## 2022-09-23 NOTE — ED NOTES
Per Dr. Cortez, pt does not need to be on monitoring equipment due to administration of IV insulin. Charge RN aware.

## 2022-10-13 ENCOUNTER — CLINICAL SUPPORT (OUTPATIENT)
Dept: URGENT CARE | Facility: CLINIC | Age: 37
End: 2022-10-13
Payer: MEDICAID

## 2022-10-13 DIAGNOSIS — Z23 ENCOUNTER FOR IMMUNIZATION: Primary | ICD-10-CM

## 2022-10-13 PROCEDURE — 86580 POCT TB SKIN TEST: ICD-10-PCS | Mod: S$GLB,,,

## 2022-10-13 PROCEDURE — 86580 TB INTRADERMAL TEST: CPT | Mod: S$GLB,,,

## 2022-11-01 NOTE — HPI
Impression: Prediabetes: R73.03. Plan: Prediabetes: no background retinopathy, no signs of neovascularization or macular edema noted. Discussed ocular and systemic benefits of blood sugar control. Schedule refraction. Reason for Consult: Management of T1DM, Hyperglycemia     Surgical Procedure and Date: n/a    Diabetes diagnosis year: 16 years old     Home Diabetes Medications:  (Per PCP Dr. An in [place])  Basaglar 40 units HS  Novolog 10 units TIDWM  TDD: 70 units daily.    How often checking glucose at home? 1-3 x day   BG readings on regimen: 200's  Hypoglycemia on the regimen?  No  Missed doses on regimen?  Yes    Diabetes Complications include:     Hyperglycemia and Hypoglycemia        Complicating diabetes co morbidities:    HTN    HPI:   Patient is a 34 y.o. female with a diagnosis of type 1 diabetes, hypertension presents to the ED for evaluation of shortness of breath.  She reports that her symptoms began this morning.  Patient has had cough, chills, body aches, nausea, vomiting, diarrhea, chest tightness for the past week.  She was diagnosed with COVID on 4/10.  Patient was also noted to have right upper lobe pneumonia.  She was seen in urgent care which her symptoms initially began.  She was treated with ceftriaxone and discharged with azithromycin as well as symptomatic treatment for vomiting and cough. N/V has been controlled with prescription meds.  Patient reports that began to feel short of breath this morning 04/16/2020. Patient is now admitted to [facility]. Endocrinology consulted to manage DM2/hyperglycemia during admission to [facility].      Lab Results   Component Value Date    HGBA1C >14.0 (H) 04/16/2020

## 2022-11-18 ENCOUNTER — HOSPITAL ENCOUNTER (EMERGENCY)
Facility: HOSPITAL | Age: 37
Discharge: HOME | End: 2022-11-18
Attending: EMERGENCY MEDICINE
Payer: MEDICAID

## 2022-11-18 VITALS
RESPIRATION RATE: 16 BRPM | OXYGEN SATURATION: 100 % | SYSTOLIC BLOOD PRESSURE: 174 MMHG | HEART RATE: 98 BPM | DIASTOLIC BLOOD PRESSURE: 93 MMHG | TEMPERATURE: 98 F

## 2022-11-18 DIAGNOSIS — N89.8 VAGINAL ITCHING: ICD-10-CM

## 2022-11-18 DIAGNOSIS — R10.2 SUPRAPUBIC PAIN: ICD-10-CM

## 2022-11-18 DIAGNOSIS — B37.2 CANDIDAL DERMATITIS: Primary | ICD-10-CM

## 2022-11-18 DIAGNOSIS — N30.00 ACUTE CYSTITIS WITHOUT HEMATURIA: ICD-10-CM

## 2022-11-18 LAB
B-HCG UR QL: NEGATIVE
BACTERIA #/AREA URNS AUTO: ABNORMAL /HPF
BACTERIA GENITAL QL WET PREP: ABNORMAL
BILIRUB UR QL STRIP: NEGATIVE
C TRACH DNA SPEC QL NAA+PROBE: NOT DETECTED
CLARITY UR REFRACT.AUTO: ABNORMAL
CLUE CELLS VAG QL WET PREP: ABNORMAL
COLOR UR AUTO: YELLOW
CTP QC/QA: YES
FILAMENT FUNGI VAG WET PREP-#/AREA: ABNORMAL
GLUCOSE UR QL STRIP: ABNORMAL
HGB UR QL STRIP: NEGATIVE
HYALINE CASTS UR QL AUTO: 0 /LPF
KETONES UR QL STRIP: NEGATIVE
LEUKOCYTE ESTERASE UR QL STRIP: NEGATIVE
MICROSCOPIC COMMENT: ABNORMAL
N GONORRHOEA DNA SPEC QL NAA+PROBE: NOT DETECTED
NITRITE UR QL STRIP: NEGATIVE
PH UR STRIP: 7 [PH] (ref 5–8)
PROT UR QL STRIP: ABNORMAL
RBC #/AREA URNS AUTO: 2 /HPF (ref 0–4)
SP GR UR STRIP: 1.02 (ref 1–1.03)
SPECIMEN SOURCE: ABNORMAL
SQUAMOUS #/AREA URNS AUTO: 1 /HPF
T VAGINALIS GENITAL QL WET PREP: ABNORMAL
URN SPEC COLLECT METH UR: ABNORMAL
WBC #/AREA URNS AUTO: 18 /HPF (ref 0–5)
WBC #/AREA VAG WET PREP: ABNORMAL
YEAST GENITAL QL WET PREP: ABNORMAL
YEAST UR QL AUTO: ABNORMAL

## 2022-11-18 PROCEDURE — 96372 THER/PROPH/DIAG INJ SC/IM: CPT | Performed by: STUDENT IN AN ORGANIZED HEALTH CARE EDUCATION/TRAINING PROGRAM

## 2022-11-18 PROCEDURE — 87491 CHLMYD TRACH DNA AMP PROBE: CPT | Performed by: STUDENT IN AN ORGANIZED HEALTH CARE EDUCATION/TRAINING PROGRAM

## 2022-11-18 PROCEDURE — 96372 THER/PROPH/DIAG INJ SC/IM: CPT | Performed by: EMERGENCY MEDICINE

## 2022-11-18 PROCEDURE — 81025 URINE PREGNANCY TEST: CPT | Performed by: STUDENT IN AN ORGANIZED HEALTH CARE EDUCATION/TRAINING PROGRAM

## 2022-11-18 PROCEDURE — 87077 CULTURE AEROBIC IDENTIFY: CPT | Performed by: STUDENT IN AN ORGANIZED HEALTH CARE EDUCATION/TRAINING PROGRAM

## 2022-11-18 PROCEDURE — 87086 URINE CULTURE/COLONY COUNT: CPT | Performed by: STUDENT IN AN ORGANIZED HEALTH CARE EDUCATION/TRAINING PROGRAM

## 2022-11-18 PROCEDURE — 87088 URINE BACTERIA CULTURE: CPT | Performed by: STUDENT IN AN ORGANIZED HEALTH CARE EDUCATION/TRAINING PROGRAM

## 2022-11-18 PROCEDURE — 25000003 PHARM REV CODE 250: Performed by: STUDENT IN AN ORGANIZED HEALTH CARE EDUCATION/TRAINING PROGRAM

## 2022-11-18 PROCEDURE — 87591 N.GONORRHOEAE DNA AMP PROB: CPT | Performed by: STUDENT IN AN ORGANIZED HEALTH CARE EDUCATION/TRAINING PROGRAM

## 2022-11-18 PROCEDURE — 87210 SMEAR WET MOUNT SALINE/INK: CPT | Performed by: EMERGENCY MEDICINE

## 2022-11-18 PROCEDURE — 63600175 PHARM REV CODE 636 W HCPCS: Performed by: EMERGENCY MEDICINE

## 2022-11-18 PROCEDURE — 81001 URINALYSIS AUTO W/SCOPE: CPT | Performed by: STUDENT IN AN ORGANIZED HEALTH CARE EDUCATION/TRAINING PROGRAM

## 2022-11-18 PROCEDURE — 99284 PR EMERGENCY DEPT VISIT,LEVEL IV: ICD-10-PCS | Mod: ,,, | Performed by: EMERGENCY MEDICINE

## 2022-11-18 PROCEDURE — 99284 EMERGENCY DEPT VISIT MOD MDM: CPT | Mod: ,,, | Performed by: EMERGENCY MEDICINE

## 2022-11-18 PROCEDURE — 87186 SC STD MICRODIL/AGAR DIL: CPT | Performed by: STUDENT IN AN ORGANIZED HEALTH CARE EDUCATION/TRAINING PROGRAM

## 2022-11-18 PROCEDURE — 99284 EMERGENCY DEPT VISIT MOD MDM: CPT | Mod: 25

## 2022-11-18 PROCEDURE — 63600175 PHARM REV CODE 636 W HCPCS: Performed by: STUDENT IN AN ORGANIZED HEALTH CARE EDUCATION/TRAINING PROGRAM

## 2022-11-18 RX ORDER — FLUCONAZOLE 150 MG/1
150 TABLET ORAL
Status: COMPLETED | OUTPATIENT
Start: 2022-11-18 | End: 2022-11-18

## 2022-11-18 RX ORDER — DOXYCYCLINE 100 MG/1
100 CAPSULE ORAL 2 TIMES DAILY
Qty: 28 CAPSULE | Refills: 0 | Status: SHIPPED | OUTPATIENT
Start: 2022-11-18 | End: 2022-12-02

## 2022-11-18 RX ORDER — KETOROLAC TROMETHAMINE 30 MG/ML
15 INJECTION, SOLUTION INTRAMUSCULAR; INTRAVENOUS
Status: COMPLETED | OUTPATIENT
Start: 2022-11-18 | End: 2022-11-18

## 2022-11-18 RX ORDER — CLOTRIMAZOLE 1 %
CREAM (GRAM) TOPICAL
Qty: 15 G | Refills: 0 | Status: SHIPPED | OUTPATIENT
Start: 2022-11-18

## 2022-11-18 RX ORDER — CEFTRIAXONE 500 MG/1
500 INJECTION, POWDER, FOR SOLUTION INTRAMUSCULAR; INTRAVENOUS
Status: COMPLETED | OUTPATIENT
Start: 2022-11-18 | End: 2022-11-18

## 2022-11-18 RX ADMIN — KETOROLAC TROMETHAMINE 15 MG: 30 INJECTION, SOLUTION INTRAMUSCULAR; INTRAVENOUS at 02:11

## 2022-11-18 RX ADMIN — CEFTRIAXONE SODIUM 500 MG: 500 INJECTION, POWDER, FOR SOLUTION INTRAMUSCULAR; INTRAVENOUS at 02:11

## 2022-11-18 RX ADMIN — FLUCONAZOLE 150 MG: 150 TABLET ORAL at 02:11

## 2022-11-18 NOTE — ED PROVIDER NOTES
Encounter Date: 11/18/2022       History     Chief Complaint   Patient presents with    Vaginal Itching     Pt c/o vaginal itching since last Monday without discharge.      37 y.o. female with DM, HTN presents for vaginal burning and discharge for the past several days.  Patient reports she used over-the-counter yeast infection medicines without improvement.  Patient reports associated suprapubic and lower back pain.  Discharge is white.  Patient denies any fever/chills, after abdominal pain, nausea/vomiting.  Patient is sexually active      The history is provided by the patient and medical records.   Review of patient's allergies indicates:  No Known Allergies  Past Medical History:   Diagnosis Date    Cellulitis     Diabetes mellitus     Hypertension     Retinal detachment      Past Surgical History:   Procedure Laterality Date    VITRECTOMY BY PARS PLANA APPROACH Right 4/24/2019    Procedure: VITRECTOMY, PARS PLANA APPROACH;  Surgeon: JAMI Schulz MD;  Location: [facility] OR [address] [place] [address];  Service: Ophthalmology;  Laterality: Right;  25g PPV/membrane stripping/ILM peel/EL/AFx/GFX/avastin OD    VITRECTOMY BY PARS PLANA APPROACH Left 5/29/2019    Procedure: VITRECTOMY, PARS PLANA  APPROACH  25g PPV/Membrane Stripping/IML peel/EL/AFX/Avastin left eye   60 mins;  Surgeon: JAMI Schulz MD;  Location: [facility] OR [address] [place] [address];  Service: Ophthalmology;  Laterality: Left;     Family History   Problem Relation Age of Onset    Diabetes Mother      Social History     Tobacco Use    Smoking status: Never    Smokeless tobacco: Never   Substance Use Topics    Alcohol use: Yes     Comment: social    Drug use: No     Review of Systems   Constitutional:  Negative for chills and fever.   HENT:  Negative for rhinorrhea and sore throat.    Eyes:  Negative for photophobia and visual disturbance.   Respiratory:  Negative for cough, chest tightness and shortness of breath.    Cardiovascular:  Negative for chest pain and palpitations.    Gastrointestinal:  Negative for nausea and vomiting.   Genitourinary:  Positive for pelvic pain, vaginal discharge and vaginal pain. Negative for difficulty urinating, dysuria and vaginal bleeding.   Musculoskeletal:  Positive for back pain. Negative for myalgias.   Skin:  Negative for pallor and rash.   Neurological:  Negative for dizziness, weakness, numbness and headaches.   Psychiatric/Behavioral:  Negative for agitation and confusion.      Physical Exam     Initial Vitals [11/18/22 0015]   BP Pulse Resp Temp SpO2   (!) 174/93 98 16 98.2 °F (36.8 °C) 100 %      MAP       --         Physical Exam    Nursing note and vitals reviewed.  Constitutional:   Alert, normal work of breathing, speaking full sentences   HENT:   Head: Normocephalic and atraumatic.   Eyes: Conjunctivae and EOM are normal.   Cardiovascular:  Normal rate, regular rhythm and intact distal pulses.           Pulmonary/Chest: Breath sounds normal. No stridor. No respiratory distress.   Abdominal:   Mild suprapubic tenderness   Genitourinary:    Genitourinary Comments: Chaperone was present for entirety of examination  Patient has a red erythematous rash extending from the vulva the vagina no vesicles  On speculum exam, copious thick white discharge present in vaginal vault, no cervical discharge, no cervical erythema  Cervical cultures and vaginal screen collected     Musculoskeletal:         General: No tenderness or edema.     Neurological: She is alert and oriented to person, place, and time.   Skin: Skin is warm and dry.   Psychiatric: She has a normal mood and affect. Thought content normal.       ED Course   Procedures  Labs Reviewed   VAGINAL SCREEN - Abnormal; Notable for the following components:       Result Value    WBC - Vaginal Screen Few (*)     Bacteria - Vaginal Screen Few (*)     All other components within normal limits   URINALYSIS, REFLEX TO URINE CULTURE - Abnormal; Notable for the following components:    Appearance, UA Hazy  (*)     Protein, UA 2+ (*)     Glucose, UA 3+ (*)     All other components within normal limits    Narrative:     Specimen Source->Urine   URINALYSIS MICROSCOPIC - Abnormal; Notable for the following components:    WBC, UA 18 (*)     Bacteria Many (*)     All other components within normal limits    Narrative:     Specimen Source->Urine   VAGINAL SCREEN   C. TRACHOMATIS/N. GONORRHOEAE BY AMP DNA   CULTURE, URINE   POCT URINE PREGNANCY          Imaging Results    None          Medications   ketorolac injection 15 mg (15 mg Intramuscular Given 11/18/22 0203)   fluconazole tablet 150 mg (150 mg Oral Given 11/18/22 0211)   cefTRIAXone injection 500 mg (500 mg Intramuscular Given 11/18/22 0210)     Medical Decision Making:   History:   Old Medical Records: I decided to obtain old medical records.  Old Records Summarized: records from clinic visits and records from previous admission(s).  Initial Assessment:   37 y.o. female with DM, HTN presents for vaginal burning and discharge for the past several days  Presentation most consistent with candidal dermatitis of the vulva  Differentials include candidal vaginitis, cervicitis DC/chlamydia, PID, UTI  Patient with external candidal dermatitis extending into the vagina, pelvic exam shows copious thick white discharge  Patient does have some suprapubic in, urinalysis, concerning for UTI vs. PID  Patient is sexually active currently  No unilateral pain, no significant adnexal or lateral tenderness, inconsistent with appendicitis or TOA/ovarian torsion  Will discharge with empiric treatment for PID and treatment of candidal vaginosis  Clinical Tests:   Lab Tests: Ordered and Reviewed          Attending Attestation:   Physician Attestation Statement for Resident:  As the supervising MD   Physician Attestation Statement: I have personally seen and examined this patient.   I agree with the above history.  -:   As the supervising MD I agree with the above PE.     As the supervising  MD I agree with the above treatment, course, plan, and disposition.    I have reviewed and agree with the residents interpretation of the following: lab data.                            Clinical Impression:   Final diagnoses:  [N89.8] Vaginal itching  [R10.2] Suprapubic pain  [B37.2] Candidal dermatitis (Primary)  [N30.00] Acute cystitis without hematuria        ED Disposition Condition    Discharge Stable          ED Prescriptions       Medication Sig Dispense Start Date End Date Auth. Provider    clotrimazole (LOTRIMIN) 1 % cream Apply to affected area 2 times daily 15 g 11/18/2022 -- Franklin Kwong MD    doxycycline (VIBRAMYCIN) 100 MG Cap Take 1 capsule (100 mg total) by mouth 2 (two) times daily. for 14 days 28 capsule 11/18/2022 12/2/2022 Franklin Kwong MD          Follow-up Information       Follow up With Specialties Details Why Contact Info Additional Information    Dagoberto Ott MD Internal Medicine Schedule an appointment as soon as possible for a visit   [address] [facility]  [address]  [facility] [address] [phone]       [facility] - [facility] GYN Obstetrics and Gynecology Schedule an appointment as soon as possible for a visit   [address] [facility] [address] [phone] OB GYN - [facility], 4th Floor, [address] Please park in [place] [place] and use [place] elevators             Franklin Kwong MD  Resident  11/18/22 0215       France Dorsey MD  11/18/22 0227

## 2022-11-18 NOTE — ED NOTES
Pt arrives to Ed with c/o vagina itching. Pt reports h/o frequent yeast infections due to diabetes. Pt sitting in chair, respirations even, unlabored, Nad noted, answering questions appropriately.

## 2022-11-18 NOTE — LETTER
December 8, 2022    Mary Sims  [address] [facility] [address]                   [address] [facility] [address]  Phone: [phone]  Fax: [phone]   Dear Ms. Mary Sims:    This letter is regarding abnormal laboratory results from your recent emergency room visit. We were not able to reach you by phone at your listed contact number. Please give us a call at [phone] and ask to speak to an ABIEL regarding your results.      Sincerely,        Kristi Gonsalez PA-C

## 2022-11-18 NOTE — DISCHARGE INSTRUCTIONS
We gave you your initial dose of antibiotics during your visit. Take the prescribed antibiotics for the next two weeks    Follow up with your primary care doctor to follow up resolution of symptoms    Return to the emergency department if you develop severe, constant pain that is not responding to tylenol/ibuprofen, if you develop fever, if you develop fever with these symptoms    Discontinue sexual activity until you follow up and ensure resolution of infection. Any current partners need to be treated as well if your gonorrhea and/or chlamydia tests come back positive

## 2022-11-20 LAB — BACTERIA UR CULT: ABNORMAL

## 2023-01-22 ENCOUNTER — HOSPITAL ENCOUNTER (EMERGENCY)
Facility: HOSPITAL | Age: 38
Discharge: HOME | End: 2023-01-22
Attending: EMERGENCY MEDICINE
Payer: MEDICAID

## 2023-01-22 VITALS
HEART RATE: 98 BPM | OXYGEN SATURATION: 100 % | BODY MASS INDEX: 30.11 KG/M2 | SYSTOLIC BLOOD PRESSURE: 130 MMHG | TEMPERATURE: 99 F | RESPIRATION RATE: 18 BRPM | WEIGHT: 170 LBS | DIASTOLIC BLOOD PRESSURE: 80 MMHG

## 2023-01-22 DIAGNOSIS — K59.00 CONSTIPATION: Primary | ICD-10-CM

## 2023-01-22 LAB
B-HCG UR QL: NEGATIVE
CTP QC/QA: YES

## 2023-01-22 PROCEDURE — 81025 URINE PREGNANCY TEST: CPT | Performed by: PHYSICIAN ASSISTANT

## 2023-01-22 PROCEDURE — 99283 EMERGENCY DEPT VISIT LOW MDM: CPT

## 2023-01-22 RX ORDER — POLYETHYLENE GLYCOL 3350 17 G/17G
17 POWDER, FOR SOLUTION ORAL DAILY
Qty: 116 G | Refills: 0 | Status: SHIPPED | OUTPATIENT
Start: 2023-01-22

## 2023-01-22 NOTE — Clinical Note
"Mary Lemus" Levi was seen and treated in our emergency department on 1/22/2023.  She may return to work on 01/24/2023.       If you have any questions or concerns, please don't hesitate to call.       RN    "

## 2023-01-22 NOTE — FIRST PROVIDER EVALUATION
Emergency Department TeleTriage Encounter Note      CHIEF COMPLAINT    Chief Complaint   Patient presents with    Constipation     No BM in 4 days, + nausea, last ate-PTA       VITAL SIGNS   Initial Vitals [01/22/23 1704]   BP Pulse Resp Temp SpO2   136/87 102 18 98.6 °F (37 °C) 100 %      MAP       --            ALLERGIES    Review of patient's allergies indicates:  No Known Allergies    PROVIDER TRIAGE NOTE  Patient presents with constipation and no BM for 2-3 days. She has some mild pain in the abdomen. She was afraid to try any otc medications because of her diabetes.       ORDERS  Labs Reviewed - No data to display    ED Orders (720h ago, onward)      None              Virtual Visit Note: The provider triage portion of this emergency department evaluation and documentation was performed via [employer], a HIPAA-compliant telemedicine application, in concert with a tele-presenter in the room. A face to face patient evaluation with one of my colleagues will occur once the patient is placed in an emergency department room.      DISCLAIMER: This note was prepared with [employer] voice recognition transcription software. Garbled syntax, mangled pronouns, and other bizarre constructions may be attributed to that software system.

## 2023-01-23 NOTE — ED PROVIDER NOTES
Encounter Date: 1/22/2023       History     Chief Complaint   Patient presents with    Constipation     No BM in 4 days, + nausea, last ate-PTA     37-year-old female who presents chief complaint constipation with no bowel movement over the last 4 days.  She denies any nausea or vomiting to me.  She states that she is not used any medications to help because she is worried about her diabetes.  He is complaining of pain to the right side.  Denies any dysuria/hematuria/urinary frequency.  She has been able to eat.    Review of patient's allergies indicates:  No Known Allergies  Past Medical History:   Diagnosis Date    Cellulitis     Diabetes mellitus     Hypertension     Retinal detachment      Past Surgical History:   Procedure Laterality Date    VITRECTOMY BY PARS PLANA APPROACH Right 4/24/2019    Procedure: VITRECTOMY, PARS PLANA APPROACH;  Surgeon: JAMI Schulz MD;  Location: [facility] OR [address] [place] [address];  Service: Ophthalmology;  Laterality: Right;  25g PPV/membrane stripping/ILM peel/EL/AFx/GFX/avastin OD    VITRECTOMY BY PARS PLANA APPROACH Left 5/29/2019    Procedure: VITRECTOMY, PARS PLANA  APPROACH  25g PPV/Membrane Stripping/IML peel/EL/AFX/Avastin left eye   60 mins;  Surgeon: JAMI Schulz MD;  Location: [facility] OR [address] [place] [address];  Service: Ophthalmology;  Laterality: Left;     Family History   Problem Relation Age of Onset    Diabetes Mother      Social History     Tobacco Use    Smoking status: Never    Smokeless tobacco: Never   Substance Use Topics    Alcohol use: Yes     Comment: social    Drug use: No     Review of Systems   Gastrointestinal:  Positive for abdominal pain and constipation.     Physical Exam     Initial Vitals [01/22/23 1704]   BP Pulse Resp Temp SpO2   136/87 102 18 98.6 °F (37 °C) 100 %      MAP       --         Physical Exam    Nursing note and vitals reviewed.  Constitutional: She appears well-developed and well-nourished.   Cardiovascular:  Normal rate and regular rhythm.           No  murmur heard.  Pulmonary/Chest: Breath sounds normal. She has no wheezes.   Abdominal:   Soft, some subjective tenderness to palpation to the right-sided abdomen, there is mild distention, there is no rigidity   Musculoskeletal:         General: Normal range of motion.     Neurological: She is alert and oriented to person, place, and time.   Skin: Skin is warm and dry. Capillary refill takes less than 2 seconds.   Psychiatric: She has a normal mood and affect.       ED Course   Procedures  Labs Reviewed   POCT URINE PREGNANCY          Imaging Results              X-Ray Abdomen Flat And Erect (Final result)  Result time 01/22/23 18:52:30      Final result by Macie Ramirez MD (01/22/23 18:52:30)                   Impression:      Nonspecific bowel gas pattern.  No free air or acute abnormality.      Electronically signed by: Macie Ramirez  Date:    01/22/2023  Time:    18:52               Narrative:    EXAMINATION:  XR ABDOMEN FLAT AND ERECT    CLINICAL HISTORY:  Constipation, unspecified    TECHNIQUE:  Flat and erect AP views of the abdomen were performed.    COMPARISON:  03/11/2022 [facility]    FINDINGS:  There is a nonspecific nonobstructive bowel gas pattern.  There is no bowel distension or free air beneath the diaphragm.  IUD noted.  Round calcifications the pelvis is likely myoma.  There is no organomegaly.                                       Medications - No data to display  Medical Decision Making:   ED Management:  Instructed patient that she must keep hydrated on order for Mira[employer] to work.  She verbalized understanding of this.  Instructed to return immediately for any new or worsening symptoms and she verbalized understanding.           ED Course as of 01/22/23 2300   Sun Jan 22, 2023 1944 POCT urine pregnancy  This was reviewed and negative [CD]   1944 X-Ray Abdomen Flat And Erect  Nonspecific bowel gas pattern no signs of obstruction [CD]      ED Course User Index  [CD] Jun Parker DO                  Clinical Impression:   Final diagnoses:  [K59.00] Constipation (Primary)        ED Disposition Condition    Discharge Stable          ED Prescriptions       Medication Sig Dispense Start Date End Date Auth. Provider    polyethylene glycol (GLYCOLAX) 17 gram/dose powder Take 17 g by mouth once daily. 116 g 1/22/2023 -- Jun Parker DO          Follow-up Information       Follow up With Specialties Details Why Contact Info    Dagoberto Ott MD Internal Medicine Schedule an appointment as soon as possible for a visit   [address] [facility]  [address]  [facility] [address] [phone]               Jun Parker DO  01/22/23 5132

## 2023-01-31 ENCOUNTER — HOSPITAL ENCOUNTER (EMERGENCY)
Facility: HOSPITAL | Age: 38
Discharge: HOME | End: 2023-01-31
Attending: EMERGENCY MEDICINE
Payer: MEDICAID

## 2023-01-31 VITALS
OXYGEN SATURATION: 99 % | BODY MASS INDEX: 30.3 KG/M2 | RESPIRATION RATE: 18 BRPM | DIASTOLIC BLOOD PRESSURE: 83 MMHG | HEART RATE: 92 BPM | SYSTOLIC BLOOD PRESSURE: 126 MMHG | TEMPERATURE: 99 F | HEIGHT: 63 IN | WEIGHT: 171 LBS

## 2023-01-31 DIAGNOSIS — U07.1 COVID-19: Primary | ICD-10-CM

## 2023-01-31 LAB
INFLUENZA A, MOLECULAR: NEGATIVE
INFLUENZA B, MOLECULAR: NEGATIVE
SARS-COV-2 RDRP RESP QL NAA+PROBE: POSITIVE
SPECIMEN SOURCE: NORMAL

## 2023-01-31 PROCEDURE — U0002 COVID-19 LAB TEST NON-CDC: HCPCS | Performed by: NURSE PRACTITIONER

## 2023-01-31 PROCEDURE — 87502 INFLUENZA DNA AMP PROBE: CPT | Performed by: NURSE PRACTITIONER

## 2023-01-31 PROCEDURE — 99282 EMERGENCY DEPT VISIT SF MDM: CPT

## 2023-01-31 PROCEDURE — 99283 EMERGENCY DEPT VISIT LOW MDM: CPT | Mod: CR,CS,, | Performed by: EMERGENCY MEDICINE

## 2023-01-31 PROCEDURE — 99283 PR EMERGENCY DEPT VISIT,LEVEL III: ICD-10-PCS | Mod: CR,CS,, | Performed by: EMERGENCY MEDICINE

## 2023-01-31 NOTE — DISCHARGE INSTRUCTIONS
Tylenol or motrin as needed for fever  Keep hydrated with powerade, gatorade or pedialyte  No school/work until fever free for 24 hours without medication  Clean all door handles and common counter services    Your test was POSITIVE for COVID-19 (coronavirus).       Please isolate yourself at home.  You may leave home and/or return to work once the following conditions are met:    If you were not hospitalized and are not moderately to severely immunocompromised:   More than 5 days since symptoms first appeared AND  More than 24 hours fever free without medications AND  Symptoms are improving  Continue to wear a mask around others for 5 additional days.    If you were hospitalized OR are moderately to severely immunocompromised:  More than 20 days since symptoms first appeared  More than 24 hours fever free without medications  Symptoms have improved    If you had no symptoms but tested positive:  More than 5 days since the date of the first positive test (20 days if moderately to severely immunocompromised). If you develop symptoms, then use the guidelines above.  Continue to wear a mask around others for 5 additional days.      Contact Tracing    As one of the next steps, you will receive a call or text from the [place] Department of Health ([facility]) COVID-19 Tracing Team. See the contact information below so you know not to ignore the health departments call. It is important that you contact them back immediately so they can help.      Contact Tracer Number:  [phone]  Caller ID for most carriers: [place] Dept Health     What is contact tracing?  Contact tracing is a process that helps identify everyone who has been in close contact with an infected person. Contact tracers let those people know they may have been exposed and guide them on next steps. Confidentiality is important for everyone; no one will be told who may have exposed them to the virus.  Your involvement is important. The more we know about where  and how this virus is spreading, the better chance we have at stopping it from spreading further.  What does exposure mean?  Exposure means you have been within 6 feet for more than 15 minutes with a person who has or had COVID-19.  What kind of questions do the contact tracers ask?  A contact tracer will confirm your basic contact information including name, address, phone number, and next of kin, as well as asking about any symptoms you may have had. Theyll also ask you how you think you may have gotten sick, such as places where you may have been exposed to the virus, and people you were with. Those names will never be shared with anyone outside of that call, and will only be used to help trace and stop the spread of the virus.   I have privacy concerns. How will the state use my information?  Your privacy about your health is important. All calls are completed using call centers that use the appropriate health privacy protection measures (HIPAA compliance), meaning that your patient information is safe. No one will ever ask you any questions related to immigration status. Your health comes first.   Do I have to participate?  You do not have to participate, but we strongly encourage you to. Contact tracing can help us catch and control new outbreaks as theyre developing to keep your friends and family safe.   What if I dont hear from anyone?  If you dont receive a call within 24 hours, you can call the number above right away to inquire about your status. That line is open from 8:00 am - 8:00 p.m., 7 days a week.  Contact tracing saves lives! Together, we have the power to beat this virus and keep our loved ones and neighbors safe.    For more information see CDC link below.      https://www.cdc.gov/coronavirus/2019-ncov/hcp/guidance-prevent-spread.html#precautions        Sources:  [facility], [place] Department [facility] and hospitals

## 2023-01-31 NOTE — ED PROVIDER NOTES
Encounter Date: 1/31/2023       History     Chief Complaint   Patient presents with    Mutliple complaints     Congestion in nose , throat and chest, feeling hot and cold     36 y/o female which presents to the ED with congestion, sore throat and feeling hot/cold since yesterday. Pt denies fevers or any other symptoms.    The history is provided by the patient.   Review of patient's allergies indicates:  No Known Allergies  Past Medical History:   Diagnosis Date    Cellulitis     Diabetes mellitus     Hypertension     Retinal detachment      Past Surgical History:   Procedure Laterality Date    VITRECTOMY BY PARS PLANA APPROACH Right 4/24/2019    Procedure: VITRECTOMY, PARS PLANA APPROACH;  Surgeon: JAMI Schulz MD;  Location: [facility] OR [address] [place] [address];  Service: Ophthalmology;  Laterality: Right;  25g PPV/membrane stripping/ILM peel/EL/AFx/GFX/avastin OD    VITRECTOMY BY PARS PLANA APPROACH Left 5/29/2019    Procedure: VITRECTOMY, PARS PLANA  APPROACH  25g PPV/Membrane Stripping/IML peel/EL/AFX/Avastin left eye   60 mins;  Surgeon: JAMI Schulz MD;  Location: [facility] OR [address] [place] [address];  Service: Ophthalmology;  Laterality: Left;     Family History   Problem Relation Age of Onset    Diabetes Mother      Social History     Tobacco Use    Smoking status: Never    Smokeless tobacco: Never   Substance Use Topics    Alcohol use: Yes     Comment: social    Drug use: No     Review of Systems   Constitutional:  Negative for fever.   HENT:  Positive for congestion and sore throat.    Respiratory:  Positive for cough. Negative for shortness of breath.    Cardiovascular:  Negative for chest pain.   Gastrointestinal:  Negative for nausea.   Genitourinary:  Negative for dysuria.   Musculoskeletal:  Positive for myalgias. Negative for back pain.   Skin:  Negative for rash.   Neurological:  Positive for headaches. Negative for weakness.   Hematological:  Does not bruise/bleed easily.   All other systems reviewed and are  negative.    Physical Exam     Initial Vitals [01/31/23 1001]   BP Pulse Resp Temp SpO2   126/83 92 18 98.5 °F (36.9 °C) 99 %      MAP       --         Physical Exam    Nursing note and vitals reviewed.  Constitutional: She appears well-developed and well-nourished.   HENT:   Head: Normocephalic and atraumatic.   Eyes: Conjunctivae and EOM are normal. Pupils are equal, round, and reactive to light.   Neck:   Normal range of motion.  Cardiovascular:  Normal rate, regular rhythm, normal heart sounds and intact distal pulses.     Exam reveals no gallop and no friction rub.       No murmur heard.  Pulmonary/Chest: Breath sounds normal. No respiratory distress. She has no wheezes. She has no rhonchi. She has no rales. She exhibits no tenderness.   Abdominal: Abdomen is soft. Bowel sounds are normal. She exhibits no distension and no mass. There is no abdominal tenderness. There is no rebound and no guarding.   Musculoskeletal:         General: No tenderness or edema. Normal range of motion.      Cervical back: Normal range of motion.     Neurological: She is alert and oriented to person, place, and time. She has normal strength. GCS score is 15. GCS eye subscore is 4. GCS verbal subscore is 5. GCS motor subscore is 6.   Skin: Skin is warm. Capillary refill takes less than 2 seconds.   Psychiatric: She has a normal mood and affect.       ED Course   Procedures  Labs Reviewed   SARS-COV-2 RNA AMPLIFICATION, QUAL - Abnormal; Notable for the following components:       Result Value    SARS-CoV-2 RNA, Amplification, Qual Positive (*)     All other components within normal limits   INFLUENZA A & B BY MOLECULAR   HIV 1 / 2 ANTIBODY   HEPATITIS C ANTIBODY   URINALYSIS, REFLEX TO URINE CULTURE   POCT URINE PREGNANCY          Imaging Results    None          Medications - No data to display  Medical Decision Making:   Initial Assessment:   38 y/o female which presents with viral URI symptoms. COVID and influenza  pending.  Differential Diagnosis:   Covid, influenza, viral uri  Clinical Tests:   Lab Tests: Ordered and Reviewed       <> Summary of Lab: Covid positive  ED Management:  Pt examined and does NOT have any respiratory distress. COVID positive. Patient given strict return precautions and voiced understanding of all discharge instructions. Pt was stable at discharge.              ED Course as of 01/31/23 1200   Tue Jan 31, 2023   1004 BP: 126/83 [AT]   1004 Temp: 98.5 °F (36.9 °C) [AT]   1004 Temp src: Oral [AT]   1004 Pulse: 92 [AT]   1004 Resp: 18 [AT]   1004 SpO2: 99 % [AT]   1116 SARS-CoV-2 RNA, Amplification, Qual(!): Positive [AT]   1116 Influenza A, Molecular: Negative [AT]   1116 Influenza B, Molecular: Negative [AT]      ED Course User Index  [AT] CLOVIS Deshpande                 Clinical Impression:   Final diagnoses:  [U07.1] COVID-19 (Primary)        ED Disposition Condition    Discharge Stable          ED Prescriptions    None       Follow-up Information       Follow up With Specialties Details Why Contact Info    Dagoberto Ott MD Internal Medicine Schedule an appointment as soon as possible for a visit  As needed [address]  [address]  [facility] [id number] [phone]               CLOVIS Deshpande  01/31/23 1200

## 2023-01-31 NOTE — ED TRIAGE NOTES
Patient presents to the ER with complaints of chills, chest congestion, and nasal congestion that started yesterday. Pt endorses shortness of breath upon exertion. Patient states she took benadryl last night, with no relief.

## 2023-01-31 NOTE — Clinical Note
"Mary Lemus"Levi was seen and treated in our emergency department on 1/31/2023.  She may return to work on 02/04/2023.       If you have any questions or concerns, please don't hesitate to call.      MADI WrightP RN    "

## 2023-02-10 ENCOUNTER — HOSPITAL ENCOUNTER (EMERGENCY)
Facility: HOSPITAL | Age: 38
Discharge: HOME | End: 2023-02-10
Attending: STUDENT IN AN ORGANIZED HEALTH CARE EDUCATION/TRAINING PROGRAM
Payer: MEDICAID

## 2023-02-10 VITALS
WEIGHT: 170 LBS | DIASTOLIC BLOOD PRESSURE: 71 MMHG | BODY MASS INDEX: 30.11 KG/M2 | TEMPERATURE: 99 F | HEART RATE: 105 BPM | OXYGEN SATURATION: 99 % | RESPIRATION RATE: 20 BRPM | SYSTOLIC BLOOD PRESSURE: 137 MMHG

## 2023-02-10 DIAGNOSIS — S60.221A CONTUSION OF RIGHT HAND, INITIAL ENCOUNTER: Primary | ICD-10-CM

## 2023-02-10 DIAGNOSIS — M25.531 RIGHT WRIST PAIN: ICD-10-CM

## 2023-02-10 LAB
B-HCG UR QL: NEGATIVE
CTP QC/QA: YES

## 2023-02-10 PROCEDURE — 99283 EMERGENCY DEPT VISIT LOW MDM: CPT

## 2023-02-10 PROCEDURE — 81025 URINE PREGNANCY TEST: CPT | Performed by: PHYSICIAN ASSISTANT

## 2023-02-10 RX ORDER — IBUPROFEN 600 MG/1
600 TABLET ORAL EVERY 6 HOURS PRN
Qty: 30 TABLET | Refills: 0 | Status: SHIPPED | OUTPATIENT
Start: 2023-02-10
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
## 2023-02-11 NOTE — DISCHARGE INSTRUCTIONS

## 2023-05-08 ENCOUNTER — HOSPITAL ENCOUNTER (EMERGENCY)
Facility: HOSPITAL | Age: 38
Discharge: HOME | End: 2023-05-08
Attending: EMERGENCY MEDICINE
Payer: MEDICAID

## 2023-05-08 VITALS
OXYGEN SATURATION: 100 % | RESPIRATION RATE: 20 BRPM | DIASTOLIC BLOOD PRESSURE: 82 MMHG | WEIGHT: 170 LBS | HEART RATE: 98 BPM | BODY MASS INDEX: 30.11 KG/M2 | TEMPERATURE: 98 F | SYSTOLIC BLOOD PRESSURE: 140 MMHG

## 2023-05-08 DIAGNOSIS — R31.9 URINARY TRACT INFECTION WITH HEMATURIA, SITE UNSPECIFIED: Primary | ICD-10-CM

## 2023-05-08 DIAGNOSIS — N39.0 URINARY TRACT INFECTION WITH HEMATURIA, SITE UNSPECIFIED: Primary | ICD-10-CM

## 2023-05-08 DIAGNOSIS — R73.9 HYPERGLYCEMIA: ICD-10-CM

## 2023-05-08 LAB
ALBUMIN SERPL BCP-MCNC: 2.2 G/DL (ref 3.5–5.2)
ALP SERPL-CCNC: 78 U/L (ref 55–135)
ALT SERPL W/O P-5'-P-CCNC: 18 U/L (ref 10–44)
ANION GAP SERPL CALC-SCNC: 8 MMOL/L (ref 8–16)
AST SERPL-CCNC: 16 U/L (ref 10–40)
B-HCG UR QL: NEGATIVE
B-OH-BUTYR BLD STRIP-SCNC: 0 MMOL/L (ref 0–0.5)
BACTERIA #/AREA URNS HPF: ABNORMAL /HPF
BASOPHILS # BLD AUTO: 0.05 K/UL (ref 0–0.2)
BASOPHILS NFR BLD: 0.8 % (ref 0–1.9)
BILIRUB SERPL-MCNC: <0.1 MG/DL (ref 0.1–1)
BILIRUB UR QL STRIP: NEGATIVE
BUN SERPL-MCNC: 22 MG/DL (ref 6–20)
CALCIUM SERPL-MCNC: 9.6 MG/DL (ref 8.7–10.5)
CHLORIDE SERPL-SCNC: 108 MMOL/L (ref 95–110)
CLARITY UR: ABNORMAL
CO2 SERPL-SCNC: 20 MMOL/L (ref 23–29)
COLOR UR: YELLOW
CREAT SERPL-MCNC: 1.2 MG/DL (ref 0.5–1.4)
CTP QC/QA: YES
DIFFERENTIAL METHOD: ABNORMAL
EOSINOPHIL # BLD AUTO: 0.2 K/UL (ref 0–0.5)
EOSINOPHIL NFR BLD: 3 % (ref 0–8)
ERYTHROCYTE [DISTWIDTH] IN BLOOD BY AUTOMATED COUNT: 14 % (ref 11.5–14.5)
EST. GFR  (NO RACE VARIABLE): >60 ML/MIN/1.73 M^2
FIO2: 21 %
GLUCOSE SERPL-MCNC: 193 MG/DL (ref 70–110)
GLUCOSE UR QL STRIP: ABNORMAL
HCT VFR BLD AUTO: 33 % (ref 37–48.5)
HGB BLD-MCNC: 11.3 G/DL (ref 12–16)
HGB UR QL STRIP: ABNORMAL
HYALINE CASTS #/AREA URNS LPF: 0 /LPF
IMM GRANULOCYTES # BLD AUTO: 0.01 K/UL (ref 0–0.04)
IMM GRANULOCYTES NFR BLD AUTO: 0.2 % (ref 0–0.5)
KETONES UR QL STRIP: NEGATIVE
LEUKOCYTE ESTERASE UR QL STRIP: ABNORMAL
LYMPHOCYTES # BLD AUTO: 1.8 K/UL (ref 1–4.8)
LYMPHOCYTES NFR BLD: 28 % (ref 18–48)
MCH RBC QN AUTO: 24 PG (ref 27–31)
MCHC RBC AUTO-ENTMCNC: 34.2 G/DL (ref 32–36)
MCV RBC AUTO: 70 FL (ref 82–98)
MICROSCOPIC COMMENT: ABNORMAL
MONOCYTES # BLD AUTO: 0.4 K/UL (ref 0.3–1)
MONOCYTES NFR BLD: 6.2 % (ref 4–15)
NEUTROPHILS # BLD AUTO: 3.9 K/UL (ref 1.8–7.7)
NEUTROPHILS NFR BLD: 61.8 % (ref 38–73)
NITRITE UR QL STRIP: NEGATIVE
NRBC BLD-RTO: 0 /100 WBC
PCO2 BLDA: 51.2 MMHG (ref 35–45)
PH SMN: 7.31 [PH] (ref 7.35–7.45)
PH UR STRIP: 6 [PH] (ref 5–8)
PLATELET # BLD AUTO: 276 K/UL (ref 150–450)
PMV BLD AUTO: 11.3 FL (ref 9.2–12.9)
PO2 BLDA: 32.7 MMHG (ref 40–60)
POC BASE DEFICIT: -1.2 MMOL/L (ref -2–2)
POC HCO3: 25.7 MMOL/L (ref 24–28)
POC PERFORMED BY: ABNORMAL
POC SATURATED O2: 55.9 % (ref 95–100)
POCT GLUCOSE: 262 MG/DL (ref 70–110)
POTASSIUM SERPL-SCNC: 4.7 MMOL/L (ref 3.5–5.1)
PROT SERPL-MCNC: 6.2 G/DL (ref 6–8.4)
PROT UR QL STRIP: ABNORMAL
RBC # BLD AUTO: 4.7 M/UL (ref 4–5.4)
RBC #/AREA URNS HPF: 7 /HPF (ref 0–4)
SODIUM SERPL-SCNC: 136 MMOL/L (ref 136–145)
SP GR UR STRIP: 1.01 (ref 1–1.03)
SPECIMEN SOURCE: ABNORMAL
SQUAMOUS #/AREA URNS HPF: 2 /HPF
URN SPEC COLLECT METH UR: ABNORMAL
UROBILINOGEN UR STRIP-ACNC: NEGATIVE EU/DL
WBC # BLD AUTO: 6.32 K/UL (ref 3.9–12.7)
WBC #/AREA URNS HPF: 83 /HPF (ref 0–5)
WBC CLUMPS URNS QL MICRO: ABNORMAL
YEAST URNS QL MICRO: ABNORMAL

## 2023-05-08 PROCEDURE — 93010 ELECTROCARDIOGRAM REPORT: CPT | Mod: ,,, | Performed by: INTERNAL MEDICINE

## 2023-05-08 PROCEDURE — 25000003 PHARM REV CODE 250: Performed by: NURSE PRACTITIONER

## 2023-05-08 PROCEDURE — 93010 EKG 12-LEAD: ICD-10-PCS | Mod: ,,, | Performed by: INTERNAL MEDICINE

## 2023-05-08 PROCEDURE — 80053 COMPREHEN METABOLIC PANEL: CPT | Performed by: NURSE PRACTITIONER

## 2023-05-08 PROCEDURE — 87077 CULTURE AEROBIC IDENTIFY: CPT | Performed by: NURSE PRACTITIONER

## 2023-05-08 PROCEDURE — 87088 URINE BACTERIA CULTURE: CPT | Performed by: NURSE PRACTITIONER

## 2023-05-08 PROCEDURE — 96361 HYDRATE IV INFUSION ADD-ON: CPT

## 2023-05-08 PROCEDURE — 82962 GLUCOSE BLOOD TEST: CPT

## 2023-05-08 PROCEDURE — 99285 EMERGENCY DEPT VISIT HI MDM: CPT | Mod: 25

## 2023-05-08 PROCEDURE — 82010 KETONE BODYS QUAN: CPT | Performed by: NURSE PRACTITIONER

## 2023-05-08 PROCEDURE — 93005 ELECTROCARDIOGRAM TRACING: CPT

## 2023-05-08 PROCEDURE — 87086 URINE CULTURE/COLONY COUNT: CPT | Performed by: NURSE PRACTITIONER

## 2023-05-08 PROCEDURE — 81025 URINE PREGNANCY TEST: CPT | Performed by: NURSE PRACTITIONER

## 2023-05-08 PROCEDURE — 85025 COMPLETE CBC W/AUTO DIFF WBC: CPT | Performed by: NURSE PRACTITIONER

## 2023-05-08 PROCEDURE — 81000 URINALYSIS NONAUTO W/SCOPE: CPT | Performed by: NURSE PRACTITIONER

## 2023-05-08 PROCEDURE — 25000003 PHARM REV CODE 250: Performed by: EMERGENCY MEDICINE

## 2023-05-08 PROCEDURE — 82803 BLOOD GASES ANY COMBINATION: CPT

## 2023-05-08 PROCEDURE — 99900035 HC TECH TIME PER 15 MIN (STAT)

## 2023-05-08 PROCEDURE — 87186 SC STD MICRODIL/AGAR DIL: CPT | Performed by: NURSE PRACTITIONER

## 2023-05-08 PROCEDURE — 96360 HYDRATION IV INFUSION INIT: CPT

## 2023-05-08 RX ORDER — CEPHALEXIN 500 MG/1
500 CAPSULE ORAL
Status: COMPLETED | OUTPATIENT
Start: 2023-05-08 | End: 2023-05-08

## 2023-05-08 RX ORDER — CEPHALEXIN 500 MG/1
500 CAPSULE ORAL 4 TIMES DAILY
Qty: 20 CAPSULE | Refills: 0 | Status: SHIPPED | OUTPATIENT
Start: 2023-05-08 | End: 2023-05-08 | Stop reason: SDUPTHER

## 2023-05-08 RX ORDER — CEPHALEXIN 500 MG/1
500 CAPSULE ORAL 4 TIMES DAILY
Qty: 20 CAPSULE | Refills: 0 | Status: SHIPPED | OUTPATIENT
Start: 2023-05-08 | End: 2023-05-10 | Stop reason: ALTCHOICE

## 2023-05-08 RX ADMIN — SODIUM CHLORIDE 1000 ML: 0.9 INJECTION, SOLUTION INTRAVENOUS at 10:05

## 2023-05-08 RX ADMIN — CEPHALEXIN 500 MG: 500 CAPSULE ORAL at 12:05

## 2023-05-08 NOTE — FIRST PROVIDER EVALUATION
"   Emergency Department TeleTriage Encounter Note      CHIEF COMPLAINT    Chief Complaint   Patient presents with    Hyperglycemia     Not feeling well, blurred vision and glucose has been above 300 since last night. Took 30 units of insulin PTA.        VITAL SIGNS   Initial Vitals [05/08/23 1010]   BP Pulse Resp Temp SpO2   (!) 160/95 105 -- 98.2 °F (36.8 °C) 100 %      MAP       --            ALLERGIES    Review of patient's allergies indicates:  No Known Allergies    PROVIDER TRIAGE NOTE  This is a teletriage evaluation of a 37 y.o. female presenting to the ED complaining of elevated glucose, fatigue, and body aches since last night. Pt reports that she was "sweaty and out of it" last night and glucose was 364.  Has taken insulin prior to arrival. Denies SOB.      Pt is alert, well-appearing.  Glucose elevated and pt is tachycardic. Will eval for DKA.     Initial orders will be placed and care will be transferred to an alternate provider when patient is roomed for a full evaluation. Any additional orders and the final disposition will be determined by that provider.         ORDERS  Labs Reviewed   POCT GLUCOSE - Abnormal; Notable for the following components:       Result Value    POCT Glucose 262 (*)     All other components within normal limits   CBC W/ AUTO DIFFERENTIAL   COMPREHENSIVE METABOLIC PANEL   BETA - HYDROXYBUTYRATE, SERUM   URINALYSIS, REFLEX TO URINE CULTURE   POCT URINE PREGNANCY   POCT GLUCOSE MONITORING CONTINUOUS   POCT GLUCOSE MONITORING CONTINUOUS       ED Orders (720h ago, onward)      Start Ordered     Status Ordering Provider    05/08/23 1200 05/08/23 1018  Vital signs  Every 2 hours         Ordered KIMBERLY RAMSAY N.    05/08/23 1030 05/08/23 1018  sodium chloride 0.9% bolus 1,000 mL 1,000 mL  ED 1 Time         Ordered KIMBERLY RAMSAY N.    05/08/23 1018 05/08/23 1018  Cardiac Monitoring - Adult  Continuous         Ordered KIMBERLY RAMSAY N.    05/08/23 1018 05/08/23 " 1018  Pulse Oximetry Continuous  Continuous         Ordered KIMBERLY RAMSAY N.    05/08/23 1018 05/08/23 1018  Saline lock IV  Once         Ordered KIMBERLY RAMSAY N.    05/08/23 1018 05/08/23 1018  CBC auto differential  STAT         Ordered KIMBERLY RAMSAY N.    05/08/23 1018 05/08/23 1018  Comprehensive metabolic panel  STAT         Ordered DEVENDRA KMIBERLY N.    05/08/23 1018 05/08/23 1018  Beta - Hydroxybutyrate, Serum  STAT         Ordered KIMBERLY RAMSAY N.    05/08/23 1018 05/08/23 1018  POCT glucose  Once         Ordered DEVENDRA KIMBERLY N.    05/08/23 1018 05/08/23 1018  Urinalysis, Reflex to Urine Culture Urine, Clean Catch  STAT         Ordered DEVENDRA KIMBERLY N.    05/08/23 1018 05/08/23 1018  EKG 12-lead  Once         Ordered KIMBERLY RAMSAY N.    05/08/23 1018 05/08/23 1018  X-Ray Chest AP Portable  1 time imaging         Ordered KIMBERLY RAMSAY N.    05/08/23 1018 05/08/23 1018  POCT Venous Blood Gas  Once        Comments: This test should be used for VBGs.  If using this order for other tests (K, creatinine, HCT, PT/INR, lactate etc)  ONLY do so in the case of an emergency or rapid response.Notify Physician if: see parameters below.      Ordered KIMBERLY RAMSAY N.    05/08/23 1018 05/08/23 1018  POCT urine pregnancy  Once         Ordered DEVENDRA KIMBERLY N.    05/08/23 1012 05/08/23 1011  POCT glucose  Once         Acknowledged KIMBERLEE BRAVO    05/08/23 1010 05/08/23 1010  POCT glucose  Once         Final result EMERGENCY, DEPT PHYSICIAN              Virtual Visit Note: The provider triage portion of this emergency department evaluation and documentation was performed via [employer], a HIPAA-compliant telemedicine application, in concert with a tele-presenter in the room. A face to face patient evaluation with one of my colleagues will occur once the patient is placed in an emergency department room.      DISCLAIMER:  This note was prepared with [employer] voice recognition transcription software. Garbled syntax, mangled pronouns, and other bizarre constructions may be attributed to that software system.

## 2023-05-08 NOTE — Clinical Note
"Mary Lemus"Levi was seen and treated in our emergency department on 5/8/2023.  She may return to work on 05/09/2023.       If you have any questions or concerns, please don't hesitate to call.      Leanne Cornell MD"

## 2023-05-08 NOTE — ED PROVIDER NOTES
Emergency Department Encounter  Provider Note  Encounter Date: 5/8/2023    Patient Name: Mary Sims  MRN: [id number]    History of Present Illness   HPI  History of Present Illness:    Chief Complaint:   Chief Complaint   Patient presents with    Hyperglycemia     Not feeling well, blurred vision and glucose has been above 300 since last night. Took 30 units of insulin PTA.        37-year-old female presenting with elevated blood sugar that started last night.  Denies any fever, chills, nausea or vomiting.  States that she has blurry vision intermittently, and also just generally not feeling well when her blood sugar is elevated.  Gave herself insulin prior to coming to the ED but did not eat anything afterwards.  States that she is transitioning to a new PCP.    The following PMH/PSH/SocHx/FamHx has been reviewed by myself:    Past Medical History:   Diagnosis Date    Cellulitis     Diabetes mellitus     Hypertension     Retinal detachment      Past Surgical History:   Procedure Laterality Date    VITRECTOMY BY PARS PLANA APPROACH Right 4/24/2019    Procedure: VITRECTOMY, PARS PLANA APPROACH;  Surgeon: JAMI Schulz MD;  Location: [address];  Service: Ophthalmology;  Laterality: Right;  25g PPV/membrane stripping/ILM peel/EL/AFx/GFX/avastin OD    VITRECTOMY BY PARS PLANA APPROACH Left 5/29/2019    Procedure: VITRECTOMY, PARS PLANA  APPROACH  25g PPV/Membrane Stripping/IML peel/EL/AFX/Avastin left eye   60 mins;  Surgeon: JAMI Schulz MD;  Location: [facility] OR [address] [place] [address];  Service: Ophthalmology;  Laterality: Left;     Social History     Tobacco Use    Smoking status: Never    Smokeless tobacco: Never   Substance Use Topics    Alcohol use: Yes     Comment: social    Drug use: No     Family History   Problem Relation Age of Onset    Diabetes Mother        Allergies reviewed:  Review of patient's allergies indicates:  No Known Allergies    Medications reviewed:  Discharge Medication List as of  "5/8/2023 12:06 PM        START taking these medications    Details   cephALEXin (KEFLEX) 500 MG capsule Take 1 capsule (500 mg total) by mouth 4 (four) times daily. for 5 days, Starting Mon 5/8/2023, Until Sat 5/13/2023, Normal           CONTINUE these medications which have NOT CHANGED    Details   !! ADMELOG U-100 INSULIN LISPRO 100 unit/mL injection ADM 10 UNI SC TID, Historical Med      albuterol (PROVENTIL/VENTOLIN HFA) 90 mcg/actuation inhaler Inhale 1-2 puffs into the lungs every 6 (six) hours as needed for Wheezing or Shortness of Breath. Rescue, Starting Sun 6/5/2022, Until Mon 6/5/2023 at 2359, Print      bacitracin 500 unit/gram Oint Apply topically 2 (two) times daily., Starting Sat 6/4/2022, Normal      BD ULTRA-FINE SHORT PEN NEEDLE 31 gauge x 5/16" Ndle USE UTD, Historical Med      chlordiazepoxide (LIBRIUM) 25 MG Cap Take 1 capsule (25 mg total) by mouth every 8 (eight) hours as needed (Itching)., Starting Fri 9/23/2022, Until Sun 10/23/2022 at 2359, Print      clotrimazole (LOTRIMIN) 1 % cream Apply to affected area 2 times daily, Print      dulaglutide (TRULICITY) 1.5 mg/0.5 mL pen injector Inject into the skin every 7 days., Historical Med      hydrocortisone 2.5 % cream Apply topically 2 (two) times daily. for 7 days, Starting Mon 9/12/2022, Until Mon 9/19/2022, Normal      hydrOXYzine HCL (ATARAX) 25 MG tablet Take 1 tablet (25 mg total) by mouth 4 (four) times daily as needed for Itching., Starting Tue 9/20/2022, Print      hydrOXYzine pamoate (VISTARIL) 25 MG Cap Take 1 capsule (25 mg total) by mouth every 8 (eight) hours as needed (Itching)., Starting Fri 9/23/2022, Print      ibuprofen (ADVIL,MOTRIN) 600 MG tablet Take 1 tablet (600 mg total) by mouth every 6 (six) hours as needed for Pain., Starting Fri 2/10/2023, Print      insulin aspart (NOVOLOG) 100 unit/mL InPn pen Inject 5 Units into the skin 3 (three) times daily with meals., Starting Mon 5/9/2016, Until Tue 4/12/2022, Print    " "  insulin glargine (LANTUS U-100 INSULIN) 100 unit/mL injection Inject 20 Units into the skin every evening., Historical Med      insulin glargine 100 units/mL (3mL) SubQ pen Inject 45 Units into the skin every evening., Historical Med      !! insulin lispro 100 unit/mL injection Inject 10 Units into the skin 3 (three) times daily before meals., Historical Med      insulin syringe-needle U-100 1 mL 30 gauge X 7/16" Syrg USE AS DIRECTED, Historical Med      ketoconazole (NIZORAL) 2 % cream Apply topically 2 (two) times daily. for 7 days, Starting Mon 9/12/2022, Until Mon 9/19/2022, Normal      nystatin (MYCOSTATIN) powder Apply topically 2 (two) times daily., Starting Tue 9/20/2022, Print      ondansetron (ZOFRAN-ODT) 4 MG TbDL Take 1 tablet (4 mg total) by mouth every 6 (six) hours as needed (nausea or vomiting)., Starting Sun 6/5/2022, Print      ONETOUCH DELICA LANCETS 33 gauge Misc USE AS DIRECTED, Historical Med      ONETOUCH ULTRA BLUE TEST STRIP Strp USE TO TEST BLOOD GLUCOSE BID, Historical Med      pen needle, diabetic (BD ULTRA-FINE ISAAC PEN NEEDLES) 32 gauge x 5/32" Ndle Use to inject insulin 5x/day, Normal      polyethylene glycol (GLYCOLAX) 17 gram/dose powder Take 17 g by mouth once daily., Starting Sun 1/22/2023, Print       !! - Potential duplicate medications found. Please discuss with provider.          ROS  Review of Systems:    Constitutional:  Negative for fever.   HENT:  Negative for sore throat.    Eyes:  Negative for redness.   Respiratory:  Negative for shortness of breath.    Cardiovascular:  Negative for chest pain.   Gastrointestinal:  Negative for nausea.   Genitourinary:  Negative for dysuria.   Musculoskeletal:  Negative for back pain.   Skin:  Negative for rash.   Neurological:  Negative for weakness.   Hematological:  Does not bruise/bleed easily.   Psychiatric/Behavioral:  The patient is not nervous/anxious.      Physical Exam   Physical Exam    Initial Vitals   BP Pulse Resp Temp " SpO2   05/08/23 1010 05/08/23 1010 05/08/23 1057 05/08/23 1010 05/08/23 1010   (!) 160/95 105 18 98.2 °F (36.8 °C) 100 %      MAP       --                  Triage vital signs reviewed.    Constitutional: Well-nourished, well-developed. Not in acute distress.  HENT: Normocephalic, atraumatic. Moist mucous membranes.  Eyes: No conjunctival injection.  Resp: Normal respiratory effort, breathing unlabored.  Cardio: Regular rate and rhythm.  GI: Abdomen non-distended.   MSK: Extremities without any deformities noted. No lower extremity edema.  Skin: Warm and dry. No rashes or lesions noted.  Neuro: Awake and alert. Moves all extremities.    ED Course   Procedures    Medical Decision Making    History Acquisition     The history is provided by the patient.     Review of prior external/non ED notes:  Urgent Care note for intertrigo    Differential Diagnoses   Based on available information and initial assessment, the working Differential Diagnosis includes, but is not limited to:  DKA, hyperglycemic hyperosmotic non-ketotic state, sepsis/infection, dehydration, UTI, dietary cause/increased sugar intake, device malfunction, alcohol/drug abuse, medication non-compliance.  .    EKG   EKG ordered and independently reviewed by me:   Normal sinus rhythm, rate 97, normal intervals, no STEMI, normal axis    Labs   Lab tests ordered and independently reviewed by me:    Labs Reviewed   CBC W/ AUTO DIFFERENTIAL - Abnormal; Notable for the following components:       Result Value    Hemoglobin 11.3 (*)     Hematocrit 33.0 (*)     MCV 70 (*)     MCH 24.0 (*)     All other components within normal limits   COMPREHENSIVE METABOLIC PANEL - Abnormal; Notable for the following components:    CO2 20 (*)     Glucose 193 (*)     BUN 22 (*)     Albumin 2.2 (*)     Total Bilirubin <0.1 (*)     All other components within normal limits   URINALYSIS, REFLEX TO URINE CULTURE - Abnormal; Notable for the following components:    Appearance, UA Hazy  (*)     Protein, UA 3+ (*)     Glucose, UA 4+ (*)     Occult Blood UA Trace (*)     Leukocytes, UA 1+ (*)     All other components within normal limits    Narrative:     Specimen Source->Urine   URINALYSIS MICROSCOPIC - Abnormal; Notable for the following components:    RBC, UA 7 (*)     WBC, UA 83 (*)     WBC Clumps, UA Few (*)     Bacteria Many (*)     All other components within normal limits    Narrative:     Specimen Source->Urine   POCT GLUCOSE - Abnormal; Notable for the following components:    POCT Glucose 262 (*)     All other components within normal limits   CULTURE, URINE   BETA - HYDROXYBUTYRATE, SERUM   POCT URINE PREGNANCY   POCT GLUCOSE MONITORING CONTINUOUS   POCT GLUCOSE MONITORING CONTINUOUS         Imaging   Imaging ordered and independently reviewed by me:   Imaging Results              X-Ray Chest AP Portable (Final result)  Result time 05/08/23 11:31:41      Final result by Eduard Solis MD (05/08/23 11:31:41)                   Impression:      No convincing evidence of acute cardiopulmonary disease.      Electronically signed by: Eduard Solis  Date:    05/08/2023  Time:    11:31               Narrative:    EXAMINATION:  XR CHEST AP PORTABLE    CLINICAL HISTORY:  hyperglycemia;    TECHNIQUE:  Single frontal view of the chest was performed.    COMPARISON:  Chest radiograph 08/10/2021    FINDINGS:  Monitoring leads overlie the chest.  Cardiomediastinal contour appears to be within normal limits.  Lungs appear essentially clear.  No definite pneumothorax or large volume pleural effusion.    No acute findings in the visualized abdomen.  Osseous and soft tissue structures appear without definite acute change.                                           Additional Consideration   Mary Sims  presents to the emergency Department today with hyperglycemia.  Labs do not show DKA.  Patient has a urinary tract infection, likely etiology of patient's hyperglycemia.  Nontoxic appearing.  Will  give fluids.  Also Keflex.  Recent urine culture is sensitive to this.  Before leaving, patient stated that she fell a little weak, blood sugar check was 63, had not eat anything.  She was provided with food, discharged to  her prescription at the pharmacy.    Additional testing considered but not indicated during the course of this workup: further imaging and labwork, not indicated  Co-morbidities/chronic illness/exacerbation of chronic illness considered which impacted clinical decision making:  Diabetes  Procedures done in the ED or plan for the OR: No  Social determinants of care considered during development of treatment plan include: Decreased medical literacy  Discussion of management or test interpretation with external provider: No  DNR discussion: No    The patient's list of active medications and allergies as documented per RN staff has been reviewed.  Medications given in the ED and/or prescribed:   Medications   sodium chloride 0.9% bolus 1,000 mL 1,000 mL (0 mLs Intravenous Stopped 5/8/23 1225)   cephALEXin capsule 500 mg (500 mg Oral Given 5/8/23 1226)             ED Course as of 05/08/23 1311   Mon May 08, 2023   1209 CBC auto differential(!) [CS]   1210 Comprehensive metabolic panel(!) [CS]   1210 Urinalysis, Reflex to Urine Culture Urine, Clean Catch(!)  infection [CS]   1210 Urinalysis Microscopic(!) [CS]   1210 POCT Venous Blood Gas(!!) [CS]   1210 Patient with urine infection.  Will give Keflex, likely that is what is causing patient's blood sugar to rise.  Work and school note given as requested. [CS]      ED Course User Index  [CS] Leanne Cornell MD       Explanation of disposition:  Discharge    Clinical Impression:     1. Urinary tract infection with hematuria, site unspecified    2. Hyperglycemia         All results from the workup were reviewed with the patient/family in detail. I discussed with the patient and/or the family/caregiver that today's evaluation in the ED does not  suggest any emergent or life-threatening medical conditions that would require hospitalization or immediate intervention beyond what was provided in the ED. I believe the patient is safe for discharge.  However, a reassuring evaluation in the ED does not preclude the presence or development of a serious or life-threatening condition. As such, strict return precautions were discussed with the patient expressing understanding of instructions, and all questions answered. The patient/family communicates understanding of all this information and all remaining questions and concerns were addressed at this time.    The patient/family has been provided with verbal and printed direction regarding our final diagnosis(es) as well as instructions regarding use of OTC and/or Rx medications intended to manage the patient's aforementioned conditions including:  ED Prescriptions       Medication Sig Dispense Start Date End Date Auth. Provider    cephALEXin (KEFLEX) 500 MG capsule  (Status: Discontinued) Take 1 capsule (500 mg total) by mouth 4 (four) times daily. for 5 days 20 capsule 5/8/2023 5/8/2023 Leanne Cornell MD    cephALEXin (KEFLEX) 500 MG capsule Take 1 capsule (500 mg total) by mouth 4 (four) times daily. for 5 days 20 capsule 5/8/2023 5/13/2023 Leanne Cornell MD          The patient's condition does not warrant review of the  and prescription of controlled substances.      ED Disposition Condition    Discharge Stable                 Leanne Cornell MD  05/08/23 8480

## 2023-05-08 NOTE — ED NOTES
Patient checked glucose with personal glucometer. Results of 63. Pt reporting that she feels shaky. MD informed. Provided patient with 2 cups of orange juice.

## 2023-05-08 NOTE — Clinical Note
"Mary Lemus"Levi was seen and treated in our emergency department on 5/8/2023.  She may return to school on 05/09/2023.      If you have any questions or concerns, please don't hesitate to call.      Leanne Cornell MD"

## 2023-05-10 ENCOUNTER — TELEPHONE (OUTPATIENT)
Dept: EMERGENCY MEDICINE | Facility: HOSPITAL | Age: 38
End: 2023-05-10
Payer: MEDICAID

## 2023-05-10 LAB — BACTERIA UR CULT: ABNORMAL

## 2023-05-10 RX ORDER — NITROFURANTOIN 25; 75 MG/1; MG/1
100 CAPSULE ORAL 2 TIMES DAILY
Qty: 14 CAPSULE | Refills: 0 | Status: SHIPPED | OUTPATIENT
Start: 2023-05-10 | End: 2023-05-17

## 2023-05-11 NOTE — TELEPHONE ENCOUNTER
Discussed urine culture results with patient. No worsening of symptoms. Keflex to be discontinued. Macrobid sent to requested pharmacy. Patient agreeable.

## 2023-06-22 ENCOUNTER — HOSPITAL ENCOUNTER (EMERGENCY)
Facility: HOSPITAL | Age: 38
Discharge: HOME | End: 2023-06-23
Attending: EMERGENCY MEDICINE
Payer: MEDICAID

## 2023-06-22 DIAGNOSIS — R73.9 HYPERGLYCEMIA: Primary | ICD-10-CM

## 2023-06-22 DIAGNOSIS — R19.7 NAUSEA VOMITING AND DIARRHEA: ICD-10-CM

## 2023-06-22 DIAGNOSIS — R11.2 NAUSEA VOMITING AND DIARRHEA: ICD-10-CM

## 2023-06-22 LAB
ALBUMIN SERPL BCP-MCNC: 3.1 G/DL (ref 3.5–5.2)
ALLENS TEST: ABNORMAL
ALP SERPL-CCNC: 103 U/L (ref 55–135)
ALT SERPL W/O P-5'-P-CCNC: 12 U/L (ref 10–44)
ANION GAP SERPL CALC-SCNC: 11 MMOL/L (ref 8–16)
AST SERPL-CCNC: 15 U/L (ref 10–40)
B-HCG UR QL: NEGATIVE
B-OH-BUTYR BLD STRIP-SCNC: 0.3 MMOL/L (ref 0–0.5)
BACTERIA #/AREA URNS AUTO: NORMAL /HPF
BASOPHILS # BLD AUTO: 0.04 K/UL (ref 0–0.2)
BASOPHILS NFR BLD: 0.4 % (ref 0–1.9)
BILIRUB SERPL-MCNC: 0.3 MG/DL (ref 0.1–1)
BILIRUB UR QL STRIP: NEGATIVE
BUN SERPL-MCNC: 18 MG/DL (ref 6–20)
CALCIUM SERPL-MCNC: 9.6 MG/DL (ref 8.7–10.5)
CHLORIDE SERPL-SCNC: 101 MMOL/L (ref 95–110)
CLARITY UR REFRACT.AUTO: CLEAR
CO2 SERPL-SCNC: 21 MMOL/L (ref 23–29)
COLOR UR AUTO: YELLOW
CREAT SERPL-MCNC: 1.5 MG/DL (ref 0.5–1.4)
CTP QC/QA: YES
DELSYS: ABNORMAL
DIFFERENTIAL METHOD: ABNORMAL
EOSINOPHIL # BLD AUTO: 0.1 K/UL (ref 0–0.5)
EOSINOPHIL NFR BLD: 1.3 % (ref 0–8)
ERYTHROCYTE [DISTWIDTH] IN BLOOD BY AUTOMATED COUNT: 14 % (ref 11.5–14.5)
EST. GFR  (NO RACE VARIABLE): 45.7 ML/MIN/1.73 M^2
FIO2: 21
GLUCOSE SERPL-MCNC: 488 MG/DL (ref 70–110)
GLUCOSE UR QL STRIP: ABNORMAL
HCO3 UR-SCNC: 23.4 MMOL/L (ref 24–28)
HCT VFR BLD AUTO: 36.8 % (ref 37–48.5)
HCV AB SERPL QL IA: NORMAL
HGB BLD-MCNC: 12.8 G/DL (ref 12–16)
HGB UR QL STRIP: ABNORMAL
HIV 1+2 AB+HIV1 P24 AG SERPL QL IA: NORMAL
HYALINE CASTS UR QL AUTO: 0 /LPF
IMM GRANULOCYTES # BLD AUTO: 0.03 K/UL (ref 0–0.04)
IMM GRANULOCYTES NFR BLD AUTO: 0.3 % (ref 0–0.5)
KETONES UR QL STRIP: NEGATIVE
LEUKOCYTE ESTERASE UR QL STRIP: NEGATIVE
LIPASE SERPL-CCNC: 35 U/L (ref 4–60)
LYMPHOCYTES # BLD AUTO: 1.3 K/UL (ref 1–4.8)
LYMPHOCYTES NFR BLD: 12.6 % (ref 18–48)
MCH RBC QN AUTO: 23.9 PG (ref 27–31)
MCHC RBC AUTO-ENTMCNC: 34.8 G/DL (ref 32–36)
MCV RBC AUTO: 69 FL (ref 82–98)
MICROSCOPIC COMMENT: NORMAL
MODE: ABNORMAL
MONOCYTES # BLD AUTO: 0.5 K/UL (ref 0.3–1)
MONOCYTES NFR BLD: 5.2 % (ref 4–15)
NEUTROPHILS # BLD AUTO: 8.3 K/UL (ref 1.8–7.7)
NEUTROPHILS NFR BLD: 80.2 % (ref 38–73)
NITRITE UR QL STRIP: POSITIVE
NRBC BLD-RTO: 0 /100 WBC
PCO2 BLDA: 47.6 MMHG (ref 35–45)
PH SMN: 7.3 [PH] (ref 7.35–7.45)
PH UR STRIP: 6 [PH] (ref 5–8)
PLATELET # BLD AUTO: 295 K/UL (ref 150–450)
PMV BLD AUTO: 11.3 FL (ref 9.2–12.9)
PO2 BLDA: 22 MMHG (ref 40–60)
POC BE: -3 MMOL/L
POC SATURATED O2: 32 % (ref 95–100)
POC TCO2: 25 MMOL/L (ref 24–29)
POCT GLUCOSE: 471 MG/DL (ref 70–110)
POTASSIUM SERPL-SCNC: 4.8 MMOL/L (ref 3.5–5.1)
PROT SERPL-MCNC: 7.4 G/DL (ref 6–8.4)
PROT UR QL STRIP: ABNORMAL
RBC # BLD AUTO: 5.35 M/UL (ref 4–5.4)
RBC #/AREA URNS AUTO: 3 /HPF (ref 0–4)
SAMPLE: ABNORMAL
SITE: ABNORMAL
SODIUM SERPL-SCNC: 133 MMOL/L (ref 136–145)
SP GR UR STRIP: 1.02 (ref 1–1.03)
SQUAMOUS #/AREA URNS AUTO: 1 /HPF
URN SPEC COLLECT METH UR: ABNORMAL
WBC # BLD AUTO: 10.29 K/UL (ref 3.9–12.7)
WBC #/AREA URNS AUTO: 2 /HPF (ref 0–5)
YEAST UR QL AUTO: NORMAL

## 2023-06-22 PROCEDURE — 99284 EMERGENCY DEPT VISIT MOD MDM: CPT | Mod: 25

## 2023-06-22 PROCEDURE — 80053 COMPREHEN METABOLIC PANEL: CPT | Performed by: EMERGENCY MEDICINE

## 2023-06-22 PROCEDURE — 99900035 HC TECH TIME PER 15 MIN (STAT)

## 2023-06-22 PROCEDURE — 82803 BLOOD GASES ANY COMBINATION: CPT

## 2023-06-22 PROCEDURE — 86803 HEPATITIS C AB TEST: CPT | Performed by: PHYSICIAN ASSISTANT

## 2023-06-22 PROCEDURE — 82962 GLUCOSE BLOOD TEST: CPT

## 2023-06-22 PROCEDURE — 85025 COMPLETE CBC W/AUTO DIFF WBC: CPT | Performed by: EMERGENCY MEDICINE

## 2023-06-22 PROCEDURE — 25000003 PHARM REV CODE 250

## 2023-06-22 PROCEDURE — 81001 URINALYSIS AUTO W/SCOPE: CPT | Performed by: EMERGENCY MEDICINE

## 2023-06-22 PROCEDURE — 82800 BLOOD PH: CPT | Mod: 59

## 2023-06-22 PROCEDURE — 82010 KETONE BODYS QUAN: CPT | Performed by: EMERGENCY MEDICINE

## 2023-06-22 PROCEDURE — 87389 HIV-1 AG W/HIV-1&-2 AB AG IA: CPT | Performed by: PHYSICIAN ASSISTANT

## 2023-06-22 PROCEDURE — 96360 HYDRATION IV INFUSION INIT: CPT

## 2023-06-22 PROCEDURE — 81025 URINE PREGNANCY TEST: CPT | Performed by: EMERGENCY MEDICINE

## 2023-06-22 PROCEDURE — 83690 ASSAY OF LIPASE: CPT | Performed by: EMERGENCY MEDICINE

## 2023-06-22 RX ORDER — ONDANSETRON 4 MG/1
4 TABLET, ORALLY DISINTEGRATING ORAL
Status: COMPLETED | OUTPATIENT
Start: 2023-06-22 | End: 2023-06-22

## 2023-06-22 RX ADMIN — ONDANSETRON 4 MG: 4 TABLET, ORALLY DISINTEGRATING ORAL at 11:06

## 2023-06-22 RX ADMIN — SODIUM CHLORIDE 1000 ML: 9 INJECTION, SOLUTION INTRAVENOUS at 11:06

## 2023-06-22 NOTE — Clinical Note
"Mary Lemus" Levi was seen and treated in our emergency department on 6/22/2023.  She may return to work on 06/24/2023.       If you have any questions or concerns, please don't hesitate to call.      Mel FLOREZN RN    "

## 2023-06-23 VITALS
HEIGHT: 63 IN | OXYGEN SATURATION: 99 % | HEART RATE: 80 BPM | DIASTOLIC BLOOD PRESSURE: 94 MMHG | TEMPERATURE: 99 F | BODY MASS INDEX: 30.12 KG/M2 | SYSTOLIC BLOOD PRESSURE: 154 MMHG | RESPIRATION RATE: 18 BRPM | WEIGHT: 170 LBS

## 2023-06-23 LAB — POCT GLUCOSE: 338 MG/DL (ref 70–110)

## 2023-06-23 PROCEDURE — 82962 GLUCOSE BLOOD TEST: CPT

## 2023-06-23 PROCEDURE — 96361 HYDRATE IV INFUSION ADD-ON: CPT

## 2023-06-23 RX ORDER — ONDANSETRON 4 MG/1
4 TABLET, FILM COATED ORAL EVERY 6 HOURS
Qty: 20 TABLET | Refills: 0 | Status: SHIPPED | OUTPATIENT
Start: 2023-06-23 | End: 2023-06-23 | Stop reason: SDUPTHER

## 2023-06-23 RX ORDER — ONDANSETRON 4 MG/1
4 TABLET, FILM COATED ORAL EVERY 6 HOURS
Qty: 20 TABLET | Refills: 0 | Status: SHIPPED | OUTPATIENT
Start: 2023-06-23 | End: 2023-06-28

## 2023-06-23 NOTE — ED PROVIDER NOTES
Encounter Date: 6/22/2023       History     Chief Complaint   Patient presents with    Emesis     Pt is a 38 year old female with PMHx significant for DM who presents for evaluation of nausea and vomiting, which began today. She notes an associated diarrhea and urinary frequency. Denies any chest pain, shortness of breath, fever, chills, numbness, or weakness. Denies any recent sick contact.     The history is provided by the patient.   Review of patient's allergies indicates:  No Known Allergies  Past Medical History:   Diagnosis Date    Cellulitis     Diabetes mellitus     Hypertension     Retinal detachment      Past Surgical History:   Procedure Laterality Date    VITRECTOMY BY PARS PLANA APPROACH Right 4/24/2019    Procedure: VITRECTOMY, PARS PLANA APPROACH;  Surgeon: JAMI Schulz MD;  Location: [facility] OR [address] [place] [address];  Service: Ophthalmology;  Laterality: Right;  25g PPV/membrane stripping/ILM peel/EL/AFx/GFX/avastin OD    VITRECTOMY BY PARS PLANA APPROACH Left 5/29/2019    Procedure: VITRECTOMY, PARS PLANA  APPROACH  25g PPV/Membrane Stripping/IML peel/EL/AFX/Avastin left eye   60 mins;  Surgeon: JAMI Schulz MD;  Location: [facility] OR [address] [place] [address];  Service: Ophthalmology;  Laterality: Left;     Family History   Problem Relation Age of Onset    Diabetes Mother      Social History     Tobacco Use    Smoking status: Never    Smokeless tobacco: Never   Substance Use Topics    Alcohol use: Yes     Comment: social    Drug use: No     Review of Systems   Constitutional:  Negative for chills and fever.   HENT:  Negative for ear discharge and ear pain.    Eyes:  Negative for pain and redness.   Respiratory:  Negative for cough and shortness of breath.    Cardiovascular:  Negative for chest pain and palpitations.   Gastrointestinal:  Positive for diarrhea, nausea and vomiting. Negative for abdominal pain.   Genitourinary:  Positive for frequency. Negative for dysuria.   Musculoskeletal:  Negative for back pain.    Skin:  Negative for rash.   Neurological:  Negative for headaches.     Physical Exam     Initial Vitals [06/22/23 2022]   BP Pulse Resp Temp SpO2   124/74 93 20 98.2 °F (36.8 °C) 100 %      MAP       --         Physical Exam    Nursing note and vitals reviewed.  Constitutional: She appears well-developed and well-nourished. No distress.   HENT:   Head: Normocephalic and atraumatic.   Mouth/Throat: Oropharynx is clear and moist.   Eyes: Conjunctivae and EOM are normal. Pupils are equal, round, and reactive to light.   Neck: Neck supple. No tracheal deviation present.   Cardiovascular:  Normal rate, regular rhythm and intact distal pulses.           No murmur heard.  Pulmonary/Chest: Breath sounds normal. No stridor. No respiratory distress. She has no wheezes. She has no rales.   Abdominal: Abdomen is soft. She exhibits no distension. There is no abdominal tenderness.   Musculoskeletal:         General: No edema. Normal range of motion.      Cervical back: Neck supple.     Neurological: She is alert and oriented to person, place, and time.   Skin: Skin is warm and dry. Capillary refill takes less than 2 seconds.       ED Course   Procedures  Labs Reviewed   CBC W/ AUTO DIFFERENTIAL - Abnormal; Notable for the following components:       Result Value    Hematocrit 36.8 (*)     MCV 69 (*)     MCH 23.9 (*)     Gran # (ANC) 8.3 (*)     Gran % 80.2 (*)     Lymph % 12.6 (*)     All other components within normal limits   COMPREHENSIVE METABOLIC PANEL - Abnormal; Notable for the following components:    Sodium 133 (*)     CO2 21 (*)     Glucose 488 (*)     Creatinine 1.5 (*)     Albumin 3.1 (*)     eGFR 45.7 (*)     All other components within normal limits   URINALYSIS, REFLEX TO URINE CULTURE - Abnormal; Notable for the following components:    Protein, UA 2+ (*)     Glucose, UA 3+ (*)     Occult Blood UA 1+ (*)     Nitrite, UA Positive (*)     All other components within normal limits    Narrative:     Specimen  Source->Urine   POCT GLUCOSE - Abnormal; Notable for the following components:    POCT Glucose 471 (*)     All other components within normal limits   ISTAT PROCEDURE - Abnormal; Notable for the following components:    POC PH 7.299 (*)     POC PCO2 47.6 (*)     POC PO2 22 (*)     POC HCO3 23.4 (*)     POC SATURATED O2 32 (*)     All other components within normal limits   POCT GLUCOSE - Abnormal; Notable for the following components:    POCT Glucose 338 (*)     All other components within normal limits   HIV 1 / 2 ANTIBODY    Narrative:     Release to patient->Immediate   HEPATITIS C ANTIBODY    Narrative:     Release to patient->Immediate   LIPASE   BETA - HYDROXYBUTYRATE, SERUM   URINALYSIS MICROSCOPIC    Narrative:     Specimen Source->Urine   POCT URINE PREGNANCY          Imaging Results    None          Medications   sodium chloride 0.9% bolus 1,000 mL 1,000 mL (0 mLs Intravenous Stopped 6/23/23 7263)   ondansetron disintegrating tablet 4 mg (4 mg Oral Given 6/22/23 6367)     Medical Decision Making:   History:   Old Medical Records: I decided to obtain old medical records.  Initial Assessment:   Pt presents for evaluation of nausea, vomiting, and diarrhea   Differential Diagnosis:   Gastroenteritis, DKA, HHS, hyperglycemia without ketosis, UTI  Clinical Tests:   Lab Tests: Ordered and Reviewed  Medical Tests: Ordered and Reviewed  ED Management:  Pt hyperglycemic without evidence of DKA. No evidence of leukocytosis. UA without signs of infection. Pt treated in the ED with fluids and anti-emetics with symptomatic improvement. Repeat glucose significantly improved and pt tolerating PO intake. Plan to discharge to home. Return precautions advised.                         Clinical Impression:   Final diagnoses:  [R73.9] Hyperglycemia (Primary)  [R11.2, R19.7] Nausea vomiting and diarrhea        ED Disposition Condition    Discharge Stable          ED Prescriptions       Medication Sig Dispense Start Date End Date  Auth. Provider    ondansetron (ZOFRAN) 4 MG tablet  (Status: Discontinued) Take 1 tablet (4 mg total) by mouth every 6 (six) hours. for 5 days 20 tablet 6/23/2023 6/23/2023 Emanuel Gomez Jr., MD    ondansetron (ZOFRAN) 4 MG tablet Take 1 tablet (4 mg total) by mouth every 6 (six) hours. for 5 days 20 tablet 6/23/2023 6/28/2023 Emanuel Gomez Jr., MD          Follow-up Information       Follow up With Specialties Details Why Contact Info    Dagoberto Ott MD Internal Medicine In 1 week  [address] [facility]  [address] [place] [id number] [phone]               Emanuel Gomez Jr., MD  Resident  06/23/23 4720

## 2023-06-23 NOTE — DISCHARGE INSTRUCTIONS
Follow up with your primary care doctor. Return to the ED for new or worsening symptoms   
A/P: MILA LUCAS is a 38yo  now POD#2 s/p  section  to [facility] at 37w3d gestation (P/C 0.2)    -Vital Signs Stable  -Postop CBC H/H 10.0/29.1  - tolerating PO, bowel function nml   -Advance care as tolerated   -Continue routine postpartum/postoperative care and education.  -Ambulation encouraged, SCDs and lovenox for DVT ppx  -Healthy male infant, desires circumcision, not done yet

## 2023-06-29 ENCOUNTER — OFFICE VISIT (OUTPATIENT)
Dept: URGENT CARE | Facility: CLINIC | Age: 38
End: 2023-06-29
Payer: MEDICAID

## 2023-06-29 VITALS
HEART RATE: 95 BPM | SYSTOLIC BLOOD PRESSURE: 114 MMHG | WEIGHT: 170 LBS | RESPIRATION RATE: 15 BRPM | HEIGHT: 63 IN | TEMPERATURE: 98 F | OXYGEN SATURATION: 100 % | BODY MASS INDEX: 30.12 KG/M2 | DIASTOLIC BLOOD PRESSURE: 74 MMHG

## 2023-06-29 DIAGNOSIS — N76.0 ACUTE VAGINITIS: ICD-10-CM

## 2023-06-29 DIAGNOSIS — N39.0 URINARY TRACT INFECTION WITHOUT HEMATURIA, SITE UNSPECIFIED: Primary | ICD-10-CM

## 2023-06-29 LAB
B-HCG UR QL: NEGATIVE
BILIRUB UR QL STRIP: NEGATIVE
CTP QC/QA: YES
GLUCOSE UR QL STRIP: POSITIVE
KETONES UR QL STRIP: NEGATIVE
LEUKOCYTE ESTERASE UR QL STRIP: NEGATIVE
PH, POC UA: 5.5 (ref 5–8)
POC BLOOD, URINE: NEGATIVE
POC NITRATES, URINE: POSITIVE
PROT UR QL STRIP: POSITIVE
SP GR UR STRIP: 1.02 (ref 1–1.03)
UROBILINOGEN UR STRIP-ACNC: ABNORMAL (ref 0.1–1.1)

## 2023-06-29 PROCEDURE — 81003 URINALYSIS AUTO W/O SCOPE: CPT | Mod: QW,S$GLB,, | Performed by: NURSE PRACTITIONER

## 2023-06-29 PROCEDURE — 99213 PR OFFICE/OUTPT VISIT, EST, LEVL III, 20-29 MIN: ICD-10-PCS | Mod: S$GLB,,, | Performed by: NURSE PRACTITIONER

## 2023-06-29 PROCEDURE — 87591 N.GONORRHOEAE DNA AMP PROB: CPT | Performed by: NURSE PRACTITIONER

## 2023-06-29 PROCEDURE — 81514 NFCT DS BV&VAGINITIS DNA ALG: CPT | Performed by: NURSE PRACTITIONER

## 2023-06-29 PROCEDURE — 81025 POCT URINE PREGNANCY: ICD-10-PCS | Mod: S$GLB,,, | Performed by: NURSE PRACTITIONER

## 2023-06-29 PROCEDURE — 99213 OFFICE O/P EST LOW 20 MIN: CPT | Mod: S$GLB,,, | Performed by: NURSE PRACTITIONER

## 2023-06-29 PROCEDURE — 81003 POCT URINALYSIS, DIPSTICK, AUTOMATED, W/O SCOPE: ICD-10-PCS | Mod: QW,S$GLB,, | Performed by: NURSE PRACTITIONER

## 2023-06-29 PROCEDURE — 81025 URINE PREGNANCY TEST: CPT | Mod: S$GLB,,, | Performed by: NURSE PRACTITIONER

## 2023-06-29 RX ORDER — FLUCONAZOLE 150 MG/1
150 TABLET ORAL DAILY
Qty: 1 TABLET | Refills: 0 | Status: SHIPPED | OUTPATIENT
Start: 2023-06-29 | End: 2023-06-30

## 2023-06-29 RX ORDER — NYSTATIN AND TRIAMCINOLONE ACETONIDE 100000; 1 [USP'U]/G; MG/G
CREAM TOPICAL 2 TIMES DAILY
Qty: 60 G | Refills: 0 | Status: SHIPPED | OUTPATIENT
Start: 2023-06-29

## 2023-06-29 RX ORDER — CEFDINIR 300 MG/1
300 CAPSULE ORAL 2 TIMES DAILY
Qty: 20 CAPSULE | Refills: 0 | Status: SHIPPED | OUTPATIENT
Start: 2023-06-29 | End: 2023-07-09

## 2023-06-29 RX ORDER — CEFDINIR 300 MG/1
300 CAPSULE ORAL 2 TIMES DAILY
Qty: 20 CAPSULE | Refills: 0 | Status: SHIPPED | OUTPATIENT
Start: 2023-06-29 | End: 2023-06-29 | Stop reason: RX

## 2023-06-29 RX ORDER — NYSTATIN AND TRIAMCINOLONE ACETONIDE 100000; 1 [USP'U]/G; MG/G
CREAM TOPICAL 2 TIMES DAILY
Qty: 30 G | Refills: 0 | Status: SHIPPED | OUTPATIENT
Start: 2023-06-29 | End: 2023-06-29 | Stop reason: RX

## 2023-06-29 NOTE — PROGRESS NOTES
"Subjective:      Patient ID: Mary Sims is a 38 y.o. female.    Vitals:  height is 5' 3" (1.6 m) and weight is 77.1 kg (170 lb). Her temperature is 98.2 °F (36.8 °C). Her blood pressure is 114/74 and her pulse is 95. Her respiration is 15 and oxygen saturation is 100%.     Chief Complaint: Female  Problem    38 year old female presents today with dysuria with urgency and frequency, lower abdominal pressure, and vaginal irritation and itch.  Has h/o UTIs-last one approx 3 months ago, per pt. H/o diabetes- type 1.  Denies fever and back pain.       Female  Problem  The patient's primary symptoms include genital itching and vaginal discharge (white). The patient's pertinent negatives include no genital lesions, genital odor, genital rash, missed menses, pelvic pain or vaginal bleeding. This is a new problem. The current episode started in the past 7 days. The problem has been gradually worsening. Associated symptoms include abdominal pain, discolored urine, dysuria, frequency and urgency. Pertinent negatives include no diarrhea, fever, flank pain, nausea or vomiting. The vaginal discharge was white and scant. There has been no bleeding. She has not been passing clots. She has not been passing tissue. Nothing aggravates the symptoms. She has tried nothing for the symptoms.     Constitution: Negative for fever.   Gastrointestinal:  Positive for abdominal pain. Negative for nausea, vomiting and diarrhea.   Genitourinary:  Positive for dysuria, frequency, urgency and vaginal discharge (white). Negative for flank pain, missed menses and pelvic pain.    Objective:     Physical Exam   Constitutional: She is oriented to person, place, and time.  Non-toxic appearance. She does not appear ill. No distress.   HENT:   Head: Normocephalic.   Nose: Nose normal.   Mouth/Throat: Mucous membranes are moist.   Cardiovascular: Normal rate.   Pulmonary/Chest: Effort normal.   Abdominal: Normal appearance. She exhibits no " distension. Soft. flat abdomen There is no abdominal tenderness. There is no guarding, no left CVA tenderness and no right CVA tenderness.   Musculoskeletal: Normal range of motion.         General: Normal range of motion.   Neurological: She is alert and oriented to person, place, and time.   Skin: Skin is warm, dry and not diaphoretic.   Psychiatric: Her behavior is normal. Mood normal.   Nursing note and vitals reviewed.  Results for orders placed or performed in visit on 06/29/23   POCT Urinalysis, Dipstick, Automated, W/O Scope   Result Value Ref Range    POC Blood, Urine Negative Negative    POC Bilirubin, Urine Negative Negative    POC Urobilinogen, Urine Norm 0.1 - 1.1    POC Ketones, Urine Negative Negative    POC Protein, Urine Positive (A) Negative    POC Nitrates, Urine Positive (A) Negative    POC Glucose, Urine Positive (A) Negative    pH, UA 5.5 5 - 8    POC Specific Gravity, Urine 1.020 1.003 - 1.029    POC Leukocytes, Urine Negative Negative   POCT urine pregnancy   Result Value Ref Range    POC Preg Test, Ur Negative Negative     Acceptable Yes       Assessment:     1. Urinary tract infection without hematuria, site unspecified    2. Acute vaginitis        Plan:       Urinary tract infection without hematuria, site unspecified  -     POCT Urinalysis, Dipstick, Automated, W/O Scope  -     POCT urine pregnancy  -     cefdinir (OMNICEF) 300 MG capsule; Take 1 capsule (300 mg total) by mouth 2 (two) times daily. for 10 days  Dispense: 20 capsule; Refill: 0    Acute vaginitis  -     POCT Urinalysis, Dipstick, Automated, W/O Scope  -     POCT urine pregnancy  -     VAGINOSIS SCREEN BY DNA PROBE  -     C. trachomatis/N. gonorrhoeae by AMP DNA [facility]; Urine  -     nystatin-triamcinolone (MYCOLOG II) cream; Apply topically 2 (two) times daily.  Dispense: 60 g; Refill: 0  -     fluconazole (DIFLUCAN) 150 MG Tab; Take 1 tablet (150 mg total) by mouth once daily. for 1 day  Dispense: 1 tablet;  Refill: 0    Other orders  -     Discontinue: nystatin-triamcinolone (MYCOLOG II) cream; Apply topically 2 (two) times daily.  Dispense: 30 g; Refill: 0  -     Discontinue: cefdinir (OMNICEF) 300 MG capsule; Take 1 capsule (300 mg total) by mouth 2 (two) times daily. for 10 days  Dispense: 20 capsule; Refill: 0      Patient Instructions   Increase water intake  Wipe front to back  Take time on toilet while voiding  Use gentle soap  Avoid scented soaps/gels/bubble baths

## 2023-06-29 NOTE — LETTER
June 29, 2023      [facility]  [address] [address]  Phone: [phone]  Fax: [phone]       Patient: Mary Sims   YOB: 1985  Date of Visit: 06/29/2023    To Whom It May Concern:    Wale Sims  was at [facility] on 06/29/2023. The patient may return to work/school on 6/30/2023 with no restrictions. If you have any questions or concerns, or if I can be of further assistance, please do not hesitate to contact me.    Sincerely,    Ally San, CLOVIS-BC

## 2023-07-01 LAB
C TRACH DNA SPEC QL NAA+PROBE: NOT DETECTED
N GONORRHOEA DNA SPEC QL NAA+PROBE: NOT DETECTED

## 2023-07-04 ENCOUNTER — TELEPHONE (OUTPATIENT)
Dept: URGENT CARE | Facility: CLINIC | Age: 38
End: 2023-07-04
Payer: MEDICAID

## 2023-07-04 DIAGNOSIS — N76.0 BV (BACTERIAL VAGINOSIS): Primary | ICD-10-CM

## 2023-07-04 DIAGNOSIS — B37.31 VAGINAL CANDIDIASIS: ICD-10-CM

## 2023-07-04 DIAGNOSIS — B96.89 BV (BACTERIAL VAGINOSIS): Primary | ICD-10-CM

## 2023-07-04 LAB
BACTERIAL VAGINOSIS DNA: POSITIVE
CANDIDA GLABRATA DNA: NEGATIVE
CANDIDA KRUSEI DNA: NEGATIVE
CANDIDA RRNA VAG QL PROBE: POSITIVE
T VAGINALIS RRNA GENITAL QL PROBE: NEGATIVE

## 2023-07-04 RX ORDER — METRONIDAZOLE 500 MG/1
500 TABLET ORAL EVERY 12 HOURS
Qty: 14 TABLET | Refills: 0 | Status: SHIPPED | OUTPATIENT
Start: 2023-07-04 | End: 2023-07-11

## 2023-07-04 RX ORDER — FLUCONAZOLE 150 MG/1
150 TABLET ORAL DAILY
Qty: 1 TABLET | Refills: 0 | Status: SHIPPED | OUTPATIENT
Start: 2023-07-04 | End: 2023-07-05

## 2023-07-04 NOTE — TELEPHONE ENCOUNTER
BV and yeast - positive     C/G, trich - negative     Pt treated in clinic with diflucan x 1 and omnicef (for uti)    METRONIDAZOLE 500 mg BIDS x 7 days and diflucan 1 dose (take on last day of metronidazole rx) sent to rx of choice

## 2023-07-04 NOTE — TELEPHONE ENCOUNTER
BV and yeast - positive    C/G, trich - negative    Pt treated in clinic with diflucan x 1 and omnicef (for uti)    Pt will need trmt.  No answer.  Left  with call back #

## 2023-09-04 ENCOUNTER — HOSPITAL ENCOUNTER (EMERGENCY)
Facility: HOSPITAL | Age: 38
Discharge: HOME | End: 2023-09-04
Attending: EMERGENCY MEDICINE
Payer: MEDICAID

## 2023-09-04 VITALS
SYSTOLIC BLOOD PRESSURE: 168 MMHG | HEART RATE: 90 BPM | TEMPERATURE: 99 F | OXYGEN SATURATION: 97 % | WEIGHT: 170 LBS | RESPIRATION RATE: 18 BRPM | DIASTOLIC BLOOD PRESSURE: 89 MMHG | BODY MASS INDEX: 30.11 KG/M2

## 2023-09-04 DIAGNOSIS — R05.9 COUGH: ICD-10-CM

## 2023-09-04 DIAGNOSIS — R06.02 SOB (SHORTNESS OF BREATH): ICD-10-CM

## 2023-09-04 DIAGNOSIS — J06.9 UPPER RESPIRATORY TRACT INFECTION, UNSPECIFIED TYPE: Primary | ICD-10-CM

## 2023-09-04 LAB
B-HCG UR QL: NEGATIVE
CTP QC/QA: YES
GROUP A STREP, MOLECULAR: NEGATIVE
INFLUENZA A, MOLECULAR: NEGATIVE
INFLUENZA B, MOLECULAR: NEGATIVE
SARS-COV-2 RDRP RESP QL NAA+PROBE: NEGATIVE
SPECIMEN SOURCE: NORMAL

## 2023-09-04 PROCEDURE — 99284 EMERGENCY DEPT VISIT MOD MDM: CPT | Mod: 25

## 2023-09-04 PROCEDURE — 93010 ELECTROCARDIOGRAM REPORT: CPT | Mod: ,,, | Performed by: INTERNAL MEDICINE

## 2023-09-04 PROCEDURE — 87502 INFLUENZA DNA AMP PROBE: CPT | Performed by: EMERGENCY MEDICINE

## 2023-09-04 PROCEDURE — U0002 COVID-19 LAB TEST NON-CDC: HCPCS | Performed by: EMERGENCY MEDICINE

## 2023-09-04 PROCEDURE — 81025 URINE PREGNANCY TEST: CPT | Performed by: EMERGENCY MEDICINE

## 2023-09-04 PROCEDURE — 87651 STREP A DNA AMP PROBE: CPT | Performed by: EMERGENCY MEDICINE

## 2023-09-04 PROCEDURE — 93005 ELECTROCARDIOGRAM TRACING: CPT

## 2023-09-04 PROCEDURE — 93010 EKG 12-LEAD: ICD-10-PCS | Mod: ,,, | Performed by: INTERNAL MEDICINE

## 2023-09-04 RX ORDER — BENZONATATE 100 MG/1
100 CAPSULE ORAL 3 TIMES DAILY PRN
Qty: 20 CAPSULE | Refills: 0 | Status: SHIPPED | OUTPATIENT
Start: 2023-09-04 | End: 2023-09-14

## 2023-09-04 RX ORDER — FLUTICASONE PROPIONATE 50 MCG
1 SPRAY, SUSPENSION (ML) NASAL 2 TIMES DAILY PRN
Qty: 15 G | Refills: 0 | Status: SHIPPED | OUTPATIENT
Start: 2023-09-04

## 2023-09-04 NOTE — ED TRIAGE NOTES
"Pt arrived with c/o CP, HA, body aches, amd sore throat since Saturday.  Pt denies any fever, but reports "feeling hot and cold."   Pt denies taking any medication for symptoms.  CP described as tightness, non radiating, and intermittent.  Denies SOB.  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 Pt answering questions appropriately, speaking in complete sentences,however, hoarse voice noted, NAD noted.  Aao x 4.    "

## 2023-09-04 NOTE — Clinical Note
"Mary Lemus"Levi was seen and treated in our emergency department on 9/4/2023.  She may return to work on 09/07/2023.       If you have any questions or concerns, please don't hesitate to call.      Nina Fung, MAGALIE"

## 2023-09-04 NOTE — ED PROVIDER NOTES
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 Pt has hoarseness, chest congestion and body aches since Saturday. Pt requesting a test since they tested her yesterday but did not give her the results.      Patient is a 38-year-old female with a past history of type 2 diabetes, hypertension who presents to the ED with complaint of COVID-19 concerns.  Patient states that for the past 3 days she has been having myalgias, cough, hoarseness in her voice.  She denies any overt fever, nausea vomiting diarrhea shortness of breath.  She does report some chest tightness with the cough but denies any overt chest pain.  She describes the cough is nonproductive in nature.  She has not been taking anything for symptoms however.  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         Review of patient's allergies indicates:  No Known Allergies  Past Medical History:   Diagnosis Date    Cellulitis     Diabetes mellitus     Hypertension     Retinal detachment      Past Surgical History:   Procedure Laterality Date    VITRECTOMY BY PARS PLANA APPROACH Right 4/24/2019    Procedure: VITRECTOMY, PARS PLANA APPROACH;  Surgeon: JAMI Schulz MD;  Location: [facility] OR [address] [place] [address];  Service: Ophthalmology;  Laterality: Right;  25g PPV/membrane stripping/ILM peel/EL/AFx/GFX/avastin OD    VITRECTOMY BY PARS PLANA APPROACH Left 5/29/2019    Procedure: VITRECTOMY, PARS PLANA  APPROACH  25g PPV/Membrane Stripping/IML peel/EL/AFX/Avastin left eye   60 mins;  Surgeon: JAMI Schulz MD;  Location: [facility] OR [address] [place] [address];  Service: Ophthalmology;  Laterality: Left;     Family History   Problem Relation Age of Onset    Diabetes Mother      Social History     Tobacco Use    Smoking status: Never     Smokeless tobacco: Never   Substance Use Topics    Alcohol use: Yes     Comment: social    Drug use: No     Review of Systems   Constitutional:  Positive for fatigue. Negative for chills and fever.   HENT:  Positive for voice change. Negative for sinus pressure, sinus pain and sore throat.    Respiratory:  Positive for cough. Negative for shortness of breath.    Cardiovascular:  Positive for chest pain (Chest tightness with coughing).   Gastrointestinal:  Negative for abdominal pain and nausea.   Musculoskeletal:  Positive for myalgias.   Neurological:  Negative for dizziness, light-headedness and headaches.   Psychiatric/Behavioral:  Negative for agitation.        Physical Exam     Initial Vitals [09/04/23 1655]   BP Pulse Resp Temp SpO2   (!) 168/89 90 18 99.1 °F (37.3 °C) 97 %      MAP       --         Physical Exam    Nursing note and vitals reviewed.  Constitutional: She appears well-developed and well-nourished.   Well-appearing   Non toxic   No acute distress noted    HENT:   Head: Normocephalic and atraumatic.   Right Ear: External ear normal.   Left Ear: External ear normal.   Mouth/Throat: Oropharynx is clear and moist.   Posterior oropharynx is clear    Eyes: EOM are normal. Pupils are equal, round, and reactive to light.   Neck: Neck supple.   Normal ROM  No anterior or posterior cervical adenopathy appreciated.    Normal range of motion.  Cardiovascular:  Normal rate, regular rhythm, normal heart sounds and intact distal pulses.           Pulmonary/Chest: Breath sounds normal.   Abdominal: Abdomen is soft. Bowel sounds are normal.   Musculoskeletal:         General: Normal range of motion.      Cervical back: Normal range of motion and neck supple.     Neurological: She is alert and oriented to person, place, and time. She has normal strength.   Skin: Skin is warm. Capillary refill takes less than 2 seconds.   Psychiatric: She has a normal mood and affect.         ED Course   Procedures  Labs Reviewed    INFLUENZA A & B BY MOLECULAR   GROUP A STREP, MOLECULAR   SARS-COV-2 RNA AMPLIFICATION, QUAL   POCT URINE PREGNANCY          Imaging Results              X-Ray Chest PA And Lateral (Final result)  Result time 09/04/23 20:08:42      Final result by Benson Child MD (09/04/23 20:08:42)                   Impression:      1. No acute cardiopulmonary process.      Electronically signed by: Benson Child MD  Date:    09/04/2023  Time:    20:08               Narrative:    EXAMINATION:  XR CHEST PA AND LATERAL    CLINICAL HISTORY:  Cough, unspecified    TECHNIQUE:  PA and lateral views of the chest were performed.    COMPARISON:  05/08/2023    FINDINGS:  The cardiomediastinal silhouette is not enlarged.  There is no pleural effusion.  The trachea is midline.  The lungs are symmetrically expanded bilaterally without evidence of acute parenchymal process. No large focal consolidation seen.  There is no pneumothorax.  The osseous structures are unremarkable.                        Final result by Benson Child MD (09/04/23 20:08:42)                   Impression:      1. No acute cardiopulmonary process.      Electronically signed by: Benson Child MD  Date:    09/04/2023  Time:    20:08               Narrative:    EXAMINATION:  XR CHEST PA AND LATERAL    CLINICAL HISTORY:  Cough, unspecified    TECHNIQUE:  PA and lateral views of the chest were performed.    COMPARISON:  05/08/2023    FINDINGS:  The cardiomediastinal silhouette is not enlarged.  There is no pleural effusion.  The trachea is midline.  The lungs are symmetrically expanded bilaterally without evidence of acute parenchymal process. No large focal consolidation seen.  There is no pneumothorax.  The osseous structures are unremarkable.                                       Medications - No data to display  Medical Decision Making  Differential Diagnosis includes, but is not limited to:  COVID-19 infection, influenza, bacterial sinusitis, allergic  rhinitis, bacterial/viral pharyngitis, peritonsillar abscess, retropharyngeal abscess, bacterial/viral pneumonia.     Amount and/or Complexity of Data Reviewed  Labs: ordered. Decision-making details documented in ED Course.  Radiology: ordered.    Risk  Prescription drug management.               ED Course as of 09/04/23 2052   Mon Sep 04, 2023   1845 POCT urine pregnancy [DT]   1845 COVID-19 Rapid Screening [DT]   1915 Group A Strep, Molecular [DT]   1915 Influenza A & B by Molecular [DT]   2013 Independent interpretation of the chest x-ray are no acute findings. [DT]   2013 Patient notified of the need for follow up care with the primary care provider in addition to medications prescribed.  The patient is nontoxic in appearance and is stable at this time for discharge.  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  I have notified her to repeat her COVID testing within the next day or so. [DT]      ED Course User Index  [DT] Nina Fung NP                    Clinical Impression:   Final diagnoses:  [R05.9] Cough  [R06.02] SOB (shortness of breath)  [J06.9] Upper respiratory tract infection, unspecified type (Primary)        ED Disposition Condition    Discharge Stable          ED Prescriptions       Medication Sig Dispense Start Date End Date Auth. Provider    benzonatate (TESSALON) 100 MG capsule Take 1 capsule (100 mg total) by mouth 3 (three) times daily as needed for Cough. 20 capsule 9/4/2023 9/14/2023 Nina Fung NP    fluticasone propionate (FLONASE) 50 mcg/actuation nasal spray 1 spray (50 mcg total) by Each Nostril route 2 (two) times daily as needed for Rhinitis. 15 g 9/4/2023 -- Nina Fung NP          Follow-up Information       Follow up With Specialties Details Why Contact Info    Dagoberto Ott MD Internal Medicine Schedule an appointment as soon as possible for a visit in 2 days  [address] [facility]  [address]  [place] [address]  [phone]               Nina Fung, NP  09/04/23 2052

## 2024-01-30 ENCOUNTER — HOSPITAL ENCOUNTER (EMERGENCY)
Facility: HOSPITAL | Age: 39
Discharge: HOME | End: 2024-01-30
Attending: EMERGENCY MEDICINE
Payer: MEDICAID

## 2024-01-30 VITALS
DIASTOLIC BLOOD PRESSURE: 89 MMHG | TEMPERATURE: 98 F | HEIGHT: 63 IN | RESPIRATION RATE: 18 BRPM | BODY MASS INDEX: 30.12 KG/M2 | SYSTOLIC BLOOD PRESSURE: 171 MMHG | OXYGEN SATURATION: 100 % | HEART RATE: 90 BPM | WEIGHT: 170 LBS

## 2024-01-30 DIAGNOSIS — R51.9 FRONTAL HEADACHE: Primary | ICD-10-CM

## 2024-01-30 DIAGNOSIS — Z86.69 HISTORY OF RETINAL DETACHMENT: ICD-10-CM

## 2024-01-30 PROCEDURE — 87651 STREP A DNA AMP PROBE: CPT

## 2024-01-30 PROCEDURE — 87502 INFLUENZA DNA AMP PROBE: CPT

## 2024-01-30 PROCEDURE — 25000003 PHARM REV CODE 250

## 2024-01-30 PROCEDURE — 99283 EMERGENCY DEPT VISIT LOW MDM: CPT

## 2024-01-30 PROCEDURE — U0002 COVID-19 LAB TEST NON-CDC: HCPCS

## 2024-01-30 PROCEDURE — 81025 URINE PREGNANCY TEST: CPT

## 2024-01-30 RX ORDER — PROPARACAINE HYDROCHLORIDE 5 MG/ML
1 SOLUTION/ DROPS OPHTHALMIC
Status: COMPLETED | OUTPATIENT
Start: 2024-01-30 | End: 2024-01-30

## 2024-01-30 RX ORDER — DIPHENHYDRAMINE HCL 25 MG
25 CAPSULE ORAL
Status: COMPLETED | OUTPATIENT
Start: 2024-01-30 | End: 2024-01-30

## 2024-01-30 RX ORDER — METOCLOPRAMIDE 5 MG/1
10 TABLET ORAL
Status: COMPLETED | OUTPATIENT
Start: 2024-01-30 | End: 2024-01-30

## 2024-01-30 RX ORDER — KETOROLAC TROMETHAMINE 10 MG/1
10 TABLET, FILM COATED ORAL
Status: COMPLETED | OUTPATIENT
Start: 2024-01-30 | End: 2024-01-30

## 2024-01-30 RX ADMIN — KETOROLAC TROMETHAMINE 10 MG: 10 TABLET, FILM COATED ORAL at 03:01

## 2024-01-30 RX ADMIN — METOCLOPRAMIDE 10 MG: 5 TABLET ORAL at 03:01

## 2024-01-30 RX ADMIN — PROPARACAINE HYDROCHLORIDE 1 DROP: 5 SOLUTION/ DROPS OPHTHALMIC at 03:01

## 2024-01-30 RX ADMIN — DIPHENHYDRAMINE HYDROCHLORIDE 25 MG: 25 CAPSULE ORAL at 03:01

## 2024-01-30 NOTE — Clinical Note
"Mary Lemus"Levi was seen and treated in our emergency department on 1/30/2024.  She may return to work on 01/31/2024.       If you have any questions or concerns, please don't hesitate to call.      Selene Issa PA-C"

## 2024-01-30 NOTE — ED TRIAGE NOTES
Pt presents to ED today c/o headache, blurry vision to right eye and nausea x 1 week   Pt denies hx of HTN   Reports taking new cholesterol medication

## 2024-01-30 NOTE — Clinical Note
"Mary Callowaydona Sims was seen and treated in our emergency department on 1/30/2024.  She may return to work on 01/31/2024.       If you have any questions or concerns, please don't hesitate to call.      Grady Tirado III, MD"

## 2024-01-30 NOTE — DISCHARGE INSTRUCTIONS
Thank you for letting me care for you today - it was nice to meet you and I hope you feel better soon. Please return to the ER if your symptoms don't improve or get worse. And be sure to follow up with your primary care provider within the next week if not improving.     Rotate between Tylenol and ibuprofen (Motrin), switching every 3 hours. For example, Tylenol at 8am, ibuprofen at 11am, tylenol at 2pm.     Our goal at [facility] is to always give you outstanding care and exceptional service. You may receive a survey by mail or email in the next week about your experience in our ED. We would greatly appreciate you completing and returning the survey. Your feedback provides us with a way to recognize our staff who give very good care and it helps us learn how to improve when your experience was below our aspiration of excellence.     All the best,     Selene Issa, MPH, PA-C  Emergency Department Physician Assistant  [facility], [facility]

## 2024-01-30 NOTE — ED PROVIDER NOTES
Encounter Date: 1/30/2024       History     Chief Complaint   Patient presents with    Headache     Right side headache x 1 week. Pain radiates down lateral neck and into right shoulder. Pain is described as pressure. Pain was initially intermittent but is now constant. + nausea over the week but presently denies.  Denies emesis. Denies photophobia. Reports blurry and double vision to the right eye but reports that visual changes are chronic and r/t previous retinal detachment. Pt has not taken any medication for headache sicne last week. States Aleve did not help.      Patient is a 38 y.o. female who presents to the ED for evaluation of intermittent frontal headache X 1 week. Reports that the headache is localized over the right side of her forehead. Endorses photophobia. Patient has a history of retinal detachment in 2019, states that she has blurry vision and mild double vision in the right eye at baseline. States her vision has not changed from baseline. Also reports intermittent nausea, but none at this time.     Patient states that she tool tylenol and OTC headache medications last week, but patient has taken no medication this week for symptom relief. No known sick contacts. Denies fever, chills, chest pain, shortness of breath, wheezing, stridor, sore throat, vomiting, diarrhea, abdominal pain, constipation, urinary problems, joint problems, rashes, or any other complaints at this time.      The history is provided by the patient.     Review of patient's allergies indicates:  No Known Allergies  Past Medical History:   Diagnosis Date    Cellulitis     Diabetes mellitus     Hypertension     Retinal detachment      Past Surgical History:   Procedure Laterality Date    VITRECTOMY BY PARS PLANA APPROACH Right 4/24/2019    Procedure: VITRECTOMY, PARS PLANA APPROACH;  Surgeon: JAMI Schulz MD;  Location: [facility] OR [address] [place] [address];  Service: Ophthalmology;  Laterality: Right;  25g PPV/membrane stripping/ILM  peel/EL/AFx/GFX/avastin OD    VITRECTOMY BY PARS PLANA APPROACH Left 5/29/2019    Procedure: VITRECTOMY, PARS PLANA  APPROACH  25g PPV/Membrane Stripping/IML peel/EL/AFX/Avastin left eye   60 mins;  Surgeon: JAMI Schulz MD;  Location: [facility] OR [address] [place] [address];  Service: Ophthalmology;  Laterality: Left;     Family History   Problem Relation Age of Onset    Diabetes Mother      Social History     Tobacco Use    Smoking status: Never    Smokeless tobacco: Never   Substance Use Topics    Alcohol use: Yes     Comment: social    Drug use: No     Review of Systems   Constitutional:  Negative for chills and fever.   HENT:  Negative for congestion, drooling, ear discharge, ear pain, facial swelling, postnasal drip, rhinorrhea, sore throat, trouble swallowing and voice change.    Eyes:  Negative for photophobia, pain, redness and visual disturbance.   Respiratory:  Negative for shortness of breath and wheezing.    Cardiovascular:  Negative for chest pain.   Gastrointestinal:  Negative for abdominal distention, abdominal pain, diarrhea, nausea and vomiting.   Genitourinary:  Negative for dysuria.   Musculoskeletal:  Negative for back pain, neck pain and neck stiffness.   Skin:  Negative for rash.   Neurological:  Positive for headaches. Negative for weakness.   Hematological:  Does not bruise/bleed easily.   All other systems reviewed and are negative.      Physical Exam     Initial Vitals [01/30/24 1428]   BP Pulse Resp Temp SpO2   (!) 199/106 91 18 97.8 °F (36.6 °C) 100 %      MAP       --         Physical Exam    Constitutional: She appears well-developed and well-nourished.   HENT:   Head: Normocephalic and atraumatic.   Eyes: EOM are normal. Pupils are equal, round, and reactive to light.   Neck:   Normal range of motion.  Cardiovascular:  Normal rate and regular rhythm.           Pulmonary/Chest: Breath sounds normal.   Abdominal: Abdomen is soft.   Musculoskeletal:      Cervical back: Normal range of motion.      Neurological: She is alert and oriented to person, place, and time. No cranial nerve deficit or sensory deficit. GCS eye subscore is 4. GCS verbal subscore is 5. GCS motor subscore is 6.         ED Course   Procedures  Labs Reviewed   INFLUENZA A & B BY MOLECULAR   GROUP A STREP, MOLECULAR   SARS-COV-2 RNA AMPLIFICATION, QUAL   POCT URINE PREGNANCY          Imaging Results    None          Medications   proparacaine 0.5 % ophthalmic solution 1 drop (1 drop Both Eyes Given by Provider 1/30/24 1513)   metoclopramide HCl tablet 10 mg (10 mg Oral Given 1/30/24 1521)   ketorolac tablet 10 mg (10 mg Oral Given 1/30/24 1521)   diphenhydrAMINE capsule 25 mg (25 mg Oral Given 1/30/24 1521)     Medical Decision Making  Patient is an afebrile, well appearing 38 y.o. female who presents for evaluation intermittent frontal headache X 1 week. Patient has a hx of hypertension, but is not compliant with her blood pressure medication. Patient does not history of headaches. Headache was of gradual onset. No headache red flags. Neurologic exam without evidence of meningismus, AMS, focal neurologic findings.     Reports pain is localized right forehead. No gross vision loss. Denies floaters, halos, sudden decrease in visual acuity.  There is not significant photophobia. There is not purulent drainage. VSS and do not suggest sepsis.  Denies chest pain, SOB, or any other complaints at this time. A&Ox4. The patient remained comfortable and stable during their visit in the ED. Details of ED course documented in ED workup.     Differential Diagnosis included but was not limited to:  - COVID: test negative  - Influenza: test negative  - Strep: negative  - SAH: not sudden onset or worst headache of life  - Epidural/subdural hematoma: no head injury  - CVA: normal neuro exam  - Temporal arteritis: no changes in vision, no temporal pain, headache not specifically unilateral  - Glaucoma: age inconsistent, eye exam WNL  - Meningitis: no neck  stiffness, no fever    All historical, clinical and laboratory findings reviewed. Findings are consistent with a diagnosis of migraine aheadache. Pt has benign initial and repeat neuro exam here in ED, no visual changes or ataxia. The pain was made better with  toradol, reglan, benadryl.    There are no concerning features on physical exam to suggest an emergent condition - no further intervention is indicated at this time after having taken into account the patient's history, physical exam findings, and empirical and objective data obtained during the patient's emergency department workup. Therefore, I do not believe there is any underlying intracranial pathology and patient is safe for discharge home. Patient to follow up with PCP in 2-3 days.    Although the patient has no emergent etiology today this does not preclude the development of an emergent condition so, in addition, I have advised the patient to return to the ED with worsening of their symptoms, change of their symptoms, including, but not limited to: inability to eat/drink, fevers (temperature >100.4), visual change, or with any other concerns. All patient questions answered. Patient given discharge instructions. Patient discharged from the emergency department in stable condition, in no acute distress.     Amount and/or Complexity of Data Reviewed  Labs: ordered. Decision-making details documented in ED Course.    Risk  OTC drugs.  Prescription drug management.               ED Course as of 02/01/24 2142 Tue Jan 30, 2024   1512 Preg Test, Ur: Negative [OB]   1531 SARS-CoV-2 RNA, Amplification, Qual: Negative [OB]   1532 Group A Strep, Molecular: Negative [OB]   1538 IOP OD:  IOP OS:  [OB]   1545 Spoke to Dr. Tirado [OB]   1637 Patient reexamined. States that her headache has improved. Will take her home BP meds when she gets home. Strict return precautions and PCP, ophthalmology follow up. [OB]      ED Course User Index  [OB] Selene Issa PA-C                              Clinical Impression:  Final diagnoses:  [R51.9] Frontal headache (Primary)  [Z86.69] History of retinal detachment          ED Disposition Condition    Discharge Stable          ED Prescriptions    None       Follow-up Information       Follow up With Specialties Details Why Contact Info Additional Information    Dagoberto Ott MD Internal Medicine In 2 days As needed, If symptoms worsen [address]  [address]  [facility] [id number] [phone]       [facility] Family Medicine Call in 1 day As needed, If symptoms worsen [address] [facility] [address]  [facility] [address] [phone] Please park in [address] or [address] and use [address]. [facility] Medical Office Bldg. elevators.             Selene Issa PA-C  02/01/24 0794

## 2024-04-08 ENCOUNTER — HOSPITAL ENCOUNTER (EMERGENCY)
Facility: HOSPITAL | Age: 39
Discharge: HOME | End: 2024-04-08
Attending: EMERGENCY MEDICINE
Payer: MEDICAID

## 2024-04-08 VITALS
TEMPERATURE: 98 F | HEART RATE: 91 BPM | BODY MASS INDEX: 31.89 KG/M2 | RESPIRATION RATE: 18 BRPM | DIASTOLIC BLOOD PRESSURE: 88 MMHG | WEIGHT: 180 LBS | SYSTOLIC BLOOD PRESSURE: 179 MMHG | OXYGEN SATURATION: 100 %

## 2024-04-08 DIAGNOSIS — S52.515A NONDISPLACED FRACTURE OF LEFT RADIAL STYLOID PROCESS, INITIAL ENCOUNTER FOR CLOSED FRACTURE: ICD-10-CM

## 2024-04-08 DIAGNOSIS — S62.002A CLOSED NONDISPLACED FRACTURE OF SCAPHOID OF LEFT WRIST, UNSPECIFIED PORTION OF SCAPHOID, INITIAL ENCOUNTER: Primary | ICD-10-CM

## 2024-04-08 DIAGNOSIS — S69.92XA WRIST INJURY, LEFT, INITIAL ENCOUNTER: ICD-10-CM

## 2024-04-08 LAB
B-HCG UR QL: NEGATIVE
CTP QC/QA: YES

## 2024-04-08 PROCEDURE — 99283 EMERGENCY DEPT VISIT LOW MDM: CPT | Mod: 25

## 2024-04-08 PROCEDURE — 29125 APPL SHORT ARM SPLINT STATIC: CPT | Mod: LT

## 2024-04-08 PROCEDURE — 81025 URINE PREGNANCY TEST: CPT

## 2024-04-08 NOTE — ED TRIAGE NOTES
Pt presents to ED today c/o LUE pain onset x 10 days ago secondary to fall while int he shower  Denies head trauma or LOC   Pain unrelieved by OTC medication   NAD noted

## 2024-04-08 NOTE — ED NOTES
Pt presents to ED with C/O L forearm pain with onset x10 days ago after having a slip and fall getting out of the shower. Pt with noted scant swelling to the area. Pt is able to flex and extend at the wrist. Cap refills <2sec. Pt states she did initially ice the area and that she has been taking Tylenol with little relief.

## 2024-04-08 NOTE — Clinical Note
"Mary Lemus"Levi was seen and treated in our emergency department on 4/8/2024.  She may return to work on 04/10/2024.       If you have any questions or concerns, please don't hesitate to call.      Selene Issa PA-C"

## 2024-04-09 PROCEDURE — 81025 URINE PREGNANCY TEST: CPT | Performed by: EMERGENCY MEDICINE

## 2024-04-09 NOTE — DISCHARGE INSTRUCTIONS
Thank you for letting me care for you today - it was nice to meet you and I hope you feel better soon. Please return to the ER if your symptoms don't improve or get worse.  It is important that you follow-up with an orthopedic hand specialist for further care. Please call Dr. Collins's office tomorrow to make an appointment: [phone]     Rotate between Tylenol and ibuprofen (Motrin), switching every 3 hours. For example, Tylenol at 8am, ibuprofen at 11am, tylenol at 2pm.  Do not take more than 3000 mg of Tylenol in 1 day or more than 2400 mg of ibuprofen and 1 day.     Our goal at [facility] is to always give you outstanding care and exceptional service. You may receive a survey by mail or email in the next week about your experience in our ED. We would greatly appreciate you completing and returning the survey. Your feedback provides us with a way to recognize our staff who give very good care and it helps us learn how to improve when your experience was below our aspiration of excellence.     All the best,     Selene Issa, MPH, PA-C  Emergency Department Physician Assistant  [facility], [facility]

## 2024-04-09 NOTE — ED PROVIDER NOTES
Encounter Date: 4/8/2024       History     Chief Complaint   Patient presents with    Hand Pain     Left hand pain after slipping in shower 10 days ago, + slight swelling, +2 2 radial pulse noted, no deformity      Patient is a 38 y.o. female who presents to the ED for evaluation of left wrist and forearm pain and swelling.  Patient states that 10 days ago she slipped and fell while getting out of the shower.  Patient states that she has been taking Tylenol and icing the area, but continues to have pain.  denies any previous injuries or surgeries to this area. Denies associated bruising, numbness, deformity, abrasion, and laceration. No other complaints at this time.       The history is provided by the patient.     Review of patient's allergies indicates:  No Known Allergies  Past Medical History:   Diagnosis Date    Cellulitis     Diabetes mellitus     Hypertension     Retinal detachment      Past Surgical History:   Procedure Laterality Date    VITRECTOMY BY PARS PLANA APPROACH Right 4/24/2019    Procedure: VITRECTOMY, PARS PLANA APPROACH;  Surgeon: JAMI Schulz MD;  Location: [facility] OR [address] [place] [address];  Service: Ophthalmology;  Laterality: Right;  25g PPV/membrane stripping/ILM peel/EL/AFx/GFX/avastin OD    VITRECTOMY BY PARS PLANA APPROACH Left 5/29/2019    Procedure: VITRECTOMY, PARS PLANA  APPROACH  25g PPV/Membrane Stripping/IML peel/EL/AFX/Avastin left eye   60 mins;  Surgeon: JAMI Schulz MD;  Location: [facility] OR [address] [place] [address];  Service: Ophthalmology;  Laterality: Left;     Family History   Problem Relation Name Age of Onset    Diabetes Mother       Social History     Tobacco Use    Smoking status: Never    Smokeless tobacco: Never   Substance Use Topics    Alcohol use: Yes     Comment: social    Drug use: No     Review of Systems   Constitutional:  Negative for chills and fever.   HENT:  Negative for congestion and sore throat.    Respiratory:  Negative for shortness of breath and wheezing.     Cardiovascular:  Negative for chest pain.   Gastrointestinal:  Negative for abdominal distention, abdominal pain, diarrhea, nausea and vomiting.   Genitourinary:  Negative for dysuria.   Musculoskeletal:  Positive for arthralgias (left wrist and left forearm pain) and joint swelling (swelling, left wrist). Negative for back pain, neck pain and neck stiffness.   Skin:  Negative for rash.   Neurological:  Negative for weakness.   Hematological:  Does not bruise/bleed easily.   All other systems reviewed and are negative.      Physical Exam     Initial Vitals [04/08/24 1830]   BP Pulse Resp Temp SpO2   (!) 179/88 91 18 98.1 °F (36.7 °C) 100 %      MAP       --         Physical Exam    Nursing note reviewed.  Constitutional: She appears well-developed and well-nourished.   HENT:   Head: Normocephalic and atraumatic.   Eyes: EOM are normal.   Neck:   Normal range of motion.  Cardiovascular:  Normal rate and regular rhythm.           Pulmonary/Chest: Breath sounds normal.   Abdominal: Abdomen is soft.   Musculoskeletal:      Right forearm: Normal.      Left forearm: Swelling and tenderness present.      Right wrist: Normal.      Left wrist: Swelling, tenderness and snuff box tenderness present.      Cervical back: Normal range of motion.      Comments: Tenderness along distal forearm and wrist.  My nurse swelling noted to the dorsal aspect of the wrist.  Positive snuffbox tenderness.  Patient has full range of motion of the fingers in the wrist.  2+ radial pulses.  No cyanosis, pallor, numbness.     Neurological: She is alert and oriented to person, place, and time.         ED Course   Orthopedic Injury    Date/Time: 5/17/2024 3:07 PM    Performed by: Selene Issa PA-C  Authorized by: Octavio Oliveira Jr., MD    Location procedure was performed:  [facility] EMERGENCY DEPARTMENT  Assisting Provider:  Selene Issa PA-C  Pre-operative diagnosis:  Nondisplaced fracture of the radial styloid process  Post-operative diagnosis:   Nondisplaced fracture of the radial styloid process  Consent Done?:  Not Needed  Injury:     Injury location:  Forearm    Injury type:  Fracture    Fracture type: distal radius      Fracture type: distal radius        Pre-procedure assessment:     Neurovascular status: Neurovascularly intact      Distal perfusion: normal      Neurological function: normal      Range of motion: reduced      Local anesthesia used?: No      Patient sedated?: No        Procedure details:     Description of findings:  Thumb spica  Selections made in this section will also lock the Injury type section above.:     Manipulation performed?: No      Immobilization:  Splint    Splint type:  Thumb spica    Supplies used:  Ortho-Glass    Technical Procedures Used:  Thumb spica splint    Significant surgical tasks conducted by the assistant(s):  None    Complications: No      Estimated blood loss (mL):  0    Specimens: No      Implants: No    Post-procedure assessment:     Neurovascular status: Neurovascularly intact      Distal perfusion: normal      Neurological function: normal      Range of motion: splinted      Patient tolerance:  Patient tolerated the procedure well with no immediate complications     Nondisplaced fracture of the radial styloid process, with questionable scaphoid fracture. Placed in thumb spica.     Labs Reviewed   POCT URINE PREGNANCY - Normal          Imaging Results              X-Ray Wrist Complete Left (Final result)  Result time 04/08/24 19:28:48      Final result by Maverick Wallis DO (04/08/24 19:28:48)                   Impression:      Small nondisplaced fracture of the radial styloid process.    Questionable nondisplaced fracture of the scaphoid waist, recommend further evaluation with scaphoid view.      Electronically signed by: Maverick Wallis  Date:    04/08/2024  Time:    19:28               Narrative:    EXAMINATION:  XR WRIST COMPLETE 3 VIEWS LEFT; XR HAND COMPLETE 3 VIEW LEFT    CLINICAL HISTORY:  hand  injury left; Unspecified injury of left wrist, hand and finger(s), initial encounter    TECHNIQUE:  PA, lateral, and oblique views of the left wrist were performed.    PA, oblique, and lateral radiographs of the left hand.    COMPARISON:  Contralateral side from 02/10/2023.    FINDINGS:  There is diffuse osteopenia.    Left wrist: There is a small nondisplaced fracture of the radial styloid process.  No additional fractures are seen.  Mild soft tissue edema noted.    Left hand: There is a questionable nondisplaced fracture of the waist of the scaphoid.  Recommend additional scaphoid view for confirmation.  No additional fractures are seen.  There are vascular calcifications.                                       X-Ray Hand 3 view Left (Final result)  Result time 04/08/24 19:28:48      Final result by Maverick Wallis DO (04/08/24 19:28:48)                   Impression:      Small nondisplaced fracture of the radial styloid process.    Questionable nondisplaced fracture of the scaphoid waist, recommend further evaluation with scaphoid view.      Electronically signed by: Maverick Wallis  Date:    04/08/2024  Time:    19:28               Narrative:    EXAMINATION:  XR WRIST COMPLETE 3 VIEWS LEFT; XR HAND COMPLETE 3 VIEW LEFT    CLINICAL HISTORY:  hand injury left; Unspecified injury of left wrist, hand and finger(s), initial encounter    TECHNIQUE:  PA, lateral, and oblique views of the left wrist were performed.    PA, oblique, and lateral radiographs of the left hand.    COMPARISON:  Contralateral side from 02/10/2023.    FINDINGS:  There is diffuse osteopenia.    Left wrist: There is a small nondisplaced fracture of the radial styloid process.  No additional fractures are seen.  Mild soft tissue edema noted.    Left hand: There is a questionable nondisplaced fracture of the waist of the scaphoid.  Recommend additional scaphoid view for confirmation.  No additional fractures are seen.  There are vascular  calcifications.                                       Medications - No data to display  Medical Decision Making  Patient is a afebrile, well appearing 38 y.o.  female. Patient has tenderness along the distal radius and also has snuffbox tenderness.  Denies cyanosis, pallor, decreased strength or sensation. Pulses normal. Neurovascularly intact. The patient remained comfortable and stable during their visit in the ED. Details of ED course documented in ED workup.     Differential Diagnosis includes, but is not limited to: Fracture, dislocation, cellulitis, bursitis, muscle strain, ligament tear/sprain, soft tissue contusion, osteoarthritis     All historical, clinical, and radiographic findings reviewed with the patient.  Patient has been advised of the diagnosis of closed, nondisplaced fracture of the distal styloid and possible scaphoid fracture.  Patient placed in thumb spica..  There are no concerning features on physical exam to suggest an emergent or life threatening condition. No further intervention is indicated at this time and I am of the belief that that it is safe to discharge the patient from the emergency department.     Patient has been counseled regarding the need for follow-up as well as the indications to return to the emergency room should new or worrisome developments (including but not limited to worsening pain, cyanosis, or loss of strength or sensation) occur.  Discussed importance of following up with the orthopedic hand specialist, referral placed.  Instructed patient to wear the thumb spica splint until further evaluation by orthopedic hand.      Discharge and follow-up instructions discussed with the patient who expressed understanding and willingness to comply with recommendations. Patient discharged from the emergency department in stable condition, in no acute distress.     Amount and/or Complexity of Data Reviewed  Labs: ordered. Decision-making details documented in ED  Course.  Radiology: ordered and independent interpretation performed. Decision-making details documented in ED Course.               ED Course as of 05/17/24 1509   Mon Apr 08, 2024   1954 hCG Qualitative, Urine: Negative [OB]   2001 X-ray wrist independently reviewed: Small nondisplaced fracture of the radial styloid process.     Questionable nondisplaced fracture of the scaphoid waist, recommend further evaluation with scaphoid view. [OB]   2003 Will place patient in a short-arm thumb spica splint and referred to Dr. Collins. [OB]      ED Course User Index  [OB] Selene Issa PA-C                           Clinical Impression:  Final diagnoses:  [S69.92XA] Wrist injury, left, initial encounter  [S62.002A] Closed nondisplaced fracture of scaphoid of left wrist, unspecified portion of scaphoid, initial encounter (Primary)  [S52.515A] Nondisplaced fracture of left radial styloid process, initial encounter for closed fracture          ED Disposition Condition    Discharge Stable          ED Prescriptions    None       Follow-up Information       Follow up With Specialties Details Why Contact Info    Octavio Collins Jr., MD Hand Surgery, Orthopedic Surgery   [address] [facility]  [address]  [facility] [address]  [phone]               Selene Issa PA-C  04/08/24 2249       Selene Issa PA-C  05/17/24 1502

## 2024-04-10 ENCOUNTER — HOSPITAL ENCOUNTER (EMERGENCY)
Facility: HOSPITAL | Age: 39
Discharge: HOME | End: 2024-04-10
Attending: EMERGENCY MEDICINE | Admitting: EMERGENCY MEDICINE
Payer: MEDICAID

## 2024-04-10 VITALS
TEMPERATURE: 98 F | WEIGHT: 170 LBS | BODY MASS INDEX: 30.12 KG/M2 | HEIGHT: 63 IN | OXYGEN SATURATION: 100 % | HEART RATE: 79 BPM | DIASTOLIC BLOOD PRESSURE: 70 MMHG | RESPIRATION RATE: 18 BRPM | SYSTOLIC BLOOD PRESSURE: 111 MMHG

## 2024-04-10 DIAGNOSIS — E88.89 KETOSIS: ICD-10-CM

## 2024-04-10 DIAGNOSIS — R11.2 INTRACTABLE NAUSEA AND VOMITING: ICD-10-CM

## 2024-04-10 DIAGNOSIS — K51.00 PANCOLITIS: Primary | ICD-10-CM

## 2024-04-10 DIAGNOSIS — E86.0 DEHYDRATION: ICD-10-CM

## 2024-04-10 LAB
ALBUMIN SERPL BCP-MCNC: 2.4 G/DL (ref 3.5–5.2)
ALLENS TEST: ABNORMAL
ALP SERPL-CCNC: 90 U/L (ref 55–135)
ALT SERPL W/O P-5'-P-CCNC: 21 U/L (ref 10–44)
ANION GAP SERPL CALC-SCNC: 10 MMOL/L (ref 8–16)
ANION GAP SERPL CALC-SCNC: 12 MMOL/L (ref 8–16)
AST SERPL-CCNC: 21 U/L (ref 10–40)
B-HCG UR QL: NEGATIVE
B-OH-BUTYR BLD STRIP-SCNC: 1.4 MMOL/L (ref 0–0.5)
BACTERIA #/AREA URNS AUTO: ABNORMAL /HPF
BASOPHILS # BLD AUTO: 0.04 K/UL (ref 0–0.2)
BASOPHILS NFR BLD: 0.7 % (ref 0–1.9)
BILIRUB SERPL-MCNC: 0.2 MG/DL (ref 0.1–1)
BILIRUB UR QL STRIP: NEGATIVE
BUN SERPL-MCNC: 11 MG/DL (ref 6–30)
BUN SERPL-MCNC: 12 MG/DL (ref 6–20)
BUN SERPL-MCNC: 13 MG/DL (ref 6–20)
CALCIUM SERPL-MCNC: 7.9 MG/DL (ref 8.7–10.5)
CALCIUM SERPL-MCNC: 8.9 MG/DL (ref 8.7–10.5)
CHLORIDE SERPL-SCNC: 110 MMOL/L (ref 95–110)
CHLORIDE SERPL-SCNC: 110 MMOL/L (ref 95–110)
CHLORIDE SERPL-SCNC: 114 MMOL/L (ref 95–110)
CLARITY UR REFRACT.AUTO: ABNORMAL
CO2 SERPL-SCNC: 16 MMOL/L (ref 23–29)
CO2 SERPL-SCNC: 17 MMOL/L (ref 23–29)
COLOR UR AUTO: YELLOW
CREAT SERPL-MCNC: 1.2 MG/DL (ref 0.5–1.4)
CREAT SERPL-MCNC: 1.2 MG/DL (ref 0.5–1.4)
CREAT SERPL-MCNC: 1.3 MG/DL (ref 0.5–1.4)
CTP QC/QA: YES
DIFFERENTIAL METHOD BLD: ABNORMAL
EOSINOPHIL # BLD AUTO: 0.1 K/UL (ref 0–0.5)
EOSINOPHIL NFR BLD: 1.7 % (ref 0–8)
ERYTHROCYTE [DISTWIDTH] IN BLOOD BY AUTOMATED COUNT: 15.3 % (ref 11.5–14.5)
EST. GFR  (NO RACE VARIABLE): 54.3 ML/MIN/1.73 M^2
EST. GFR  (NO RACE VARIABLE): 59.8 ML/MIN/1.73 M^2
ESTIMATED AVG GLUCOSE: 335 MG/DL (ref 68–131)
GLUCOSE SERPL-MCNC: 113 MG/DL (ref 70–110)
GLUCOSE SERPL-MCNC: 132 MG/DL (ref 70–110)
GLUCOSE SERPL-MCNC: 179 MG/DL (ref 70–110)
GLUCOSE UR QL STRIP: ABNORMAL
HBA1C MFR BLD: 13.3 % (ref 4–5.6)
HCO3 UR-SCNC: 21.8 MMOL/L (ref 24–28)
HCT VFR BLD AUTO: 36.1 % (ref 37–48.5)
HCT VFR BLD CALC: 28 %PCV (ref 36–54)
HGB BLD-MCNC: 12.1 G/DL (ref 12–16)
HGB UR QL STRIP: ABNORMAL
HYALINE CASTS UR QL AUTO: 0 /LPF
IMM GRANULOCYTES # BLD AUTO: 0.01 K/UL (ref 0–0.04)
IMM GRANULOCYTES NFR BLD AUTO: 0.2 % (ref 0–0.5)
KETONES UR QL STRIP: ABNORMAL
LEUKOCYTE ESTERASE UR QL STRIP: NEGATIVE
LIPASE SERPL-CCNC: 10 U/L (ref 4–60)
LYMPHOCYTES # BLD AUTO: 1.5 K/UL (ref 1–4.8)
LYMPHOCYTES NFR BLD: 24.5 % (ref 18–48)
MAGNESIUM SERPL-MCNC: 1.8 MG/DL (ref 1.6–2.6)
MCH RBC QN AUTO: 23.7 PG (ref 27–31)
MCHC RBC AUTO-ENTMCNC: 33.5 G/DL (ref 32–36)
MCV RBC AUTO: 71 FL (ref 82–98)
MICROSCOPIC COMMENT: ABNORMAL
MONOCYTES # BLD AUTO: 0.6 K/UL (ref 0.3–1)
MONOCYTES NFR BLD: 9.6 % (ref 4–15)
NEUTROPHILS # BLD AUTO: 3.8 K/UL (ref 1.8–7.7)
NEUTROPHILS NFR BLD: 63.3 % (ref 38–73)
NITRITE UR QL STRIP: POSITIVE
NRBC BLD-RTO: 0 /100 WBC
PCO2 BLDA: 40.2 MMHG (ref 35–45)
PH SMN: 7.34 [PH] (ref 7.35–7.45)
PH UR STRIP: 6 [PH] (ref 5–8)
PLATELET # BLD AUTO: 289 K/UL (ref 150–450)
PMV BLD AUTO: 11.2 FL (ref 9.2–12.9)
PO2 BLDA: 34 MMHG (ref 40–60)
POC BE: -4 MMOL/L
POC IONIZED CALCIUM: 1.13 MMOL/L (ref 1.06–1.42)
POC SATURATED O2: 61 % (ref 95–100)
POC TCO2 (MEASURED): 21 MMOL/L (ref 23–29)
POC TCO2: 23 MMOL/L (ref 24–29)
POCT GLUCOSE: 121 MG/DL (ref 70–110)
POTASSIUM BLD-SCNC: 3.6 MMOL/L (ref 3.5–5.1)
POTASSIUM SERPL-SCNC: 3.5 MMOL/L (ref 3.5–5.1)
POTASSIUM SERPL-SCNC: 4.1 MMOL/L (ref 3.5–5.1)
PROT SERPL-MCNC: 6.6 G/DL (ref 6–8.4)
PROT UR QL STRIP: ABNORMAL
RBC # BLD AUTO: 5.1 M/UL (ref 4–5.4)
RBC #/AREA URNS AUTO: 12 /HPF (ref 0–4)
SAMPLE: ABNORMAL
SAMPLE: ABNORMAL
SITE: ABNORMAL
SODIUM BLD-SCNC: 141 MMOL/L (ref 136–145)
SODIUM SERPL-SCNC: 139 MMOL/L (ref 136–145)
SODIUM SERPL-SCNC: 140 MMOL/L (ref 136–145)
SP GR UR STRIP: 1.03 (ref 1–1.03)
SQUAMOUS #/AREA URNS AUTO: 13 /HPF
URN SPEC COLLECT METH UR: ABNORMAL
WBC # BLD AUTO: 6.05 K/UL (ref 3.9–12.7)
WBC #/AREA URNS AUTO: 10 /HPF (ref 0–5)
YEAST UR QL AUTO: ABNORMAL

## 2024-04-10 PROCEDURE — 25500020 PHARM REV CODE 255: Performed by: EMERGENCY MEDICINE

## 2024-04-10 PROCEDURE — 82010 KETONE BODYS QUAN: CPT | Performed by: EMERGENCY MEDICINE

## 2024-04-10 PROCEDURE — 80048 BASIC METABOLIC PNL TOTAL CA: CPT | Mod: XB | Performed by: EMERGENCY MEDICINE

## 2024-04-10 PROCEDURE — 25000003 PHARM REV CODE 250: Performed by: PHYSICIAN ASSISTANT

## 2024-04-10 PROCEDURE — 82962 GLUCOSE BLOOD TEST: CPT

## 2024-04-10 PROCEDURE — 63600175 PHARM REV CODE 636 W HCPCS: Performed by: EMERGENCY MEDICINE

## 2024-04-10 PROCEDURE — 81001 URINALYSIS AUTO W/SCOPE: CPT | Performed by: EMERGENCY MEDICINE

## 2024-04-10 PROCEDURE — 83735 ASSAY OF MAGNESIUM: CPT | Performed by: EMERGENCY MEDICINE

## 2024-04-10 PROCEDURE — 80053 COMPREHEN METABOLIC PANEL: CPT | Performed by: EMERGENCY MEDICINE

## 2024-04-10 PROCEDURE — 99900035 HC TECH TIME PER 15 MIN (STAT)

## 2024-04-10 PROCEDURE — 99285 EMERGENCY DEPT VISIT HI MDM: CPT | Mod: 25

## 2024-04-10 PROCEDURE — 83036 HEMOGLOBIN GLYCOSYLATED A1C: CPT | Performed by: PHYSICIAN ASSISTANT

## 2024-04-10 PROCEDURE — 85025 COMPLETE CBC W/AUTO DIFF WBC: CPT | Performed by: EMERGENCY MEDICINE

## 2024-04-10 PROCEDURE — 83690 ASSAY OF LIPASE: CPT | Performed by: EMERGENCY MEDICINE

## 2024-04-10 PROCEDURE — 25000003 PHARM REV CODE 250: Performed by: EMERGENCY MEDICINE

## 2024-04-10 PROCEDURE — 96365 THER/PROPH/DIAG IV INF INIT: CPT | Mod: 59

## 2024-04-10 PROCEDURE — 96375 TX/PRO/DX INJ NEW DRUG ADDON: CPT

## 2024-04-10 PROCEDURE — 80047 BASIC METABLC PNL IONIZED CA: CPT

## 2024-04-10 PROCEDURE — 82803 BLOOD GASES ANY COMBINATION: CPT

## 2024-04-10 PROCEDURE — 82330 ASSAY OF CALCIUM: CPT

## 2024-04-10 PROCEDURE — 63600175 PHARM REV CODE 636 W HCPCS: Performed by: PHYSICIAN ASSISTANT

## 2024-04-10 PROCEDURE — 96361 HYDRATE IV INFUSION ADD-ON: CPT

## 2024-04-10 PROCEDURE — 96376 TX/PRO/DX INJ SAME DRUG ADON: CPT

## 2024-04-10 RX ORDER — ONDANSETRON HYDROCHLORIDE 2 MG/ML
4 INJECTION, SOLUTION INTRAVENOUS
Status: COMPLETED | OUTPATIENT
Start: 2024-04-10 | End: 2024-04-10

## 2024-04-10 RX ORDER — PROMETHAZINE HYDROCHLORIDE 25 MG/1
25 SUPPOSITORY RECTAL EVERY 6 HOURS PRN
Qty: 10 SUPPOSITORY | Refills: 0 | Status: SHIPPED | OUTPATIENT
Start: 2024-04-10

## 2024-04-10 RX ORDER — DROPERIDOL 2.5 MG/ML
0.62 INJECTION, SOLUTION INTRAMUSCULAR; INTRAVENOUS
Status: COMPLETED | OUTPATIENT
Start: 2024-04-10 | End: 2024-04-10

## 2024-04-10 RX ORDER — DROPERIDOL 2.5 MG/ML
0.62 INJECTION, SOLUTION INTRAMUSCULAR; INTRAVENOUS EVERY 6 HOURS PRN
Status: DISCONTINUED | OUTPATIENT
Start: 2024-04-10 | End: 2024-04-10 | Stop reason: HOSPADM

## 2024-04-10 RX ORDER — TALC
6 POWDER (GRAM) TOPICAL NIGHTLY PRN
Status: DISCONTINUED | OUTPATIENT
Start: 2024-04-10 | End: 2024-04-10 | Stop reason: HOSPADM

## 2024-04-10 RX ORDER — MORPHINE SULFATE 4 MG/ML
4 INJECTION, SOLUTION INTRAMUSCULAR; INTRAVENOUS
Status: COMPLETED | OUTPATIENT
Start: 2024-04-10 | End: 2024-04-10

## 2024-04-10 RX ORDER — METOCLOPRAMIDE HYDROCHLORIDE 5 MG/ML
5 INJECTION INTRAMUSCULAR; INTRAVENOUS
Status: COMPLETED | OUTPATIENT
Start: 2024-04-10 | End: 2024-04-10

## 2024-04-10 RX ORDER — ONDANSETRON HYDROCHLORIDE 2 MG/ML
4 INJECTION, SOLUTION INTRAVENOUS EVERY 8 HOURS PRN
Status: DISCONTINUED | OUTPATIENT
Start: 2024-04-10 | End: 2024-04-10 | Stop reason: HOSPADM

## 2024-04-10 RX ORDER — MORPHINE SULFATE 4 MG/ML
4 INJECTION, SOLUTION INTRAMUSCULAR; INTRAVENOUS EVERY 4 HOURS PRN
Status: DISCONTINUED | OUTPATIENT
Start: 2024-04-10 | End: 2024-04-10 | Stop reason: HOSPADM

## 2024-04-10 RX ORDER — HYOSCYAMINE SULFATE 0.12 MG/1
0.12 TABLET SUBLINGUAL EVERY 4 HOURS PRN
Qty: 10 TABLET | Refills: 0 | Status: SHIPPED | OUTPATIENT
Start: 2024-04-10

## 2024-04-10 RX ORDER — HYOSCYAMINE SULFATE 0.12 MG/1
0.12 TABLET SUBLINGUAL EVERY 6 HOURS PRN
Status: DISCONTINUED | OUTPATIENT
Start: 2024-04-10 | End: 2024-04-10 | Stop reason: HOSPADM

## 2024-04-10 RX ORDER — SODIUM CHLORIDE 0.9 % (FLUSH) 0.9 %
10 SYRINGE (ML) INJECTION
Status: DISCONTINUED | OUTPATIENT
Start: 2024-04-10 | End: 2024-04-10 | Stop reason: HOSPADM

## 2024-04-10 RX ORDER — INSULIN ASPART 100 [IU]/ML
0-10 INJECTION, SOLUTION INTRAVENOUS; SUBCUTANEOUS EVERY 6 HOURS PRN
Status: DISCONTINUED | OUTPATIENT
Start: 2024-04-10 | End: 2024-04-10 | Stop reason: HOSPADM

## 2024-04-10 RX ORDER — SODIUM CHLORIDE 9 MG/ML
INJECTION, SOLUTION INTRAVENOUS CONTINUOUS
Status: DISCONTINUED | OUTPATIENT
Start: 2024-04-10 | End: 2024-04-10 | Stop reason: HOSPADM

## 2024-04-10 RX ORDER — NITROFURANTOIN 25; 75 MG/1; MG/1
100 CAPSULE ORAL EVERY 12 HOURS
Status: DISCONTINUED | OUTPATIENT
Start: 2024-04-10 | End: 2024-04-10 | Stop reason: HOSPADM

## 2024-04-10 RX ORDER — NITROFURANTOIN 25; 75 MG/1; MG/1
100 CAPSULE ORAL 2 TIMES DAILY
Qty: 10 CAPSULE | Refills: 0 | Status: SHIPPED | OUTPATIENT
Start: 2024-04-10 | End: 2024-04-15

## 2024-04-10 RX ORDER — GLUCAGON 1 MG
1 KIT INJECTION
Status: DISCONTINUED | OUTPATIENT
Start: 2024-04-10 | End: 2024-04-10 | Stop reason: HOSPADM

## 2024-04-10 RX ORDER — ONDANSETRON 4 MG/1
4 TABLET, ORALLY DISINTEGRATING ORAL EVERY 8 HOURS PRN
Qty: 12 TABLET | Refills: 0 | Status: SHIPPED | OUTPATIENT
Start: 2024-04-10

## 2024-04-10 RX ORDER — HYOSCYAMINE SULFATE 0.12 MG/1
0.12 TABLET SUBLINGUAL
Status: COMPLETED | OUTPATIENT
Start: 2024-04-10 | End: 2024-04-10

## 2024-04-10 RX ADMIN — ONDANSETRON 4 MG: 2 INJECTION INTRAMUSCULAR; INTRAVENOUS at 01:04

## 2024-04-10 RX ADMIN — CEFTRIAXONE 1 G: 1 INJECTION, POWDER, FOR SOLUTION INTRAMUSCULAR; INTRAVENOUS at 05:04

## 2024-04-10 RX ADMIN — MORPHINE SULFATE 4 MG: 4 INJECTION INTRAVENOUS at 08:04

## 2024-04-10 RX ADMIN — IOHEXOL 75 ML: 350 INJECTION, SOLUTION INTRAVENOUS at 04:04

## 2024-04-10 RX ADMIN — HYOSCYAMINE SULFATE 0.12 MG: 0.12 TABLET ORAL at 08:04

## 2024-04-10 RX ADMIN — DROPERIDOL 0.62 MG: 2.5 INJECTION, SOLUTION INTRAMUSCULAR; INTRAVENOUS at 08:04

## 2024-04-10 RX ADMIN — METOCLOPRAMIDE 5 MG: 5 INJECTION, SOLUTION INTRAMUSCULAR; INTRAVENOUS at 04:04

## 2024-04-10 RX ADMIN — MORPHINE SULFATE 4 MG: 4 INJECTION INTRAVENOUS at 01:04

## 2024-04-10 RX ADMIN — SODIUM CHLORIDE 2000 ML: 9 INJECTION, SOLUTION INTRAVENOUS at 01:04

## 2024-04-10 NOTE — ED NOTES
I-STAT Chem-8+ Results:   Value Reference Range   Sodium 141 136-145 mmol/L   Potassium  3.6 3.5-5.1 mmol/L   Chloride 110  mmol/L   Ionized Calcium 1.13 1.06-1.42 mmol/L   CO2 (measured) 21 23-29 mmol/L   Glucose 132  mg/dL   BUN 11 6-30 mg/dL   Creatinine 1.2 0.5-1.4 mg/dL   Hematocrit 28 36-54%

## 2024-04-10 NOTE — DISCHARGE INSTRUCTIONS
Be sure to complete the entire course of your antibiotics (nitrofurantoin).  Take your other prescription medications as directed.    Follow-up with your primary doctor or in GI clinic if you continue to have abdominal pain since you had inflammation in your intestines on your CT scan.     Return to the ER if you develop fever greater than 100.4°, can not stop vomiting, have severe pain that is not improving with your prescription medications or for any other worrisome symptoms.

## 2024-04-10 NOTE — PLAN OF CARE
Patient was only in observation for a few hours when she reported improvement in her symptoms and prefers to be discharged.  She was able to tolerate p.o. in the ED. labs overall improved.  Vitals stable.  She was discharged with a referral to GI for re-evaluation given pan colitis noted on CT.  ED return precautions given.

## 2024-04-10 NOTE — Clinical Note
"Mary Lemus" Levi was seen and treated in our emergency department on 4/9/2024.  She may return to work on 04/11/2024.       If you have any questions or concerns, please don't hesitate to call.      Jose HALL RN    "

## 2024-04-10 NOTE — Clinical Note
"Mary Lemus" Levi was seen and treated in our emergency department on 4/9/2024.  She may return to work on 04/11/2024.       If you have any questions or concerns, please don't hesitate to call.       RN    "

## 2024-04-10 NOTE — Clinical Note
Diagnosis: Intractable nausea and vomiting [649451]   Future Attending Provider: ZITA ALTMAN [[id number]]   Is the patient being sent to ED Observation?: Yes

## 2024-04-10 NOTE — H&P
ED Observation Unit  History and Physical    Patient admitted by ED team early this morning at 0647 on 4/10/24.     I assumed care of this patient from the Main ED at the start of my shift, 0700 on 04/10/2024.       History of Present Illness:    Mary Sims is a 38 y.o. female who presents to the ED with Abdominal Pain (Pt states abdominal pain, nausea since yesterday with bright red blood in her stool. Pt denies any urination issues)     Described as 38-year-old female with a history of type 1 diabetes presenting to the emergency department with abdominal pain.  She was seen yesterday at [place] emergency room after a fall and diagnosed with a wrist fracture.  She reports upon driving home, she developed a diffuse lower abdominal pain with associated nausea and diarrhea.  The diarrhea she reports is mucousy and 2 episodes had a very small amount of bright red blood.  This has since resolved.  She does report that she hit her stomach when she fell in the shower it was unsure as to whether this is related to the fall or is a new viral syndrome.  She reports that she has had poor p.o. intake throughout the day and has not checked her blood sugar.    UA is slightly contaminated, though given nitrate positive patient treated for urinary tract infection with Rocephin.  CT imaging shows pancolitis, likely the cause of her diarrhea.  Laboratory studies are concerning for a mild acidosis, bicarb 17 borderline anion gap with low albumin, beta hydroxybutyrate minimally elevated at 1.7, the pH on VBG still relatively normal at 7.342.  Considered very early DKA versus dehydration.  She was given 2 L of IV fluids in the ED and i-STAT repeated with significant improvement in bicarb 21.  However, repeat BNP shows a bicarb of 16.  Plan on further observation of blood glucose, which is only minimally elevated here, p.o. challenge, further hydration, and DC if tolerating p.o., glucose remains controlled, no further concern for  DKA on repeat laboratory studies.     I reviewed the ED Provider Note dated today prior to my evaluation of this patient.  I reviewed all labs and imaging performed in the Main ED, prior to patient being placed in Observation. Patient was placed in the ED Observation Unit for Intractable nausea and vomiting.    PMHx   Past Medical History:   Diagnosis Date    Cellulitis     Diabetes mellitus     Hypertension     Retinal detachment       Past Surgical History:   Procedure Laterality Date    VITRECTOMY BY PARS PLANA APPROACH Right 4/24/2019    Procedure: VITRECTOMY, PARS PLANA APPROACH;  Surgeon: JAMI Schulz MD;  Location: [facility] OR [address] [place] [address];  Service: Ophthalmology;  Laterality: Right;  25g PPV/membrane stripping/ILM peel/EL/AFx/GFX/avastin OD    VITRECTOMY BY PARS PLANA APPROACH Left 5/29/2019    Procedure: VITRECTOMY, PARS PLANA  APPROACH  25g PPV/Membrane Stripping/IML peel/EL/AFX/Avastin left eye   60 mins;  Surgeon: JAMI Schulz MD;  Location: [facility] OR [address] [place] [address];  Service: Ophthalmology;  Laterality: Left;        Family Hx   Family History   Problem Relation Age of Onset    Diabetes Mother         Social Hx   Social History     Socioeconomic History    Marital status:    Tobacco Use    Smoking status: Never    Smokeless tobacco: Never   Substance and Sexual Activity    Alcohol use: Yes     Comment: social    Drug use: No    Sexual activity: Yes     Partners: Male     Birth control/protection: I.U.D.     Social Determinants of Health     Financial Resource Strain: Low Risk  (6/7/2022)    Overall Financial Resource Strain (CARDIA)     Difficulty of Paying Living Expenses: Not very hard   Food Insecurity: No Food Insecurity (6/7/2022)    Hunger Vital Sign     Worried About Running Out of Food in the Last Year: Never true     Ran Out of Food in the Last Year: Never true   Transportation Needs: No Transportation Needs (6/7/2022)    PRAPARE - Transportation     Lack of Transportation (Medical): No      Lack of Transportation (Non-Medical): No   Physical Activity: Inactive (6/7/2022)    Exercise Vital Sign     Days of Exercise per Week: 0 days     Minutes of Exercise per Session: 0 min   Stress: Stress Concern Present (6/7/2022)    Citizen of [place] of Occupational Health - Occupational Stress Questionnaire     Feeling of Stress : To some extent   Social Connections: Moderately Integrated (6/7/2022)    Social Connection and Isolation Panel [NHANES]     Frequency of Communication with Friends and Family: More than three times a week     Frequency of Social Gatherings with Friends and Family: More than three times a week     Attends Hinduism Services: More than 4 times per year     Active Member of Clubs or Organizations: No     Attends Club or Organization Meetings: Never     Marital Status: Living with partner   Housing Stability: Unknown (6/7/2022)    Housing Stability Vital Sign     Unable to Pay for Housing in the Last Year: No     Unstable Housing in the Last Year: No        Vital Signs   Vitals:    04/10/24 0140 04/10/24 0725 04/10/24 0816 04/10/24 0907   BP:  (!) 140/84  111/70   BP Location:  Right arm     Patient Position:  Sitting     Pulse:  86  79   Resp: 16 18 18    Temp:  97.8 °F (36.6 °C)  97.7 °F (36.5 °C)   TempSrc:  Oral  Oral   SpO2:  100%  100%   Weight:       Height:            Review of Systems  Review of Systems   Gastrointestinal:  Positive for abdominal pain, diarrhea, nausea and vomiting.       Physical Exam  Physical Exam  Vitals reviewed.   Constitutional:       General: She is not in acute distress.     Appearance: She is not diaphoretic.   HENT:      Head: Normocephalic and atraumatic.   Cardiovascular:      Rate and Rhythm: Normal rate and regular rhythm.      Heart sounds: Heart sounds not distant. No murmur heard.     No friction rub. No gallop.   Pulmonary:      Effort: Pulmonary effort is normal. No respiratory distress.      Breath sounds: Normal breath sounds. No decreased  breath sounds, wheezing, rhonchi or rales.   Abdominal:      Palpations: Abdomen is soft. There is no mass.      Tenderness: There is no abdominal tenderness. There is no guarding.   Musculoskeletal:      Cervical back: Neck supple.   Skin:     General: Skin is warm and dry.   Neurological:      Mental Status: She is alert.   Psychiatric:         Mood and Affect: Mood and affect normal.         Medications:   Scheduled Meds:  Continuous Infusions:  PRN Meds:.dextrose 10%, dextrose 10%, droPERidol, glucagon (human recombinant), hyoscyamine, insulin aspart U-100, melatonin, morphine, ondansetron, sodium chloride 0.9%      Assessment/Plan:  Abdominal pain  Nausea/Vomiting  Ketosis  - on my assessment, patient feels improved after ED management.  Will p.o. challenge  - repeat BNP with bicarb of 16, glucose 113  - Mag wnl 1.8  - beta hydroxybutyrate elevated at 1.4. Doubt DKA with glucose of 113, normal AG of 12  - PRN analgesics, pain control    Colitis  - pancolitis on CT.  - WBC count wnl   - Afebrile  - Will refer to GI    UTI  - specimen does appear contaminated, but is nitrite positive  - received 1 g of Rocephin in the ED for treatment of UTI.   - has history of E coli ESBL UTIs.  Previously sensitive to Macrobid.  Will start macrobid and plan to discharge with Rx for same.     Case was discussed with the ED provider, Dr. Augustin

## 2024-04-10 NOTE — PLAN OF CARE
AME faxed referral to [facility] [phone]      Tatianna Nicole, CESARIO, MSW, LMSW, RSW   Case Management  [facility]  Email: [email or web address]

## 2024-04-10 NOTE — ED PROVIDER NOTES
"History:  Mary Sims is a 38 y.o. female who presents to the ED with Abdominal Pain (Pt states abdominal pain, nausea since yesterday with bright red blood in her stool. Pt denies any urination issues)    Described as 38-year-old female with a history of type 1 diabetes presenting to the emergency department with abdominal pain.  She was seen yesterday and [place] emergency room after a fall and diagnosed with a wrist fracture.  She reports upon driving home, she developed a diffuse lower abdominal pain with associated nausea and diarrhea.  The diarrhea she reports is mucousy onto episodes had a very small amount of bright red blood.  This has since resolved.  She does report that she hit her stomach when she fell in the shower it was unsure as to whether this is related to the fall or is a new viral syndrome.  She reports that she was had poor p.o. intake throughout the day and has not checked her blood sugar.    Review of Systems: All systems reviewed and are negative except as per history of present illness.    Medications:   Previous Medications    ADMELOG U-100 INSULIN LISPRO 100 UNIT/ML INJECTION    ADM 10 UNI SC TID    ALBUTEROL (PROVENTIL/VENTOLIN HFA) 90 MCG/ACTUATION INHALER    Inhale 1-2 puffs into the lungs every 6 (six) hours as needed for Wheezing or Shortness of Breath. Rescue    BACITRACIN 500 UNIT/GRAM OINT    Apply topically 2 (two) times daily.    BD ULTRA-FINE SHORT PEN NEEDLE 31 GAUGE X 5/16" NDLE    USE UTD    CHLORDIAZEPOXIDE (LIBRIUM) 25 MG CAP    Take 1 capsule (25 mg total) by mouth every 8 (eight) hours as needed (Itching).    CLOTRIMAZOLE (LOTRIMIN) 1 % CREAM    Apply to affected area 2 times daily    DULAGLUTIDE (TRULICITY) 1.5 MG/0.5 ML PEN INJECTOR    Inject into the skin every 7 days.    FLUTICASONE PROPIONATE (FLONASE) 50 MCG/ACTUATION NASAL SPRAY    1 spray (50 mcg total) by Each Nostril route 2 (two) times daily as needed for Rhinitis.    HYDROCORTISONE 2.5 % CREAM    Apply " "topically 2 (two) times daily. for 7 days    HYDROXYZINE HCL (ATARAX) 25 MG TABLET    Take 1 tablet (25 mg total) by mouth 4 (four) times daily as needed for Itching.    HYDROXYZINE PAMOATE (VISTARIL) 25 MG CAP    Take 1 capsule (25 mg total) by mouth every 8 (eight) hours as needed (Itching).    IBUPROFEN (ADVIL,MOTRIN) 600 MG TABLET    Take 1 tablet (600 mg total) by mouth every 6 (six) hours as needed for Pain.    INSULIN ASPART (NOVOLOG) 100 UNIT/ML INPN PEN    Inject 5 Units into the skin 3 (three) times daily with meals.    INSULIN GLARGINE (LANTUS U-100 INSULIN) 100 UNIT/ML INJECTION    Inject 20 Units into the skin every evening.    INSULIN GLARGINE 100 UNITS/ML (3ML) SUBQ PEN    Inject 45 Units into the skin every evening.    INSULIN LISPRO 100 UNIT/ML INJECTION    Inject 10 Units into the skin 3 (three) times daily before meals.    INSULIN SYRINGE-NEEDLE U-100 1 ML 30 GAUGE X 7/16" SYRG    USE AS DIRECTED    KETOCONAZOLE (NIZORAL) 2 % CREAM    Apply topically 2 (two) times daily. for 7 days    NYSTATIN (MYCOSTATIN) POWDER    Apply topically 2 (two) times daily.    NYSTATIN-TRIAMCINOLONE (MYCOLOG II) CREAM    Apply topically 2 (two) times daily.    ONDANSETRON (ZOFRAN-ODT) 4 MG TBDL    Take 1 tablet (4 mg total) by mouth every 6 (six) hours as needed (nausea or vomiting).    ONETOUCH DELICA LANCETS 33 GAUGE MISC    USE AS DIRECTED    ONETOUCH ULTRA BLUE TEST STRIP STRP    USE TO TEST BLOOD GLUCOSE BID    PEN NEEDLE, DIABETIC (BD ULTRA-FINE ISAAC PEN NEEDLES) 32 GAUGE X 5/32" NDLE    Use to inject insulin 5x/day    POLYETHYLENE GLYCOL (GLYCOLAX) 17 GRAM/DOSE POWDER    Take 17 g by mouth once daily.       PMH:   Past Medical History:   Diagnosis Date    Cellulitis     Diabetes mellitus     Hypertension     Retinal detachment      PSH:   Past Surgical History:   Procedure Laterality Date    VITRECTOMY BY PARS PLANA APPROACH Right 4/24/2019    Procedure: VITRECTOMY, PARS PLANA APPROACH;  Surgeon: JAMI Heredia " "MD Jazz;  Location: [address];  Service: Ophthalmology;  Laterality: Right;  25g PPV/membrane stripping/ILM peel/EL/AFx/GFX/avastin OD    VITRECTOMY BY PARS PLANA APPROACH Left 5/29/2019    Procedure: VITRECTOMY, PARS PLANA  APPROACH  25g PPV/Membrane Stripping/IML peel/EL/AFX/Avastin left eye   60 mins;  Surgeon: JAMI Schulz MD;  Location: [facility] OR [address] [place] [address];  Service: Ophthalmology;  Laterality: Left;     Allergies: She has No Known Allergies.  Social History: Marital Status: . She  reports that she has never smoked. She has never used smokeless tobacco.. She  reports current alcohol use..       Exam:  VITAL SIGNS:   Vitals:    04/09/24 2356 04/10/24 0140   BP: (!) 143/89    Pulse: 98    Resp: 18 16   Temp: 98 °F (36.7 °C)    SpO2: 99%    Weight: 77.1 kg (170 lb)    Height: 5' 3" (1.6 m)      Const: Awake and alert, NAD   Head: Atraumatic  Eyes: Normal Conjunctiva  ENT: Normal External Ears, Nose and Mouth.  Neck: Full range of motion. No meningismus.  Resp: Normal respiratory effort, No distress  Cardio: Equal and intact distal pulses  Abd: Soft, mild diffuse lower abdominal tenderness to palpation.  No rebound or guarding, nondistended  Back:  No midline C/T/L-spine tenderness to palpation, no step-off  Skin: No petechiae or rashes  Ext:  Left wrist splint in place.  Neur: Awake and alert  Psych: Normal Mood and Affect    Data:  Results for orders placed or performed during the hospital encounter of 04/10/24   CBC W/ AUTO DIFFERENTIAL   Result Value Ref Range    WBC 6.05 3.90 - 12.70 K/uL    RBC 5.10 4.00 - 5.40 M/uL    Hemoglobin 12.1 12.0 - 16.0 g/dL    Hematocrit 36.1 (L) 37.0 - 48.5 %    MCV 71 (L) 82 - 98 fL    MCH 23.7 (L) 27.0 - 31.0 pg    MCHC 33.5 32.0 - 36.0 g/dL    RDW 15.3 (H) 11.5 - 14.5 %    Platelets 289 150 - 450 K/uL    MPV 11.2 9.2 - 12.9 fL    Immature Granulocytes 0.2 0.0 - 0.5 %    Gran # (ANC) 3.8 1.8 - 7.7 K/uL    Immature Grans (Abs) 0.01 0.00 - 0.04 K/uL    Lymph " # 1.5 1.0 - 4.8 K/uL    Mono # 0.6 0.3 - 1.0 K/uL    Eos # 0.1 0.0 - 0.5 K/uL    Baso # 0.04 0.00 - 0.20 K/uL    nRBC 0 0 /100 WBC    Gran % 63.3 38.0 - 73.0 %    Lymph % 24.5 18.0 - 48.0 %    Mono % 9.6 4.0 - 15.0 %    Eosinophil % 1.7 0.0 - 8.0 %    Basophil % 0.7 0.0 - 1.9 %    Differential Method Automated    Comp. Metabolic Panel   Result Value Ref Range    Sodium 139 136 - 145 mmol/L    Potassium 4.1 3.5 - 5.1 mmol/L    Chloride 110 95 - 110 mmol/L    CO2 17 (L) 23 - 29 mmol/L    Glucose 179 (H) 70 - 110 mg/dL    BUN 13 6 - 20 mg/dL    Creatinine 1.3 0.5 - 1.4 mg/dL    Calcium 8.9 8.7 - 10.5 mg/dL    Total Protein 6.6 6.0 - 8.4 g/dL    Albumin 2.4 (L) 3.5 - 5.2 g/dL    Total Bilirubin 0.2 0.1 - 1.0 mg/dL    Alkaline Phosphatase 90 55 - 135 U/L    AST 21 10 - 40 U/L    ALT 21 10 - 44 U/L    eGFR 54.3 (A) >60 mL/min/1.73 m^2    Anion Gap 12 8 - 16 mmol/L   Lipase   Result Value Ref Range    Lipase 10 4 - 60 U/L   Urinalysis, Reflex to Urine Culture Urine, Clean Catch    Specimen: Urine   Result Value Ref Range    Specimen UA Urine, Clean Catch     Color, UA Yellow Yellow, Straw, Suly    Appearance, UA Hazy (A) Clear    pH, UA 6.0 5.0 - 8.0    Specific Gravity, UA 1.030 1.005 - 1.030    Protein, UA 3+ (A) Negative    Glucose, UA 4+ (A) Negative    Ketones, UA 1+ (A) Negative    Bilirubin (UA) Negative Negative    Occult Blood UA 1+ (A) Negative    Nitrite, UA Positive (A) Negative    Leukocytes, UA Negative Negative   Beta - Hydroxybutyrate, Serum   Result Value Ref Range    Beta-Hydroxybutyrate 1.4 (H) 0.0 - 0.5 mmol/L   Urinalysis Microscopic   Result Value Ref Range    RBC, UA 12 (H) 0 - 4 /hpf    WBC, UA 10 (H) 0 - 5 /hpf    Bacteria Many (A) None-Occ /hpf    Yeast, UA None None    Squam Epithel, UA 13 /hpf    Hyaline Casts, UA 0 0-1/lpf /lpf    Microscopic Comment SEE COMMENT    POCT urine pregnancy   Result Value Ref Range    POC Preg Test, Ur Negative Negative     Acceptable Yes    ISTAT  PROCEDURE   Result Value Ref Range    POC PH 7.342 (L) 7.35 - 7.45    POC PCO2 40.2 35 - 45 mmHg    POC PO2 34 (LL) 40 - 60 mmHg    POC HCO3 21.8 (L) 24 - 28 mmol/L    POC BE -4 (L) -2 to 2 mmol/L    POC SATURATED O2 61 95 - 100 %    POC TCO2 23 (L) 24 - 29 mmol/L    Sample VENOUS     Site Other     Allens Test N/A      Imaging Results               CT Abdomen Pelvis With IV Contrast NO Oral Contrast (Final result)  Result time 04/10/24 06:35:28      Final result by Masood Skelton MD (04/10/24 06:35:28)                   Impression:      Findings suggestive of a nonspecific pancolitis.    Mild diffuse urinary bladder wall thickening.  Suggest correlation with urinalysis if there is concern for cystitis.    No acute traumatic abnormality identified in the abdomen or pelvis.    Uterine fibroids.    Small volume free fluid in the lower abdomen and pelvis.    Small hiatal hernia.    Additional findings discussed in the body of the report.    This report was flagged in Epic as abnormal.      Electronically signed by: Masood Skelton MD  Date:    04/10/2024  Time:    06:35               Narrative:    EXAMINATION:  CT ABDOMEN PELVIS WITH IV CONTRAST    CLINICAL HISTORY:  Abdominal trauma, blunt;    TECHNIQUE:  Low dose axial images, sagittal and coronal reformations were obtained from the lung bases to the pubic symphysis following the IV administration of 75 mL of Omnipaque 350 .  Oral contrast was not given.    COMPARISON:  Abdominal radiograph, 01/22/2023.    FINDINGS:  Lower Chest:    Lung bases are clear.  Heart size is normal.    Abdomen:    Liver is enlarged and demonstrates normal contour.  Focal fat infiltration adjacent to the falciform ligament.  No worrisome liver mass identified on this single phase study.  Small gallstones noted in the gallbladder.  No pericholecystic inflammatory changes.  No intrahepatic biliary ductal dilatation.    Spleen is at the upper limits of normal in size.  Adrenal glands and  pancreas are unremarkable.    The kidneys are symmetric.  No hydronephrosis. No asymmetric perinephric fat stranding.    No small bowel obstruction.  Small hiatal hernia.  Minimal lower esophageal wall prominence.  Diffuse wall thickening of the entire colon with mucosal edema and pericolonic fat stranding.  Appendix is unremarkable.    No pneumoperitoneum or organized fluid collection.  No significant hemoperitoneum.  Trace free fluid in the lower abdomen.    No bulky retroperitoneal lymphadenopathy.  Scattered minimally prominent mesenteric lymph nodes.  Minimally prominent bilateral inguinal lymph nodes measuring up to 1.2 cm in short axis on the right.    Abdominal aorta is normal in caliber without significant atherosclerosis.    Portal vein is patent.  No portal venous gas.    Pelvis:    Urinary bladder demonstrates mild diffuse wall prominence.  Small volume air in the bladder lumen.  Uterus is prominent with probable small fibroids including a peripherally calcified subserosal fibroid in the uterine fundus to the left of midline.  IUD is present without obvious complication.  Probable dominant follicle in the right adnexa.  Small volume pelvic free fluid.  Mild rectal wall thickening.    Bones and soft tissues:    No aggressive osseous lesions.  No acute fracture identified.  Small benign-appearing lucent foci in the left proximal femur.  Mild body wall edema.                                    Labs & Imaging studies were reviewed independently by me.     Medical Decision Makin-year-old female with a history of type 1 diabetes presenting to the emergency department with intractable nausea vomiting, poor p.o. intake, and abdominal pain.  She would minimal lower suprapubic abdominal tenderness to palpation.  UA is slightly contaminated, though given nitrate positive will treat for urinary tract infection with Rocephin.  CT imaging shows pancolitis, likely the cause of her diarrhea.  Laboratory studies  are concerning for a mild acidosis, bicarb 17 borderline anion gap with low albumin, beta hydroxybutyrate minimally elevated at 1.7, the pH on VBG still relatively normal at 7.342.  Considered very early DKA versus dehydration.  She was given 2 L of IV fluids and i-STAT repeated with significant improvement in bicarb 21. Repeat BMP and magnesium level pending.  Plan on further observation of blood glucose, which is only minimally elevated here, p.o. challenge, further hydration, and DC if tolerating p.o., glucose remains controlled, no further concern for DKA on repeat laboratory studies.     Clinical Impression:  1. Pancolitis    2. Intractable nausea and vomiting    3. Dehydration    4. Ketosis                  Mary Anne Augustin MD  04/10/24 4804

## 2024-04-10 NOTE — PLAN OF CARE
[facility] - Emergency Dept  Initial Discharge Assessment       Primary Care Provider: Dagoberto Ott MD    Admission Diagnosis: Intractable nausea and vomiting [R11.2]    Admission Date: 4/10/2024  Expected Discharge Date:     Pt stated she is independent with her ambulation and ADL's and does not require assistance or equipment.    Pt to d/c home with no needs when ready    Transition of Care Barriers: (P) None    Payor: MEDICAID / Plan: [employer] [facility] / Product Type: Managed Medicaid /     Extended Emergency Contact Information  Primary Emergency Contact: Pricilla Sims  Address: [facility] [place] [address] [facility]  Home Phone: [phone]  Relation: Mother  Secondary Emergency Contact: JarrettJonh  Mobile Phone: [phone]  Relation: Significant other  Preferred language: English   needed? No    Discharge Plan A: (P) Home  Discharge Plan B: (P) Home      [id number] [place], [place] - [address] AT [facility] & [address]  [address]  [facility] [address]  Phone: [phone] Fax: [phone]    [facility] #[id number] - [place] [facility]  AT [facility] & [address]  [address] [place] [address]  Phone: [phone] Fax: [phone]    [id number] - [place]  [address] AT [facility] & [address]  [place] [address]  Phone: [phone] Fax: [phone]    [id number] - [place]  [address] [facility] & [address]  [address] [place] [address]  Phone: [phone] Fax: [phone]      Initial Assessment (most recent)       Adult Discharge Assessment - 04/10/24 1043          Discharge Assessment    Assessment Type Discharge Planning Assessment (P)      Confirmed/corrected address, phone number and insurance Yes (P)      Confirmed  Demographics Correct on Facesheet (P)      Source of Information patient (P)      People in Home significant other;child(cammie), dependent (P)      Facility Arrived From: Intractable nausea and vomiting (P)      Do you expect to return to your current living situation? Yes (P)      Do you have help at home or someone to help you manage your care at home? No (P)      Prior to hospitilization cognitive status: Alert/Oriented;No Deficits (P)      Current cognitive status: No Deficits;Alert/Oriented (P)      Walking or Climbing Stairs Difficulty no (P)      Dressing/Bathing Difficulty no (P)      Home Accessibility wheelchair accessible (P)      Home Layout Able to live on 1st floor (P)      Equipment Currently Used at Home none (P)      Patient currently being followed by outpatient case management? No (P)      Do you currently have service(s) that help you manage your care at home? No (P)      Do you have any problems affording any of your prescribed medications? No (P)      Is the patient taking medications as prescribed? yes (P)      Who is going to help you get home at discharge? family/friends (P)      How do you get to doctors appointments? car, drives self (P)      Are you on dialysis? No (P)      Do you take coumadin? No (P)      Discharge Plan A Home (P)      Discharge Plan B Home (P)      DME Needed Upon Discharge  none (P)      Discharge Plan discussed with: Patient (P)      Transition of Care Barriers None (P)         Physical Activity    On average, how many days per week do you engage in moderate to strenuous exercise (like a brisk walk)? 0 days (P)      On average, how many minutes do you engage in exercise at this level? 0 min (P)         Financial Resource Strain    How hard is it for you to pay for the very basics like food, housing, medical care, and heating? Not very hard (P)         Housing Stability    In the last 12 months, was there a time when you were not able to pay the mortgage or rent on  time? No (P)      In the last 12 months, was there a time when you did not have a steady place to sleep or slept in a shelter (including now)? No (P)         Transportation Needs    In the past 12 months, has lack of transportation kept you from medical appointments or from getting medications? No (P)      In the past 12 months, has lack of transportation kept you from meetings, work, or from getting things needed for daily living? No (P)         Food Insecurity    Within the past 12 months, you worried that your food would run out before you got the money to buy more. Never true (P)      Within the past 12 months, the food you bought just didn't last and you didn't have money to get more. Never true (P)         Stress    Do you feel stress - tense, restless, nervous, or anxious, or unable to sleep at night because your mind is troubled all the time - these days? To some extent (P)         Social Connections    In a typical week, how many times do you talk on the phone with family, friends, or neighbors? More than three times a week (P)      How often do you get together with friends or relatives? More than three times a week (P)      How often do you attend Anglican or Islam services? More than 4 times per year (P)      Do you belong to any clubs or organizations such as Anglican groups, unions, fraternal or athletic groups, or school groups? No (P)      How often do you attend meetings of the clubs or organizations you belong to? Never (P)      Are you , , , , never , or living with a partner? Living with partner (P)         Alcohol Use    Q1: How often do you have a drink containing alcohol? 2-4 times a month (P)      Q2: How many drinks containing alcohol do you have on a typical day when you are drinking? 1 or 2 (P)      Q3: How often do you have six or more drinks on one occasion? Never (P)         OTHER    Name(s) of People in Home significant other Shakila  daughter (P)                    Tatianna Nicole CD, MSW, LMSW, RSW   Case Management  [facility]  Email: [email or web address]

## 2024-07-16 ENCOUNTER — HOSPITAL ENCOUNTER (INPATIENT)
Facility: HOSPITAL | Age: 39
LOS: 3 days | Discharge: HOME | DRG: 638 | End: 2024-07-19
Attending: EMERGENCY MEDICINE | Admitting: EMERGENCY MEDICINE
Payer: MEDICAID

## 2024-07-16 DIAGNOSIS — R55 SYNCOPE AND COLLAPSE: ICD-10-CM

## 2024-07-16 DIAGNOSIS — E10.69 TYPE 1 DIABETES MELLITUS WITH OTHER SPECIFIED COMPLICATION: Primary | ICD-10-CM

## 2024-07-16 DIAGNOSIS — R73.9 HYPERGLYCEMIA: ICD-10-CM

## 2024-07-16 DIAGNOSIS — R55 NEAR SYNCOPE: ICD-10-CM

## 2024-07-16 DIAGNOSIS — R07.9 CHEST PAIN: ICD-10-CM

## 2024-07-16 PROBLEM — N39.0 UTI (URINARY TRACT INFECTION): Status: ACTIVE | Noted: 2024-07-16

## 2024-07-16 PROBLEM — R11.0 NAUSEA: Status: RESOLVED | Noted: 2024-07-16 | Resolved: 2024-07-16

## 2024-07-16 PROBLEM — R11.0 NAUSEA: Status: ACTIVE | Noted: 2024-07-16

## 2024-07-16 LAB
ALBUMIN SERPL BCP-MCNC: 1.8 G/DL (ref 3.5–5.2)
ALLENS TEST: ABNORMAL
ALP SERPL-CCNC: 124 U/L (ref 55–135)
ALT SERPL W/O P-5'-P-CCNC: 14 U/L (ref 10–44)
ANION GAP SERPL CALC-SCNC: 6 MMOL/L (ref 8–16)
ANION GAP SERPL CALC-SCNC: 8 MMOL/L (ref 8–16)
AST SERPL-CCNC: 12 U/L (ref 10–40)
B-HCG UR QL: NEGATIVE
B-OH-BUTYR BLD STRIP-SCNC: 0.5 MMOL/L (ref 0–0.5)
BACTERIA #/AREA URNS AUTO: ABNORMAL /HPF
BASOPHILS # BLD AUTO: 0.06 K/UL (ref 0–0.2)
BASOPHILS NFR BLD: 0.8 % (ref 0–1.9)
BILIRUB SERPL-MCNC: 0.1 MG/DL (ref 0.1–1)
BILIRUB UR QL STRIP: NEGATIVE
BNP SERPL-MCNC: 78 PG/ML (ref 0–99)
BUN SERPL-MCNC: 25 MG/DL (ref 6–20)
BUN SERPL-MCNC: 29 MG/DL (ref 6–20)
CALCIUM SERPL-MCNC: 8.5 MG/DL (ref 8.7–10.5)
CALCIUM SERPL-MCNC: 8.5 MG/DL (ref 8.7–10.5)
CHLORIDE SERPL-SCNC: 106 MMOL/L (ref 95–110)
CHLORIDE SERPL-SCNC: 109 MMOL/L (ref 95–110)
CLARITY UR REFRACT.AUTO: ABNORMAL
CO2 SERPL-SCNC: 18 MMOL/L (ref 23–29)
CO2 SERPL-SCNC: 22 MMOL/L (ref 23–29)
COLOR UR AUTO: YELLOW
CREAT SERPL-MCNC: 1.7 MG/DL (ref 0.5–1.4)
CREAT SERPL-MCNC: 1.9 MG/DL (ref 0.5–1.4)
CTP QC/QA: YES
DIFFERENTIAL METHOD BLD: ABNORMAL
EOSINOPHIL # BLD AUTO: 0.1 K/UL (ref 0–0.5)
EOSINOPHIL NFR BLD: 0.9 % (ref 0–8)
ERYTHROCYTE [DISTWIDTH] IN BLOOD BY AUTOMATED COUNT: 14.6 % (ref 11.5–14.5)
EST. GFR  (NO RACE VARIABLE): 34.2 ML/MIN/1.73 M^2
EST. GFR  (NO RACE VARIABLE): 39.1 ML/MIN/1.73 M^2
ESTIMATED AVG GLUCOSE: ABNORMAL MG/DL (ref 68–131)
GLUCOSE SERPL-MCNC: 419 MG/DL (ref 70–110)
GLUCOSE SERPL-MCNC: 797 MG/DL (ref 70–110)
GLUCOSE UR QL STRIP: ABNORMAL
HBA1C MFR BLD: >14 % (ref 4–5.6)
HCO3 UR-SCNC: 21 MMOL/L (ref 24–28)
HCT VFR BLD AUTO: 33.4 % (ref 37–48.5)
HGB BLD-MCNC: 11.3 G/DL (ref 12–16)
HGB UR QL STRIP: ABNORMAL
HYALINE CASTS UR QL AUTO: 0 /LPF
IMM GRANULOCYTES # BLD AUTO: 0.04 K/UL (ref 0–0.04)
IMM GRANULOCYTES NFR BLD AUTO: 0.5 % (ref 0–0.5)
KETONES UR QL STRIP: NEGATIVE
LEUKOCYTE ESTERASE UR QL STRIP: ABNORMAL
LYMPHOCYTES # BLD AUTO: 1.3 K/UL (ref 1–4.8)
LYMPHOCYTES NFR BLD: 16 % (ref 18–48)
MAGNESIUM SERPL-MCNC: 2 MG/DL (ref 1.6–2.6)
MCH RBC QN AUTO: 23.3 PG (ref 27–31)
MCHC RBC AUTO-ENTMCNC: 33.8 G/DL (ref 32–36)
MCV RBC AUTO: 69 FL (ref 82–98)
MICROSCOPIC COMMENT: ABNORMAL
MONOCYTES # BLD AUTO: 0.4 K/UL (ref 0.3–1)
MONOCYTES NFR BLD: 4.5 % (ref 4–15)
NEUTROPHILS # BLD AUTO: 6.1 K/UL (ref 1.8–7.7)
NEUTROPHILS NFR BLD: 77.3 % (ref 38–73)
NITRITE UR QL STRIP: NEGATIVE
NRBC BLD-RTO: 0 /100 WBC
OHS QRS DURATION: 70 MS
OHS QTC CALCULATION: 442 MS
OSMOLALITY SERPL: 325 MOSM/KG (ref 275–295)
PCO2 BLDA: 42.3 MMHG (ref 35–45)
PH SMN: 7.3 [PH] (ref 7.35–7.45)
PH UR STRIP: 7 [PH] (ref 5–8)
PHOSPHATE SERPL-MCNC: 2.9 MG/DL (ref 2.7–4.5)
PLATELET # BLD AUTO: 252 K/UL (ref 150–450)
PMV BLD AUTO: 12.1 FL (ref 9.2–12.9)
PO2 BLDA: 35 MMHG (ref 40–60)
POC BE: -5 MMOL/L
POC SATURATED O2: 62 % (ref 95–100)
POC TCO2: 22 MMOL/L (ref 24–29)
POCT GLUCOSE: 183 MG/DL (ref 70–110)
POCT GLUCOSE: 257 MG/DL (ref 70–110)
POCT GLUCOSE: 305 MG/DL (ref 70–110)
POCT GLUCOSE: 392 MG/DL (ref 70–110)
POCT GLUCOSE: 407 MG/DL (ref 70–110)
POCT GLUCOSE: >500 MG/DL (ref 70–110)
POCT GLUCOSE: >500 MG/DL (ref 70–110)
POTASSIUM SERPL-SCNC: 4.1 MMOL/L (ref 3.5–5.1)
POTASSIUM SERPL-SCNC: 5 MMOL/L (ref 3.5–5.1)
PROT SERPL-MCNC: 5.7 G/DL (ref 6–8.4)
PROT UR QL STRIP: ABNORMAL
RBC # BLD AUTO: 4.86 M/UL (ref 4–5.4)
RBC #/AREA URNS AUTO: 5 /HPF (ref 0–4)
SAMPLE: ABNORMAL
SITE: ABNORMAL
SODIUM SERPL-SCNC: 132 MMOL/L (ref 136–145)
SODIUM SERPL-SCNC: 137 MMOL/L (ref 136–145)
SP GR UR STRIP: 1.02 (ref 1–1.03)
SQUAMOUS #/AREA URNS AUTO: 4 /HPF
TROPONIN I SERPL DL<=0.01 NG/ML-MCNC: <0.006 NG/ML (ref 0–0.03)
URN SPEC COLLECT METH UR: ABNORMAL
WBC # BLD AUTO: 7.86 K/UL (ref 3.9–12.7)
WBC #/AREA URNS AUTO: 63 /HPF (ref 0–5)
YEAST UR QL AUTO: ABNORMAL

## 2024-07-16 PROCEDURE — 21400001 HC TELEMETRY ROOM

## 2024-07-16 PROCEDURE — 84100 ASSAY OF PHOSPHORUS: CPT

## 2024-07-16 PROCEDURE — 87088 URINE BACTERIA CULTURE: CPT

## 2024-07-16 PROCEDURE — 99285 EMERGENCY DEPT VISIT HI MDM: CPT | Mod: 25

## 2024-07-16 PROCEDURE — 25000003 PHARM REV CODE 250

## 2024-07-16 PROCEDURE — 96372 THER/PROPH/DIAG INJ SC/IM: CPT

## 2024-07-16 PROCEDURE — 63600175 PHARM REV CODE 636 W HCPCS

## 2024-07-16 PROCEDURE — 83036 HEMOGLOBIN GLYCOSYLATED A1C: CPT

## 2024-07-16 PROCEDURE — 87086 URINE CULTURE/COLONY COUNT: CPT

## 2024-07-16 PROCEDURE — 83930 ASSAY OF BLOOD OSMOLALITY: CPT

## 2024-07-16 PROCEDURE — 83735 ASSAY OF MAGNESIUM: CPT

## 2024-07-16 PROCEDURE — 81001 URINALYSIS AUTO W/SCOPE: CPT

## 2024-07-16 PROCEDURE — 11000001 HC ACUTE MED/SURG PRIVATE ROOM

## 2024-07-16 PROCEDURE — 85025 COMPLETE CBC W/AUTO DIFF WBC: CPT

## 2024-07-16 PROCEDURE — 96374 THER/PROPH/DIAG INJ IV PUSH: CPT

## 2024-07-16 PROCEDURE — 96361 HYDRATE IV INFUSION ADD-ON: CPT

## 2024-07-16 PROCEDURE — 82962 GLUCOSE BLOOD TEST: CPT

## 2024-07-16 PROCEDURE — 82010 KETONE BODYS QUAN: CPT

## 2024-07-16 PROCEDURE — 63600175 PHARM REV CODE 636 W HCPCS: Performed by: EMERGENCY MEDICINE

## 2024-07-16 PROCEDURE — 83880 ASSAY OF NATRIURETIC PEPTIDE: CPT

## 2024-07-16 PROCEDURE — 87040 BLOOD CULTURE FOR BACTERIA: CPT

## 2024-07-16 PROCEDURE — 81025 URINE PREGNANCY TEST: CPT

## 2024-07-16 PROCEDURE — 99900035 HC TECH TIME PER 15 MIN (STAT)

## 2024-07-16 PROCEDURE — 80053 COMPREHEN METABOLIC PANEL: CPT

## 2024-07-16 PROCEDURE — 93005 ELECTROCARDIOGRAM TRACING: CPT

## 2024-07-16 PROCEDURE — 84484 ASSAY OF TROPONIN QUANT: CPT

## 2024-07-16 PROCEDURE — 25000003 PHARM REV CODE 250: Performed by: EMERGENCY MEDICINE

## 2024-07-16 PROCEDURE — 87186 SC STD MICRODIL/AGAR DIL: CPT

## 2024-07-16 PROCEDURE — 82803 BLOOD GASES ANY COMBINATION: CPT

## 2024-07-16 PROCEDURE — 80048 BASIC METABOLIC PNL TOTAL CA: CPT | Mod: XB

## 2024-07-16 PROCEDURE — 93010 ELECTROCARDIOGRAM REPORT: CPT | Mod: ,,, | Performed by: INTERNAL MEDICINE

## 2024-07-16 RX ORDER — SODIUM CHLORIDE 0.9 % (FLUSH) 0.9 %
10 SYRINGE (ML) INJECTION
Status: DISCONTINUED | OUTPATIENT
Start: 2024-07-16 | End: 2024-07-19 | Stop reason: HOSPADM

## 2024-07-16 RX ORDER — IBUPROFEN 200 MG
16 TABLET ORAL
Status: DISCONTINUED | OUTPATIENT
Start: 2024-07-16 | End: 2024-07-16

## 2024-07-16 RX ORDER — INSULIN GLARGINE 100 [IU]/ML
40 INJECTION, SOLUTION SUBCUTANEOUS NIGHTLY
Status: DISCONTINUED | OUTPATIENT
Start: 2024-07-16 | End: 2024-07-17

## 2024-07-16 RX ORDER — SODIUM CHLORIDE 9 MG/ML
1000 INJECTION, SOLUTION INTRAVENOUS
Status: COMPLETED | OUTPATIENT
Start: 2024-07-16 | End: 2024-07-16

## 2024-07-16 RX ORDER — LOSARTAN POTASSIUM 100 MG/1
100 TABLET ORAL DAILY
Status: ON HOLD | COMMUNITY
End: 2024-07-19

## 2024-07-16 RX ORDER — INSULIN ASPART 100 [IU]/ML
10 INJECTION, SOLUTION INTRAVENOUS; SUBCUTANEOUS
Status: DISCONTINUED | OUTPATIENT
Start: 2024-07-17 | End: 2024-07-17

## 2024-07-16 RX ORDER — DEXTROMETHORPHAN HYDROBROMIDE, GUAIFENESIN 5; 100 MG/5ML; MG/5ML
650 LIQUID ORAL DAILY PRN
Status: ON HOLD | COMMUNITY

## 2024-07-16 RX ORDER — INSULIN ASPART 100 [IU]/ML
10 INJECTION, SOLUTION INTRAVENOUS; SUBCUTANEOUS ONCE
Status: COMPLETED | OUTPATIENT
Start: 2024-07-16 | End: 2024-07-16

## 2024-07-16 RX ORDER — NALOXONE HCL 0.4 MG/ML
0.02 VIAL (ML) INJECTION
Status: DISCONTINUED | OUTPATIENT
Start: 2024-07-16 | End: 2024-07-19 | Stop reason: HOSPADM

## 2024-07-16 RX ORDER — NITROFURANTOIN 25; 75 MG/1; MG/1
100 CAPSULE ORAL
Status: COMPLETED | OUTPATIENT
Start: 2024-07-16 | End: 2024-07-16

## 2024-07-16 RX ORDER — INSULIN ASPART 100 [IU]/ML
0-5 INJECTION, SOLUTION INTRAVENOUS; SUBCUTANEOUS
Status: DISCONTINUED | OUTPATIENT
Start: 2024-07-16 | End: 2024-07-17

## 2024-07-16 RX ORDER — SODIUM CHLORIDE 0.9 % (FLUSH) 0.9 %
10 SYRINGE (ML) INJECTION EVERY 12 HOURS PRN
Status: DISCONTINUED | OUTPATIENT
Start: 2024-07-16 | End: 2024-07-19 | Stop reason: HOSPADM

## 2024-07-16 RX ORDER — ATORVASTATIN CALCIUM 40 MG/1
40 TABLET, FILM COATED ORAL DAILY
Status: DISCONTINUED | OUTPATIENT
Start: 2024-07-17 | End: 2024-07-19 | Stop reason: HOSPADM

## 2024-07-16 RX ORDER — ONDANSETRON 8 MG/1
8 TABLET, ORALLY DISINTEGRATING ORAL EVERY 8 HOURS PRN
Status: DISCONTINUED | OUTPATIENT
Start: 2024-07-16 | End: 2024-07-19 | Stop reason: HOSPADM

## 2024-07-16 RX ORDER — SODIUM CHLORIDE 9 MG/ML
1000 INJECTION, SOLUTION INTRAVENOUS CONTINUOUS
Status: DISCONTINUED | OUTPATIENT
Start: 2024-07-16 | End: 2024-07-16

## 2024-07-16 RX ORDER — ACETAMINOPHEN 325 MG/1
650 TABLET ORAL EVERY 4 HOURS PRN
Status: DISCONTINUED | OUTPATIENT
Start: 2024-07-16 | End: 2024-07-19 | Stop reason: HOSPADM

## 2024-07-16 RX ORDER — IBUPROFEN 200 MG
24 TABLET ORAL
Status: DISCONTINUED | OUTPATIENT
Start: 2024-07-16 | End: 2024-07-16

## 2024-07-16 RX ORDER — IBUPROFEN 200 MG
16 TABLET ORAL
Status: DISCONTINUED | OUTPATIENT
Start: 2024-07-16 | End: 2024-07-17

## 2024-07-16 RX ORDER — AMLODIPINE BESYLATE 5 MG/1
5 TABLET ORAL
Status: COMPLETED | OUTPATIENT
Start: 2024-07-16 | End: 2024-07-16

## 2024-07-16 RX ORDER — ROSUVASTATIN CALCIUM 40 MG/1
40 TABLET, COATED ORAL NIGHTLY
Status: ON HOLD | COMMUNITY

## 2024-07-16 RX ORDER — GLUCAGON 1 MG
1 KIT INJECTION
Status: DISCONTINUED | OUTPATIENT
Start: 2024-07-16 | End: 2024-07-17

## 2024-07-16 RX ORDER — DEXTROSE MONOHYDRATE AND SODIUM CHLORIDE 5; .45 G/100ML; G/100ML
INJECTION, SOLUTION INTRAVENOUS CONTINUOUS PRN
Status: DISCONTINUED | OUTPATIENT
Start: 2024-07-16 | End: 2024-07-16 | Stop reason: SDUPTHER

## 2024-07-16 RX ORDER — SEMAGLUTIDE 0.68 MG/ML
0.5 INJECTION, SOLUTION SUBCUTANEOUS
Status: ON HOLD | COMMUNITY

## 2024-07-16 RX ORDER — FLUCONAZOLE 200 MG/1
200 TABLET ORAL DAILY
Status: ON HOLD | COMMUNITY
Start: 2024-07-12 | End: 2024-07-19 | Stop reason: HOSPADM

## 2024-07-16 RX ORDER — PANTOPRAZOLE SODIUM 40 MG/1
40 TABLET, DELAYED RELEASE ORAL DAILY
Status: ON HOLD | COMMUNITY

## 2024-07-16 RX ORDER — SODIUM CHLORIDE 9 MG/ML
1000 INJECTION, SOLUTION INTRAVENOUS CONTINUOUS
Status: ACTIVE | OUTPATIENT
Start: 2024-07-16 | End: 2024-07-17

## 2024-07-16 RX ORDER — ONDANSETRON HYDROCHLORIDE 2 MG/ML
4 INJECTION, SOLUTION INTRAVENOUS ONCE
Status: COMPLETED | OUTPATIENT
Start: 2024-07-16 | End: 2024-07-16

## 2024-07-16 RX ORDER — DEXTROSE MONOHYDRATE AND SODIUM CHLORIDE 5; .45 G/100ML; G/100ML
INJECTION, SOLUTION INTRAVENOUS CONTINUOUS
Status: ACTIVE | OUTPATIENT
Start: 2024-07-16 | End: 2024-07-17

## 2024-07-16 RX ORDER — IBUPROFEN 200 MG
24 TABLET ORAL
Status: DISCONTINUED | OUTPATIENT
Start: 2024-07-16 | End: 2024-07-17

## 2024-07-16 RX ORDER — POLYETHYLENE GLYCOL 3350 17 G/17G
17 POWDER, FOR SOLUTION ORAL DAILY
Status: DISCONTINUED | OUTPATIENT
Start: 2024-07-17 | End: 2024-07-19 | Stop reason: HOSPADM

## 2024-07-16 RX ORDER — GLUCAGON 1 MG
1 KIT INJECTION
Status: DISCONTINUED | OUTPATIENT
Start: 2024-07-16 | End: 2024-07-16

## 2024-07-16 RX ORDER — DEXTROSE MONOHYDRATE AND SODIUM CHLORIDE 5; .45 G/100ML; G/100ML
INJECTION, SOLUTION INTRAVENOUS CONTINUOUS PRN
Status: DISCONTINUED | OUTPATIENT
Start: 2024-07-16 | End: 2024-07-17

## 2024-07-16 RX ORDER — HYDRALAZINE HYDROCHLORIDE 25 MG/1
25 TABLET, FILM COATED ORAL EVERY 8 HOURS
Status: DISCONTINUED | OUTPATIENT
Start: 2024-07-16 | End: 2024-07-19 | Stop reason: HOSPADM

## 2024-07-16 RX ADMIN — INSULIN ASPART 1 UNITS: 100 INJECTION, SOLUTION INTRAVENOUS; SUBCUTANEOUS at 09:07

## 2024-07-16 RX ADMIN — INSULIN ASPART 10 UNITS: 100 INJECTION, SOLUTION INTRAVENOUS; SUBCUTANEOUS at 11:07

## 2024-07-16 RX ADMIN — INSULIN HUMAN 0.1 UNITS/KG/HR: 1 INJECTION, SOLUTION INTRAVENOUS at 03:07

## 2024-07-16 RX ADMIN — AMLODIPINE BESYLATE 5 MG: 5 TABLET ORAL at 12:07

## 2024-07-16 RX ADMIN — NITROFURANTOIN MONOHYDRATE/MACROCRYSTALS 100 MG: 25; 75 CAPSULE ORAL at 11:07

## 2024-07-16 RX ADMIN — SODIUM CHLORIDE 1000 ML: 9 INJECTION, SOLUTION INTRAVENOUS at 01:07

## 2024-07-16 RX ADMIN — SODIUM CHLORIDE 1000 ML: 9 INJECTION, SOLUTION INTRAVENOUS at 07:07

## 2024-07-16 RX ADMIN — SODIUM CHLORIDE 1000 ML: 9 INJECTION, SOLUTION INTRAVENOUS at 03:07

## 2024-07-16 RX ADMIN — SODIUM CHLORIDE 1000 ML: 9 INJECTION, SOLUTION INTRAVENOUS at 09:07

## 2024-07-16 RX ADMIN — HYDRALAZINE HYDROCHLORIDE 25 MG: 25 TABLET, FILM COATED ORAL at 09:07

## 2024-07-16 RX ADMIN — HYDRALAZINE HYDROCHLORIDE 25 MG: 25 TABLET, FILM COATED ORAL at 04:07

## 2024-07-16 RX ADMIN — GENTAMICIN SULFATE 311.6 MG: 40 INJECTION, SOLUTION INTRAMUSCULAR; INTRAVENOUS at 03:07

## 2024-07-16 RX ADMIN — ONDANSETRON 4 MG: 2 INJECTION INTRAMUSCULAR; INTRAVENOUS at 10:07

## 2024-07-16 RX ADMIN — SODIUM CHLORIDE 1000 ML: 9 INJECTION, SOLUTION INTRAVENOUS at 10:07

## 2024-07-16 RX ADMIN — INSULIN GLARGINE 40 UNITS: 100 INJECTION, SOLUTION SUBCUTANEOUS at 06:07

## 2024-07-16 NOTE — Clinical Note
Adrian Sims accompanied their father to the emergency department on 7/16/2024. They may return to work on 07/17/2024.      If you have any questions or concerns, please don't hesitate to call.      Christophe HALL

## 2024-07-16 NOTE — ED NOTES
Patient identifiers verified and correct for Mary Sims.    LOC: The patient is awake, alert and oriented x 4. Pt is speaking appropriately, no slurred speech.  APPEARANCE: Patient resting comfortably and in no acute distress. Pt is clean and well groomed. No JVD visible. Pt reports pain level of 0.  SKIN: Skin is warm dry and intact, and color is consistent with ethnicity. No tenting observed and capillary refill <3 seconds. No clubbing noted to nail beds. No breakdown or brusing visible and mucus membranes moist and acyanotic.  MUSCULOSKELETAL: Full range of motion present in all extremities. Hand  equal and leg strength strong +2 bilaterally.  RESPIRATORY: Airway is open and patent. Respirations-unlabored, regular rate, equal bilaterally on inspiration and expiration. No accessory muscle use noted. Lungs clear to auscultation in all fields bilaterally anterior and posterior.   CARDIAC: Patient has regular heart rate and rhythm.  No peripheral edema noted, and patient has no c/o chest pain. Hypertensive  ABDOMEN: Soft and non-tender to palpation with no distention noted. Normoactive bowel sounds X4 quadrants. Pt has no complaints of abnormal bowel movements. Pt reports normal appetite.   NEUROLOGIC: Eyes open spontaneously and facial expression symmetrical. Pt behavior appropriate to situation, and pt follows commands.  Pt reports sensation present in all extremities when touched with a finger. No s/s of ischemic stroke. PERRLA  : No complaints of frequency, burning, urgency or blood in the urine.

## 2024-07-16 NOTE — ASSESSMENT & PLAN NOTE
Patient endorsed recent hx of urinary urgency and pain on urination which she treated with OTC medication- AZO, which reliefs her symptoms. Urinalysis today indicate UTI, uc collected pending result. Her past uc grows E-coli ESBL that was sensitive to gentamicin, meropenem, tobramycin, and nitrofurantoin.    Plan:  -Received a single dose of Macrobid and gentamicin  -Urine culture pending  -ID consult in place, briefly discussed over the phone with fellow, will start carbapenem AM if needed, will be seen tomorrow by ID.

## 2024-07-16 NOTE — SUBJECTIVE & OBJECTIVE
Interval History: Ms. Hernandez is 39 y.o F with PMHx of Type 1 DM,HTN, hyperlipidemia, and diabetic retinopathy who presents to the ED today with syncopal episode and elevated blood sugar.while at work. 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 She denies hitting her head, that she was supported by a coworker when she had the syncopal episode. She reported similar episode of dizziness few weeks ago and she feel in the bathroom, and another episode yesterday which was associated with nausea and vomiting but she did not have syncope. She reported a recent symptoms of UTI that she treated with OTC- AZO which improves her UTI symptoms..    Review of Systems   Cardiovascular:  Negative for chest pain, palpitations and leg swelling.   Gastrointestinal:  Positive for abdominal pain (Rt UQ pain on deep palpation) and nausea. Negative for abdominal distention.   Genitourinary:  Negative for difficulty urinating, dyspareunia, urgency and vaginal bleeding.   Skin: Negative.      Objective:     Vital Signs (Most Recent):  Temp: 98.2 °F (36.8 °C) (07/16/24 0945)  Pulse: 88 (07/16/24 1521)  Resp: 19 (07/16/24 1521)  BP: (!) 179/107 (07/16/24 1521)  SpO2: 97 % (07/16/24 1521) Vital Signs (24h Range):  Temp:  [98.2 °F (36.8 °C)] 98.2 °F (36.8 °C)  Pulse:  [86-93] 88  Resp:  [14-20] 19  SpO2:  [97 %-100 %] 97 %  BP: (150-225)/() 179/107     Weight: 77.1 kg (170 lb)  Body mass index is 30.11 kg/m².  No intake or output data in the 24 hours ending 07/16/24 1610      Physical Exam  Vitals reviewed.   Constitutional:       General: She is not in acute distress.  Cardiovascular:      Rate and Rhythm: Normal rate and regular rhythm.      Pulses: Normal pulses.      Heart sounds: No murmur heard.  Pulmonary:      Effort: No respiratory distress.      Breath sounds: No wheezing or rales.   Abdominal:      Palpations:  Abdomen is soft.      Tenderness: There is abdominal tenderness (On deep palpation).   Skin:     General: Skin is warm.   Neurological:      Mental Status: She is alert.             Significant Labs: All pertinent labs within the past 24 hours have been reviewed.    Significant Imaging: I have reviewed all pertinent imaging results/findings within the past 24 hours.

## 2024-07-16 NOTE — Clinical Note
Adrian Sims accompanied their child to the emergency department on 7/16/2024. They may return to work on 07/17/2024.      If you have any questions or concerns, please don't hesitate to call.      Christophe HALL

## 2024-07-16 NOTE — MEDICAL/APP STUDENT
[facility] Medicine Student   History and Physical  [facility] MED 2  07/16/2024  3:29 PM    SUBJECTIVE:     Chief Complaint:  syncope    History of Present Illness:  Mary Sims is a 39 y.o. female with a relevant medical history of diabetes, hypertension, hyperlipidemia, and proliferative diabetic retinopathy who presents with syncope and hyperglycemia.    Patient presented to ED today following syncopal episode at work. 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 She denies hitting head. BGL was checked at work, it was >500. Patient states she did not take her insulin this morning as she was in a hurry to get to work. She last checked her blood sugar last night, it was 309. Reports her glucose is normally in 300s when checked. She did take her usual dose of PM lantus (44u) last night, but reports that she often misses this dose. Reports that she does regularly take her lispro (15u) TID with meals. She follows with outpatient endocrine, Dr. Gupta at . She states that yesterday she had a similar episode of dizziness, nausea, vomiting, but she did not lose consciousness. She also reports UTI symptoms of itching and irritability beginning 7/13. She took Azo which helped.     Upon arrival, patient was hypertensive up to 225/110. She was hyponatremic (132), K+ was 5.0, bicarb 18, BUN 29, Cr 1.9, glucose 797. BHB normal at 0.5. On VBG, pH 7.3, pCO2 42, bicarb 21. UA hazy with 2+ protein, 4+ glucose, 2+ leuks, 63 WBC, moderate bacteria. She has a history of UTI, prior urine cultures positive for ESBL. CXR unremarkable.  She received 1L NS bolus x2 in ED. She also received amlodipine 5 mg for hypertension. For UTI, she received one dose of macrobid and one dose of gentamicin as prior urine cultures positive for multi-resistant ESBL.    Of note, patient reports several syncopal episodes over the last  few months. She has also experienced dyspnea on exertion, orthopnea (requiring 2 pillows to sleep), and associated cough. This has not been worked up. Her last echo in 2021 was normal, EF 60%. She also reports bilateral LE edema x years. She attributes this to work, denies recent worsening of swelling.      Review of Systems   Constitutional: Negative.    HENT: Negative.     Eyes:  Positive for blurred vision.   Respiratory:  Positive for shortness of breath (on exertion).    Cardiovascular:  Positive for orthopnea and leg swelling. Negative for chest pain and palpitations.   Gastrointestinal:  Positive for abdominal pain and nausea. Negative for blood in stool, constipation, diarrhea, melena and vomiting.   Genitourinary:  Positive for dysuria (itching). Negative for flank pain, frequency, hematuria and urgency.   Musculoskeletal: Negative.    Skin: Negative.    Neurological: Negative.        HISTORY:     Past Medical History:   Diagnosis Date    Cellulitis     Diabetes mellitus     Hypertension     Retinal detachment        Past Surgical History:   Procedure Laterality Date    VITRECTOMY BY PARS PLANA APPROACH Right 4/24/2019    Procedure: VITRECTOMY, PARS PLANA APPROACH;  Surgeon: JAMI Schulz MD;  Location: [facility] OR [address] [place] [address];  Service: Ophthalmology;  Laterality: Right;  25g PPV/membrane stripping/ILM peel/EL/AFx/GFX/avastin OD    VITRECTOMY BY PARS PLANA APPROACH Left 5/29/2019    Procedure: VITRECTOMY, PARS PLANA  APPROACH  25g PPV/Membrane Stripping/IML peel/EL/AFX/Avastin left eye   60 mins;  Surgeon: JAMI Schulz MD;  Location: [facility] OR [address] [place] [address];  Service: Ophthalmology;  Laterality: Left;       Family History   Problem Relation Name Age of Onset    Diabetes Mother         Social History     Socioeconomic History    Marital status:    Tobacco Use    Smoking status: Never    Smokeless tobacco: Never   Substance and Sexual Activity    Alcohol use: Yes     Comment: social    Drug use: No     Sexual activity: Yes     Partners: Male     Birth control/protection: I.U.D.     Social Determinants of Health     Financial Resource Strain: Low Risk  (4/10/2024)    Overall Financial Resource Strain (CARDIA)     Difficulty of Paying Living Expenses: Not very hard   Food Insecurity: No Food Insecurity (4/10/2024)    Hunger Vital Sign     Worried About Running Out of Food in the Last Year: Never true     Ran Out of Food in the Last Year: Never true   Transportation Needs: No Transportation Needs (4/10/2024)    PRAPARE - Transportation     Lack of Transportation (Medical): No     Lack of Transportation (Non-Medical): No   Physical Activity: Inactive (4/10/2024)    Exercise Vital Sign     Days of Exercise per Week: 0 days     Minutes of Exercise per Session: 0 min   Stress: Stress Concern Present (4/10/2024)    [employer] - Occupational Stress Questionnaire     Feeling of Stress : To some extent   Housing Stability: Unknown (4/10/2024)    Housing Stability Vital Sign     Unable to Pay for Housing in the Last Year: No     Unstable Housing in the Last Year: No         MEDICATIONS & ALLERGIES:     Current Facility-Administered Medications on File Prior to Encounter   Medication Dose Route Frequency Provider Last Rate Last Admin    cyclopentolate 1% ophthalmic solution 1 drop  1 drop Right Eye On Call Procedure Eduard Magdaleno MD   1 drop at 04/24/19 1055    homatropine 5 % ophthalmic solution 1 drop  1 drop Right Eye On Call Procedure Eduard Magdaleno MD   1 drop at 04/24/19 1055    moxifloxacin 0.5 % ophthalmic solution 1 drop  1 drop Right Eye On Call Procedure Eduard Magdaleno MD   1 drop at 04/24/19 1055    phenylephrine HCL 2.5% ophthalmic solution 1 drop  1 drop Right Eye On Call Procedure Eduard Magdaleno MD   1 drop at 04/24/19 1055    prednisoLONE acetate 1 % ophthalmic suspension 1 drop  1 drop Right Eye On Call Procedure Eduard Magdaleno MD   1 drop at 04/24/19 1056     "tetracaine HCl (PF) 0.5 % Drop 1 drop  1 drop Right Eye On Call Procedure Eduard Magdaleno MD   1 drop at 04/24/19 1040    tropicamide 1% ophthalmic solution 1 drop  1 drop Right Eye On Call Procedure Eduard Magdaleno MD   1 drop at 04/24/19 1056     Current Outpatient Medications on File Prior to Encounter   Medication Sig Dispense Refill    fluconazole (DIFLUCAN) 200 MG Tab Take 200 mg by mouth.      insulin glargine 100 units/mL (3mL) SubQ pen Inject 44 Units into the skin every evening.      insulin lispro 100 unit/mL pen Inject 15 Units into the skin 3 (three) times daily before meals.      insulin syringe-needle U-100 1 mL 30 gauge X 7/16" Syrg USE AS DIRECTED  0    ONETOUCH DELICA LANCETS 33 gauge Misc 1 lancet  by skin prick route 4 (four) times daily.  0    ONETOUCH ULTRA BLUE TEST STRIP Strp 1 strip by Misc.(Non-Drug; Combo Route) route 4 (four) times daily.  11    pen needle, diabetic (BD ULTRA-FINE ISAAC PEN NEEDLES) 32 gauge x 5/32" Ndle Use to inject insulin 5x/day 100 each 1    [DISCONTINUED] insulin glargine (LANTUS U-100 INSULIN) 100 unit/mL injection Inject 44 Units into the skin every evening.      dulaglutide (TRULICITY) 1.5 mg/0.5 mL pen injector Inject into the skin every 7 days.      fluticasone propionate (FLONASE) 50 mcg/actuation nasal spray 1 spray (50 mcg total) by Each Nostril route 2 (two) times daily as needed for Rhinitis. 15 g 0    ibuprofen (ADVIL,MOTRIN) 600 MG tablet Take 1 tablet (600 mg total) by mouth every 6 (six) hours as needed for Pain. 30 tablet 0    ondansetron (ZOFRAN-ODT) 4 MG TbDL Take 1 tablet (4 mg total) by mouth every 8 (eight) hours as needed (nausea or vomiting). 12 tablet 0    polyethylene glycol (GLYCOLAX) 17 gram/dose powder Take 17 g by mouth once daily. 116 g 0    promethazine (PHENERGAN) 25 MG suppository Place 1 suppository (25 mg total) rectally every 6 (six) hours as needed for Nausea (Use for vomiting if you are unable to take anything by mouth). 10 " "suppository 0    [DISCONTINUED] ADMELOG U-100 INSULIN LISPRO 100 unit/mL injection ADM 10 UNI SC TID  4    [DISCONTINUED] albuterol (PROVENTIL/VENTOLIN HFA) 90 mcg/actuation inhaler Inhale 1-2 puffs into the lungs every 6 (six) hours as needed for Wheezing or Shortness of Breath. Rescue 8 g 0    [DISCONTINUED] bacitracin 500 unit/gram Oint Apply topically 2 (two) times daily. 30 g 0    [DISCONTINUED] BD ULTRA-FINE SHORT PEN NEEDLE 31 gauge x 5/16" Ndle USE UTD  0    [DISCONTINUED] chlordiazepoxide (LIBRIUM) 25 MG Cap Take 1 capsule (25 mg total) by mouth every 8 (eight) hours as needed (Itching). 14 capsule 0    [DISCONTINUED] clotrimazole (LOTRIMIN) 1 % cream Apply to affected area 2 times daily 15 g 0    [DISCONTINUED] hydrocortisone 2.5 % cream Apply topically 2 (two) times daily. for 7 days 28 g 0    [DISCONTINUED] hydrOXYzine HCL (ATARAX) 25 MG tablet Take 1 tablet (25 mg total) by mouth 4 (four) times daily as needed for Itching. 15 tablet 0    [DISCONTINUED] hydrOXYzine pamoate (VISTARIL) 25 MG Cap Take 1 capsule (25 mg total) by mouth every 8 (eight) hours as needed (Itching). 14 capsule 0    [DISCONTINUED] hyoscyamine (LEVSIN) 0.125 mg Subl Place 1 tablet (0.125 mg total) under the tongue every 4 (four) hours as needed (Abdominal pain). 10 tablet 0    [DISCONTINUED] insulin aspart (NOVOLOG) 100 unit/mL InPn pen Inject 5 Units into the skin 3 (three) times daily with meals. (Patient taking differently: Inject 15 Units into the skin 3 (three) times daily with meals.) 1 Box 1    [DISCONTINUED] ketoconazole (NIZORAL) 2 % cream Apply topically 2 (two) times daily. for 7 days 60 g 0    [DISCONTINUED] nystatin (MYCOSTATIN) powder Apply topically 2 (two) times daily. 15 g 0    [DISCONTINUED] nystatin-triamcinolone (MYCOLOG II) cream Apply topically 2 (two) times daily. 60 g 0    [DISCONTINUED] ondansetron (ZOFRAN-ODT) 4 MG TbDL Take 1 tablet (4 mg total) by mouth every 6 (six) hours as needed (nausea or vomiting). " 20 tablet 0       Review of patient's allergies indicates:  No Known Allergies    OBJECTIVE:     Vital Signs Recent:  Temp: 98.2 °F (36.8 °C) (07/16/24 0945)  Pulse: 88 (07/16/24 1521)  Resp: 19 (07/16/24 1521)  BP: (!) 179/107 (07/16/24 1521)  SpO2: 97 % (07/16/24 1521)  Oxygen Documentation:                Device (Oxygen Therapy): room air         Vital Signs Range (Last 24H):  Temp:  [98.2 °F (36.8 °C)]   Pulse:  [86-93]   Resp:  [14-20]   BP: (150-225)/()   SpO2:  [97 %-100 %]        Weight:  Body mass index is 30.11 kg/m².  Wt Readings from Last 3 Encounters:   07/16/24 77.1 kg (170 lb)   04/09/24 77.1 kg (170 lb)   04/08/24 81.6 kg (180 lb)        Physical Exam  Constitutional:       General: She is not in acute distress.     Appearance: Normal appearance.   HENT:      Head: Normocephalic and atraumatic.   Cardiovascular:      Rate and Rhythm: Normal rate and regular rhythm.      Pulses: Normal pulses.      Heart sounds: Normal heart sounds. No murmur heard.  Pulmonary:      Effort: Pulmonary effort is normal. No respiratory distress.      Breath sounds: Normal breath sounds. No wheezing or rales.   Abdominal:      General: Abdomen is flat. Bowel sounds are normal.      Palpations: Abdomen is soft.      Tenderness: There is abdominal tenderness (RLQ tender to palpation).   Musculoskeletal:      Right lower leg: Edema (1+ pitting) present.      Left lower leg: Edema (1+ pitting) present.   Skin:     General: Skin is warm and dry.   Neurological:      General: No focal deficit present.      Mental Status: She is alert and oriented to person, place, and time.          Sodium (mmol/L)   Date Value   07/16/2024 132 (L)   04/10/2024 140   04/10/2024 139     Potassium (mmol/L)   Date Value   07/16/2024 5.0   04/10/2024 3.5   04/10/2024 4.1     Chloride (mmol/L)   Date Value   07/16/2024 106   04/10/2024 114 (H)   04/10/2024 110     CO2 (mmol/L)   Date Value   07/16/2024 18 (L)   04/10/2024 16 (L)   04/10/2024  17 (L)     BUN (mg/dL)   Date Value   07/16/2024 29 (H)   04/10/2024 12   04/10/2024 13     Creatinine (mg/dL)   Date Value   07/16/2024 1.9 (H)   04/10/2024 1.2   04/10/2024 1.3     Glucose (mg/dL)   Date Value   07/16/2024 797 (HH)   04/10/2024 113 (H)   04/10/2024 179 (H)     Calcium (mg/dL)   Date Value   07/16/2024 8.5 (L)   04/10/2024 7.9 (L)   04/10/2024 8.9     Magnesium (mg/dL)   Date Value   07/16/2024 2.0   04/10/2024 1.8   09/22/2022 2.0     Phosphorus (mg/dL)   Date Value   07/16/2024 2.9   04/18/2020 3.5   06/22/2016 3.7     Alkaline Phosphatase (U/L)   Date Value   07/16/2024 124   04/10/2024 90   06/22/2023 103     ALT (U/L)   Date Value   07/16/2024 14   04/10/2024 21   06/22/2023 12     AST (U/L)   Date Value   07/16/2024 12   04/10/2024 21   06/22/2023 15     Albumin (g/dL)   Date Value   07/16/2024 1.8 (L)   04/10/2024 2.4 (L)   06/22/2023 3.1 (L)     Total Protein (g/dL)   Date Value   07/16/2024 5.7 (L)   04/10/2024 6.6   06/22/2023 7.4     Total Bilirubin (mg/dL)   Date Value   07/16/2024 0.1   04/10/2024 0.2   06/22/2023 0.3       WBC (K/uL)   Date Value   07/16/2024 7.86   04/10/2024 6.05   06/22/2023 10.29     Hemoglobin (g/dL)   Date Value   07/16/2024 11.3 (L)   04/10/2024 12.1   06/22/2023 12.8     Platelets (K/uL)   Date Value   07/16/2024 252   04/10/2024 289   06/22/2023 295       Diagnostic Results:  CXR- no significant intrathoracic abnormality      ASSESSMENT -- PLAN:   Mary Sims is a 39 y.o. female with a relevant medical history of diabetes, hypertension, hyperlipidemia, and proliferative diabetic retinopathy who is being treated for hyperosmolar state.      Active Hospital Problems    Diagnosis  POA    UTI (urinary tract infection) [N39.0]  Yes     Patient endorse recent hx of urinary urgency and pain on urination which she treated with OTC medication- AZO, which reliefs her symptoms. Urinalysis today indicate UTI, uc collected pending result. Her past uc grows E-coli  that was sensitive to gentamicin, meropenem, tobramycin, and nitrofurantoin.      Hyperglycemia [R73.9]  Yes    Proliferative diabetic retinopathy of both eyes associated with type 1 diabetes mellitus [E10.3593]  Yes      Resolved Hospital Problems   No resolved problems to display.        Hyperosmolar hyperglycemic state (HHS)  ASSESSMENT:  39F with hx of T1DM on lantus, aspart TID, and trulicity presenting with glucose 797. Likely due to poor adherence to insulin regimen.  PLAN:  -IVF, NS  -insulin gtt  -K+ repletion (20-30 mEq per 1L IVF) given initial K+ is <5.3  -Once blood glucose less than or equal to 300, change fluids to 0.45%NS+D5 and switch to subQ insulin  -Mg, phos pending  -A1c pending    2. Syncope  ASSESSMENT: Vasovagal vs orthostatic in setting of dehydration  PLAN:  -TTE    2. Urinary Tract Infection  ASSESSMENT: History of recurrent UTI, previous cultures positive for highly resistant ESBL. One dose of macrobid and one dose of gentamicin given in ED per previous sensitivities.  PLAN:  -follow up urine cultures  -follow up blood cultures    3. Acute Kidney Injury  ASSESSMENT: Likely prerenal in setting of dehydration from hyperosmolar hyperglycemic state  PLAN:  -IVF  -monitor daily CMP    4. Hypertension  ASSESSMENT: Chronic hypertension on lisinopril at home, likely worse in setting of current illness  PLAN:  -amlodipine 5 mg  -hydralazine 25 mg TID    5. Dyspnea on exertion  ASSESSMENT:   PLAN:  -TTE    6. T1DM  ASSESSMENT: Patient diagnosed with diabetes at age 16. Currently managed with home insulin regimen of lantus 44u qPM, aspart 15u TIDAC, and trulicity. Likely uncontrolled as patient reports difficulty adhering to regimen, with frequently missed evening lantus doses and BGL usually in 300s on home checks.  PLAN:  -A1c  -Diabetes education  -Aung Owen, MS4

## 2024-07-16 NOTE — HPI
Ms. Hernandez is 39 y.o F with PMHx of DM (unclear if is type 1 or 2, no anti-INDU),HTN, hyperlipidemia, and diabetic retinopathypresents to the ED today with syncopal episode and elevated blood sugar.while at work. 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 She denies hitting her head, that she was supported by a coworker when she had the syncopal episode. She reported similar episode of dizziness few weeks ago and she feel in the bathroom, and another episode yesterday which was associated with nausea and vomiting but she did not have syncope. She reported a recent symptoms of UTI that she treated with OTC- AZO which improves her UTI symptoms.    According to the patient she did not take her insulin this morning because she was rushing to work, but her sugar last night was in the 300s, she endorses poor blood sugar control in recent weeks. She is on 44 units of Lantus at night and 15 units of lispro with meals. Patient blood pressure at the ED on arrival was 225/110, hyponatremic- 132, K- 5, bicarb- 18, BUN 29, Cr 1.9, glu- 797. BHB normal at 0.5. On VBG, pH 7.3, pCO2 42, bicarb 21. UA hazy with 2+ protein, 4+ glucose, 2+ leuks, 63 WBC, moderate bacteria, Chest Xray is unremarkable. She received 1L NS bolus x2 in ED. She also received amlodipine 5 mg for hypertension, a single dose of macrobid and gentamicin for UTI given at the ED.

## 2024-07-16 NOTE — ED TRIAGE NOTES
Mary Sims, a 39 y.o. female presents to the ED w/ complaint of near syncopal episode    Triage note:  Chief Complaint   Patient presents with    near syncope      While lifting patient, pt also has hx of diabetes, blood glucose >500     Review of patient's allergies indicates:  No Known Allergies  Past Medical History:   Diagnosis Date    Cellulitis     Diabetes mellitus     Hypertension     Retinal detachment

## 2024-07-16 NOTE — Clinical Note
Adrian Sims accompanied their father to the emergency department on 7/16/2024. They may return to work on 07/17/2024.      If you have any questions or concerns, please don't hesitate to call.      Christophe HALL RN

## 2024-07-16 NOTE — ED PROVIDER NOTES
Encounter Date: 7/16/2024       History     Chief Complaint   Patient presents with    near syncope      While lifting patient, pt also has hx of diabetes, blood glucose >500     The history is provided by the patient.     Patient is  a 40 yo F with a PMH of DM, HTN, who presents s/p syncopal episode.  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  Another help her was able to catch her to prevent her from falling down and assisted her to the ground.  She states that she was unconscious for approximately 5 minutes, after which she started to become aware and noted she was on the ground.  Blood sugar was subsequently taken at the nursing home and shown to be greater than 500 which is what prompted her to come to the emergency department.  Here she states that she is feeling better but is still tired/slightly nauseous.  She notes that she did not take any of her insulin this morning because she was in a rush to get to work.  Denies all other symptoms at this time, prior to the incident she was in her baseline state of health.    Review of patient's allergies indicates:  No Known Allergies  Past Medical History:   Diagnosis Date    Cellulitis     Diabetes mellitus     Hypertension     Retinal detachment      Past Surgical History:   Procedure Laterality Date    VITRECTOMY BY PARS PLANA APPROACH Right 4/24/2019    Procedure: VITRECTOMY, PARS PLANA APPROACH;  Surgeon: JAMI Schulz MD;  Location: [facility] OR [address] [place] [address];  Service: Ophthalmology;  Laterality: Right;  25g PPV/membrane stripping/ILM peel/EL/AFx/GFX/avastin OD    VITRECTOMY BY PARS PLANA APPROACH Left 5/29/2019    Procedure: VITRECTOMY, PARS PLANA  APPROACH  25g PPV/Membrane Stripping/IML peel/EL/AFX/Avastin left eye   60 mins;  Surgeon: JAMI Schulz MD;  Location: [facility] OR [facility]  [facility];  Service: Ophthalmology;  Laterality: Left;     Family History   Problem Relation Name Age of Onset    Diabetes Mother       Social History     Tobacco Use    Smoking status: Never    Smokeless tobacco: Never   Substance Use Topics    Alcohol use: Yes     Comment: social    Drug use: No     Physical Exam     Initial Vitals [07/16/24 0908]   BP Pulse Resp Temp SpO2   (!) 196/105 91 16 98.2 °F (36.8 °C) 100 %      MAP       --         Physical Exam    Nursing note and vitals reviewed.  Constitutional: She appears well-developed. No distress.   HENT:   Head: Normocephalic and atraumatic.   Mouth/Throat: Mucous membranes are normal.   Patient's oropharynx noted to be significantly dry   Eyes: EOM are normal. Pupils are equal, round, and reactive to light.   Neck:   Normal range of motion.  Cardiovascular:  Normal rate and regular rhythm.           A systolic heart murmur is auscultated most prominently in the 2nd intercostal space left lateral to the sternum   Pulmonary/Chest: Breath sounds normal. No respiratory distress. She has no wheezes. She has no rhonchi. She has no rales.   Abdominal: Abdomen is soft. She exhibits no distension. There is no abdominal tenderness. There is no rebound.   Musculoskeletal:      Cervical back: Normal range of motion.      Comments: 2+ pitting edema noted in the bilateral lower extremities     Neurological: She is alert and oriented to person, place, and time.   Skin: Skin is warm.   Psychiatric: She has a normal mood and affect. Her behavior is normal. Thought content normal.         ED Course   Procedures  Labs Reviewed   CBC W/ AUTO DIFFERENTIAL - Abnormal; Notable for the following components:       Result Value    Hemoglobin 11.3 (*)     Hematocrit 33.4 (*)     MCV 69 (*)     MCH 23.3 (*)     RDW 14.6 (*)     Gran % 77.3 (*)     Lymph % 16.0 (*)     All other components within normal limits   COMPREHENSIVE METABOLIC PANEL - Abnormal; Notable for the following components:     Sodium 132 (*)     CO2 18 (*)     Glucose 797 (*)     BUN 29 (*)     Creatinine 1.9 (*)     Calcium 8.5 (*)     Total Protein 5.7 (*)     Albumin 1.8 (*)     eGFR 34.2 (*)     All other components within normal limits   URINALYSIS, REFLEX TO URINE CULTURE - Abnormal; Notable for the following components:    Appearance, UA Hazy (*)     Protein, UA 2+ (*)     Glucose, UA 4+ (*)     Occult Blood UA Trace (*)     Leukocytes, UA 2+ (*)     All other components within normal limits    Narrative:     Specimen Source->Urine   URINALYSIS MICROSCOPIC - Abnormal; Notable for the following components:    RBC, UA 5 (*)     WBC, UA 63 (*)     Bacteria Moderate (*)     All other components within normal limits    Narrative:     Specimen Source->Urine   POCT GLUCOSE - Abnormal; Notable for the following components:    POCT Glucose >500 (*)     All other components within normal limits   ISTAT PROCEDURE - Abnormal; Notable for the following components:    POC PH 7.304 (*)     POC PO2 35 (*)     POC HCO3 21.0 (*)     POC BE -5 (*)     POC TCO2 22 (*)     All other components within normal limits   POCT GLUCOSE - Abnormal; Notable for the following components:    POCT Glucose >500 (*)     All other components within normal limits   CULTURE, URINE   BETA - HYDROXYBUTYRATE, SERUM   B-TYPE NATRIURETIC PEPTIDE   TROPONIN I   HEMOGLOBIN A1C   PHOSPHORUS   MAGNESIUM   PHOSPHORUS    Narrative:     ADD ON MAG AND PHOS PER YENNI GILBERT RN PER GALILEA ORDOÑEZ MD   ORDER# [id number] AND [id number] @  07/16/2024  13:13    MAGNESIUM    Narrative:     ADD ON MAG AND PHOS PER YENNI GILBERT RN PER GALILEA ORDOÑEZ MD   ORDER# [id number] AND [id number] @  07/16/2024  13:13    BASIC METABOLIC PANEL   BASIC METABOLIC PANEL   PHOSPHORUS   POCT GLUCOSE MONITORING CONTINUOUS     EKG Readings: (Independently Interpreted)   Independently interpreted by MD:  Rate 90, NSR, no stemi, no ectopy, no hypertrophy        ECG Results              EKG 12-lead  (Final result)        Collection Time Result Time QRS Duration OHS QTC Calculation    07/16/24 09:15:02 07/16/24 11:51:51 70 442                     Final result by Interface, [facility] (07/16/24 11:51:57)                   Narrative:    Test Reason : R55,    Vent. Rate : 090 BPM     Atrial Rate : 090 BPM     P-R Int : 154 ms          QRS Dur : 070 ms      QT Int : 362 ms       P-R-T Axes : 063 083 027 degrees     QTc Int : 442 ms    Normal sinus rhythm  Normal ECG  When compared with ECG of 04-SEP-2023 19:28,  No significant change was found  Confirmed by Emile CLEMONS, Tanvi ([id number]) on 7/16/2024 11:51:48 AM    Referred By: JORGEERR   SELF           Confirmed By:Tanvi Baptiste MD                                  Imaging Results              X-Ray Chest AP Portable (Final result)  Result time 07/16/24 10:24:30      Final result by Red Anna MD (07/16/24 10:24:30)                   Impression:      No significant intrathoracic abnormality.  No significant detrimental interval change in the appearance of the chest since the examinations referenced above is appreciated.      Electronically signed by: Red Anna MD  Date:    07/16/2024  Time:    10:24               Narrative:    EXAMINATION:  XR CHEST AP PORTABLE    CLINICAL HISTORY:  hyperglycemia;    TECHNIQUE:  One view    COMPARISON:  Comparison is made to 09/04/2023 and 05/08/2023.  Clinical information of syncopal episode.    FINDINGS:  Heart size is normal, as is the appearance of the pulmonary vascularity.  Lung zones are clear, and are free of significant airspace consolidation or volume loss.  No pleural fluid.  No hilar or mediastinal mass lesion.  No pneumothorax.                                    X-Rays:   Independently Interpreted Readings:   Other Readings:  Patient's chest x-ray was independently interpreted by me:  No obvious intrathoracic abnormalities are noted    Medications   0.9%  NaCl infusion (has no administration in time range)    dextrose 5 % and 0.45 % NaCl infusion (has no administration in time range)   insulin regular bolus from bag/infusion 7.71 Units 7.71 mL (has no administration in time range)   dextrose 10% bolus 125 mL 125 mL (has no administration in time range)   dextrose 10% bolus 250 mL 250 mL (has no administration in time range)   sodium chloride 0.9% bolus 1,000 mL 1,000 mL (0 mLs Intravenous Stopped 7/16/24 1208)   insulin aspart U-100 pen 10 Units (10 Units Subcutaneous Given 7/16/24 1100)   ondansetron injection 4 mg (4 mg Intravenous Given 7/16/24 1059)   nitrofurantoin (macrocrystal-monohydrate) 100 MG capsule 100 mg (100 mg Oral Given 7/16/24 1118)   amLODIPine tablet 5 mg (5 mg Oral Given 7/16/24 1209)     Medical Decision Making  Patient is a 39-year-old female who presents s/p syncopal episode just prior to arriving in the emergency department as well as hyperglycemia.  Differential diagnosis includes but is not limited to DKA, HHS, dehydration, electrolyte abnormality, acidosis, sepsis, ACS, CHF.  Upon my initial evaluation of the patient, she would overall reassuring vital signs however she appeared very dry on exam.  Given her history of diabetes/lack of consuming her insulin this morning, we will perform a DKA workup.  Additionally, given the bilateral lower extremity swelling as well as heart murmur, we will perform a cardiac workup.    Patient's urine significant for urinary tract infection for which we gave a dose of Macrobid.  Patient was not shown to be acidotic and her beta hydroxybutyrate were negative, making DKA less likely.  Her blood sugar was shown to be significantly elevated with a high 700s.  For this we gave her a dose of her home subcu insulin.  She also got an initial L of fluids.  After this, her blood sugar was still shown to be significantly elevated to the 500s.  For this reason, with the decision was made that she would need to go on an insulin drip.  Along with this, we gave her an  additional 2 L of fluids.  This patient's presentation is consistent with HHS.  Decision made to admit to Hospital Medicine for further management of her HHS.    Amount and/or Complexity of Data Reviewed  External Data Reviewed: notes.  Labs: ordered.  Radiology: ordered.    Risk  OTC drugs.  Prescription drug management.  Decision regarding hospitalization.                                Clinical Impression:  Final diagnoses:  [R55] Near syncope  [R73.9] Hyperglycemia          ED Disposition Condition    Admit                 Edward Infante MD  Resident  07/16/24 9642

## 2024-07-16 NOTE — ASSESSMENT & PLAN NOTE
Ms. Hernandez is 39 y.o F with PMHx of Type 1 DM, who presents to the ED today with syncopal episode while at work. 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 Glucose was in the 700s on admission. Patient endorses poorly controlled blood sugar and diet, she said she forgot to take her insulin this morning while rushing to get to work on time.    Plan:  -Insulin  -IV fluids  -Check BMP

## 2024-07-16 NOTE — ASSESSMENT & PLAN NOTE
Poorly controlled type 1 DM. On insulin 44 unit at night, and lispro 15 units before meals.    Plan:  -Insulin  -Sliding scle  -Hb1C

## 2024-07-17 LAB
ALBUMIN SERPL BCP-MCNC: 1.5 G/DL (ref 3.5–5.2)
ALBUMIN SERPL BCP-MCNC: 1.5 G/DL (ref 3.5–5.2)
ALBUMIN SERPL BCP-MCNC: 1.6 G/DL (ref 3.5–5.2)
ALBUMIN SERPL BCP-MCNC: 1.8 G/DL (ref 3.5–5.2)
ALP SERPL-CCNC: 103 U/L (ref 55–135)
ALP SERPL-CCNC: 111 U/L (ref 55–135)
ALP SERPL-CCNC: 94 U/L (ref 55–135)
ALP SERPL-CCNC: 96 U/L (ref 55–135)
ALT SERPL W/O P-5'-P-CCNC: 11 U/L (ref 10–44)
ALT SERPL W/O P-5'-P-CCNC: 12 U/L (ref 10–44)
ANION GAP SERPL CALC-SCNC: 6 MMOL/L (ref 8–16)
ANION GAP SERPL CALC-SCNC: 7 MMOL/L (ref 8–16)
ANION GAP SERPL CALC-SCNC: 7 MMOL/L (ref 8–16)
ANION GAP SERPL CALC-SCNC: 9 MMOL/L (ref 8–16)
ASCENDING AORTA: 2.94 CM
AST SERPL-CCNC: 13 U/L (ref 10–40)
AST SERPL-CCNC: 14 U/L (ref 10–40)
AST SERPL-CCNC: 16 U/L (ref 10–40)
AST SERPL-CCNC: 17 U/L (ref 10–40)
AV INDEX (PROSTH): 0.62
AV MEAN GRADIENT: 6 MMHG
AV PEAK GRADIENT: 12 MMHG
AV VALVE AREA BY VELOCITY RATIO: 1.57 CM²
AV VALVE AREA: 1.92 CM²
AV VELOCITY RATIO: 0.51
BASOPHILS # BLD AUTO: 0.06 K/UL (ref 0–0.2)
BASOPHILS NFR BLD: 0.7 % (ref 0–1.9)
BILIRUB SERPL-MCNC: 0.1 MG/DL (ref 0.1–1)
BSA FOR ECHO PROCEDURE: 1.96 M2
BUN SERPL-MCNC: 17 MG/DL (ref 6–20)
BUN SERPL-MCNC: 18 MG/DL (ref 6–20)
BUN SERPL-MCNC: 19 MG/DL (ref 6–20)
BUN SERPL-MCNC: 21 MG/DL (ref 6–20)
CALCIUM SERPL-MCNC: 7.8 MG/DL (ref 8.7–10.5)
CALCIUM SERPL-MCNC: 7.8 MG/DL (ref 8.7–10.5)
CALCIUM SERPL-MCNC: 8.2 MG/DL (ref 8.7–10.5)
CALCIUM SERPL-MCNC: 8.4 MG/DL (ref 8.7–10.5)
CHLORIDE SERPL-SCNC: 105 MMOL/L (ref 95–110)
CHLORIDE SERPL-SCNC: 106 MMOL/L (ref 95–110)
CHLORIDE SERPL-SCNC: 109 MMOL/L (ref 95–110)
CHLORIDE SERPL-SCNC: 109 MMOL/L (ref 95–110)
CO2 SERPL-SCNC: 15 MMOL/L (ref 23–29)
CO2 SERPL-SCNC: 18 MMOL/L (ref 23–29)
CO2 SERPL-SCNC: 19 MMOL/L (ref 23–29)
CO2 SERPL-SCNC: 20 MMOL/L (ref 23–29)
CREAT SERPL-MCNC: 1.5 MG/DL (ref 0.5–1.4)
CREAT SERPL-MCNC: 1.7 MG/DL (ref 0.5–1.4)
CV ECHO LV RWT: 0.46 CM
DIFFERENTIAL METHOD BLD: ABNORMAL
DOP CALC AO PEAK VEL: 1.72 M/S
DOP CALC AO VTI: 34.07 CM
DOP CALC LVOT AREA: 3.1 CM2
DOP CALC LVOT DIAMETER: 1.98 CM
DOP CALC LVOT PEAK VEL: 0.88 M/S
DOP CALC LVOT STROKE VOLUME: 65.3 CM3
DOP CALCLVOT PEAK VEL VTI: 21.22 CM
E WAVE DECELERATION TIME: 194.58 MSEC
E/A RATIO: 1.13
E/E' RATIO: 8.95 M/S
ECHO LV POSTERIOR WALL: 0.93 CM (ref 0.6–1.1)
EOSINOPHIL # BLD AUTO: 0.1 K/UL (ref 0–0.5)
EOSINOPHIL NFR BLD: 1.7 % (ref 0–8)
ERYTHROCYTE [DISTWIDTH] IN BLOOD BY AUTOMATED COUNT: 14.6 % (ref 11.5–14.5)
EST. GFR  (NO RACE VARIABLE): 39.1 ML/MIN/1.73 M^2
EST. GFR  (NO RACE VARIABLE): 45.5 ML/MIN/1.73 M^2
ESTIMATED AVG GLUCOSE: ABNORMAL MG/DL (ref 68–131)
FRACTIONAL SHORTENING: 34 % (ref 28–44)
GLUCOSE SERPL-MCNC: 127 MG/DL (ref 70–110)
GLUCOSE SERPL-MCNC: 184 MG/DL (ref 70–110)
GLUCOSE SERPL-MCNC: 255 MG/DL (ref 70–110)
GLUCOSE SERPL-MCNC: 353 MG/DL (ref 70–110)
HBA1C MFR BLD: >14 % (ref 4–5.6)
HCT VFR BLD AUTO: 28.9 % (ref 37–48.5)
HGB BLD-MCNC: 10 G/DL (ref 12–16)
IMM GRANULOCYTES # BLD AUTO: 0.02 K/UL (ref 0–0.04)
IMM GRANULOCYTES NFR BLD AUTO: 0.2 % (ref 0–0.5)
INTERVENTRICULAR SEPTUM: 1.01 CM (ref 0.6–1.1)
LA MAJOR: 5.56 CM
LA MINOR: 5.6 CM
LA WIDTH: 3.45 CM
LEFT ATRIUM SIZE: 3.27 CM
LEFT ATRIUM VOLUME INDEX MOD: 20.5 ML/M2
LEFT ATRIUM VOLUME INDEX: 28.3 ML/M2
LEFT ATRIUM VOLUME MOD: 38.7 CM3
LEFT ATRIUM VOLUME: 53.51 CM3
LEFT INTERNAL DIMENSION IN SYSTOLE: 2.69 CM (ref 2.1–4)
LEFT VENTRICLE DIASTOLIC VOLUME INDEX: 38.43 ML/M2
LEFT VENTRICLE DIASTOLIC VOLUME: 72.64 ML
LEFT VENTRICLE MASS INDEX: 66 G/M2
LEFT VENTRICLE SYSTOLIC VOLUME INDEX: 14.1 ML/M2
LEFT VENTRICLE SYSTOLIC VOLUME: 26.67 ML
LEFT VENTRICULAR INTERNAL DIMENSION IN DIASTOLE: 4.06 CM (ref 3.5–6)
LEFT VENTRICULAR MASS: 124.63 G
LV LATERAL E/E' RATIO: 7.83 M/S
LV SEPTAL E/E' RATIO: 10.44 M/S
LYMPHOCYTES # BLD AUTO: 2.2 K/UL (ref 1–4.8)
LYMPHOCYTES NFR BLD: 27.2 % (ref 18–48)
MCH RBC QN AUTO: 23.7 PG (ref 27–31)
MCHC RBC AUTO-ENTMCNC: 34.6 G/DL (ref 32–36)
MCV RBC AUTO: 69 FL (ref 82–98)
MONOCYTES # BLD AUTO: 0.5 K/UL (ref 0.3–1)
MONOCYTES NFR BLD: 6 % (ref 4–15)
MV PEAK A VEL: 0.83 M/S
MV PEAK E VEL: 0.94 M/S
NEUTROPHILS # BLD AUTO: 5.2 K/UL (ref 1.8–7.7)
NEUTROPHILS NFR BLD: 64.2 % (ref 38–73)
NRBC BLD-RTO: 0 /100 WBC
PLATELET # BLD AUTO: 256 K/UL (ref 150–450)
PMV BLD AUTO: 11.4 FL (ref 9.2–12.9)
POCT GLUCOSE: 150 MG/DL (ref 70–110)
POCT GLUCOSE: 282 MG/DL (ref 70–110)
POCT GLUCOSE: 386 MG/DL (ref 70–110)
POCT GLUCOSE: 443 MG/DL (ref 70–110)
POTASSIUM SERPL-SCNC: 3.6 MMOL/L (ref 3.5–5.1)
POTASSIUM SERPL-SCNC: 4 MMOL/L (ref 3.5–5.1)
POTASSIUM SERPL-SCNC: 4.3 MMOL/L (ref 3.5–5.1)
POTASSIUM SERPL-SCNC: 4.6 MMOL/L (ref 3.5–5.1)
PROT SERPL-MCNC: 4.8 G/DL (ref 6–8.4)
PROT SERPL-MCNC: 5 G/DL (ref 6–8.4)
PROT SERPL-MCNC: 5.2 G/DL (ref 6–8.4)
PROT SERPL-MCNC: 5.6 G/DL (ref 6–8.4)
RA MAJOR: 4.39 CM
RA PRESSURE ESTIMATED: 3 MMHG
RA WIDTH: 3.25 CM
RBC # BLD AUTO: 4.22 M/UL (ref 4–5.4)
RIGHT VENTRICLE DIASTOLIC BASEL DIMENSION: 3.1 CM
SINUS: 3.03 CM
SODIUM SERPL-SCNC: 129 MMOL/L (ref 136–145)
SODIUM SERPL-SCNC: 132 MMOL/L (ref 136–145)
SODIUM SERPL-SCNC: 133 MMOL/L (ref 136–145)
SODIUM SERPL-SCNC: 136 MMOL/L (ref 136–145)
STJ: 2.6 CM
TDI LATERAL: 0.12 M/S
TDI SEPTAL: 0.09 M/S
TDI: 0.11 M/S
TRICUSPID ANNULAR PLANE SYSTOLIC EXCURSION: 2.02 CM
WBC # BLD AUTO: 8.16 K/UL (ref 3.9–12.7)
Z-SCORE OF LEFT VENTRICULAR DIMENSION IN END DIASTOLE: -2.65
Z-SCORE OF LEFT VENTRICULAR DIMENSION IN END SYSTOLE: -1.52

## 2024-07-17 PROCEDURE — 80053 COMPREHEN METABOLIC PANEL: CPT | Mod: 91

## 2024-07-17 PROCEDURE — 21400001 HC TELEMETRY ROOM

## 2024-07-17 PROCEDURE — 63600175 PHARM REV CODE 636 W HCPCS

## 2024-07-17 PROCEDURE — 36415 COLL VENOUS BLD VENIPUNCTURE: CPT | Mod: XB

## 2024-07-17 PROCEDURE — 83036 HEMOGLOBIN GLYCOSYLATED A1C: CPT

## 2024-07-17 PROCEDURE — 63600175 PHARM REV CODE 636 W HCPCS: Performed by: HOSPITALIST

## 2024-07-17 PROCEDURE — 80053 COMPREHEN METABOLIC PANEL: CPT

## 2024-07-17 PROCEDURE — 99223 1ST HOSP IP/OBS HIGH 75: CPT | Mod: ,,, | Performed by: PHYSICIAN ASSISTANT

## 2024-07-17 PROCEDURE — 25000003 PHARM REV CODE 250

## 2024-07-17 PROCEDURE — 85025 COMPLETE CBC W/AUTO DIFF WBC: CPT

## 2024-07-17 RX ORDER — INSULIN GLARGINE 100 [IU]/ML
20 INJECTION, SOLUTION SUBCUTANEOUS ONCE
Status: COMPLETED | OUTPATIENT
Start: 2024-07-17 | End: 2024-07-17

## 2024-07-17 RX ORDER — IBUPROFEN 200 MG
24 TABLET ORAL
Status: DISCONTINUED | OUTPATIENT
Start: 2024-07-17 | End: 2024-07-19 | Stop reason: HOSPADM

## 2024-07-17 RX ORDER — POTASSIUM CHLORIDE 20 MEQ/1
40 TABLET, EXTENDED RELEASE ORAL 2 TIMES DAILY
Status: COMPLETED | OUTPATIENT
Start: 2024-07-17 | End: 2024-07-17

## 2024-07-17 RX ORDER — INSULIN ASPART 100 [IU]/ML
10 INJECTION, SOLUTION INTRAVENOUS; SUBCUTANEOUS
Status: DISCONTINUED | OUTPATIENT
Start: 2024-07-18 | End: 2024-07-19

## 2024-07-17 RX ORDER — IBUPROFEN 200 MG
16 TABLET ORAL
Status: DISCONTINUED | OUTPATIENT
Start: 2024-07-17 | End: 2024-07-19 | Stop reason: HOSPADM

## 2024-07-17 RX ORDER — INSULIN ASPART 100 [IU]/ML
0-10 INJECTION, SOLUTION INTRAVENOUS; SUBCUTANEOUS
Status: DISCONTINUED | OUTPATIENT
Start: 2024-07-17 | End: 2024-07-19 | Stop reason: HOSPADM

## 2024-07-17 RX ORDER — GLUCAGON 1 MG
1 KIT INJECTION
Status: DISCONTINUED | OUTPATIENT
Start: 2024-07-17 | End: 2024-07-19 | Stop reason: HOSPADM

## 2024-07-17 RX ADMIN — HYDRALAZINE HYDROCHLORIDE 25 MG: 25 TABLET, FILM COATED ORAL at 06:07

## 2024-07-17 RX ADMIN — INSULIN ASPART 10 UNITS: 100 INJECTION, SOLUTION INTRAVENOUS; SUBCUTANEOUS at 08:07

## 2024-07-17 RX ADMIN — ATORVASTATIN CALCIUM 40 MG: 40 TABLET, FILM COATED ORAL at 08:07

## 2024-07-17 RX ADMIN — HYDRALAZINE HYDROCHLORIDE 25 MG: 25 TABLET, FILM COATED ORAL at 09:07

## 2024-07-17 RX ADMIN — INSULIN ASPART 10 UNITS: 100 INJECTION, SOLUTION INTRAVENOUS; SUBCUTANEOUS at 11:07

## 2024-07-17 RX ADMIN — POTASSIUM CHLORIDE 40 MEQ: 1500 TABLET, EXTENDED RELEASE ORAL at 09:07

## 2024-07-17 RX ADMIN — INSULIN ASPART 3 UNITS: 100 INJECTION, SOLUTION INTRAVENOUS; SUBCUTANEOUS at 11:07

## 2024-07-17 RX ADMIN — ACETAMINOPHEN 650 MG: 325 TABLET ORAL at 08:07

## 2024-07-17 RX ADMIN — POTASSIUM CHLORIDE 40 MEQ: 1500 TABLET, EXTENDED RELEASE ORAL at 08:07

## 2024-07-17 RX ADMIN — INSULIN GLARGINE 20 UNITS: 100 INJECTION, SOLUTION SUBCUTANEOUS at 11:07

## 2024-07-17 RX ADMIN — INSULIN ASPART 3 UNITS: 100 INJECTION, SOLUTION INTRAVENOUS; SUBCUTANEOUS at 09:07

## 2024-07-17 RX ADMIN — INSULIN ASPART 5 UNITS: 100 INJECTION, SOLUTION INTRAVENOUS; SUBCUTANEOUS at 03:07

## 2024-07-17 RX ADMIN — HYDRALAZINE HYDROCHLORIDE 25 MG: 25 TABLET, FILM COATED ORAL at 02:07

## 2024-07-17 RX ADMIN — SODIUM CHLORIDE, POTASSIUM CHLORIDE, SODIUM LACTATE AND CALCIUM CHLORIDE 1000 ML: 600; 310; 30; 20 INJECTION, SOLUTION INTRAVENOUS at 11:07

## 2024-07-17 NOTE — HOSPITAL COURSE
Ms. Hernandez is 39 y.o F with PMHx DM (unclear if is type 1 or 2, no anti-INDU collected 07/17 pending),HTN, hyperlipidemia, and diabetic retinopathy who presents to the ED 07/16 with syncopal episode and elevated blood sugar.while at work. 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 She denies hitting her head, that she was supported by a coworker when she had the syncopal episode. She reported similar episode of dizziness few weeks ago and she feel in the bathroom, and another episode yesterday which was associated with nausea and vomiting but she did not have syncope. She reported a recent symptoms of UTI that she treated with OTC- AZO which improves her UTI symptoms. Patient endorsed poor adherence to diabetic medications due to social issues.    During this admission, she was treated Ms. Hernandez for severe hyperglycemia and hyperosmolarity without acidosis and uncomplicated UTI. Her blood sugar has improved remarkably, her UC shows to growth till date.  ECHO done on on 07/17 shows no abnormalities with EF of 60-65%. We educate her about the importance of medication compliance.    Ms. Hernandez wang be discharged to on Latus 40  units s/c every morning, lispro 15 units s/c TiD before meals, pantoprazole, semaglutide, rosuvastatin.We discontinued fluconazole and empaglifozin. Follow up with PCP, your endocrinology, and diabetic clinic.

## 2024-07-17 NOTE — PLAN OF CARE
[facility] - Med Surg  Initial Discharge Assessment       Primary Care Provider: Ariadne, Primary Doctor    Admission Diagnosis: Syncope and collapse [R55]  Hyperglycemia [R73.9]  Chest pain [R07.9]  Near syncope [R55]    Admission Date: 7/16/2024  Expected Discharge Date:     Transition of Care Barriers: (P) None    Payor: MEDICAID / Plan: AETNA [facility] / Product Type: Managed Medicaid /     Extended Emergency Contact Information  Primary Emergency Contact: Pricilla Sims  Address: [id number] [facility]  Home Phone: [phone]  Relation: Mother  Secondary Emergency Contact: Jonh Farias  Mobile Phone: [phone]  Relation: Significant other  Preferred language: English   needed? No    Discharge Plan A: (P) Home with family  Discharge Plan B: (P) Home      [id number] - [place], [place] - [address] AT [facility] & [address]  [address]  [facility] [address]  Phone: [phone] Fax: [phone]    [id number]  [place] - [address]  AT [facility] [address] [facility]  [address] [place] [address]  Phone: [phone] Fax: [phone]    [id number] - [place] - [address] [facility] AT [facility] & [address]  [place] [address]  Phone: [phone] Fax: [phone]    [facility] #[id number] - [place] - [address] AT [facility] & [address]  [address] [place] [address]  Phone: [phone] Fax: [phone]      CM met with patient to discuss discharge planning. Patient lives home with Jonh Farias (significant other) [phone]  and dependent child in a single story home with no steps to enter. Prior to hospital admission, patient was independent with ADLs and did not require medical equipment. Discharge plan is home with family. Discharge Plan A  and Plan B have been determined by review of patient's clinical status, future medical and therapeutic needs, and coverage/benefits for post-acute care in coordination with multidisciplinary team members.  Initial Assessment (most recent)       Adult Discharge Assessment - 07/17/24 1527          Discharge Assessment    Assessment Type Discharge Planning Assessment (P)      Confirmed/corrected address, phone number and insurance Yes (P)      Confirmed Demographics Correct on Facesheet (P)      Source of Information patient (P)      Communicated LEXIE with patient/caregiver Yes (P)      Reason For Admission Hyperglycemia (P)      People in Home significant other;child(cammie), dependent (P)      Facility Arrived From: home (P)      Do you expect to return to your current living situation? Yes (P)      Do you have help at home or someone to help you manage your care at home? Yes (P)      Who are your caregiver(s) and their phone number(s)? Jonh Farias (significant other) [phone] (P)      Prior to hospitilization cognitive status: Alert/Oriented (P)      Current cognitive status: Alert/Oriented (P)      Walking or Climbing Stairs Difficulty no (P)      Dressing/Bathing Difficulty no (P)      Home Accessibility stairs to enter home (P)      Number of Stairs, Main Entrance three (P)      Stair Railings, Main Entrance railings on both sides of stairs (P)      Equipment Currently Used at Home none (P)      Readmission within 30 days? No (P)      Patient currently being followed by outpatient case management? No (P)      Do you currently have service(s) that help you manage your care at home? No (P)      Do you take prescription medications? Yes (P)      Do you have prescription coverage? Yes (P)      [facility] ([facility])      Do you have any problems affording any of your prescribed medications? No (P)      Is the patient taking medications as prescribed? yes (P)      Who is going to help you get home  at discharge? Jonh Farias (significant other) [phone] (P)      How do you get to doctors appointments? car, drives self (P)      Are you on dialysis? No (P)      Do you take coumadin? No (P)      Discharge Plan A Home with family (P)      Discharge Plan B Home (P)      DME Needed Upon Discharge  none (P)      Discharge Plan discussed with: Patient (P)      Transition of Care Barriers None (P)         Physical Activity    On average, how many days per week do you engage in moderate to strenuous exercise (like a brisk walk)? 0 days (P)      On average, how many minutes do you engage in exercise at this level? 0 min (P)         Financial Resource Strain    How hard is it for you to pay for the very basics like food, housing, medical care, and heating? Not very hard (P)         Housing Stability    In the last 12 months, was there a time when you were not able to pay the mortgage or rent on time? No (P)      At any time in the past 12 months, were you homeless or living in a shelter (including now)? No (P)         Transportation Needs    Has the lack of transportation kept you from medical appointments, meetings, work or from getting things needed for daily living? No (P)         Food Insecurity    Within the past 12 months, you worried that your food would run out before you got the money to buy more. Never true (P)      Within the past 12 months, the food you bought just didn't last and you didn't have money to get more. Never true (P)         Stress    Do you feel stress - tense, restless, nervous, or anxious, or unable to sleep at night because your mind is troubled all the time - these days? To some extent (P)         Social Isolation    How often do you feel lonely or isolated from those around you?  Rarely (P)         Alcohol Use    Q1: How often do you have a drink containing alcohol? Monthly or less (P)      Q2: How many drinks containing alcohol do you have on a typical day when you are drinking? 1 or 2  (P)      Q3: How often do you have six or more drinks on one occasion? Never (P)         Utilities    In the past 12 months has the electric, gas, oil, or water company threatened to shut off services in your home? No (P)         Health Literacy    How often do you need to have someone help you when you read instructions, pamphlets, or other written material from your doctor or pharmacy? Rarely (P)         OTHER    Name(s) of People in Home Jonh Jarrett (significant other) [phone] (P)                      JESSICA Moncada  Case Management  [phone]

## 2024-07-17 NOTE — PROGRESS NOTES
RD consulted for DM diet. Carb controlled diet education added to discharge planning    Recommendation: Referral to outpatient RD     Thanks,    Bia Jackson RD, LDN

## 2024-07-17 NOTE — ASSESSMENT & PLAN NOTE
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 She regained consciousness quickly there after in the ED the BP was 225/110. No hypotension on admission. Patient has bilateral pitting edema on examination    Plan:    - ECHO   - BNP
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
## 2024-07-17 NOTE — H&P
[facility] Medicine  History & Physical    Patient Name: Mary Sims  MRN: [id number]  Patient Class: IP- Inpatient  Admission Date: 7/16/2024  Attending Physician: Lilo Escobedo MD   Primary Care Provider: No primary care provider on file.         Patient information was obtained from patient, past medical records, and ER records.     Subjective:     Principal Problem:Hyperglycemia    Chief Complaint:   Chief Complaint   Patient presents with    near syncope      While lifting patient, pt also has hx of diabetes, blood glucose >500        HPI: Ms. Hernandez is 39 y.o F with PMHx of Type 1 DM,HTN, hyperlipidemia, and diabetic retinopathy who presents to the ED today with syncopal episode and elevated blood sugar.while at work. 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 She denies hitting her head, that she was supported by a coworker when she had the syncopal episode. She reported similar episode of dizziness few weeks ago and she feel in the bathroom, and another episode yesterday which was associated with nausea and vomiting but she did not have syncope. She reported a recent symptoms of UTI that she treated with OTC- AZO which improves her UTI symptoms.    According to the patient she did not take her insulin this morning because she was rushing to work, but her sugar last night was in the 300s, she endorses poor blood sugar control in recent weeks. She is on 44 units of Lantus at night and 15 units of lispro with meals. Patient blood pressure at the ED on arrival was 225/110, hyponatremic- 132, K- 5, bicarb- 18, BUN 29, Cr 1.9, glu- 797. BHB normal at 0.5. On VBG, pH 7.3, pCO2 42, bicarb 21. UA hazy with 2+ protein, 4+ glucose, 2+ leuks, 63 WBC, moderate bacteria, Chest Xray is unremarkable. She received 1L NS bolus x2 in ED. She also received amlodipine 5 mg for  "hypertension, a single dose of macrobid and gentamicin for UTI given at the ED.         Past Medical History:   Diagnosis Date    Cellulitis     Diabetes mellitus     Hypertension     Retinal detachment        Past Surgical History:   Procedure Laterality Date    VITRECTOMY BY PARS PLANA APPROACH Right 4/24/2019    Procedure: VITRECTOMY, PARS PLANA APPROACH;  Surgeon: JAMI Schulz MD;  Location: [facility] OR [address] [place] [address];  Service: Ophthalmology;  Laterality: Right;  25g PPV/membrane stripping/ILM peel/EL/AFx/GFX/avastin OD    VITRECTOMY BY PARS PLANA APPROACH Left 5/29/2019    Procedure: VITRECTOMY, PARS PLANA  APPROACH  25g PPV/Membrane Stripping/IML peel/EL/AFX/Avastin left eye   60 mins;  Surgeon: JAMI Schulz MD;  Location: [facility] OR [address] [place] [address];  Service: Ophthalmology;  Laterality: Left;       Review of patient's allergies indicates:  No Known Allergies    Current Facility-Administered Medications on File Prior to Encounter   Medication    cyclopentolate 1% ophthalmic solution 1 drop    homatropine 5 % ophthalmic solution 1 drop    moxifloxacin 0.5 % ophthalmic solution 1 drop    phenylephrine HCL 2.5% ophthalmic solution 1 drop    prednisoLONE acetate 1 % ophthalmic suspension 1 drop    tetracaine HCl (PF) 0.5 % Drop 1 drop    tropicamide 1% ophthalmic solution 1 drop     Current Outpatient Medications on File Prior to Encounter   Medication Sig    fluconazole (DIFLUCAN) 200 MG Tab Take 200 mg by mouth once daily. For 5 days (EOT: 7/17/24)    insulin glargine 100 units/mL (3mL) SubQ pen Inject 44 Units into the skin every evening.    insulin lispro 100 unit/mL pen Inject 15 Units into the skin 3 (three) times daily before meals.    insulin syringe-needle U-100 1 mL 30 gauge X 7/16" Syrg USE AS DIRECTED    losartan (COZAAR) 100 MG tablet Take 100 mg by mouth once daily.    ONETOUCH DELICA LANCETS 33 gauge Misc 1 lancet  by skin prick route 4 (four) times daily.    ONETOUCH ULTRA BLUE TEST STRIP Strp 1 strip " "by Misc.(Non-Drug; Combo Route) route 4 (four) times daily.    pantoprazole (PROTONIX) 40 MG tablet Take 40 mg by mouth once daily.    pen needle, diabetic (BD ULTRA-FINE ISAAC PEN NEEDLES) 32 gauge x 5/32" Ndle Use to inject insulin 5x/day    rosuvastatin (CRESTOR) 40 MG Tab Take 40 mg by mouth every evening.    semaglutide (OZEMPIC) 0.25 mg or 0.5 mg (2 mg/3 mL) pen injector Inject 0.5 mg into the skin every 7 days. Mondays    [DISCONTINUED] insulin glargine (LANTUS U-100 INSULIN) 100 unit/mL injection Inject 44 Units into the skin every evening.    acetaminophen (TYLENOL) 650 MG TbSR Take 650 mg by mouth daily as needed (migraines).    empagliflozin (JARDIANCE) 10 mg tablet Take 10 mg by mouth once daily.    [DISCONTINUED] ADMELOG U-100 INSULIN LISPRO 100 unit/mL injection ADM 10 UNI SC TID    [DISCONTINUED] albuterol (PROVENTIL/VENTOLIN HFA) 90 mcg/actuation inhaler Inhale 1-2 puffs into the lungs every 6 (six) hours as needed for Wheezing or Shortness of Breath. Rescue    [DISCONTINUED] bacitracin 500 unit/gram Oint Apply topically 2 (two) times daily.    [DISCONTINUED] BD ULTRA-FINE SHORT PEN NEEDLE 31 gauge x 5/16" Ndle USE UTD    [DISCONTINUED] chlordiazepoxide (LIBRIUM) 25 MG Cap Take 1 capsule (25 mg total) by mouth every 8 (eight) hours as needed (Itching).    [DISCONTINUED] clotrimazole (LOTRIMIN) 1 % cream Apply to affected area 2 times daily    [DISCONTINUED] dulaglutide (TRULICITY) 1.5 mg/0.5 mL pen injector Inject into the skin every 7 days.    [DISCONTINUED] fluticasone propionate (FLONASE) 50 mcg/actuation nasal spray 1 spray (50 mcg total) by Each Nostril route 2 (two) times daily as needed for Rhinitis.    [DISCONTINUED] hydrocortisone 2.5 % cream Apply topically 2 (two) times daily. for 7 days    [DISCONTINUED] hydrOXYzine HCL (ATARAX) 25 MG tablet Take 1 tablet (25 mg total) by mouth 4 (four) times daily as needed for Itching.    [DISCONTINUED] hydrOXYzine pamoate (VISTARIL) 25 MG Cap Take 1 " capsule (25 mg total) by mouth every 8 (eight) hours as needed (Itching).    [DISCONTINUED] hyoscyamine (LEVSIN) 0.125 mg Subl Place 1 tablet (0.125 mg total) under the tongue every 4 (four) hours as needed (Abdominal pain).    [DISCONTINUED] ibuprofen (ADVIL,MOTRIN) 600 MG tablet Take 1 tablet (600 mg total) by mouth every 6 (six) hours as needed for Pain.    [DISCONTINUED] insulin aspart (NOVOLOG) 100 unit/mL InPn pen Inject 5 Units into the skin 3 (three) times daily with meals. (Patient taking differently: Inject 15 Units into the skin 3 (three) times daily with meals.)    [DISCONTINUED] ketoconazole (NIZORAL) 2 % cream Apply topically 2 (two) times daily. for 7 days    [DISCONTINUED] nystatin (MYCOSTATIN) powder Apply topically 2 (two) times daily.    [DISCONTINUED] nystatin-triamcinolone (MYCOLOG II) cream Apply topically 2 (two) times daily.    [DISCONTINUED] ondansetron (ZOFRAN-ODT) 4 MG TbDL Take 1 tablet (4 mg total) by mouth every 6 (six) hours as needed (nausea or vomiting).    [DISCONTINUED] ondansetron (ZOFRAN-ODT) 4 MG TbDL Take 1 tablet (4 mg total) by mouth every 8 (eight) hours as needed (nausea or vomiting).    [DISCONTINUED] polyethylene glycol (GLYCOLAX) 17 gram/dose powder Take 17 g by mouth once daily.    [DISCONTINUED] promethazine (PHENERGAN) 25 MG suppository Place 1 suppository (25 mg total) rectally every 6 (six) hours as needed for Nausea (Use for vomiting if you are unable to take anything by mouth).     Family History       Problem Relation (Age of Onset)    Diabetes Mother          Tobacco Use    Smoking status: Never    Smokeless tobacco: Never   Substance and Sexual Activity    Alcohol use: Yes     Comment: social    Drug use: No    Sexual activity: Yes     Partners: Male     Birth control/protection: I.U.D.     Review of Systems   Cardiovascular:  Negative for chest pain, palpitations and leg swelling.   Gastrointestinal:  Positive for abdominal pain (Rt UQ pain on deep palpation)  and nausea. Negative for abdominal distention.   Genitourinary:  Negative for difficulty urinating, dyspareunia, urgency and vaginal bleeding.   Skin: Negative.      Objective:     Vital Signs (Most Recent):  Temp: 97.5 °F (36.4 °C) (07/16/24 2020)  Pulse: 96 (07/16/24 2020)  Resp: 20 (07/16/24 2020)  BP: 138/82 (07/16/24 2020)  SpO2: 98 % (07/16/24 2020) Vital Signs (24h Range):  Temp:  [97.5 °F (36.4 °C)-98.6 °F (37 °C)] 97.5 °F (36.4 °C)  Pulse:  [86-96] 96  Resp:  [12-20] 20  SpO2:  [97 %-100 %] 98 %  BP: (138-225)/() 138/82     Weight: 86.8 kg (191 lb 5.8 oz)  Body mass index is 33.9 kg/m².     Physical Exam  Vitals reviewed.   Constitutional:       General: She is not in acute distress.  Cardiovascular:      Rate and Rhythm: Normal rate and regular rhythm.      Pulses: Normal pulses.      Heart sounds: No murmur heard.  Pulmonary:      Effort: No respiratory distress.      Breath sounds: No wheezing or rales.   Abdominal:      Palpations: Abdomen is soft.      Tenderness: There is abdominal tenderness (On deep palpation).   Skin:     General: Skin is warm.   Neurological:      Mental Status: She is alert.                Significant Labs: All pertinent labs within the past 24 hours have been reviewed.    Significant Imaging: I have reviewed all pertinent imaging results/findings within the past 24 hours.  Assessment/Plan:     * Hyperglycemia    Ms. Hernandez is 39 y.o F with PMHx of Type 1 DM, who presents to the ED today with syncopal episode while at work. 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 Glucose was in the 700s on admission. Patient endorses poorly controlled blood sugar and diet, she said she forgot to take her insulin this morning while rushing to get to work on time.    Plan:  -Insulin  -IV fluids  -Check BMP      UTI (urinary tract infection)  Patient endorsed recent hx of  urinary urgency and pain on urination which she treated with OTC medication- AZO, which reliefs her symptoms. Urinalysis today indicate UTI, uc collected pending result. Her past uc grows E-coli ESBL that was sensitive to gentamicin, meropenem, tobramycin, and nitrofurantoin.    Plan:  -Received a single dose of Macrobid and gentamicin  -Urine culture pending  -ID consult in place, briefly discussed over the phone with fellow, will start carbapenem AM if needed, will be seen tomorrow by ID.      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 She regained consciousness quickly there after in the ED the BP was 225/110. No hypotension on admission. Patient has bilateral pitting edema on examination    Plan:    - ECHO   - BNP        Type 1 diabetes mellitus with other specified complication    Poorly controlled type 1 DM. On insulin 44 unit at night, and lispro 15 units before meals.    Plan:  -Insulin  -Sliding scle  -Hb1C      VTE Risk Mitigation (From admission, onward)           Ordered     IP VTE HIGH RISK PATIENT  Once         07/16/24 1752     Place sequential compression device  Until discontinued         07/16/24 1752                                    Gustavo Sweet MD  Department of Hospital Medicine  [facility]

## 2024-07-17 NOTE — PROGRESS NOTES
[facility] Medicine  Progress Note    Patient Name: Mary Sims  MRN: [id number]  Patient Class: IP- Inpatient   Admission Date: 7/16/2024  Length of Stay: 1 days  Attending Physician: Lilo Escobedo MD  Primary Care Provider: Ariadne, Primary Doctor        Subjective:     Principal Problem:Hyperglycemia        HPI:  Ms. Hernandez is 39 y.o F with PMHx of DM (unclear if is type 1 or 2, no anti-INDU),HTN, hyperlipidemia, and diabetic retinopathypresents to the ED today with syncopal episode and elevated blood sugar.while at work. 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 She denies hitting her head, that she was supported by a coworker when she had the syncopal episode. She reported similar episode of dizziness few weeks ago and she feel in the bathroom, and another episode yesterday which was associated with nausea and vomiting but she did not have syncope. She reported a recent symptoms of UTI that she treated with OTC- AZO which improves her UTI symptoms.    According to the patient she did not take her insulin this morning because she was rushing to work, but her sugar last night was in the 300s, she endorses poor blood sugar control in recent weeks. She is on 44 units of Lantus at night and 15 units of lispro with meals. Patient blood pressure at the ED on arrival was 225/110, hyponatremic- 132, K- 5, bicarb- 18, BUN 29, Cr 1.9, glu- 797. BHB normal at 0.5. On VBG, pH 7.3, pCO2 42, bicarb 21. UA hazy with 2+ protein, 4+ glucose, 2+ leuks, 63 WBC, moderate bacteria, Chest Xray is unremarkable. She received 1L NS bolus x2 in ED. She also received amlodipine 5 mg for hypertension, a single dose of macrobid and gentamicin for UTI given at the ED.         Overview/Hospital Course:  Ms. Hernandez is 39 y.o F with PMHx of Type 1 DM(unclear if is type 1 or 2, no anti-INDU),HTN, hyperlipidemia,  and diabetic retinopathy who presents to the ED today with syncopal episode and elevated blood sugar.while at work. 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 She denies hitting her head, that she was supported by a coworker when she had the syncopal episode. She reported similar episode of dizziness few weeks ago and she feel in the bathroom, and another episode yesterday which was associated with nausea and vomiting but she did not have syncope. She reported a recent symptoms of UTI that she treated with OTC- AZO which improves her UTI symptoms    Interval History: Ms. Hernandez is 39 y.o F with PMHx of DM (unclear if is type 1 or 2, no anti-INDU),HTN, hyperlipidemia, and diabetic retinopathypresents to the ED today with syncopal episode and elevated blood sugar. Poorly controlled blood sugar. Undergoing treatment for severe hyperglycemia and hyperosmolarity without acidosis or ketosis consistent HHS. ECHO done to shows no abnormalities. Trace INDU antibodies, , for possible discharge tomorrrow.    Review of Systems   Cardiovascular:  Negative for chest pain, palpitations and leg swelling.   Gastrointestinal:  Positive for abdominal pain (Rt UQ pain on deep palpation) and nausea. Negative for abdominal distention.   Genitourinary:  Negative for difficulty urinating, dyspareunia, urgency and vaginal bleeding.   Skin: Negative.      Objective:     Vital Signs (Most Recent):  Temp: 98.7 °F (37.1 °C) (07/17/24 1535)  Pulse: 85 (07/17/24 1535)  Resp: 16 (07/17/24 1535)  BP: 122/82 (07/17/24 1535)  SpO2: 95 % (07/17/24 1535) Vital Signs (24h Range):  Temp:  [97.5 °F (36.4 °C)-98.7 °F (37.1 °C)] 98.7 °F (37.1 °C)  Pulse:  [] 85  Resp:  [12-20] 16  SpO2:  [95 %-99 %] 95 %  BP: (120-148)/(64-82) 122/82     Weight: 86 kg (189 lb 9.5 oz)  Body mass index is 33.59 kg/m².    Intake/Output Summary  (Last 24 hours) at 7/17/2024 1537  Last data filed at 7/17/2024 0620  Gross per 24 hour   Intake 600 ml   Output 400 ml   Net 200 ml         Physical Exam  Vitals and nursing note reviewed.   Constitutional:       General: She is not in acute distress.  Cardiovascular:      Rate and Rhythm: Normal rate and regular rhythm.      Pulses: Normal pulses.      Heart sounds: No murmur heard.  Pulmonary:      Effort: No respiratory distress.      Breath sounds: No wheezing or rales.   Abdominal:      Palpations: Abdomen is soft.      Tenderness: There is no abdominal tenderness (On deep palpation).   Skin:     General: Skin is warm.   Neurological:      Mental Status: She is alert.             Significant Labs: All pertinent labs within the past 24 hours have been reviewed.    Significant Imaging: I have reviewed all pertinent imaging results/findings within the past 24 hours.    Assessment/Plan:      * Hyperglycemia    Ms. Hernandez is 39 y.o F with PMHx of Type 1 DM, who presents to the ED today with syncopal episode while at work. 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 Glucose was in the 700s on admission. Patient endorses poorly controlled blood sugar and diet, she said she forgot to take her insulin this morning while rushing to get to work on time.    Plan:  -Insulin  -IV fluids  -Check BMP      UTI (urinary tract infection)  Patient endorsed recent hx of urinary urgency and pain on urination which she treated with OTC medication- AZO, which reliefs her symptoms. Urinalysis today indicate UTI, uc collected pending result. Her past uc grows E-coli ESBL that was sensitive to gentamicin, meropenem, tobramycin, and nitrofurantoin.    Plan:  -Received a single dose of Macrobid and gentamicin  -Urine culture pending  -ID consult in place, briefly discussed over the phone with fellow, will start carbapenem AM  if needed, will be seen tomorrow by ID.      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 She regained consciousness quickly there after in the ED the BP was 225/110. No hypotension on admission. Patient has bilateral pitting edema on examination    Plan:    - ECHO   - BNP        Type 1 diabetes mellitus with other specified complication    Poorly controlled type 1 DM. On insulin 44 unit at night, and lispro 15 units before meals.    Plan:  -Insulin  -Sliding scle  -Hb1C      VTE Risk Mitigation (From admission, onward)           Ordered     IP VTE HIGH RISK PATIENT  Once         07/16/24 1752     Place sequential compression device  Until discontinued         07/16/24 1752                    Discharge Planning   LEXIE:      Code Status: Full Code   Is the patient medically ready for discharge?:     Reason for patient still in hospital (select all that apply): Patient trending condition  Discharge Plan A: Home with family                  Gustavo Sweet MD  Department of Hospital Medicine   [facility]

## 2024-07-17 NOTE — PLAN OF CARE
Problem: Adult Inpatient Plan of Care  Goal: Plan of Care Review  Outcome: Progressing     Problem: Diabetes Comorbidity  Goal: Blood Glucose Level Within Targeted Range  Outcome: Progressing     Problem: Pneumonia  Goal: Fluid Balance  Outcome: Progressing  Goal: Resolution of Infection Signs and Symptoms  Outcome: Progressing  Goal: Effective Oxygenation and Ventilation  Outcome: Progressing     Problem: Fall Injury Risk  Goal: Absence of Fall and Fall-Related Injury  Outcome: Progressing

## 2024-07-17 NOTE — SUBJECTIVE & OBJECTIVE
"Past Medical History:   Diagnosis Date    Cellulitis     Diabetes mellitus     Hypertension     Retinal detachment        Past Surgical History:   Procedure Laterality Date    VITRECTOMY BY PARS PLANA APPROACH Right 4/24/2019    Procedure: VITRECTOMY, PARS PLANA APPROACH;  Surgeon: JAMI Schulz MD;  Location: [facility] OR [address] [place] [address];  Service: Ophthalmology;  Laterality: Right;  25g PPV/membrane stripping/ILM peel/EL/AFx/GFX/avastin OD    VITRECTOMY BY PARS PLANA APPROACH Left 5/29/2019    Procedure: VITRECTOMY, PARS PLANA  APPROACH  25g PPV/Membrane Stripping/IML peel/EL/AFX/Avastin left eye   60 mins;  Surgeon: JAMI Schulz MD;  Location: [facility] OR [address] [place] [address];  Service: Ophthalmology;  Laterality: Left;       Review of patient's allergies indicates:  No Known Allergies    Medications:  Medications Prior to Admission   Medication Sig    fluconazole (DIFLUCAN) 200 MG Tab Take 200 mg by mouth once daily. For 5 days (EOT: 7/17/24)    insulin glargine 100 units/mL (3mL) SubQ pen Inject 44 Units into the skin every evening.    insulin lispro 100 unit/mL pen Inject 15 Units into the skin 3 (three) times daily before meals.    insulin syringe-needle U-100 1 mL 30 gauge X 7/16" Syrg USE AS DIRECTED    losartan (COZAAR) 100 MG tablet Take 100 mg by mouth once daily.    ONETOUCH DELICA LANCETS 33 gauge Misc 1 lancet  by skin prick route 4 (four) times daily.    ONETOUCH ULTRA BLUE TEST STRIP Strp 1 strip by Misc.(Non-Drug; Combo Route) route 4 (four) times daily.    pantoprazole (PROTONIX) 40 MG tablet Take 40 mg by mouth once daily.    pen needle, diabetic (BD ULTRA-FINE ISAAC PEN NEEDLES) 32 gauge x 5/32" Ndle Use to inject insulin 5x/day    rosuvastatin (CRESTOR) 40 MG Tab Take 40 mg by mouth every evening.    semaglutide (OZEMPIC) 0.25 mg or 0.5 mg (2 mg/3 mL) pen injector Inject 0.5 mg into the skin every 7 days. Mondays    acetaminophen (TYLENOL) 650 MG TbSR Take 650 mg by mouth daily as needed (migraines).    " empagliflozin (JARDIANCE) 10 mg tablet Take 10 mg by mouth once daily.     Antibiotics (From admission, onward)      None          Antifungals (From admission, onward)      None          Antivirals (From admission, onward)      None             Immunization History   Administered Date(s) Administered    COVID-19, MRNA, LN-S, PF (Pfizer) (Purple Cap) 08/10/2021, 09/28/2021    PPD Test 10/13/2022       Family History       Problem Relation (Age of Onset)    Diabetes Mother          Social History     Socioeconomic History    Marital status:    Tobacco Use    Smoking status: Never    Smokeless tobacco: Never   Substance and Sexual Activity    Alcohol use: Yes     Comment: social    Drug use: No    Sexual activity: Yes     Partners: Male     Birth control/protection: I.U.D.     Social Determinants of Health     Financial Resource Strain: Low Risk  (7/17/2024)    Overall Financial Resource Strain (CARDIA)     Difficulty of Paying Living Expenses: Not very hard   Food Insecurity: No Food Insecurity (7/17/2024)    Hunger Vital Sign     Worried About Running Out of Food in the Last Year: Never true     Ran Out of Food in the Last Year: Never true   Transportation Needs: No Transportation Needs (7/17/2024)    TRANSPORTATION NEEDS     Transportation : No   Physical Activity: Inactive (7/17/2024)    Exercise Vital Sign     Days of Exercise per Week: 0 days     Minutes of Exercise per Session: 0 min   Stress: Stress Concern Present (7/17/2024)    [employer] - Occupational Stress Questionnaire     Feeling of Stress : To some extent   Housing Stability: Low Risk  (7/17/2024)    Housing Stability Vital Sign     Unable to Pay for Housing in the Last Year: No     Homeless in the Last Year: No     Review of Systems   Constitutional:  Negative for appetite change, chills, diaphoresis, fatigue, fever and unexpected weight change.   HENT:  Negative for congestion, ear pain, hearing loss, sore throat  and tinnitus.    Eyes:  Negative for pain, redness and visual disturbance.   Respiratory:  Negative for cough, chest tightness, shortness of breath and wheezing.    Cardiovascular:  Negative for chest pain.   Gastrointestinal:  Negative for abdominal pain, constipation, diarrhea, nausea and vomiting.   Endocrine: Negative for cold intolerance and heat intolerance.   Genitourinary:  Positive for frequency and urgency. Negative for decreased urine volume, difficulty urinating, dysuria, flank pain and hematuria.   Musculoskeletal:  Positive for back pain. Negative for arthralgias, myalgias and neck pain.   Skin:  Negative for rash and wound.   Allergic/Immunologic: Negative for environmental allergies, food allergies and immunocompromised state.   Neurological:  Negative for dizziness, facial asymmetry, weakness, light-headedness, numbness and headaches.   Hematological:  Negative for adenopathy. Does not bruise/bleed easily.   Psychiatric/Behavioral:  Negative for agitation, behavioral problems and confusion.      Objective:     Vital Signs (Most Recent):  Temp: 98.7 °F (37.1 °C) (07/17/24 1535)  Pulse: 85 (07/17/24 1535)  Resp: 16 (07/17/24 1535)  BP: 122/82 (07/17/24 1535)  SpO2: 95 % (07/17/24 1535) Vital Signs (24h Range):  Temp:  [97.5 °F (36.4 °C)-98.7 °F (37.1 °C)] 98.7 °F (37.1 °C)  Pulse:  [] 85  Resp:  [12-20] 16  SpO2:  [95 %-99 %] 95 %  BP: (120-147)/(64-82) 122/82     Weight: 86 kg (189 lb 9.5 oz)  Body mass index is 33.59 kg/m².    Estimated Creatinine Clearance: 52.8 mL/min (A) (based on SCr of 1.5 mg/dL (H)).     Physical Exam  Constitutional:       General: She is not in acute distress.     Appearance: Normal appearance. She is well-developed. She is obese. She is not ill-appearing, toxic-appearing or diaphoretic.       HENT:      Head: Normocephalic and atraumatic.   Cardiovascular:      Rate and Rhythm: Normal rate and regular rhythm.      Heart sounds: Normal heart sounds. No murmur heard.      No friction rub. No gallop.   Pulmonary:      Effort: Pulmonary effort is normal. No respiratory distress.      Breath sounds: Normal breath sounds. No wheezing or rales.   Abdominal:      General: Bowel sounds are normal. There is no distension.      Palpations: Abdomen is soft. There is no mass.      Tenderness: There is no abdominal tenderness. There is no guarding or rebound.   Skin:     General: Skin is warm and dry.   Neurological:      Mental Status: She is alert and oriented to person, place, and time.   Psychiatric:         Behavior: Behavior normal.          Significant Labs: Blood Culture:   Recent Labs   Lab 07/16/24  1659   LABBLOO No Growth to date  No Growth to date     CBC:   Recent Labs   Lab 07/16/24  1008 07/17/24  0349   WBC 7.86 8.16   HGB 11.3* 10.0*   HCT 33.4* 28.9*    256     CMP:   Recent Labs   Lab 07/17/24  0019 07/17/24  0349 07/17/24  1037   * 136 132*   K 4.0 3.6 4.3    109 106   CO2 15* 20* 19*   * 127* 255*   BUN 21* 19 17   CREATININE 1.5* 1.5* 1.5*   CALCIUM 8.2* 7.8* 7.8*   PROT 5.2* 4.8* 5.0*   ALBUMIN 1.6* 1.5* 1.5*   BILITOT 0.1 0.1 0.1   ALKPHOS 103 94 96   AST 17 13 14   ALT 12 11 12   ANIONGAP 9 7* 7*     Urine Culture:   Recent Labs   Lab 07/16/24  0957   LABURIN GRAM NEGATIVE BOBBY  >100,000 cfu/ml  Identification and susceptibility pending  *     Urine Studies:   Recent Labs   Lab 07/16/24  0957   COLORU Yellow   APPEARANCEUA Hazy*   PHUR 7.0   SPECGRAV 1.020   PROTEINUA 2+*   GLUCUA 4+*   KETONESU Negative   BILIRUBINUA Negative   OCCULTUA Trace*   NITRITE Negative   LEUKOCYTESUR 2+*   RBCUA 5*   WBCUA 63*   BACTERIA Moderate*   SQUAMEPITHEL 4   HYALINECASTS 0     All pertinent labs within the past 24 hours have been reviewed.    Significant Imaging: I have reviewed all pertinent imaging results/findings within the past 24 hours.  X-Ray Chest AP Portable [[id number]] Resulted: 07/16/24 1024   Order Status: Completed Updated: 07/16/24 1026    Narrative:     EXAMINATION:  XR CHEST AP PORTABLE    CLINICAL HISTORY:  hyperglycemia;    TECHNIQUE:  One view    COMPARISON:  Comparison is made to 09/04/2023 and 05/08/2023.  Clinical information of syncopal episode.    FINDINGS:  Heart size is normal, as is the appearance of the pulmonary vascularity.  Lung zones are clear, and are free of significant airspace consolidation or volume loss.  No pleural fluid.  No hilar or mediastinal mass lesion.  No pneumothorax.   Impression:       No significant intrathoracic abnormality.  No significant detrimental interval change in the appearance of the chest since the examinations referenced above is appreciated.      Electronically signed by: Red Anna MD  Date: 07/16/2024  Time: 10:24      Imaging History     2024    Date Procedure Name Study Review Link PACS Link Status Accession Number Location   07/17/24 09:14 AM Echo Study Review  Images Final [id number] [facility]   07/17/24 04:45 AM Cardiac monitoring strips Study Review  Final     07/16/24 10:20 AM X-Ray Chest AP Portable Study Review  Images Final [id number] [facility]

## 2024-07-17 NOTE — ED NOTES
Telemetry Verification   Patient placed on Telemetry Box  Verified with War Room  Box # [id number]   Monitor Tech War room   Rate 91   Rhythm NSR

## 2024-07-17 NOTE — ASSESSMENT & PLAN NOTE
39-year-old female with diabetes admitted after syncopal episode and found to have significant hyperglycemia as well as urinary tract infection.  She received gentamicin and nitrofurantoin in the ED and her symptoms have now resolved.  There is a Gram-negative growing on her urine culture which is not yet speciated.  There is minimal concern for pyelonephritis.  Suspect uncomplicated UTI that may already be treated antibiotics she received in the ED. Stable nonseptic.    Plan:  1. Hold antibiotics for now.  2. Follow up Gram-negative sensitivities from the urine.  If the Gram-negative sensitive to gentamicin then suspect she needs no further treatment.  3. Discussed with ID staff - we will follow

## 2024-07-17 NOTE — SUBJECTIVE & OBJECTIVE
Interval History: Ms. Hernandez is 39 y.o F with PMHx of DM (unclear if is type 1 or 2, no anti-INDU),HTN, hyperlipidemia, and diabetic retinopathypresents to the ED today with syncopal episode and elevated blood sugar. Poorly controlled blood sugar. Undergoing treatment for severe hyperglycemia and hyperosmolarity without acidosis or ketosis consistent HHS. ECHO done to shows no abnormalities. Trace INDU antibodies, , for possible discharge tomorrrow.    Review of Systems   Cardiovascular:  Negative for chest pain, palpitations and leg swelling.   Gastrointestinal:  Positive for abdominal pain (Rt UQ pain on deep palpation) and nausea. Negative for abdominal distention.   Genitourinary:  Negative for difficulty urinating, dyspareunia, urgency and vaginal bleeding.   Skin: Negative.      Objective:     Vital Signs (Most Recent):  Temp: 98.7 °F (37.1 °C) (07/17/24 1535)  Pulse: 85 (07/17/24 1535)  Resp: 16 (07/17/24 1535)  BP: 122/82 (07/17/24 1535)  SpO2: 95 % (07/17/24 1535) Vital Signs (24h Range):  Temp:  [97.5 °F (36.4 °C)-98.7 °F (37.1 °C)] 98.7 °F (37.1 °C)  Pulse:  [] 85  Resp:  [12-20] 16  SpO2:  [95 %-99 %] 95 %  BP: (120-148)/(64-82) 122/82     Weight: 86 kg (189 lb 9.5 oz)  Body mass index is 33.59 kg/m².    Intake/Output Summary (Last 24 hours) at 7/17/2024 1537  Last data filed at 7/17/2024 0620  Gross per 24 hour   Intake 600 ml   Output 400 ml   Net 200 ml         Physical Exam  Vitals and nursing note reviewed.   Constitutional:       General: She is not in acute distress.  Cardiovascular:      Rate and Rhythm: Normal rate and regular rhythm.      Pulses: Normal pulses.      Heart sounds: No murmur heard.  Pulmonary:      Effort: No respiratory distress.      Breath sounds: No wheezing or rales.   Abdominal:      Palpations: Abdomen is soft.      Tenderness: There is no abdominal tenderness (On deep palpation).   Skin:     General: Skin is warm.   Neurological:      Mental Status: She  is alert.             Significant Labs: All pertinent labs within the past 24 hours have been reviewed.    Significant Imaging: I have reviewed all pertinent imaging results/findings within the past 24 hours.

## 2024-07-17 NOTE — SUBJECTIVE & OBJECTIVE
"Past Medical History:   Diagnosis Date    Cellulitis     Diabetes mellitus     Hypertension     Retinal detachment        Past Surgical History:   Procedure Laterality Date    VITRECTOMY BY PARS PLANA APPROACH Right 4/24/2019    Procedure: VITRECTOMY, PARS PLANA APPROACH;  Surgeon: JAMI Schulz MD;  Location: [facility] OR [address] [place] [address];  Service: Ophthalmology;  Laterality: Right;  25g PPV/membrane stripping/ILM peel/EL/AFx/GFX/avastin OD    VITRECTOMY BY PARS PLANA APPROACH Left 5/29/2019    Procedure: VITRECTOMY, PARS PLANA  APPROACH  25g PPV/Membrane Stripping/IML peel/EL/AFX/Avastin left eye   60 mins;  Surgeon: JAMI Schulz MD;  Location: [facility] OR [address] [place] [address];  Service: Ophthalmology;  Laterality: Left;       Review of patient's allergies indicates:  No Known Allergies    Current Facility-Administered Medications on File Prior to Encounter   Medication    cyclopentolate 1% ophthalmic solution 1 drop    homatropine 5 % ophthalmic solution 1 drop    moxifloxacin 0.5 % ophthalmic solution 1 drop    phenylephrine HCL 2.5% ophthalmic solution 1 drop    prednisoLONE acetate 1 % ophthalmic suspension 1 drop    tetracaine HCl (PF) 0.5 % Drop 1 drop    tropicamide 1% ophthalmic solution 1 drop     Current Outpatient Medications on File Prior to Encounter   Medication Sig    fluconazole (DIFLUCAN) 200 MG Tab Take 200 mg by mouth once daily. For 5 days (EOT: 7/17/24)    insulin glargine 100 units/mL (3mL) SubQ pen Inject 44 Units into the skin every evening.    insulin lispro 100 unit/mL pen Inject 15 Units into the skin 3 (three) times daily before meals.    insulin syringe-needle U-100 1 mL 30 gauge X 7/16" Syrg USE AS DIRECTED    losartan (COZAAR) 100 MG tablet Take 100 mg by mouth once daily.    ONETOUCH DELICA LANCETS 33 gauge Misc 1 lancet  by skin prick route 4 (four) times daily.    ONETOUCH ULTRA BLUE TEST STRIP Strp 1 strip by Misc.(Non-Drug; Combo Route) route 4 (four) times daily.    pantoprazole (PROTONIX) 40 " "MG tablet Take 40 mg by mouth once daily.    pen needle, diabetic (BD ULTRA-FINE ISAAC PEN NEEDLES) 32 gauge x 5/32" Ndle Use to inject insulin 5x/day    rosuvastatin (CRESTOR) 40 MG Tab Take 40 mg by mouth every evening.    semaglutide (OZEMPIC) 0.25 mg or 0.5 mg (2 mg/3 mL) pen injector Inject 0.5 mg into the skin every 7 days. Mondays    [DISCONTINUED] insulin glargine (LANTUS U-100 INSULIN) 100 unit/mL injection Inject 44 Units into the skin every evening.    acetaminophen (TYLENOL) 650 MG TbSR Take 650 mg by mouth daily as needed (migraines).    empagliflozin (JARDIANCE) 10 mg tablet Take 10 mg by mouth once daily.    [DISCONTINUED] ADMELOG U-100 INSULIN LISPRO 100 unit/mL injection ADM 10 UNI SC TID    [DISCONTINUED] albuterol (PROVENTIL/VENTOLIN HFA) 90 mcg/actuation inhaler Inhale 1-2 puffs into the lungs every 6 (six) hours as needed for Wheezing or Shortness of Breath. Rescue    [DISCONTINUED] bacitracin 500 unit/gram Oint Apply topically 2 (two) times daily.    [DISCONTINUED] BD ULTRA-FINE SHORT PEN NEEDLE 31 gauge x 5/16" Ndle USE UTD    [DISCONTINUED] chlordiazepoxide (LIBRIUM) 25 MG Cap Take 1 capsule (25 mg total) by mouth every 8 (eight) hours as needed (Itching).    [DISCONTINUED] clotrimazole (LOTRIMIN) 1 % cream Apply to affected area 2 times daily    [DISCONTINUED] dulaglutide (TRULICITY) 1.5 mg/0.5 mL pen injector Inject into the skin every 7 days.    [DISCONTINUED] fluticasone propionate (FLONASE) 50 mcg/actuation nasal spray 1 spray (50 mcg total) by Each Nostril route 2 (two) times daily as needed for Rhinitis.    [DISCONTINUED] hydrocortisone 2.5 % cream Apply topically 2 (two) times daily. for 7 days    [DISCONTINUED] hydrOXYzine HCL (ATARAX) 25 MG tablet Take 1 tablet (25 mg total) by mouth 4 (four) times daily as needed for Itching.    [DISCONTINUED] hydrOXYzine pamoate (VISTARIL) 25 MG Cap Take 1 capsule (25 mg total) by mouth every 8 (eight) hours as needed (Itching).    [DISCONTINUED] " hyoscyamine (LEVSIN) 0.125 mg Subl Place 1 tablet (0.125 mg total) under the tongue every 4 (four) hours as needed (Abdominal pain).    [DISCONTINUED] ibuprofen (ADVIL,MOTRIN) 600 MG tablet Take 1 tablet (600 mg total) by mouth every 6 (six) hours as needed for Pain.    [DISCONTINUED] insulin aspart (NOVOLOG) 100 unit/mL InPn pen Inject 5 Units into the skin 3 (three) times daily with meals. (Patient taking differently: Inject 15 Units into the skin 3 (three) times daily with meals.)    [DISCONTINUED] ketoconazole (NIZORAL) 2 % cream Apply topically 2 (two) times daily. for 7 days    [DISCONTINUED] nystatin (MYCOSTATIN) powder Apply topically 2 (two) times daily.    [DISCONTINUED] nystatin-triamcinolone (MYCOLOG II) cream Apply topically 2 (two) times daily.    [DISCONTINUED] ondansetron (ZOFRAN-ODT) 4 MG TbDL Take 1 tablet (4 mg total) by mouth every 6 (six) hours as needed (nausea or vomiting).    [DISCONTINUED] ondansetron (ZOFRAN-ODT) 4 MG TbDL Take 1 tablet (4 mg total) by mouth every 8 (eight) hours as needed (nausea or vomiting).    [DISCONTINUED] polyethylene glycol (GLYCOLAX) 17 gram/dose powder Take 17 g by mouth once daily.    [DISCONTINUED] promethazine (PHENERGAN) 25 MG suppository Place 1 suppository (25 mg total) rectally every 6 (six) hours as needed for Nausea (Use for vomiting if you are unable to take anything by mouth).     Family History       Problem Relation (Age of Onset)    Diabetes Mother          Tobacco Use    Smoking status: Never    Smokeless tobacco: Never   Substance and Sexual Activity    Alcohol use: Yes     Comment: social    Drug use: No    Sexual activity: Yes     Partners: Male     Birth control/protection: I.U.D.     Review of Systems   Cardiovascular:  Negative for chest pain, palpitations and leg swelling.   Gastrointestinal:  Positive for abdominal pain (Rt UQ pain on deep palpation) and nausea. Negative for abdominal distention.   Genitourinary:  Negative for difficulty  urinating, dyspareunia, urgency and vaginal bleeding.   Skin: Negative.      Objective:     Vital Signs (Most Recent):  Temp: 97.5 °F (36.4 °C) (07/16/24 2020)  Pulse: 96 (07/16/24 2020)  Resp: 20 (07/16/24 2020)  BP: 138/82 (07/16/24 2020)  SpO2: 98 % (07/16/24 2020) Vital Signs (24h Range):  Temp:  [97.5 °F (36.4 °C)-98.6 °F (37 °C)] 97.5 °F (36.4 °C)  Pulse:  [86-96] 96  Resp:  [12-20] 20  SpO2:  [97 %-100 %] 98 %  BP: (138-225)/() 138/82     Weight: 86.8 kg (191 lb 5.8 oz)  Body mass index is 33.9 kg/m².     Physical Exam  Vitals reviewed.   Constitutional:       General: She is not in acute distress.  Cardiovascular:      Rate and Rhythm: Normal rate and regular rhythm.      Pulses: Normal pulses.      Heart sounds: No murmur heard.  Pulmonary:      Effort: No respiratory distress.      Breath sounds: No wheezing or rales.   Abdominal:      Palpations: Abdomen is soft.      Tenderness: There is abdominal tenderness (On deep palpation).   Skin:     General: Skin is warm.   Neurological:      Mental Status: She is alert.                Significant Labs: All pertinent labs within the past 24 hours have been reviewed.    Significant Imaging: I have reviewed all pertinent imaging results/findings within the past 24 hours.

## 2024-07-17 NOTE — PLAN OF CARE
Problem: Adult Inpatient Plan of Care  Goal: Plan of Care Review  Outcome: Progressing  Goal: Patient-Specific Goal (Individualized)  Outcome: Progressing  Goal: Absence of Hospital-Acquired Illness or Injury  Outcome: Progressing  Goal: Optimal Comfort and Wellbeing  Outcome: Progressing  Goal: Readiness for Transition of Care  Outcome: Progressing     Problem: Diabetes Comorbidity  Goal: Blood Glucose Level Within Targeted Range  Outcome: Progressing     Problem: Pneumonia  Goal: Fluid Balance  Outcome: Progressing  Goal: Resolution of Infection Signs and Symptoms  Outcome: Progressing  Goal: Effective Oxygenation and Ventilation  Outcome: Progressing     Problem: Fall Injury Risk  Goal: Absence of Fall and Fall-Related Injury  Outcome: Progressing
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
## 2024-07-17 NOTE — H&P
[facility] - Emergency Dept  [facility] Medicine  History & Physical    Patient Name: Mary Sims  MRN: [id number]  Patient Class: IP- Inpatient  Admission Date: 7/16/2024  Attending Physician: Lilo Escobedo MD   Primary Care Provider: No primary care provider on file.         Patient information was obtained from patient, past medical records, and ER records.     Subjective:     Principal Problem:Hyperglycemia    Chief Complaint:   Chief Complaint   Patient presents with    near syncope      While lifting patient, pt also has hx of diabetes, blood glucose >500        HPI: Ms. Hernandez is 39 y.o F with PMHx of Type 1 DM,HTN, hyperlipidemia, and diabetic retinopathy who presents to the ED today with syncopal episode and elevated blood sugar.while at work. 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 She denies hitting her head, that she was supported by a coworker when she had the syncopal episode. She reported similar episode of dizziness few weeks ago and she feel in the bathroom, and another episode yesterday which was associated with nausea and vomiting but she did not have syncope. She reported a recent symptoms of UTI that she treated with OTC- AZO which improves her UTI symptoms.    According to the patient she did not take her insulin this morning because she was rushing to work, but her sugar last night was in the 300s, she endorses poor blood sugar control in recent weeks. She is on 44 units of Lantus at night and 15 units of lispro with meals. Patient blood pressure at the ED on arrival was 225/110, hyponatremic- 132, K- 5, bicarb- 18, BUN 29, Cr 1.9, glu- 797. BHB normal at 0.5. On VBG, pH 7.3, pCO2 42, bicarb 21. UA hazy with 2+ protein, 4+ glucose, 2+ leuks, 63 WBC, moderate bacteria, Chest Xray is unremarkable. She received 1L NS bolus x2 in ED. She also received amlodipine 5 mg for  hypertension, a single dose of macrobid and gentamicin for UTI given at the ED.         Interval History: Ms. Hernandez is 39 y.o F with PMHx of Type 1 DM,HTN, hyperlipidemia, and diabetic retinopathy who presents to the ED today with syncopal episode and elevated blood sugar.while at work. 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 She denies hitting her head, that she was supported by a coworker when she had the syncopal episode. She reported similar episode of dizziness few weeks ago and she feel in the bathroom, and another episode yesterday which was associated with nausea and vomiting but she did not have syncope. She reported a recent symptoms of UTI that she treated with OTC- AZO which improves her UTI symptoms..    Review of Systems   Cardiovascular:  Negative for chest pain, palpitations and leg swelling.   Gastrointestinal:  Positive for abdominal pain (Rt UQ pain on deep palpation) and nausea. Negative for abdominal distention.   Genitourinary:  Negative for difficulty urinating, dyspareunia, urgency and vaginal bleeding.   Skin: Negative.      Objective:     Vital Signs (Most Recent):  Temp: 98.2 °F (36.8 °C) (07/16/24 0945)  Pulse: 88 (07/16/24 1521)  Resp: 19 (07/16/24 1521)  BP: (!) 179/107 (07/16/24 1521)  SpO2: 97 % (07/16/24 1521) Vital Signs (24h Range):  Temp:  [98.2 °F (36.8 °C)] 98.2 °F (36.8 °C)  Pulse:  [86-93] 88  Resp:  [14-20] 19  SpO2:  [97 %-100 %] 97 %  BP: (150-225)/() 179/107     Weight: 77.1 kg (170 lb)  Body mass index is 30.11 kg/m².  No intake or output data in the 24 hours ending 07/16/24 1610      Physical Exam  Vitals reviewed.   Constitutional:       General: She is not in acute distress.  Cardiovascular:      Rate and Rhythm: Normal rate and regular rhythm.      Pulses: Normal pulses.      Heart sounds: No murmur heard.  Pulmonary:      Effort: No respiratory  distress.      Breath sounds: No wheezing or rales.   Abdominal:      Palpations: Abdomen is soft.      Tenderness: There is abdominal tenderness (On deep palpation).   Skin:     General: Skin is warm.   Neurological:      Mental Status: She is alert.             Significant Labs: All pertinent labs within the past 24 hours have been reviewed.    Significant Imaging: I have reviewed all pertinent imaging results/findings within the past 24 hours.  Assessment/Plan:     * Hyperglycemia    Ms. Hernandez is 39 y.o F with PMHx of Type 1 DM, who presents to the ED today with syncopal episode while at work. 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 Glucose was in the 700s on admission. Patient endorses poorly controlled blood sugar and diet, she said she forgot to take her insulin this morning while rushing to get to work on time.    Plan:  -Insulin  -IV fluids  -Check BMP      UTI (urinary tract infection)  Patient endorsed recent hx of urinary urgency and pain on urination which she treated with OTC medication- AZO, which reliefs her symptoms. Urinalysis today indicate UTI, uc collected pending result. Her past uc grows E-coli ESBL that was sensitive to gentamicin, meropenem, tobramycin, and nitrofurantoin.    Plan:  -Received a single dose of Macrobid and gentamicin  -Urine culture pending  -ID consult in place, briefly discussed over the phone with fellow, will start carbapenem AM if needed, will be seen tomorrow by ID.      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 She regained consciousness quickly there after in the ED the BP was 225/110. No hypotension on admission. Patient has  bilateral pitting edema on examination    Plan:    - ECHO   - BNP        Type 1 diabetes mellitus with other specified complication    Poorly controlled type 1 DM. On insulin 44 unit at night, and lispro 15 units before meals.    Plan:  -Insulin  -Sliding scle  -Hb1C      VTE Risk Mitigation (From admission, onward)           Ordered     IP VTE HIGH RISK PATIENT  Once         07/16/24 1752     Place sequential compression device  Until discontinued         07/16/24 1752                                    Gustavo Sweet MD  Department of Hospital Medicine  [facility] - Emergency Dept

## 2024-07-18 PROBLEM — D50.9 MICROCYTIC ANEMIA: Status: ACTIVE | Noted: 2024-07-18

## 2024-07-18 PROBLEM — E11.00 HYPEROSMOLAR HYPERGLYCEMIC STATE (HHS): Status: ACTIVE | Noted: 2024-07-18

## 2024-07-18 PROBLEM — N17.9 AKI (ACUTE KIDNEY INJURY): Status: ACTIVE | Noted: 2024-07-18

## 2024-07-18 PROBLEM — E87.1 HYPONATREMIA: Status: ACTIVE | Noted: 2024-07-18

## 2024-07-18 LAB
ALBUMIN SERPL BCP-MCNC: 1.9 G/DL (ref 3.5–5.2)
ALP SERPL-CCNC: 105 U/L (ref 55–135)
ALT SERPL W/O P-5'-P-CCNC: 13 U/L (ref 10–44)
ANION GAP SERPL CALC-SCNC: 5 MMOL/L (ref 8–16)
ANION GAP SERPL CALC-SCNC: 5 MMOL/L (ref 8–16)
ANION GAP SERPL CALC-SCNC: 8 MMOL/L (ref 8–16)
AST SERPL-CCNC: 14 U/L (ref 10–40)
BACTERIA UR CULT: ABNORMAL
BASOPHILS # BLD AUTO: 0.06 K/UL (ref 0–0.2)
BASOPHILS NFR BLD: 0.9 % (ref 0–1.9)
BILIRUB SERPL-MCNC: 0.2 MG/DL (ref 0.1–1)
BUN SERPL-MCNC: 19 MG/DL (ref 6–20)
BUN SERPL-MCNC: 20 MG/DL (ref 6–20)
BUN SERPL-MCNC: 22 MG/DL (ref 6–20)
CALCIUM SERPL-MCNC: 8.2 MG/DL (ref 8.7–10.5)
CALCIUM SERPL-MCNC: 8.4 MG/DL (ref 8.7–10.5)
CALCIUM SERPL-MCNC: 8.8 MG/DL (ref 8.7–10.5)
CHLORIDE SERPL-SCNC: 106 MMOL/L (ref 95–110)
CHLORIDE SERPL-SCNC: 108 MMOL/L (ref 95–110)
CHLORIDE SERPL-SCNC: 109 MMOL/L (ref 95–110)
CO2 SERPL-SCNC: 18 MMOL/L (ref 23–29)
CO2 SERPL-SCNC: 20 MMOL/L (ref 23–29)
CO2 SERPL-SCNC: 20 MMOL/L (ref 23–29)
CREAT SERPL-MCNC: 1.6 MG/DL (ref 0.5–1.4)
CREAT SERPL-MCNC: 1.7 MG/DL (ref 0.5–1.4)
CREAT SERPL-MCNC: 1.9 MG/DL (ref 0.5–1.4)
DIFFERENTIAL METHOD BLD: ABNORMAL
EOSINOPHIL # BLD AUTO: 0.1 K/UL (ref 0–0.5)
EOSINOPHIL NFR BLD: 1.7 % (ref 0–8)
ERYTHROCYTE [DISTWIDTH] IN BLOOD BY AUTOMATED COUNT: 14.9 % (ref 11.5–14.5)
EST. GFR  (NO RACE VARIABLE): 34.2 ML/MIN/1.73 M^2
EST. GFR  (NO RACE VARIABLE): 39.1 ML/MIN/1.73 M^2
EST. GFR  (NO RACE VARIABLE): 42.1 ML/MIN/1.73 M^2
GLUCOSE SERPL-MCNC: 240 MG/DL (ref 70–110)
GLUCOSE SERPL-MCNC: 277 MG/DL (ref 70–110)
GLUCOSE SERPL-MCNC: 305 MG/DL (ref 70–110)
HCT VFR BLD AUTO: 29 % (ref 37–48.5)
HGB BLD-MCNC: 10 G/DL (ref 12–16)
IMM GRANULOCYTES # BLD AUTO: 0.01 K/UL (ref 0–0.04)
IMM GRANULOCYTES NFR BLD AUTO: 0.2 % (ref 0–0.5)
LYMPHOCYTES # BLD AUTO: 2.1 K/UL (ref 1–4.8)
LYMPHOCYTES NFR BLD: 31.2 % (ref 18–48)
MAGNESIUM SERPL-MCNC: 1.9 MG/DL (ref 1.6–2.6)
MCH RBC QN AUTO: 23.8 PG (ref 27–31)
MCHC RBC AUTO-ENTMCNC: 34.5 G/DL (ref 32–36)
MCV RBC AUTO: 69 FL (ref 82–98)
MONOCYTES # BLD AUTO: 0.4 K/UL (ref 0.3–1)
MONOCYTES NFR BLD: 6.5 % (ref 4–15)
NEUTROPHILS # BLD AUTO: 3.9 K/UL (ref 1.8–7.7)
NEUTROPHILS NFR BLD: 59.5 % (ref 38–73)
NRBC BLD-RTO: 0 /100 WBC
PHOSPHATE SERPL-MCNC: 3 MG/DL (ref 2.7–4.5)
PLATELET # BLD AUTO: 251 K/UL (ref 150–450)
PMV BLD AUTO: 11.5 FL (ref 9.2–12.9)
POCT GLUCOSE: 242 MG/DL (ref 70–110)
POCT GLUCOSE: 257 MG/DL (ref 70–110)
POCT GLUCOSE: 279 MG/DL (ref 70–110)
POCT GLUCOSE: 292 MG/DL (ref 70–110)
POCT GLUCOSE: 334 MG/DL (ref 70–110)
POCT GLUCOSE: 346 MG/DL (ref 70–110)
POCT GLUCOSE: 388 MG/DL (ref 70–110)
POCT GLUCOSE: 494 MG/DL (ref 70–110)
POTASSIUM SERPL-SCNC: 4.6 MMOL/L (ref 3.5–5.1)
POTASSIUM SERPL-SCNC: 4.8 MMOL/L (ref 3.5–5.1)
POTASSIUM SERPL-SCNC: 4.9 MMOL/L (ref 3.5–5.1)
PROT SERPL-MCNC: 5.9 G/DL (ref 6–8.4)
RBC # BLD AUTO: 4.2 M/UL (ref 4–5.4)
SODIUM SERPL-SCNC: 132 MMOL/L (ref 136–145)
SODIUM SERPL-SCNC: 133 MMOL/L (ref 136–145)
SODIUM SERPL-SCNC: 134 MMOL/L (ref 136–145)
WBC # BLD AUTO: 6.57 K/UL (ref 3.9–12.7)

## 2024-07-18 PROCEDURE — 36415 COLL VENOUS BLD VENIPUNCTURE: CPT | Performed by: STUDENT IN AN ORGANIZED HEALTH CARE EDUCATION/TRAINING PROGRAM

## 2024-07-18 PROCEDURE — 80048 BASIC METABOLIC PNL TOTAL CA: CPT | Performed by: STUDENT IN AN ORGANIZED HEALTH CARE EDUCATION/TRAINING PROGRAM

## 2024-07-18 PROCEDURE — 83735 ASSAY OF MAGNESIUM: CPT

## 2024-07-18 PROCEDURE — 85025 COMPLETE CBC W/AUTO DIFF WBC: CPT

## 2024-07-18 PROCEDURE — 99232 SBSQ HOSP IP/OBS MODERATE 35: CPT | Mod: ,,, | Performed by: PHYSICIAN ASSISTANT

## 2024-07-18 PROCEDURE — 80048 BASIC METABOLIC PNL TOTAL CA: CPT | Mod: 91

## 2024-07-18 PROCEDURE — 21400001 HC TELEMETRY ROOM

## 2024-07-18 PROCEDURE — 86341 ISLET CELL ANTIBODY: CPT | Performed by: HOSPITALIST

## 2024-07-18 PROCEDURE — 63600175 PHARM REV CODE 636 W HCPCS: Performed by: STUDENT IN AN ORGANIZED HEALTH CARE EDUCATION/TRAINING PROGRAM

## 2024-07-18 PROCEDURE — 84100 ASSAY OF PHOSPHORUS: CPT

## 2024-07-18 PROCEDURE — 63600175 PHARM REV CODE 636 W HCPCS

## 2024-07-18 PROCEDURE — 25000003 PHARM REV CODE 250

## 2024-07-18 PROCEDURE — 36415 COLL VENOUS BLD VENIPUNCTURE: CPT

## 2024-07-18 PROCEDURE — 80053 COMPREHEN METABOLIC PANEL: CPT

## 2024-07-18 RX ORDER — INSULIN GLARGINE 100 [IU]/ML
20 INJECTION, SOLUTION SUBCUTANEOUS 2 TIMES DAILY
Status: DISCONTINUED | OUTPATIENT
Start: 2024-07-18 | End: 2024-07-19

## 2024-07-18 RX ORDER — INSULIN LISPRO 100 [IU]/ML
10 INJECTION, SOLUTION INTRAVENOUS; SUBCUTANEOUS
Qty: 9 ML | Refills: 5 | Status: CANCELLED | OUTPATIENT
Start: 2024-07-18

## 2024-07-18 RX ADMIN — INSULIN ASPART 6 UNITS: 100 INJECTION, SOLUTION INTRAVENOUS; SUBCUTANEOUS at 05:07

## 2024-07-18 RX ADMIN — INSULIN ASPART 8 UNITS: 100 INJECTION, SOLUTION INTRAVENOUS; SUBCUTANEOUS at 01:07

## 2024-07-18 RX ADMIN — INSULIN ASPART 10 UNITS: 100 INJECTION, SOLUTION INTRAVENOUS; SUBCUTANEOUS at 10:07

## 2024-07-18 RX ADMIN — INSULIN ASPART 4 UNITS: 100 INJECTION, SOLUTION INTRAVENOUS; SUBCUTANEOUS at 10:07

## 2024-07-18 RX ADMIN — HYDRALAZINE HYDROCHLORIDE 25 MG: 25 TABLET, FILM COATED ORAL at 01:07

## 2024-07-18 RX ADMIN — INSULIN ASPART 10 UNITS: 100 INJECTION, SOLUTION INTRAVENOUS; SUBCUTANEOUS at 05:07

## 2024-07-18 RX ADMIN — INSULIN GLARGINE 20 UNITS: 100 INJECTION, SOLUTION SUBCUTANEOUS at 09:07

## 2024-07-18 RX ADMIN — INSULIN ASPART 5 UNITS: 100 INJECTION, SOLUTION INTRAVENOUS; SUBCUTANEOUS at 01:07

## 2024-07-18 RX ADMIN — INSULIN GLARGINE 20 UNITS: 100 INJECTION, SOLUTION SUBCUTANEOUS at 01:07

## 2024-07-18 RX ADMIN — INSULIN ASPART 3 UNITS: 100 INJECTION, SOLUTION INTRAVENOUS; SUBCUTANEOUS at 09:07

## 2024-07-18 RX ADMIN — HYDRALAZINE HYDROCHLORIDE 25 MG: 25 TABLET, FILM COATED ORAL at 06:07

## 2024-07-18 RX ADMIN — ATORVASTATIN CALCIUM 40 MG: 40 TABLET, FILM COATED ORAL at 10:07

## 2024-07-18 RX ADMIN — HYDRALAZINE HYDROCHLORIDE 25 MG: 25 TABLET, FILM COATED ORAL at 09:07

## 2024-07-18 RX ADMIN — INSULIN ASPART 5 UNITS: 100 INJECTION, SOLUTION INTRAVENOUS; SUBCUTANEOUS at 02:07

## 2024-07-18 NOTE — ASSESSMENT & PLAN NOTE
39-year-old female with diabetes admitted after syncopal episode and found to have significant hyperglycemia as well as urinary tract infection.  She received gentamicin and nitrofurantoin in the ED and her symptoms have now resolved.  There is a Gram-negative growing on her urine culture which is not yet speciated.  There is no concern for pyelonephritis.  Suspect uncomplicated UTI that has been treated antibiotics she received in the ED -gentamicin and nitrofurantoin as the ESBL E coli on culture is sensitive to both. Stable nonseptic.  Dysuria remains resolved off antibiotics x2 days    Plan:  1. No further antibiotics recommended  2. Id will sign off

## 2024-07-18 NOTE — SUBJECTIVE & OBJECTIVE
Interval History:  hyperglycemic overnight, insulin order fell.  We will attempt to change dosing to be at a.m. to make it more convenient to the patient.  Unfortunately unable to get another Dex com per insurance  We will plan to discharge to diabetic clinic.  Long conversation regarding non adherence, ..  No further antibiotics recommended by ID    Objective:     Vital Signs (Most Recent):  Temp: 98.6 °F (37 °C) (07/18/24 1658)  Pulse: 95 (07/18/24 1809)  Resp: 18 (07/18/24 1658)  BP: (!) 168/97 (07/18/24 1658)  SpO2: 100 % (07/18/24 1658) Vital Signs (24h Range):  Temp:  [97.7 °F (36.5 °C)-98.6 °F (37 °C)] 98.6 °F (37 °C)  Pulse:  [] 95  Resp:  [16-18] 18  SpO2:  [97 %-100 %] 100 %  BP: (124-168)/(69-97) 168/97     Weight: 86 kg (189 lb 9.5 oz)  Body mass index is 33.59 kg/m².  No intake or output data in the 24 hours ending 07/18/24 1852      Physical Exam  Vitals reviewed.   Constitutional:       General: She is not in acute distress.  HENT:      Mouth/Throat:      Pharynx: No oropharyngeal exudate or posterior oropharyngeal erythema.   Cardiovascular:      Rate and Rhythm: Normal rate and regular rhythm.      Pulses: Normal pulses.      Heart sounds: No murmur heard.  Pulmonary:      Effort: No respiratory distress.      Breath sounds: No wheezing or rales.   Abdominal:      Palpations: Abdomen is soft.      Tenderness: There is abdominal tenderness.   Skin:     General: Skin is warm.      Findings: No bruising.   Neurological:      Mental Status: She is alert.      Cranial Nerves: No cranial nerve deficit.      Sensory: No sensory deficit.             Significant Labs: All pertinent labs within the past 24 hours have been reviewed.  Recent Lab Results  (Last 5 results in the past 24 hours)        07/18/24  1655   07/18/24  1214   07/18/24  0911   07/18/24  0815   07/18/24  0629        Anion Gap     5           Baso #               Basophil %               BUN     19           Calcium     8.4            Chloride     108           CO2     20           Creatinine     1.7           Differential Method               eGFR     39.1           Eos #               Eos %               Glucose     305           Gran # (ANC)               Gran %               Hematocrit               Hemoglobin               Immature Grans (Abs)               Immature Granulocytes               Lymph #               Lymph %               Magnesium                MCH               MCHC               MCV               Mono #               Mono %               MPV               nRBC               Phosphorus Level               Platelet Count               POCT Glucose 292   346     242   279       Potassium     4.9           RBC               RDW               Sodium     133           WBC                                      Significant Imaging: I have reviewed all pertinent imaging results/findings within the past 24 hours.

## 2024-07-18 NOTE — NURSING
CRITICAL VALUE.  Pt blood glucose level currently at 494 mg/dl. She was given 5 units of insulin as a correctional dose. Md on call notified .     Pt is currently receiving RLA at 75mL/hr

## 2024-07-18 NOTE — SUBJECTIVE & OBJECTIVE
Interval History:   No acute events overnight  Afebrile and white blood cell count normal.  Urine cultures show ESBL E coli that was sensitive to gentamicin and nitrofurantoin.  Dysuria remains resolved  The patient denies fever, chills, sweats.    Review of Systems   Constitutional:  Negative for activity change, chills, diaphoresis and fever.   Respiratory:  Negative for cough, shortness of breath and wheezing.    Cardiovascular:  Negative for chest pain.   Gastrointestinal:  Negative for abdominal pain, constipation, diarrhea, nausea and vomiting.   Genitourinary:  Negative for dysuria, frequency and urgency.   Neurological:  Negative for dizziness.   Hematological:  Does not bruise/bleed easily.     Objective:     Vital Signs (Most Recent):  Temp: 98.6 °F (37 °C) (07/18/24 1214)  Pulse: 108 (07/18/24 1214)  Resp: 18 (07/18/24 1214)  BP: (!) 136/91 (07/18/24 1214)  SpO2: 99 % (07/18/24 1214) Vital Signs (24h Range):  Temp:  [97.7 °F (36.5 °C)-98.6 °F (37 °C)] 98.6 °F (37 °C)  Pulse:  [] 108  Resp:  [16-18] 18  SpO2:  [97 %-100 %] 99 %  BP: (124-147)/(69-91) 136/91     Weight: 86 kg (189 lb 9.5 oz)  Body mass index is 33.59 kg/m².    Estimated Creatinine Clearance: 46.6 mL/min (A) (based on SCr of 1.7 mg/dL (H)).     Physical Exam  Constitutional:       General: She is not in acute distress.     Appearance: Normal appearance. She is well-developed. She is obese. She is not ill-appearing, toxic-appearing or diaphoretic.       HENT:      Head: Normocephalic and atraumatic.   Cardiovascular:      Rate and Rhythm: Normal rate and regular rhythm.      Heart sounds: Normal heart sounds. No murmur heard.     No friction rub. No gallop.   Pulmonary:      Effort: Pulmonary effort is normal. No respiratory distress.      Breath sounds: Normal breath sounds. No wheezing or rales.   Abdominal:      General: Bowel sounds are normal. There is no distension.      Palpations: Abdomen is soft. There is no mass.       "Tenderness: There is no abdominal tenderness. There is no guarding or rebound.   Skin:     General: Skin is warm and dry.   Neurological:      Mental Status: She is alert and oriented to person, place, and time.   Psychiatric:         Behavior: Behavior normal.          Significant Labs: Blood Culture:   Recent Labs   Lab 07/16/24  1659   LABBLOO No Growth to date  No Growth to date  No Growth to date  No Growth to date     CBC:   Recent Labs   Lab 07/17/24  0349 07/18/24  0545   WBC 8.16 6.57   HGB 10.0* 10.0*   HCT 28.9* 29.0*    251     CMP:   Recent Labs   Lab 07/17/24  0349 07/17/24  1037 07/17/24  1625 07/18/24  0545 07/18/24  0911    132* 129* 132* 133*   K 3.6 4.3 4.6 4.8 4.9    106 105 109 108   CO2 20* 19* 18* 18* 20*   * 255* 353* 277* 305*   BUN 19 17 18 20 19   CREATININE 1.5* 1.5* 1.7* 1.6* 1.7*   CALCIUM 7.8* 7.8* 8.4* 8.2* 8.4*   PROT 4.8* 5.0* 5.6*  --   --    ALBUMIN 1.5* 1.5* 1.8*  --   --    BILITOT 0.1 0.1 0.1  --   --    ALKPHOS 94 96 111  --   --    AST 13 14 16  --   --    ALT 11 12 12  --   --    ANIONGAP 7* 7* 6* 5* 5*     Wound Culture: No results for input(s): "LABAERO" in the last 4320 hours.  All pertinent labs within the past 24 hours have been reviewed.    Significant Imaging: I have reviewed all pertinent imaging results/findings within the past 24 hours.  Procedure Component Value Units Date/Time   X-Ray Chest AP Portable [[id number]] Resulted: 07/16/24 1024   Order Status: Completed Updated: 07/16/24 1026   Narrative:     EXAMINATION:  XR CHEST AP PORTABLE    CLINICAL HISTORY:  hyperglycemia;    TECHNIQUE:  One view    COMPARISON:  Comparison is made to 09/04/2023 and 05/08/2023.  Clinical information of syncopal episode.    FINDINGS:  Heart size is normal, as is the appearance of the pulmonary vascularity.  Lung zones are clear, and are free of significant airspace consolidation or volume loss.  No pleural fluid.  No hilar or mediastinal mass lesion.  " No pneumothorax.   Impression:       No significant intrathoracic abnormality.  No significant detrimental interval change in the appearance of the chest since the examinations referenced above is appreciated.      Electronically signed by: Red Anna MD  Date: 07/16/2024  Time: 10:24      Imaging History     2024    Date Procedure Name Study Review Link PACS Link Status Accession Number Location   07/17/24 09:14 AM Echo Study Review  Images Final [id number] [facility]   07/17/24 04:45 AM Cardiac monitoring strips Study Review  Final     07/16/24 10:20 AM X-Ray Chest AP Portable Study Review  Images Final [id number] [facility]
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
## 2024-07-19 VITALS
WEIGHT: 189.63 LBS | HEART RATE: 97 BPM | TEMPERATURE: 98 F | DIASTOLIC BLOOD PRESSURE: 92 MMHG | HEIGHT: 63 IN | BODY MASS INDEX: 33.6 KG/M2 | OXYGEN SATURATION: 100 % | RESPIRATION RATE: 18 BRPM | SYSTOLIC BLOOD PRESSURE: 169 MMHG

## 2024-07-19 LAB
ANION GAP SERPL CALC-SCNC: 10 MMOL/L (ref 8–16)
BASOPHILS # BLD AUTO: 0.05 K/UL (ref 0–0.2)
BASOPHILS NFR BLD: 0.6 % (ref 0–1.9)
BUN SERPL-MCNC: 27 MG/DL (ref 6–20)
CALCIUM SERPL-MCNC: 8.5 MG/DL (ref 8.7–10.5)
CHLORIDE SERPL-SCNC: 106 MMOL/L (ref 95–110)
CO2 SERPL-SCNC: 17 MMOL/L (ref 23–29)
CREAT SERPL-MCNC: 1.8 MG/DL (ref 0.5–1.4)
DIFFERENTIAL METHOD BLD: ABNORMAL
EOSINOPHIL # BLD AUTO: 0.2 K/UL (ref 0–0.5)
EOSINOPHIL NFR BLD: 2.8 % (ref 0–8)
ERYTHROCYTE [DISTWIDTH] IN BLOOD BY AUTOMATED COUNT: 14.8 % (ref 11.5–14.5)
EST. GFR  (NO RACE VARIABLE): 36.5 ML/MIN/1.73 M^2
GLUCOSE SERPL-MCNC: 274 MG/DL (ref 70–110)
HCT VFR BLD AUTO: 28.9 % (ref 37–48.5)
HGB BLD-MCNC: 10.1 G/DL (ref 12–16)
IMM GRANULOCYTES # BLD AUTO: 0.02 K/UL (ref 0–0.04)
IMM GRANULOCYTES NFR BLD AUTO: 0.3 % (ref 0–0.5)
LYMPHOCYTES # BLD AUTO: 2.4 K/UL (ref 1–4.8)
LYMPHOCYTES NFR BLD: 30.8 % (ref 18–48)
MAGNESIUM SERPL-MCNC: 1.8 MG/DL (ref 1.6–2.6)
MCH RBC QN AUTO: 23.9 PG (ref 27–31)
MCHC RBC AUTO-ENTMCNC: 34.9 G/DL (ref 32–36)
MCV RBC AUTO: 69 FL (ref 82–98)
MONOCYTES # BLD AUTO: 0.6 K/UL (ref 0.3–1)
MONOCYTES NFR BLD: 7.4 % (ref 4–15)
NEUTROPHILS # BLD AUTO: 4.6 K/UL (ref 1.8–7.7)
NEUTROPHILS NFR BLD: 58.1 % (ref 38–73)
NRBC BLD-RTO: 0 /100 WBC
PHOSPHATE SERPL-MCNC: 4.2 MG/DL (ref 2.7–4.5)
PLATELET # BLD AUTO: 257 K/UL (ref 150–450)
PMV BLD AUTO: 11.9 FL (ref 9.2–12.9)
POCT GLUCOSE: 231 MG/DL (ref 70–110)
POCT GLUCOSE: 235 MG/DL (ref 70–110)
POCT GLUCOSE: 276 MG/DL (ref 70–110)
POTASSIUM SERPL-SCNC: 4.6 MMOL/L (ref 3.5–5.1)
RBC # BLD AUTO: 4.22 M/UL (ref 4–5.4)
SODIUM SERPL-SCNC: 133 MMOL/L (ref 136–145)
WBC # BLD AUTO: 7.88 K/UL (ref 3.9–12.7)

## 2024-07-19 PROCEDURE — 25000003 PHARM REV CODE 250: Performed by: STUDENT IN AN ORGANIZED HEALTH CARE EDUCATION/TRAINING PROGRAM

## 2024-07-19 PROCEDURE — 80048 BASIC METABOLIC PNL TOTAL CA: CPT | Performed by: STUDENT IN AN ORGANIZED HEALTH CARE EDUCATION/TRAINING PROGRAM

## 2024-07-19 PROCEDURE — 25000003 PHARM REV CODE 250

## 2024-07-19 PROCEDURE — 83735 ASSAY OF MAGNESIUM: CPT

## 2024-07-19 PROCEDURE — 84100 ASSAY OF PHOSPHORUS: CPT

## 2024-07-19 PROCEDURE — 36415 COLL VENOUS BLD VENIPUNCTURE: CPT

## 2024-07-19 PROCEDURE — 85025 COMPLETE CBC W/AUTO DIFF WBC: CPT

## 2024-07-19 RX ORDER — INSULIN LISPRO 100 [IU]/ML
15 INJECTION, SOLUTION INTRAVENOUS; SUBCUTANEOUS
Qty: 130 ML | Refills: 0 | Status: ON HOLD | OUTPATIENT
Start: 2024-07-19

## 2024-07-19 RX ORDER — INSULIN GLARGINE 100 [IU]/ML
40 INJECTION, SOLUTION SUBCUTANEOUS EVERY MORNING
Qty: 12 ML | Refills: 5 | Status: ON HOLD | OUTPATIENT
Start: 2024-07-19

## 2024-07-19 RX ORDER — LOSARTAN POTASSIUM 50 MG/1
50 TABLET ORAL DAILY
Qty: 30 TABLET | Refills: 0 | Status: ON HOLD | OUTPATIENT
Start: 2024-07-19

## 2024-07-19 RX ORDER — INSULIN GLARGINE 100 [IU]/ML
40 INJECTION, SOLUTION SUBCUTANEOUS DAILY
Status: DISCONTINUED | OUTPATIENT
Start: 2024-07-19 | End: 2024-07-19 | Stop reason: HOSPADM

## 2024-07-19 RX ADMIN — ATORVASTATIN CALCIUM 40 MG: 40 TABLET, FILM COATED ORAL at 09:07

## 2024-07-19 RX ADMIN — INSULIN ASPART 4 UNITS: 100 INJECTION, SOLUTION INTRAVENOUS; SUBCUTANEOUS at 08:07

## 2024-07-19 RX ADMIN — HYDRALAZINE HYDROCHLORIDE 25 MG: 25 TABLET, FILM COATED ORAL at 01:07

## 2024-07-19 RX ADMIN — INSULIN ASPART 2 UNITS: 100 INJECTION, SOLUTION INTRAVENOUS; SUBCUTANEOUS at 05:07

## 2024-07-19 RX ADMIN — INSULIN ASPART 6 UNITS: 100 INJECTION, SOLUTION INTRAVENOUS; SUBCUTANEOUS at 01:07

## 2024-07-19 RX ADMIN — INSULIN GLARGINE 40 UNITS: 100 INJECTION, SOLUTION SUBCUTANEOUS at 08:07

## 2024-07-19 RX ADMIN — SODIUM CHLORIDE 500 ML: 9 INJECTION, SOLUTION INTRAVENOUS at 08:07

## 2024-07-19 RX ADMIN — HYDRALAZINE HYDROCHLORIDE 25 MG: 25 TABLET, FILM COATED ORAL at 05:07

## 2024-07-19 NOTE — DISCHARGE SUMMARY
[facility] Medicine  Discharge Summary      Patient Name: Mary Sims  MRN: [id number]  ELLEN: [id number]  Patient Class: IP- Inpatient  Admission Date: 7/16/2024  Hospital Length of Stay: 3 days  Discharge Date and Time:  07/19/2024 1:49 PM  Attending Physician: Katherine Ventura MD   Discharging Provider: Gustavo Sweet MD  Primary Care Provider: Ariadne Primary Doctor  [facility] Medicine Team: [facility] 2 Gutsavo Sweet MD  Primary Care Team: [facility] 2    HPI:   Ms. Hernandez is 39 y.o F with PMHx of DM (unclear if is type 1 or 2, no anti-INDU),HTN, hyperlipidemia, and diabetic retinopathypresents to the ED today with syncopal episode and elevated blood sugar.while at work. 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 She denies hitting her head, that she was supported by a coworker when she had the syncopal episode. She reported similar episode of dizziness few weeks ago and she feel in the bathroom, and another episode yesterday which was associated with nausea and vomiting but she did not have syncope. She reported a recent symptoms of UTI that she treated with OTC- AZO which improves her UTI symptoms.    According to the patient she did not take her insulin this morning because she was rushing to work, but her sugar last night was in the 300s, she endorses poor blood sugar control in recent weeks. She is on 44 units of Lantus at night and 15 units of lispro with meals. Patient blood pressure at the ED on arrival was 225/110, hyponatremic- 132, K- 5, bicarb- 18, BUN 29, Cr 1.9, glu- 797. BHB normal at 0.5. On VBG, pH 7.3, pCO2 42, bicarb 21. UA hazy with 2+ protein, 4+ glucose, 2+ leuks, 63 WBC, moderate bacteria, Chest Xray is unremarkable. She received 1L NS bolus x2 in ED. She also received amlodipine 5 mg for hypertension, a single dose of macrobid and gentamicin for  UTI given at the ED.         * No surgery found *      Hospital Course:   Ms. Hernandez is 39 y.o F with PMHx DM (unclear if is type 1 or 2, no anti-INDU collected 07/17 pending),HTN, hyperlipidemia, and diabetic retinopathy who presents to the ED 07/16 with syncopal episode and elevated blood sugar.while at work. 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 She denies hitting her head, that she was supported by a coworker when she had the syncopal episode. She reported similar episode of dizziness few weeks ago and she feel in the bathroom, and another episode yesterday which was associated with nausea and vomiting but she did not have syncope. She reported a recent symptoms of UTI that she treated with OTC- AZO which improves her UTI symptoms. Patient endorsed poor adherence to diabetic medications due to social issues.    During this admission, she was treated Ms. Hernandez for severe hyperglycemia and hyperosmolarity without acidosis and uncomplicated UTI. Her blood sugar has improved remarkably, her UC shows to growth till date.  ECHO done on on 07/17 shows no abnormalities with EF of 60-65%. We educate her about the importance of medication compliance.    Ms. Hernandez wang be discharged to on Latus 40  units s/c every morning, lispro 15 units s/c TiD before meals, pantoprazole, semaglutide, rosuvastatin.We discontinued fluconazole and empaglifozin. Follow up with PCP, your endocrinology, and diabetic clinic.      Goals of Care Treatment Preferences:  Code Status: Full Code      Consults:   Consults (From admission, onward)          Status Ordering Provider     Inpatient consult to Registered Dietitian/Nutritionist  Once        Provider:  (Not yet assigned)    Completed JADYN MCFADDEN     Inpatient consult to Infectious Diseases  Once        Provider:  (Not yet assigned)    Completed LYDIA ANDREWS             Renal/  EL (acute kidney injury)  Patient with acute kidney injury/acute renal failure likely due to pre-renal azotemia due to IVVD EL is currently worsening. Baseline creatinine normal - Labs reviewed- Renal function/electrolytes with Estimated Creatinine Clearance: 44 mL/min (A) (based on SCr of 1.8 mg/dL (H)). according to latest data. Monitor urine output and serial BMP and adjust therapy as needed. Avoid nephrotoxins and renally dose meds for GFR listed above.    UTI (urinary tract infection)  Patient endorsed recent hx of urinary urgency and pain on urination which she treated with OTC medication- AZO, which reliefs her symptoms. Urinalysis today indicate UTI, uc collected pending result. Her past uc grows E-coli ESBL that was sensitive to gentamicin, meropenem, tobramycin, and nitrofurantoin. No growth on theUC collected during admission. Uncomplicated UTI.    Plan:  -Single dose of rocephin      Endocrine  * Hyperglycemia    Ms. Hernandez is 39 y.o F with PMHx of Type 1 DM, who presents to the ED today with syncopal episode while at work. 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 Glucose was in the 700s on admission. Patient endorses poorly controlled blood sugar and diet, she said she forgot to take her insulin this morning while rushing to get to work on time.    Plan:  -Insulin (Long and Short acting)        Hyperosmolar hyperglycemic state (HHS)  Addressed during hospitalization      Type 1 diabetes mellitus with other specified complication    Poorly controlled type 1 DM. On insulin 44 unit at night, and lispro 15 units before meals.    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 She regained consciousness quickly there after in the ED the BP was 225/110. No hypotension on admission. Patient has bilateral pitting edema on examination    Plan:    ECHO is negative, EF 60-65 %          Final Active Diagnoses:    Diagnosis Date Noted POA    PRINCIPAL PROBLEM:  Hyperglycemia [R73.9] 01/05/2020 Yes    UTI (urinary tract infection) [N39.0] 07/16/2024 Yes    Hyperosmolar hyperglycemic state (HHS) [E11.00] 07/18/2024 Yes    EL (acute kidney injury) [N17.9] 07/18/2024 Yes    Microcytic anemia [D50.9] 07/18/2024 Yes    Hyponatremia [E87.1] 07/18/2024 Yes    Syncope [R55] 07/16/2024 Yes    Type 1 diabetes mellitus with other specified complication [E10.69] 01/05/2020 Yes      Problems Resolved During this Admission:    Diagnosis Date Noted Date Resolved POA    Nausea [R11.0] 07/16/2024 07/16/2024 Yes       Discharged Condition: stable    Disposition: Home or Self Care    Follow Up:   Follow-up Information       [facility] Follow up on 7/30/2024.    Why: Established 's hospital f/u visit @ 3:00pm. Please bring discharge summary, ID, insurance card, and medication list.  Contact information:  [address] [place] [address]  [address]  [place] [address] [phone]             No, Primary Doctor .               No, Primary Doctor Follow up.               Make appointment with your endocrinology. Schedule an appointment as soon as possible for a visit.                           Patient Instructions:      DIABETIC SUPPLIES FOR HOME USE     Order Specific Question Answer Comments   Height: 5' 3" (1.6 m)    Weight: 86 kg (189 lb 9.5 oz)    Does patient have medical equipment at home? none    Length of need (1-99 months): 1    Is the Patient insulin dependent? Yes    How many times each day does your Patient test his or her glucose level? Daily    Do you follow the Patient at least every 6 months for Management of Diabetes? No "      Ambulatory referral/consult to Internal Medicine   Standing Status: Future   Referral Priority: Routine Referral Type: Consultation   Referral Reason: Specialty Services Required   Requested Specialty: Internal Medicine   Number of Visits Requested: 1     Ambulatory referral/consult to Endocrinology   Standing Status: Future   Referral Priority: Urgent Referral Type: Consultation   Requested Specialty: Endocrinology   Number of Visits Requested: 1     Ambulatory referral/consult to Nephrology   Standing Status: Future   Referral Priority: Urgent Referral Type: Consultation   Referral Reason: Specialty Services Required   Requested Specialty: Nephrology   Number of Visits Requested: 1     Ambulatory referral/consult to Social Work   Standing Status: Future   Referral Priority: Routine Referral Type: Consultation   Referral Reason: Specialty Services Required   Requested Specialty: Licensed Clinical    Number of Visits Requested: 1     Diet diabetic     Diet Cardiac     Diet diabetic     Notify your health care provider if you experience any of the following:  temperature >100.4     Notify your health care provider if you experience any of the following:  persistent nausea and vomiting or diarrhea     Notify your health care provider if you experience any of the following:  severe uncontrolled pain     Notify your health care provider if you experience any of the following:  persistent dizziness, light-headedness, or visual disturbances     Notify your health care provider if you experience any of the following:  increased confusion or weakness     Notify your health care provider if you experience any of the following:  temperature >100.4     Notify your health care provider if you experience any of the following:  persistent nausea and vomiting or diarrhea     Notify your health care provider if you experience any of the following:  persistent dizziness, light-headedness, or visual disturbances      Notify your health care provider if you experience any of the following:  increased confusion or weakness     Activity as tolerated       Significant Diagnostic Studies: N/A    Pending Diagnostic Studies:       Procedure Component Value Units Date/Time    Glutamic acid decarboxylase [[id number]] Collected: 07/18/24 0545    Order Status: Sent Lab Status: In process Updated: 07/18/24 0554    Specimen: Blood     Magnesium [[id number]] Collected: 07/17/24 0349    Order Status: Sent Lab Status: No result     Specimen: Blood     Phosphorus [[id number]] Collected: 07/17/24 0349    Order Status: Sent Lab Status: No result     Specimen: Blood            Medications:  Reconciled Home Medications:      Medication List        START taking these medications      blood-glucose meter,continuous Misc  Use Dexcom G7  with Dexcom G7 sensors to monitor blood glucoses as directed.            CHANGE how you take these medications      LANTUS SOLOSTAR U-100 INSULIN 100 unit/mL (3 mL) Inpn pen  Generic drug: insulin glargine U-100 (Lantus)  Inject 40 Units into the skin every morning.  What changed:   how much to take  when to take this     losartan 50 MG tablet  Commonly known as: COZAAR  Take 1 tablet (50 mg total) by mouth once daily.  What changed:   medication strength  how much to take            CONTINUE taking these medications      acetaminophen 650 MG Tbsr  Commonly known as: TYLENOL  Take 650 mg by mouth daily as needed (migraines).     insulin lispro 100 unit/mL pen  Inject 15 Units into the skin 3 (three) times daily before meals.     ONETOUCH DELICA LANCETS 33 gauge Misc  Generic drug: lancets  1 lancet  by skin prick route 4 (four) times daily.     ONETOUCH ULTRA BLUE TEST STRIP Strp  Generic drug: blood sugar diagnostic  1 strip by Misc.(Non-Drug; Combo Route) route 4 (four) times daily.     OZEMPIC 0.25 mg or 0.5 mg (2 mg/3 mL) pen injector  Generic drug: semaglutide  Inject 0.5 mg into the skin every 7 days.  "Mondays     pantoprazole 40 MG tablet  Commonly known as: PROTONIX  Take 40 mg by mouth once daily.     rosuvastatin 40 MG Tab  Commonly known as: CRESTOR  Take 40 mg by mouth every evening.            STOP taking these medications      fluconazole 200 MG Tab  Commonly known as: DIFLUCAN     JARDIANCE 10 mg tablet  Generic drug: empagliflozin            ASK your doctor about these medications      * pen needle, diabetic 32 gauge x 5/32" Ndle  Commonly known as: BD ULTRA-FINE ISAAC PEN NEEDLE  Use to inject insulin 5x/day  Ask about: Which instructions should I use?     * TRUEPLUS PEN NEEDLE 32 gauge x 5/32" Ndle  Generic drug: pen needle, diabetic  use to inject insulin four times daily  Ask about: Which instructions should I use?           * This list has 2 medication(s) that are the same as other medications prescribed for you. Read the directions carefully, and ask your doctor or other care provider to review them with you.                  Indwelling Lines/Drains at time of discharge:   Lines/Drains/Airways       None                   Time spent on the discharge of patient: 45 minutes         Gustavo Sweet MD  Department of Hospital Medicine  [facility]  "

## 2024-07-19 NOTE — PROGRESS NOTES
[facility] Medicine  Progress Note    Patient Name: Mary Sims  MRN: [id number]  Patient Class: IP- Inpatient   Admission Date: 7/16/2024  Length of Stay: 2 days  Attending Physician: Katherine Ventura MD  Primary Care Provider: Ariadne, Primary Doctor        Subjective:     Principal Problem:Hyperglycemia        HPI:  Ms. Hernandez is 39 y.o F with PMHx of DM (unclear if is type 1 or 2, no anti-INDU),HTN, hyperlipidemia, and diabetic retinopathypresents to the ED today with syncopal episode and elevated blood sugar.while at work. 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 She denies hitting her head, that she was supported by a coworker when she had the syncopal episode. She reported similar episode of dizziness few weeks ago and she feel in the bathroom, and another episode yesterday which was associated with nausea and vomiting but she did not have syncope. She reported a recent symptoms of UTI that she treated with OTC- AZO which improves her UTI symptoms.    According to the patient she did not take her insulin this morning because she was rushing to work, but her sugar last night was in the 300s, she endorses poor blood sugar control in recent weeks. She is on 44 units of Lantus at night and 15 units of lispro with meals. Patient blood pressure at the ED on arrival was 225/110, hyponatremic- 132, K- 5, bicarb- 18, BUN 29, Cr 1.9, glu- 797. BHB normal at 0.5. On VBG, pH 7.3, pCO2 42, bicarb 21. UA hazy with 2+ protein, 4+ glucose, 2+ leuks, 63 WBC, moderate bacteria, Chest Xray is unremarkable. She received 1L NS bolus x2 in ED. She also received amlodipine 5 mg for hypertension, a single dose of macrobid and gentamicin for UTI given at the ED.         Overview/Hospital Course:  Ms. Hernandez is 39 y.o F with PMHx of Type 1 DM(unclear if is type 1 or 2, no anti-INDU),HTN, hyperlipidemia, and  diabetic retinopathy who presents to the ED today with syncopal episode and elevated blood sugar.while at work. 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 She denies hitting her head, that she was supported by a coworker when she had the syncopal episode. She reported similar episode of dizziness few weeks ago and she feel in the bathroom, and another episode yesterday which was associated with nausea and vomiting but she did not have syncope. She reported a recent symptoms of UTI that she treated with OTC- AZO which improves her UTI symptoms    Interval History:  hyperglycemic overnight, insulin order fell.  We will attempt to change dosing to be at a.m. to make it more convenient to the patient.  Unfortunately unable to get another Dex com per insurance  We will plan to discharge to diabetic clinic.  Long conversation regarding non adherence, ..  No further antibiotics recommended by ID    Objective:     Vital Signs (Most Recent):  Temp: 98.6 °F (37 °C) (07/18/24 1658)  Pulse: 95 (07/18/24 1809)  Resp: 18 (07/18/24 1658)  BP: (!) 168/97 (07/18/24 1658)  SpO2: 100 % (07/18/24 1658) Vital Signs (24h Range):  Temp:  [97.7 °F (36.5 °C)-98.6 °F (37 °C)] 98.6 °F (37 °C)  Pulse:  [] 95  Resp:  [16-18] 18  SpO2:  [97 %-100 %] 100 %  BP: (124-168)/(69-97) 168/97     Weight: 86 kg (189 lb 9.5 oz)  Body mass index is 33.59 kg/m².  No intake or output data in the 24 hours ending 07/18/24 1852      Physical Exam  Vitals reviewed.   Constitutional:       General: She is not in acute distress.  HENT:      Mouth/Throat:      Pharynx: No oropharyngeal exudate or posterior oropharyngeal erythema.   Cardiovascular:      Rate and Rhythm: Normal rate and regular rhythm.      Pulses: Normal pulses.      Heart sounds: No murmur heard.  Pulmonary:      Effort: No respiratory distress.      Breath sounds: No wheezing or  rales.   Abdominal:      Palpations: Abdomen is soft.      Tenderness: There is abdominal tenderness.   Skin:     General: Skin is warm.      Findings: No bruising.   Neurological:      Mental Status: She is alert.      Cranial Nerves: No cranial nerve deficit.      Sensory: No sensory deficit.             Significant Labs: All pertinent labs within the past 24 hours have been reviewed.  Recent Lab Results  (Last 5 results in the past 24 hours)        07/18/24  1655   07/18/24  1214   07/18/24  0911   07/18/24  0815   07/18/24  0629        Anion Gap     5           Baso #               Basophil %               BUN     19           Calcium     8.4           Chloride     108           CO2     20           Creatinine     1.7           Differential Method               eGFR     39.1           Eos #               Eos %               Glucose     305           Gran # (ANC)               Gran %               Hematocrit               Hemoglobin               Immature Grans (Abs)               Immature Granulocytes               Lymph #               Lymph %               Magnesium                MCH               MCHC               MCV               Mono #               Mono %               MPV               nRBC               Phosphorus Level               Platelet Count               POCT Glucose 292   346     242   279       Potassium     4.9           RBC               RDW               Sodium     133           WBC                                      Significant Imaging: I have reviewed all pertinent imaging results/findings within the past 24 hours.    Assessment/Plan:      * Hyperglycemia    Ms. Hernandez is 39 y.o F with PMHx of Type 1 DM, who presents to the ED today with syncopal episode while at work. 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 Glucose was in the 700s on admission. Patient endorses poorly  controlled blood sugar and diet, she said she forgot to take her insulin this morning while rushing to get to work on time.    Plan:  -Insulin  -IV fluids  -Check BMP      Hyponatremia  Patient has hyponatremia which is improving,We will aim to correct the sodium by 4-6mEq in 24 hours. We will monitor sodium Every 12 hours. The hyponatremia is due to Dehydration/hypovolemia.   Recent Labs   Lab 07/18/24  0911   *       Microcytic anemia  Patient's anemia is currently stable. Has not received any PRBCs to date. Current CBC reviewed-   Lab Results   Component Value Date    HGB 10.0 (L) 07/18/2024    HCT 29.0 (L) 07/18/2024     Monitor serial CBC and transfuse if patient becomes hemodynamically unstable, symptomatic or H/H drops below 7/21.    EL (acute kidney injury)  Patient with acute kidney injury/acute renal failure likely due to pre-renal azotemia due to IVVD EL is currently worsening. Baseline creatinine normal - Labs reviewed- Renal function/electrolytes with Estimated Creatinine Clearance: 46.6 mL/min (A) (based on SCr of 1.7 mg/dL (H)). according to latest data. Monitor urine output and serial BMP and adjust therapy as needed. Avoid nephrotoxins and renally dose meds for GFR listed above.    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 She regained consciousness quickly there after in the ED the BP was 225/110. No hypotension on admission. Patient has bilateral pitting edema on examination    Plan:    - ECHO   - BNP        UTI (urinary tract infection)  Patient endorsed recent hx of urinary urgency and pain on urination which she treated with OTC medication- AZO, which reliefs her symptoms. Urinalysis today indicate UTI, uc collected  pending result. Her past uc grows E-coli ESBL that was sensitive to gentamicin, meropenem, tobramycin, and nitrofurantoin.    Plan:  -Received a single dose of Macrobid and gentamicin  -Urine culture pending  -ID consult in place,  per ID no further antibiotics      Type 1 diabetes mellitus with other specified complication    Poorly controlled type 1 DM. On insulin 44 unit at night, and lispro 15 units before meals.    Plan:  -Insulin  -Sliding scle  -Hb1C      VTE Risk Mitigation (From admission, onward)           Ordered     IP VTE HIGH RISK PATIENT  Once         07/16/24 1752     Place sequential compression device  Until discontinued         07/16/24 1752                    Discharge Planning   LEXIE: 7/19/2024     Code Status: Full Code   Is the patient medically ready for discharge?:     Reason for patient still in hospital (select all that apply): Patient trending condition  Discharge Plan A: Home with family                  Kevan Ren MD  Department of Hospital Medicine   [facility] - Med Surg

## 2024-07-19 NOTE — ASSESSMENT & PLAN NOTE
Patient endorsed recent hx of urinary urgency and pain on urination which she treated with OTC medication- AZO, which reliefs her symptoms. Urinalysis today indicate UTI, uc collected pending result. Her past uc grows E-coli ESBL that was sensitive to gentamicin, meropenem, tobramycin, and nitrofurantoin.    Plan:  -Received a single dose of Macrobid and gentamicin  -Urine culture pending  -ID consult in place,  per ID no further antibiotics

## 2024-07-19 NOTE — ASSESSMENT & PLAN NOTE
Patient has hyponatremia which is improving,We will aim to correct the sodium by 4-6mEq in 24 hours. We will monitor sodium Every 12 hours. The hyponatremia is due to Dehydration/hypovolemia.   Recent Labs   Lab 07/18/24  0911   *

## 2024-07-19 NOTE — PLAN OF CARE
Problem: Adult Inpatient Plan of Care  Goal: Plan of Care Review  Outcome: Progressing  Goal: Patient-Specific Goal (Individualized)  Outcome: Progressing  Goal: Absence of Hospital-Acquired Illness or Injury  Outcome: Progressing  Goal: Optimal Comfort and Wellbeing  Outcome: Progressing  Goal: Readiness for Transition of Care  Outcome: Progressing     Problem: Diabetes Comorbidity  Goal: Blood Glucose Level Within Targeted Range  Outcome: Not Progressing     Problem: Pneumonia  Goal: Fluid Balance  Outcome: Progressing  Goal: Resolution of Infection Signs and Symptoms  Outcome: Progressing  Goal: Effective Oxygenation and Ventilation  Outcome: Progressing

## 2024-07-19 NOTE — ASSESSMENT & PLAN NOTE
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 She regained consciousness quickly there after in the ED the BP was 225/110. No hypotension on admission. Patient has bilateral pitting edema on examination    Plan:    ECHO is negative, EF 60-65 %

## 2024-07-19 NOTE — ASSESSMENT & PLAN NOTE
Patient endorsed recent hx of urinary urgency and pain on urination which she treated with OTC medication- AZO, which reliefs her symptoms. Urinalysis today indicate UTI, uc collected pending result. Her past uc grows E-coli ESBL that was sensitive to gentamicin, meropenem, tobramycin, and nitrofurantoin. No growth on theUC collected during admission. Uncomplicated UTI.    Plan:  -Single dose of rocephin

## 2024-07-19 NOTE — ASSESSMENT & PLAN NOTE
Ms. Hernandez is 39 y.o F with PMHx of Type 1 DM, who presents to the ED today with syncopal episode while at work. 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 Glucose was in the 700s on admission. Patient endorses poorly controlled blood sugar and diet, she said she forgot to take her insulin this morning while rushing to get to work on time.    Plan:  -Insulin (Long and Short acting)

## 2024-07-19 NOTE — PLAN OF CARE
[facility] - Med Surg  Discharge Final Note    Primary Care Provider: No, Primary Doctor    Expected Discharge Date: 7/19/2024      Patient discharged home with no needs. Follow up appointment scheduled and placed in AVS. Discharge Plan A and Plan B have been determined by review of patient's clinical status, future medical and therapeutic needs, and coverage/benefits for post-acute care in coordination with multidisciplinary team members.  Final Discharge Note (most recent)       Final Note - 07/19/24 1420          Final Note    Assessment Type Final Discharge Note (P)      Anticipated Discharge Disposition Home or Self Care (P)      Hospital Resources/Appts/Education Provided Appointments scheduled and added to AVS (P)         Post-Acute Status    Coverage Medicaid- [employer] (P)      Discharge Delays None known at this time (P)                      Important Message from Medicare             Contact Info       [address]  [address]  [place] [address] [phone]       Next Steps: Follow up on 7/30/2024    Instructions: Established pt's hospital f/u visit @ 3:00pm. Please bring discharge summary, ID, insurance card, and medication list.    No, Primary Doctor   Relationship: PCP - General        Next Steps: Follow up    No, Primary Doctor   Relationship: PCP - General        Next Steps: Follow up    Make appointment with your endocrinology        Next Steps: Schedule an appointment as soon as possible for a visit            JESSICA Moncada  Case Management  [phone]

## 2024-07-19 NOTE — ASSESSMENT & PLAN NOTE
Patient's anemia is currently stable. Has not received any PRBCs to date. Current CBC reviewed-   Lab Results   Component Value Date    HGB 10.0 (L) 07/18/2024    HCT 29.0 (L) 07/18/2024     Monitor serial CBC and transfuse if patient becomes hemodynamically unstable, symptomatic or H/H drops below 7/21.

## 2024-07-19 NOTE — PLAN OF CARE
Problem: Adult Inpatient Plan of Care  Goal: Plan of Care Review  Outcome: Met  Goal: Patient-Specific Goal (Individualized)  Outcome: Met  Goal: Absence of Hospital-Acquired Illness or Injury  Outcome: Met  Goal: Optimal Comfort and Wellbeing  Outcome: Met  Goal: Readiness for Transition of Care  Outcome: Met     Problem: Diabetes Comorbidity  Goal: Blood Glucose Level Within Targeted Range  Outcome: Met     Problem: Pneumonia  Goal: Fluid Balance  Outcome: Met  Goal: Resolution of Infection Signs and Symptoms  Outcome: Met  Goal: Effective Oxygenation and Ventilation  Outcome: Met     Problem: Fall Injury Risk  Goal: Absence of Fall and Fall-Related Injury  Outcome: Met     Problem: Acute Kidney Injury/Impairment  Goal: Fluid and Electrolyte Balance  Outcome: Met  Goal: Improved Oral Intake  Outcome: Met  Goal: Effective Renal Function  Outcome: Met

## 2024-07-19 NOTE — ASSESSMENT & PLAN NOTE
Patient with acute kidney injury/acute renal failure likely due to pre-renal azotemia due to IVVD EL is currently worsening. Baseline creatinine normal - Labs reviewed- Renal function/electrolytes with Estimated Creatinine Clearance: 46.6 mL/min (A) (based on SCr of 1.7 mg/dL (H)). according to latest data. Monitor urine output and serial BMP and adjust therapy as needed. Avoid nephrotoxins and renally dose meds for GFR listed above.

## 2024-07-19 NOTE — PLAN OF CARE
APPOINTMENT:    Necessary Specialty Appointments: Nephrology     Provider requires schedule review by Office Nurse - Office to call patient with date and time.     APPOINTMENT:    Necessary Specialty Appointments: Endocrinology     Provider requires schedule review by Office Nurse - Office to call patient with date and time.

## 2024-07-19 NOTE — ASSESSMENT & PLAN NOTE
Patient with acute kidney injury/acute renal failure likely due to pre-renal azotemia due to IVVD EL is currently worsening. Baseline creatinine normal - Labs reviewed- Renal function/electrolytes with Estimated Creatinine Clearance: 44 mL/min (A) (based on SCr of 1.8 mg/dL (H)). according to latest data. Monitor urine output and serial BMP and adjust therapy as needed. Avoid nephrotoxins and renally dose meds for GFR listed above.

## 2024-07-21 LAB
BACTERIA BLD CULT: NORMAL
BACTERIA BLD CULT: NORMAL

## 2024-07-22 LAB — GAD65 AB SER-SCNC: 0 NMOL/L

## 2024-07-24 ENCOUNTER — HOSPITAL ENCOUNTER (INPATIENT)
Facility: HOSPITAL | Age: 39
LOS: 4 days | Discharge: HOME | DRG: 699 | End: 2024-07-28
Attending: EMERGENCY MEDICINE | Admitting: STUDENT IN AN ORGANIZED HEALTH CARE EDUCATION/TRAINING PROGRAM
Payer: MEDICAID

## 2024-07-24 DIAGNOSIS — E10.21 DIABETIC NEPHROPATHY ASSOCIATED WITH TYPE 1 DIABETES MELLITUS: Primary | ICD-10-CM

## 2024-07-24 DIAGNOSIS — R60.1 ANASARCA: ICD-10-CM

## 2024-07-24 DIAGNOSIS — N30.00 ACUTE CYSTITIS WITHOUT HEMATURIA: ICD-10-CM

## 2024-07-24 DIAGNOSIS — R07.9 CHEST PAIN: ICD-10-CM

## 2024-07-24 DIAGNOSIS — N17.9 AKI (ACUTE KIDNEY INJURY): ICD-10-CM

## 2024-07-24 LAB
ANION GAP SERPL CALC-SCNC: 4 MMOL/L (ref 8–16)
B-HCG UR QL: NEGATIVE
BACTERIA #/AREA URNS AUTO: ABNORMAL /HPF
BASOPHILS # BLD AUTO: 0.07 K/UL (ref 0–0.2)
BASOPHILS NFR BLD: 0.9 % (ref 0–1.9)
BILIRUB UR QL STRIP: NEGATIVE
BNP SERPL-MCNC: 124 PG/ML (ref 0–99)
BUN SERPL-MCNC: 31 MG/DL (ref 6–20)
BUN SERPL-MCNC: 31 MG/DL (ref 6–30)
CALCIUM SERPL-MCNC: 8.1 MG/DL (ref 8.7–10.5)
CHLORIDE SERPL-SCNC: 108 MMOL/L (ref 95–110)
CHLORIDE SERPL-SCNC: 110 MMOL/L (ref 95–110)
CLARITY UR REFRACT.AUTO: ABNORMAL
CO2 SERPL-SCNC: 22 MMOL/L (ref 23–29)
COLOR UR AUTO: COLORLESS
CREAT SERPL-MCNC: 2 MG/DL (ref 0.5–1.4)
CREAT SERPL-MCNC: 2.3 MG/DL (ref 0.5–1.4)
CTP QC/QA: YES
D DIMER PPP IA.FEU-MCNC: 0.95 MG/L FEU
DIFFERENTIAL METHOD BLD: ABNORMAL
EOSINOPHIL # BLD AUTO: 0.2 K/UL (ref 0–0.5)
EOSINOPHIL NFR BLD: 2.8 % (ref 0–8)
ERYTHROCYTE [DISTWIDTH] IN BLOOD BY AUTOMATED COUNT: 15.2 % (ref 11.5–14.5)
EST. GFR  (NO RACE VARIABLE): 32.2 ML/MIN/1.73 M^2
GLUCOSE SERPL-MCNC: 124 MG/DL (ref 70–110)
GLUCOSE SERPL-MCNC: 129 MG/DL (ref 70–110)
GLUCOSE UR QL STRIP: ABNORMAL
HCT VFR BLD AUTO: 30.6 % (ref 37–48.5)
HCT VFR BLD CALC: 24 %PCV (ref 36–54)
HGB BLD-MCNC: 10 G/DL (ref 12–16)
HGB UR QL STRIP: ABNORMAL
HYALINE CASTS UR QL AUTO: 0 /LPF
IMM GRANULOCYTES # BLD AUTO: 0.02 K/UL (ref 0–0.04)
IMM GRANULOCYTES NFR BLD AUTO: 0.3 % (ref 0–0.5)
KETONES UR QL STRIP: NEGATIVE
LEUKOCYTE ESTERASE UR QL STRIP: ABNORMAL
LYMPHOCYTES # BLD AUTO: 2.1 K/UL (ref 1–4.8)
LYMPHOCYTES NFR BLD: 27.7 % (ref 18–48)
MCH RBC QN AUTO: 23.1 PG (ref 27–31)
MCHC RBC AUTO-ENTMCNC: 32.7 G/DL (ref 32–36)
MCV RBC AUTO: 71 FL (ref 82–98)
MICROSCOPIC COMMENT: ABNORMAL
MONOCYTES # BLD AUTO: 0.6 K/UL (ref 0.3–1)
MONOCYTES NFR BLD: 7.4 % (ref 4–15)
NEUTROPHILS # BLD AUTO: 4.6 K/UL (ref 1.8–7.7)
NEUTROPHILS NFR BLD: 60.9 % (ref 38–73)
NITRITE UR QL STRIP: NEGATIVE
NRBC BLD-RTO: 0 /100 WBC
PH UR STRIP: 6 [PH] (ref 5–8)
PLATELET # BLD AUTO: 307 K/UL (ref 150–450)
PMV BLD AUTO: 11.5 FL (ref 9.2–12.9)
POC IONIZED CALCIUM: 1.17 MMOL/L (ref 1.06–1.42)
POC TCO2 (MEASURED): 22 MMOL/L (ref 23–29)
POTASSIUM BLD-SCNC: 4.5 MMOL/L (ref 3.5–5.1)
POTASSIUM SERPL-SCNC: 4.5 MMOL/L (ref 3.5–5.1)
PROT UR QL STRIP: ABNORMAL
RBC # BLD AUTO: 4.32 M/UL (ref 4–5.4)
RBC #/AREA URNS AUTO: 2 /HPF (ref 0–4)
SAMPLE: ABNORMAL
SODIUM BLD-SCNC: 137 MMOL/L (ref 136–145)
SODIUM SERPL-SCNC: 136 MMOL/L (ref 136–145)
SP GR UR STRIP: 1.01 (ref 1–1.03)
SQUAMOUS #/AREA URNS AUTO: 8 /HPF
TROPONIN I SERPL DL<=0.01 NG/ML-MCNC: <0.006 NG/ML (ref 0–0.03)
URN SPEC COLLECT METH UR: ABNORMAL
WBC # BLD AUTO: 7.55 K/UL (ref 3.9–12.7)
WBC #/AREA URNS AUTO: 21 /HPF (ref 0–5)

## 2024-07-24 PROCEDURE — 85025 COMPLETE CBC W/AUTO DIFF WBC: CPT | Performed by: EMERGENCY MEDICINE

## 2024-07-24 PROCEDURE — 87086 URINE CULTURE/COLONY COUNT: CPT | Performed by: EMERGENCY MEDICINE

## 2024-07-24 PROCEDURE — 93005 ELECTROCARDIOGRAM TRACING: CPT

## 2024-07-24 PROCEDURE — 80048 BASIC METABOLIC PNL TOTAL CA: CPT | Performed by: EMERGENCY MEDICINE

## 2024-07-24 PROCEDURE — 83935 ASSAY OF URINE OSMOLALITY: CPT | Performed by: EMERGENCY MEDICINE

## 2024-07-24 PROCEDURE — 81025 URINE PREGNANCY TEST: CPT | Performed by: EMERGENCY MEDICINE

## 2024-07-24 PROCEDURE — 85379 FIBRIN DEGRADATION QUANT: CPT | Performed by: EMERGENCY MEDICINE

## 2024-07-24 PROCEDURE — 87186 SC STD MICRODIL/AGAR DIL: CPT | Performed by: EMERGENCY MEDICINE

## 2024-07-24 PROCEDURE — 84484 ASSAY OF TROPONIN QUANT: CPT | Performed by: EMERGENCY MEDICINE

## 2024-07-24 PROCEDURE — 87088 URINE BACTERIA CULTURE: CPT | Performed by: EMERGENCY MEDICINE

## 2024-07-24 PROCEDURE — 83880 ASSAY OF NATRIURETIC PEPTIDE: CPT | Performed by: EMERGENCY MEDICINE

## 2024-07-24 PROCEDURE — 93010 ELECTROCARDIOGRAM REPORT: CPT | Mod: ,,, | Performed by: INTERNAL MEDICINE

## 2024-07-24 PROCEDURE — 12000002 HC ACUTE/MED SURGE SEMI-PRIVATE ROOM

## 2024-07-24 PROCEDURE — 81001 URINALYSIS AUTO W/SCOPE: CPT | Performed by: EMERGENCY MEDICINE

## 2024-07-24 RX ADMIN — IOHEXOL 75 ML: 350 INJECTION, SOLUTION INTRAVENOUS at 11:07

## 2024-07-24 NOTE — LETTER
July 28, 2024         [address]  [place] [address]  Phone: [phone]  Fax: [phone]       Patient: Mary Sims   YOB: 1985  Date of Visit: 07/28/2024    To Whom It May Concern:    Wale Sims  was admitted at [facility] on 7/25 and discharged on 07/28/2024. The patient may return to work/school on 7/30 with no restrictions. If you have any questions or concerns, or if I can be of further assistance, please do not hesitate to contact me.    Sincerely,    Kadi Kuo MD

## 2024-07-24 NOTE — ED TRIAGE NOTES
Mary Sims, a 39 y.o. female presents to the ED w/ complaint of Shortness of breath and feeling swollen. Was just released from hospital last week for the same thing.     Triage note:  Chief Complaint   Patient presents with    Shortness of Breath     Was admitted last Tuesday.      Review of patient's allergies indicates:  No Known Allergies  Past Medical History:   Diagnosis Date    Cellulitis     Diabetes mellitus     Hypertension     Retinal detachment          APPEARANCE: awake and alert in NAD. PAIN  0/10  SKIN: warm, dry and intact. No breakdown or bruising.  MUSCULOSKELETAL: Patient moving all extremities spontaneously, no obvious swelling or deformities noted. Ambulates independently.  RESPIRATORY: Reports shortness of breath.Respirations unlabored.   CARDIAC: Denies CP, 2+ distal pulses; no peripheral edema  ABDOMEN: S/ND/NT, Denies nausea  : voids spontaneously, denies difficulty  Neurologic: AAO x 4; follows commands equal strength in all extremities; denies numbness/tingling. Denies dizziness

## 2024-07-25 PROBLEM — R60.1 ANASARCA: Status: ACTIVE | Noted: 2024-07-25

## 2024-07-25 PROBLEM — E16.2 HYPOGLYCEMIA: Status: ACTIVE | Noted: 2024-07-25

## 2024-07-25 PROBLEM — E11.21 DIABETIC NEPHROPATHY: Status: ACTIVE | Noted: 2024-07-25

## 2024-07-25 PROBLEM — E11.9 TYPE 2 DIABETES MELLITUS: Status: ACTIVE | Noted: 2024-07-25

## 2024-07-25 PROBLEM — R73.9 HYPERGLYCEMIA: Status: RESOLVED | Noted: 2020-01-05 | Resolved: 2024-07-25

## 2024-07-25 PROBLEM — N04.9 NEPHROTIC SYNDROME: Status: ACTIVE | Noted: 2024-07-25

## 2024-07-25 PROBLEM — E10.69 TYPE 1 DIABETES MELLITUS WITH OTHER SPECIFIED COMPLICATION: Status: RESOLVED | Noted: 2020-01-05 | Resolved: 2024-07-25

## 2024-07-25 PROBLEM — I10 HYPERTENSION: Status: ACTIVE | Noted: 2024-07-25

## 2024-07-25 LAB
ALBUMIN SERPL BCP-MCNC: 1.9 G/DL (ref 3.5–5.2)
ALBUMIN SERPL BCP-MCNC: 1.9 G/DL (ref 3.5–5.2)
ALBUMIN/CREAT UR: 6311.8 UG/MG (ref 0–30)
ALP SERPL-CCNC: 88 U/L (ref 55–135)
ALP SERPL-CCNC: 89 U/L (ref 55–135)
ALT SERPL W/O P-5'-P-CCNC: 14 U/L (ref 10–44)
ALT SERPL W/O P-5'-P-CCNC: 16 U/L (ref 10–44)
ANION GAP SERPL CALC-SCNC: 7 MMOL/L (ref 8–16)
AST SERPL-CCNC: 19 U/L (ref 10–40)
AST SERPL-CCNC: 19 U/L (ref 10–40)
BASOPHILS # BLD AUTO: 0.07 K/UL (ref 0–0.2)
BASOPHILS NFR BLD: 1 % (ref 0–1.9)
BILIRUB DIRECT SERPL-MCNC: 0.1 MG/DL (ref 0.1–0.3)
BILIRUB SERPL-MCNC: 0.1 MG/DL (ref 0.1–1)
BILIRUB SERPL-MCNC: 0.1 MG/DL (ref 0.1–1)
BUN SERPL-MCNC: 27 MG/DL (ref 6–20)
CALCIUM SERPL-MCNC: 8.4 MG/DL (ref 8.7–10.5)
CHLORIDE SERPL-SCNC: 113 MMOL/L (ref 95–110)
CHLORIDE UR-SCNC: 84 MMOL/L (ref 25–200)
CHOLEST SERPL-MCNC: 322 MG/DL (ref 120–199)
CHOLEST/HDLC SERPL: 4.7 {RATIO} (ref 2–5)
CO2 SERPL-SCNC: 19 MMOL/L (ref 23–29)
CREAT SERPL-MCNC: 1.8 MG/DL (ref 0.5–1.4)
CREAT UR-MCNC: 34 MG/DL (ref 15–325)
CREAT UR-MCNC: 34 MG/DL (ref 15–325)
DIFFERENTIAL METHOD BLD: ABNORMAL
EOSINOPHIL # BLD AUTO: 0.1 K/UL (ref 0–0.5)
EOSINOPHIL NFR BLD: 1.8 % (ref 0–8)
ERYTHROCYTE [DISTWIDTH] IN BLOOD BY AUTOMATED COUNT: 15.5 % (ref 11.5–14.5)
EST. GFR  (NO RACE VARIABLE): 36.5 ML/MIN/1.73 M^2
GLUCOSE SERPL-MCNC: 59 MG/DL (ref 70–110)
HCT VFR BLD AUTO: 30.5 % (ref 37–48.5)
HDLC SERPL-MCNC: 68 MG/DL (ref 40–75)
HDLC SERPL: 21.1 % (ref 20–50)
HGB BLD-MCNC: 9.9 G/DL (ref 12–16)
IMM GRANULOCYTES # BLD AUTO: 0.02 K/UL (ref 0–0.04)
IMM GRANULOCYTES NFR BLD AUTO: 0.3 % (ref 0–0.5)
INFLUENZA A, MOLECULAR: NOT DETECTED
INFLUENZA B, MOLECULAR: NOT DETECTED
LDLC SERPL CALC-MCNC: 229 MG/DL (ref 63–159)
LYMPHOCYTES # BLD AUTO: 2.1 K/UL (ref 1–4.8)
LYMPHOCYTES NFR BLD: 29.1 % (ref 18–48)
MAGNESIUM SERPL-MCNC: 2 MG/DL (ref 1.6–2.6)
MCH RBC QN AUTO: 23.2 PG (ref 27–31)
MCHC RBC AUTO-ENTMCNC: 32.5 G/DL (ref 32–36)
MCV RBC AUTO: 72 FL (ref 82–98)
MICROALBUMIN UR DL<=1MG/L-MCNC: 2146 UG/ML
MONOCYTES # BLD AUTO: 0.7 K/UL (ref 0.3–1)
MONOCYTES NFR BLD: 10.2 % (ref 4–15)
NEUTROPHILS # BLD AUTO: 4.1 K/UL (ref 1.8–7.7)
NEUTROPHILS NFR BLD: 57.6 % (ref 38–73)
NONHDLC SERPL-MCNC: 254 MG/DL
NRBC BLD-RTO: 0 /100 WBC
OHS QRS DURATION: 68 MS
OHS QTC CALCULATION: 457 MS
OSMOLALITY UR: 356 MOSM/KG (ref 50–1200)
PHOSPHATE SERPL-MCNC: 3.6 MG/DL (ref 2.7–4.5)
PLATELET # BLD AUTO: 240 K/UL (ref 150–450)
PMV BLD AUTO: 12.2 FL (ref 9.2–12.9)
POCT GLUCOSE: 129 MG/DL (ref 70–110)
POCT GLUCOSE: 157 MG/DL (ref 70–110)
POCT GLUCOSE: 217 MG/DL (ref 70–110)
POCT GLUCOSE: 53 MG/DL (ref 70–110)
POCT GLUCOSE: 89 MG/DL (ref 70–110)
POTASSIUM SERPL-SCNC: 4.1 MMOL/L (ref 3.5–5.1)
PROT SERPL-MCNC: 5.3 G/DL (ref 6–8.4)
PROT SERPL-MCNC: 5.4 G/DL (ref 6–8.4)
PROT UR-MCNC: 329 MG/DL (ref 0–15)
PROT/CREAT UR: 9.68 MG/G{CREAT} (ref 0–0.2)
RBC # BLD AUTO: 4.26 M/UL (ref 4–5.4)
RSV AG BY MOLECULAR METHOD: NOT DETECTED
SARS-COV-2 RNA RESP QL NAA+PROBE: NOT DETECTED
SODIUM SERPL-SCNC: 139 MMOL/L (ref 136–145)
SODIUM UR-SCNC: 91 MMOL/L (ref 20–250)
TRIGL SERPL-MCNC: 125 MG/DL (ref 30–150)
TROPONIN I SERPL DL<=0.01 NG/ML-MCNC: <0.006 NG/ML (ref 0–0.03)
UUN UR-MCNC: 287 MG/DL (ref 140–1050)
WBC # BLD AUTO: 7.16 K/UL (ref 3.9–12.7)

## 2024-07-25 PROCEDURE — 99285 EMERGENCY DEPT VISIT HI MDM: CPT | Mod: 25

## 2024-07-25 PROCEDURE — 63600175 PHARM REV CODE 636 W HCPCS: Performed by: EMERGENCY MEDICINE

## 2024-07-25 PROCEDURE — 84300 ASSAY OF URINE SODIUM: CPT | Performed by: EMERGENCY MEDICINE

## 2024-07-25 PROCEDURE — 82043 UR ALBUMIN QUANTITATIVE: CPT | Performed by: EMERGENCY MEDICINE

## 2024-07-25 PROCEDURE — 82436 ASSAY OF URINE CHLORIDE: CPT | Performed by: EMERGENCY MEDICINE

## 2024-07-25 PROCEDURE — 80061 LIPID PANEL: CPT | Performed by: STUDENT IN AN ORGANIZED HEALTH CARE EDUCATION/TRAINING PROGRAM

## 2024-07-25 PROCEDURE — 96372 THER/PROPH/DIAG INJ SC/IM: CPT

## 2024-07-25 PROCEDURE — 99204 OFFICE O/P NEW MOD 45 MIN: CPT | Mod: ,,, | Performed by: INTERNAL MEDICINE

## 2024-07-25 PROCEDURE — 25000003 PHARM REV CODE 250: Performed by: EMERGENCY MEDICINE

## 2024-07-25 PROCEDURE — 21400001 HC TELEMETRY ROOM

## 2024-07-25 PROCEDURE — 0241U SARS-COV2 (COVID) WITH FLU/RSV BY PCR: CPT | Performed by: STUDENT IN AN ORGANIZED HEALTH CARE EDUCATION/TRAINING PROGRAM

## 2024-07-25 PROCEDURE — 63600175 PHARM REV CODE 636 W HCPCS

## 2024-07-25 PROCEDURE — G0378 HOSPITAL OBSERVATION PER HR: HCPCS

## 2024-07-25 PROCEDURE — 80053 COMPREHEN METABOLIC PANEL: CPT

## 2024-07-25 PROCEDURE — 84100 ASSAY OF PHOSPHORUS: CPT

## 2024-07-25 PROCEDURE — 96365 THER/PROPH/DIAG IV INF INIT: CPT

## 2024-07-25 PROCEDURE — 36415 COLL VENOUS BLD VENIPUNCTURE: CPT | Performed by: STUDENT IN AN ORGANIZED HEALTH CARE EDUCATION/TRAINING PROGRAM

## 2024-07-25 PROCEDURE — 25000003 PHARM REV CODE 250

## 2024-07-25 PROCEDURE — 83735 ASSAY OF MAGNESIUM: CPT

## 2024-07-25 PROCEDURE — 82570 ASSAY OF URINE CREATININE: CPT | Performed by: EMERGENCY MEDICINE

## 2024-07-25 PROCEDURE — 84540 ASSAY OF URINE/UREA-N: CPT | Performed by: EMERGENCY MEDICINE

## 2024-07-25 PROCEDURE — 84156 ASSAY OF PROTEIN URINE: CPT | Performed by: EMERGENCY MEDICINE

## 2024-07-25 PROCEDURE — 80076 HEPATIC FUNCTION PANEL: CPT | Performed by: EMERGENCY MEDICINE

## 2024-07-25 PROCEDURE — 84484 ASSAY OF TROPONIN QUANT: CPT | Performed by: EMERGENCY MEDICINE

## 2024-07-25 PROCEDURE — 36415 COLL VENOUS BLD VENIPUNCTURE: CPT

## 2024-07-25 PROCEDURE — 85025 COMPLETE CBC W/AUTO DIFF WBC: CPT

## 2024-07-25 PROCEDURE — 25500020 PHARM REV CODE 255: Performed by: EMERGENCY MEDICINE

## 2024-07-25 RX ORDER — ATORVASTATIN CALCIUM 20 MG/1
20 TABLET, FILM COATED ORAL DAILY
Status: DISCONTINUED | OUTPATIENT
Start: 2024-07-26 | End: 2024-07-28 | Stop reason: HOSPADM

## 2024-07-25 RX ORDER — SODIUM CHLORIDE 0.9 % (FLUSH) 0.9 %
10 SYRINGE (ML) INJECTION EVERY 12 HOURS PRN
Status: DISCONTINUED | OUTPATIENT
Start: 2024-07-25 | End: 2024-07-28 | Stop reason: HOSPADM

## 2024-07-25 RX ORDER — INSULIN GLARGINE 100 [IU]/ML
20 INJECTION, SOLUTION SUBCUTANEOUS DAILY
Status: DISCONTINUED | OUTPATIENT
Start: 2024-07-25 | End: 2024-07-25

## 2024-07-25 RX ORDER — NALOXONE HCL 0.4 MG/ML
0.02 VIAL (ML) INJECTION
Status: DISCONTINUED | OUTPATIENT
Start: 2024-07-25 | End: 2024-07-28 | Stop reason: HOSPADM

## 2024-07-25 RX ORDER — IBUPROFEN 200 MG
16 TABLET ORAL
Status: DISCONTINUED | OUTPATIENT
Start: 2024-07-25 | End: 2024-07-28 | Stop reason: HOSPADM

## 2024-07-25 RX ORDER — PROCHLORPERAZINE EDISYLATE 5 MG/ML
5 INJECTION INTRAMUSCULAR; INTRAVENOUS EVERY 6 HOURS PRN
Status: DISCONTINUED | OUTPATIENT
Start: 2024-07-25 | End: 2024-07-28 | Stop reason: HOSPADM

## 2024-07-25 RX ORDER — GLUCAGON 1 MG
1 KIT INJECTION
Status: DISCONTINUED | OUTPATIENT
Start: 2024-07-25 | End: 2024-07-28 | Stop reason: HOSPADM

## 2024-07-25 RX ORDER — INSULIN GLARGINE 100 [IU]/ML
15 INJECTION, SOLUTION SUBCUTANEOUS ONCE
Status: COMPLETED | OUTPATIENT
Start: 2024-07-25 | End: 2024-07-25

## 2024-07-25 RX ORDER — INSULIN GLARGINE 100 [IU]/ML
30 INJECTION, SOLUTION SUBCUTANEOUS DAILY
Status: DISCONTINUED | OUTPATIENT
Start: 2024-07-25 | End: 2024-07-25

## 2024-07-25 RX ORDER — ENOXAPARIN SODIUM 100 MG/ML
80 INJECTION SUBCUTANEOUS EVERY 12 HOURS
Status: DISCONTINUED | OUTPATIENT
Start: 2024-07-25 | End: 2024-07-28 | Stop reason: HOSPADM

## 2024-07-25 RX ORDER — INSULIN ASPART 100 [IU]/ML
0-5 INJECTION, SOLUTION INTRAVENOUS; SUBCUTANEOUS
Status: DISCONTINUED | OUTPATIENT
Start: 2024-07-25 | End: 2024-07-28 | Stop reason: HOSPADM

## 2024-07-25 RX ORDER — ACETAMINOPHEN 325 MG/1
650 TABLET ORAL EVERY 4 HOURS PRN
Status: DISCONTINUED | OUTPATIENT
Start: 2024-07-25 | End: 2024-07-28 | Stop reason: HOSPADM

## 2024-07-25 RX ORDER — ENOXAPARIN SODIUM 100 MG/ML
40 INJECTION SUBCUTANEOUS EVERY 24 HOURS
Status: DISCONTINUED | OUTPATIENT
Start: 2024-07-25 | End: 2024-07-25

## 2024-07-25 RX ORDER — FUROSEMIDE 20 MG/1
40 TABLET ORAL DAILY
Status: DISCONTINUED | OUTPATIENT
Start: 2024-07-26 | End: 2024-07-27

## 2024-07-25 RX ORDER — INSULIN ASPART 100 [IU]/ML
7.5 INJECTION, SOLUTION INTRAVENOUS; SUBCUTANEOUS
Status: DISCONTINUED | OUTPATIENT
Start: 2024-07-25 | End: 2024-07-25

## 2024-07-25 RX ORDER — TALC
6 POWDER (GRAM) TOPICAL NIGHTLY PRN
Status: DISCONTINUED | OUTPATIENT
Start: 2024-07-25 | End: 2024-07-28 | Stop reason: HOSPADM

## 2024-07-25 RX ORDER — ONDANSETRON 4 MG/1
8 TABLET, ORALLY DISINTEGRATING ORAL EVERY 8 HOURS PRN
Status: DISCONTINUED | OUTPATIENT
Start: 2024-07-25 | End: 2024-07-28 | Stop reason: HOSPADM

## 2024-07-25 RX ORDER — FUROSEMIDE 10 MG/ML
40 INJECTION INTRAMUSCULAR; INTRAVENOUS DAILY
Status: DISCONTINUED | OUTPATIENT
Start: 2024-07-25 | End: 2024-07-25

## 2024-07-25 RX ORDER — IBUPROFEN 200 MG
24 TABLET ORAL
Status: DISCONTINUED | OUTPATIENT
Start: 2024-07-25 | End: 2024-07-28 | Stop reason: HOSPADM

## 2024-07-25 RX ORDER — LOSARTAN POTASSIUM 25 MG/1
25 TABLET ORAL DAILY
Status: DISCONTINUED | OUTPATIENT
Start: 2024-07-26 | End: 2024-07-28 | Stop reason: HOSPADM

## 2024-07-25 RX ORDER — POLYETHYLENE GLYCOL 3350 17 G/17G
17 POWDER, FOR SOLUTION ORAL DAILY
Status: DISCONTINUED | OUTPATIENT
Start: 2024-07-25 | End: 2024-07-28 | Stop reason: HOSPADM

## 2024-07-25 RX ORDER — LOSARTAN POTASSIUM 50 MG/1
50 TABLET ORAL DAILY
Status: DISCONTINUED | OUTPATIENT
Start: 2024-07-25 | End: 2024-07-25

## 2024-07-25 RX ADMIN — LOSARTAN POTASSIUM 50 MG: 50 TABLET, FILM COATED ORAL at 08:07

## 2024-07-25 RX ADMIN — ENOXAPARIN SODIUM 80 MG: 80 INJECTION SUBCUTANEOUS at 04:07

## 2024-07-25 RX ADMIN — INSULIN ASPART 1 UNITS: 100 INJECTION, SOLUTION INTRAVENOUS; SUBCUTANEOUS at 10:07

## 2024-07-25 RX ADMIN — Medication 16 G: at 06:07

## 2024-07-25 RX ADMIN — MEROPENEM 1 G: 1 INJECTION, POWDER, FOR SOLUTION INTRAVENOUS at 12:07

## 2024-07-25 RX ADMIN — ACETAMINOPHEN 650 MG: 325 TABLET ORAL at 10:07

## 2024-07-25 RX ADMIN — ACETAMINOPHEN 650 MG: 325 TABLET ORAL at 08:07

## 2024-07-25 RX ADMIN — INSULIN GLARGINE 15 UNITS: 100 INJECTION, SOLUTION SUBCUTANEOUS at 05:07

## 2024-07-25 RX ADMIN — FUROSEMIDE 40 MG: 10 INJECTION, SOLUTION INTRAVENOUS at 08:07

## 2024-07-25 NOTE — ASSESSMENT & PLAN NOTE
Chronic, uncontrolled. Latest blood pressure and vitals reviewed-     Temp:  [97.8 °F (36.6 °C)-98.1 °F (36.7 °C)]   Pulse:  []   Resp:  [16-18]   BP: (161-174)/(80-82)   SpO2:  [96 %-98 %] .   Home meds for hypertension were reviewed and noted below.   Hypertension Medications               losartan (COZAAR) 50 MG tablet Take 1 tablet (50 mg total) by mouth once daily.          - will manage blood pressure with home antihypertensive regimen

## 2024-07-25 NOTE — ASSESSMENT & PLAN NOTE
- had UTI at last hospitalization and previous cultures grew E-coli sensitive to gentamicin, meropenem, tobramycin, and nitrofurantoin  - current UA showed 3+ protein, 2+ glucose, 1+ leukocyte esterase and many bacteria  - Urine culture pending  - given dose of meropenem in ED, awaiting culture results to evaluate need for further antibiotic coverage

## 2024-07-25 NOTE — HPI
"Mary Sims is a 40 YO female with a PMH significant for T2DM (on insulin), HTN, HLD and diabetic retinopathy that presents to the ED as a bounce back with a chief complaint of shortness of breath and generalized swelling. She was recently discharged July 19th for HHS. Patient states that the dyspnea and anasarca were present even before her most recent hospitalization but that since being discharged, she has noticed progressively increased swelling in her body and dyspnea on exertion. She also endorses non-radiating, substernal chest "tightness" that is 8/10 and worse with activity. She has noticed an increase in her weight, a dry cough and occasional palpitations. She denies orthopnea, fever, chills, or abdominal pain. She states her blood sugars have not been controlled, ranging in the high 300s over the last week.     In the ED, the patient was hypertensive at 174/82 but otherwise hemodynamically stable. CBC showed microcytic anemia with a Hgb of 10. CMP showed an EL with a BUN of 31 and creatinine of 2.0 (baseline 1.2, discharged at 1.8). Albumin was decreased at 1.9. BNP was mildly elevated at 124. Troponin was negative. EKG showed normal sinus rhythm. D-dimer was elevated at 0.95. UA was significant for 3+ protein, 2+ glucose, 1+ leukocyte esterase and many bacteria; reflex to culture pending. HbA1c from her last hospitalization was >14.0. CXR showed mild interstitial infiltrate/edema and ground-glass alveolar infiltrate at the lung bases without dense consolidation. CTA chest was negative for PE, but showed small ground-glass pulmonary opacities suspicious for atypical pneumonia, including the possibility of COVID-19 pneumonia. Echo from her most recent visit was normal, with an EF of 60-65%. Due to her UA, patient was given a single dose of meropenem in the ED and was admitted to hospital medicine.   "

## 2024-07-25 NOTE — ED NOTES
I-STAT Chem-8+ Results:   Value Reference Range   Sodium 137 136-145 mmol/L   Potassium  4.5 3.5-5.1 mmol/L   Chloride 108  mmol/L   Ionized Calcium 1.17 1.06-1.42 mmol/L   CO2 (measured) 22 23-29 mmol/L   Glucose 129  mg/dL   BUN 31 6-30 mg/dL   Creatinine 2.3 0.5-1.4 mg/dL   Hematocrit 24 36-54%

## 2024-07-25 NOTE — ASSESSMENT & PLAN NOTE
EL is likely due to  nephrotic syndrome secondary to uncontrolled diabetes and hypertension . Baseline creatinine is  1.2 . Most recent creatinine and eGFR are listed below.  Recent Labs     07/24/24 2142   CREATININE 2.0*   EGFRNORACEVR 32.2*     - EL is  worsening since discharge (previous creatinine 1.8)  - avoid nephrotoxins and renally dose meds for GFR listed above  - monitor urine output, trend CMP, and adjust therapy as needed

## 2024-07-25 NOTE — PLAN OF CARE
[facility] - Med Surg  Initial Discharge Assessment       Primary Care Provider: No, Primary Doctor    Admission Diagnosis: Syncope and collapse [R55]  Hyperglycemia [R73.9]  Chest pain [R07.9]  Near syncope [R55]    Admission Date: 7/16/2024  Expected Discharge Date: 7/19/2024    Transition of Care Barriers: None    Payor: MEDICAID / Plan: AETNA [facility] / Product Type: Managed Medicaid /     Extended Emergency Contact Information  Primary Emergency Contact: Pricilla Sims  Address: [address] [place] [address] [facility]  Home Phone: [phone]  Relation: Mother  Secondary Emergency Contact: JarrettJonh  Mobile Phone: [phone]  Relation: Significant other  Preferred language: English   needed? No    Discharge Plan A: Home with family  Discharge Plan B: Home      [facility][id number] - [place] - [address] AT [facility] & [address]  [address]  [facility] [address]  Phone: [phone] Fax: [phone]    [facility] #[id number] - [place] - [address] AT [facility] & [address]  [address] [place] [address]  Phone: [phone] Fax: [phone]    [id number] - [place] - [address] [facility] AT [facility] & [facility]  [address] [place] [address]  [place] [address]  Phone: [phone] Fax: [phone]    [facility] #[id number] - [place] - [address] AT [facility] & [address]  [address] [place] [address]  Phone: [phone] Fax: [phone]      Initial Assessment (most recent)       Adult Discharge Assessment - 07/25/24 1522          Discharge Assessment    Assessment Type Discharge Planning Assessment     Confirmed/corrected address, phone number and insurance Yes     Confirmed Demographics Correct on Facesheet     Source of Information patient     Does patient/caregiver understand  observation status Yes     Communicated LEXIE with patient/caregiver Yes     Reason For Admission SOB, body swelling     People in Home significant other;child(cammie), dependent     Do you expect to return to your current living situation? Yes     Do you have help at home or someone to help you manage your care at home? Yes     Who are your caregiver(s) and their phone number(s)? Jonh Farias  Significant other  [phone]     Prior to hospitilization cognitive status: Alert/Oriented     Current cognitive status: Alert/Oriented     Walking or Climbing Stairs Difficulty no     Dressing/Bathing Difficulty no     Home Accessibility stairs to enter home   full flight to enter    Home Layout Able to live on 1st floor     Equipment Currently Used at Home none     Readmission within 30 days? Yes     Patient currently being followed by outpatient case management? No     Do you currently have service(s) that help you manage your care at home? No     Do you take prescription medications? Yes     Do you have prescription coverage? Yes     [facility]     Do you have any problems affording any of your prescribed medications? No     Is the patient taking medications as prescribed? yes     Who is going to help you get home at discharge? Jonh Farias  Significant other  [phone]     How do you get to doctors appointments? car, drives self     Are you on dialysis? No     Do you take coumadin? No     Discharge Plan A Home with family     Discharge Plan B Home     DME Needed Upon Discharge  none     Discharge Plan discussed with: Patient     Transition of Care Barriers None                     Readmission Assessment (most recent)       Readmission Assessment - 07/25/24 7334          Readmission    Was this a planned readmission? No (P)      Why were you hospitalized in the last 30 days? syncope, body swelling (P)      Why were you readmitted? Related to previous admission;Alarmed about  "signs/symptoms (P)      When you left the hospital how did you feel? "I wanted to go but knew something was wrong" (P)      When you left the hospital where did you go? Home with Family (P)      Did patient/caregiver refused recommended DC plan? No (P)      Tell me about what happened between when you left the hospital and the day you returned. Dc'd home w/ family, readmitted due to increased body swelling and SOB (P)      When did you start not feeling well? "The next week" (P)      Did you try to see or did see a doctor or nurse before you came? No (P)      Why? Decided to go straight to ED (P)      Did you have  a follow-up appointment on discharge? Yes (P)      Did you go? No (P)      Why? -- (P)    Appt has not yet arrived                Discharge Plan A and Plan B have been determined by review of patient's clinical status, future medical and therapeutic needs, and coverage/benefits for post-acute care in coordination with multidisciplinary team members.                       Gilbert Forde, ANGELIQUE, LMSW  [facility]  Case Management  Ext. [phone]     "

## 2024-07-25 NOTE — PLAN OF CARE
Problem: Adult Inpatient Plan of Care  Goal: Plan of Care Review  Outcome: Progressing  Goal: Patient-Specific Goal (Individualized)  Outcome: Progressing  Goal: Absence of Hospital-Acquired Illness or Injury  Outcome: Progressing  Goal: Optimal Comfort and Wellbeing  Outcome: Progressing  Goal: Readiness for Transition of Care  Outcome: Progressing     Problem: Diabetes Comorbidity  Goal: Blood Glucose Level Within Targeted Range  Outcome: Progressing     Problem: Acute Kidney Injury/Impairment  Goal: Fluid and Electrolyte Balance  Outcome: Progressing  Goal: Improved Oral Intake  Outcome: Progressing  Goal: Effective Renal Function  Outcome: Progressing     Problem: Pneumonia  Goal: Fluid Balance  Outcome: Progressing  Goal: Resolution of Infection Signs and Symptoms  Outcome: Progressing  Goal: Effective Oxygenation and Ventilation  Outcome: Progressing

## 2024-07-25 NOTE — SUBJECTIVE & OBJECTIVE
Past Medical History:   Diagnosis Date    Cellulitis     Diabetes mellitus     Hypertension     Retinal detachment        Past Surgical History:   Procedure Laterality Date    VITRECTOMY BY PARS PLANA APPROACH Right 4/24/2019    Procedure: VITRECTOMY, PARS PLANA APPROACH;  Surgeon: JAMI Schulz MD;  Location: [facility] OR [address] [place] [address];  Service: Ophthalmology;  Laterality: Right;  25g PPV/membrane stripping/ILM peel/EL/AFx/GFX/avastin OD    VITRECTOMY BY PARS PLANA APPROACH Left 5/29/2019    Procedure: VITRECTOMY, PARS PLANA  APPROACH  25g PPV/Membrane Stripping/IML peel/EL/AFX/Avastin left eye   60 mins;  Surgeon: JAMI Schulz MD;  Location: [facility] OR [address] [place] [address];  Service: Ophthalmology;  Laterality: Left;       Review of patient's allergies indicates:  No Known Allergies  Current Facility-Administered Medications   Medication Frequency    acetaminophen tablet 650 mg Q4H PRN    dextrose 10% bolus 125 mL 125 mL PRN    dextrose 10% bolus 250 mL 250 mL PRN    enoxaparin injection 40 mg Daily    glucagon (human recombinant) injection 1 mg PRN    glucose chewable tablet 16 g PRN    glucose chewable tablet 24 g PRN    insulin aspart U-100 pen 0-5 Units QID (AC + HS) PRN    melatonin tablet 6 mg Nightly PRN    naloxone 0.4 mg/mL injection 0.02 mg PRN    ondansetron disintegrating tablet 8 mg Q8H PRN    polyethylene glycol packet 17 g Daily    prochlorperazine injection Soln 5 mg Q6H PRN    sodium chloride 0.9% flush 10 mL Q12H PRN     Facility-Administered Medications Ordered in Other Encounters   Medication Frequency    cyclopentolate 1% ophthalmic solution 1 drop On Call Procedure    homatropine 5 % ophthalmic solution 1 drop On Call Procedure    moxifloxacin 0.5 % ophthalmic solution 1 drop On Call Procedure    phenylephrine HCL 2.5% ophthalmic solution 1 drop On Call Procedure    prednisoLONE acetate 1 % ophthalmic suspension 1 drop On Call Procedure    tetracaine HCl (PF) 0.5 % Drop 1 drop On Call Procedure     tropicamide 1% ophthalmic solution 1 drop On Call Procedure     Family History       Problem Relation (Age of Onset)    Diabetes Mother          Tobacco Use    Smoking status: Never    Smokeless tobacco: Never   Substance and Sexual Activity    Alcohol use: Yes     Comment: social    Drug use: No    Sexual activity: Yes     Partners: Male     Birth control/protection: I.U.D.     Review of Systems   Constitutional:  Negative for fatigue and fever.   Respiratory:  Negative for cough and shortness of breath.    Gastrointestinal:  Negative for abdominal distention, abdominal pain, constipation, diarrhea and nausea.   Genitourinary:  Negative for flank pain.   Neurological:  Negative for dizziness and light-headedness.   Psychiatric/Behavioral: Negative.       Objective:     Vital Signs (Most Recent):  Temp: 98.7 °F (37.1 °C) (07/25/24 0800)  Pulse: 88 (07/25/24 1059)  Resp: 18 (07/25/24 0800)  BP: 133/79 (07/25/24 0800)  SpO2: 99 % (07/25/24 0800) Vital Signs (24h Range):  Temp:  [97.8 °F (36.6 °C)-98.7 °F (37.1 °C)] 98.7 °F (37.1 °C)  Pulse:  [] 88  Resp:  [16-18] 18  SpO2:  [96 %-99 %] 99 %  BP: (133-174)/(79-82) 133/79     Weight: 85.7 kg (189 lb) (07/24/24 1747)  Body mass index is 33.48 kg/m².  Body surface area is 1.95 meters squared.    I/O last 3 completed shifts:  In: 100 [IV Piggyback:100]  Out: 1 [Urine:1]     Physical Exam  Constitutional:       Appearance: Normal appearance. She is normal weight.   HENT:      Head: Normocephalic and atraumatic.      Mouth/Throat:      Mouth: Mucous membranes are moist.   Cardiovascular:      Pulses: Normal pulses.      Heart sounds: Normal heart sounds.   Pulmonary:      Effort: Pulmonary effort is normal. No respiratory distress.   Musculoskeletal:         General: No swelling or tenderness.   Skin:     General: Skin is warm and dry.   Neurological:      Mental Status: She is alert.          Significant Labs:  All labs within the past 24 hours have been  reviewed.    Significant Imaging:  X-Ray: Reviewed

## 2024-07-25 NOTE — HPI
Mary is a pleasant 38 yo woman w pmhx significant for uncontrolled DMT2 on insulin, HTN, HLD, diabetic retinopathy, recently in the hospital June 19th for HHS/UTI, returned w worsening edema SOB, CP, cough, palpitations, CTA showed possible atypical pna, she was diuresed and nephrology was consulted for proteinuria and likely nephrotic syndrome. Recent echocardiogram unremakrable. Most recent A1c >14% for over a decade her diabetes has been uncontrolled. UA showed 3+ protein and bacteria.

## 2024-07-25 NOTE — ASSESSMENT & PLAN NOTE
- present prior to previous admission where albumin was 1.5, today's albumin is 1.9  - secondary to the hypoalbuminemia leading to decreased oncotic pressure in the blood vessels and increased fluid extravasation into surrounding tissues   - see plan for nephrotic syndrome for management      Labs/Medications

## 2024-07-25 NOTE — PLAN OF CARE
Shahriar Griffiths - Med Surg  Initial Discharge Assessment        Primary Care Provider: No, Primary Doctor     Admission Diagnosis: Syncope and collapse [R55]  Hyperglycemia [R73.9]  Chest pain [R07.9]  Near syncope [R55]     Admission Date: 7/16/2024  Expected Discharge Date: 7/19/2024     Transition of Care Barriers: None     Payor: MEDICAID / Plan: AETNA [facility] / Product Type: Managed Medicaid /      Extended Emergency Contact Information  Primary Emergency Contact: Pricilla Sims  Address: [address] [place] [address] [facility]  Home Phone: [phone]  Relation: Mother  Secondary Emergency Contact: JarrettJonh  Mobile Phone: [phone]  Relation: Significant other  Preferred language: English   needed? No     Discharge Plan A: Home with family  Discharge Plan B: Home        [facility] #[id number] - [place] - [address] AT [facility] & [place]  [address]  [facility] [address]  Phone: [phone] Fax: [phone]     [facility] #[id number] - [place] - [address] AT [facility] OF [facility] & [address]  [address] [place] [address]  Phone: [phone] Fax: [phone]     [id number] - [place] - [address] [facility] AT [facility] & [facility]  [address]  [place] [address]  Phone: [phone] Fax: [phone]     [id number] - [place] - [address] AT [facility] & [address]  [address] [place] [address]  Phone: [phone] Fax: [phone]        Initial Assessment (most recent)         Adult Discharge Assessment - 07/25/24 1522                    Discharge Assessment     Assessment Type Discharge Planning Assessment      Confirmed/corrected address, phone number and insurance Yes      Confirmed Demographics Correct on Facesheet      Source of Information patient      Does  patient/caregiver understand observation status Yes      Communicated LEXIE with patient/caregiver Yes      Reason For Admission SOB, body swelling      People in Home significant other;child(cammie), dependent      Do you expect to return to your current living situation? Yes      Do you have help at home or someone to help you manage your care at home? Yes      Who are your caregiver(s) and their phone number(s)? Jonh Farias  Significant other  [phone]      Prior to hospitilization cognitive status: Alert/Oriented      Current cognitive status: Alert/Oriented      Walking or Climbing Stairs Difficulty no      Dressing/Bathing Difficulty no      Home Accessibility stairs to enter home   full flight to enter     Home Layout Able to live on 1st floor      Equipment Currently Used at Home none      Readmission within 30 days? Yes      Patient currently being followed by outpatient case management? No      Do you currently have service(s) that help you manage your care at home? No      Do you take prescription medications? Yes      Do you have prescription coverage? Yes      Coverage MEDICAID - [facility] -      Do you have any problems affording any of your prescribed medications? No      Is the patient taking medications as prescribed? yes      Who is going to help you get home at discharge? Jonh Farias  Significant other  [phone]      How do you get to doctors appointments? car, drives self      Are you on dialysis? No      Do you take coumadin? No      Discharge Plan A Home with family      Discharge Plan B Home      DME Needed Upon Discharge  none      Discharge Plan discussed with: Patient      Transition of Care Barriers None                           Readmission Assessment (most recent)         Readmission Assessment - 07/25/24 1524                    Readmission     Was this a planned readmission? No (P)       Why were you hospitalized in the last 30 days? syncope, body swelling  "(P)       Why were you readmitted? Related to previous admission;Alarmed about signs/symptoms (P)       When you left the hospital how did you feel? "I wanted to go but knew something was wrong" (P)       When you left the hospital where did you go? Home with Family (P)       Did patient/caregiver refused recommended DC plan? No (P)       Tell me about what happened between when you left the hospital and the day you returned. Dc'd home w/ family, readmitted due to increased body swelling and SOB (P)       When did you start not feeling well? "The next week" (P)       Did you try to see or did see a doctor or nurse before you came? No (P)       Why? Decided to go straight to ED (P)       Did you have  a follow-up appointment on discharge? Yes (P)       Did you go? No (P)       Why? -- (P)    Appt has not yet arrived                    Discharge Plan A and Plan B have been determined by review of patient's clinical status, future medical and therapeutic needs, and coverage/benefits for post-acute care in coordination with multidisciplinary team members.                                 ANGELIQUE Acosta, LMSW  [facility]  Case Management  Ext. [phone]   "

## 2024-07-25 NOTE — ASSESSMENT & PLAN NOTE
- patient presented with progressively worsening anasarca and dyspnea  - labs show decreased kidney function with creatinine of 2.0 and low albumin of 1.9  - UA showed 3+ protein, 2+ glucose, 1+ leukocyte esterase and many bacteria   - started diuresis with lasix 40 IV QD  - started home dose of ARB: losartan 50 QD as nephro-protective agent   - started diabetic diet with salt restriction (max 2g/day)  - started DVT prophylaxis for hypercoagulability secondary to hypoalbuminemia  - daily weights, strict I/Os, keep K > 4 and Mg > 2  - consulted nephrology for further recommendations

## 2024-07-25 NOTE — PLAN OF CARE
Problem: Adult Inpatient Plan of Care  Goal: Plan of Care Review  Outcome: Progressing     Problem: Diabetes Comorbidity  Goal: Blood Glucose Level Within Targeted Range  Outcome: Progressing     Problem: Acute Kidney Injury/Impairment  Goal: Fluid and Electrolyte Balance  Outcome: Progressing  Goal: Effective Renal Function  Outcome: Progressing     Problem: Pneumonia  Goal: Fluid Balance  Outcome: Progressing  Goal: Resolution of Infection Signs and Symptoms  Outcome: Progressing

## 2024-07-25 NOTE — H&P
"  [facility] Surg ([address] Medicine  History & Physical    Patient Name: Mary Sims  MRN: [id number]  Patient Class: OP- Observation  Admission Date: 7/24/2024  Attending Physician: Katherine Ventura MD   Primary Care Provider: Ariadne Primary Doctor         Patient information was obtained from patient, past medical records, and ER records.     Subjective:     Principal Problem:Nephrotic syndrome    Chief Complaint:   Chief Complaint   Patient presents with    Shortness of Breath     Was admitted last Tuesday.         HPI: Mary Sims is a 40 YO female with a PMH significant for T2DM (on insulin), HTN, HLD and diabetic retinopathy that presents to the ED as a bounce back with a chief complaint of shortness of breath and generalized swelling. She was recently discharged July 19th for [facility]. Patient states that the dyspnea and anasarca were present even before her most recent hospitalization but that since being discharged, she has noticed progressively increased swelling in her body and dyspnea on exertion. She also endorses non-radiating, substernal chest "tightness" that is 8/10 and worse with activity. She has noticed an increase in her weight, a dry cough and occasional palpitations. She denies orthopnea, fever, chills, or abdominal pain. She states her blood sugars have not been controlled, ranging in the high 300s over the last week.     In the ED, the patient was hypertensive at 174/82 but otherwise hemodynamically stable. CBC showed microcytic anemia with a Hgb of 10. CMP showed an EL with a BUN of 31 and creatinine of 2.0 (baseline 1.2, discharged at 1.8). Albumin was decreased at 1.9. BNP was mildly elevated at 124. Troponin was negative. EKG showed normal sinus rhythm. D-dimer was elevated at 0.95. UA was significant for 3+ protein, 2+ glucose, 1+ leukocyte esterase and many bacteria; reflex to culture pending. HbA1c from her last hospitalization was >14.0. CXR showed mild interstitial " infiltrate/edema and ground-glass alveolar infiltrate at the lung bases without dense consolidation. CTA chest was negative for PE, but showed small ground-glass pulmonary opacities suspicious for atypical pneumonia, including the possibility of COVID-19 pneumonia. Echo from her most recent visit was normal, with an EF of 60-65%. Due to her UA, patient was given a single dose of meropenem in the ED and was admitted to hospital medicine.     Past Medical History:   Diagnosis Date    Cellulitis     Diabetes mellitus     Hypertension     Retinal detachment        Past Surgical History:   Procedure Laterality Date    VITRECTOMY BY PARS PLANA APPROACH Right 4/24/2019    Procedure: VITRECTOMY, PARS PLANA APPROACH;  Surgeon: JAMI Schulz MD;  Location: [facility] OR [address] [place] [address];  Service: Ophthalmology;  Laterality: Right;  25g PPV/membrane stripping/ILM peel/EL/AFx/GFX/avastin OD    VITRECTOMY BY PARS PLANA APPROACH Left 5/29/2019    Procedure: VITRECTOMY, PARS PLANA  APPROACH  25g PPV/Membrane Stripping/IML peel/EL/AFX/Avastin left eye   60 mins;  Surgeon: JAMI Schulz MD;  Location: [facility] OR [address] [place] [address];  Service: Ophthalmology;  Laterality: Left;       Review of patient's allergies indicates:  No Known Allergies    Current Facility-Administered Medications on File Prior to Encounter   Medication    cyclopentolate 1% ophthalmic solution 1 drop    homatropine 5 % ophthalmic solution 1 drop    moxifloxacin 0.5 % ophthalmic solution 1 drop    phenylephrine HCL 2.5% ophthalmic solution 1 drop    prednisoLONE acetate 1 % ophthalmic suspension 1 drop    tetracaine HCl (PF) 0.5 % Drop 1 drop    tropicamide 1% ophthalmic solution 1 drop     Current Outpatient Medications on File Prior to Encounter   Medication Sig    acetaminophen (TYLENOL) 650 MG TbSR Take 650 mg by mouth daily as needed (migraines).    blood-glucose meter,continuous Misc Use Dexcom G7  with Dexcom G7 sensors to monitor blood glucoses as directed.     "insulin glargine U-100, Lantus, 100 unit/mL (3 mL) SubQ InPn pen Inject 40 Units into the skin every morning.    insulin lispro 100 unit/mL pen Inject 15 Units into the skin 3 (three) times daily before meals.    losartan (COZAAR) 50 MG tablet Take 1 tablet (50 mg total) by mouth once daily.    ONETOUCH DELICA LANCETS 33 gauge Misc 1 lancet  by skin prick route 4 (four) times daily.    ONETOUCH ULTRA BLUE TEST STRIP Strp 1 strip by Misc.(Non-Drug; Combo Route) route 4 (four) times daily.    pantoprazole (PROTONIX) 40 MG tablet Take 40 mg by mouth once daily.    pen needle, diabetic (BD ULTRA-FINE ISAAC PEN NEEDLES) 32 gauge x 5/32" Ndle Use to inject insulin 5x/day    rosuvastatin (CRESTOR) 40 MG Tab Take 40 mg by mouth every evening.    semaglutide (OZEMPIC) 0.25 mg or 0.5 mg (2 mg/3 mL) pen injector Inject 0.5 mg into the skin every 7 days. Mondays     Family History       Problem Relation (Age of Onset)    Diabetes Mother          Tobacco Use    Smoking status: Never    Smokeless tobacco: Never   Substance and Sexual Activity    Alcohol use: Yes     Comment: social    Drug use: No    Sexual activity: Yes     Partners: Male     Birth control/protection: I.U.D.     Review of Systems   Constitutional:  Positive for unexpected weight change.   Respiratory:  Positive for chest tightness and shortness of breath.    Cardiovascular:  Positive for palpitations and leg swelling.     Objective:     Vital Signs (Most Recent):  Temp: 98 °F (36.7 °C) (07/25/24 0156)  Pulse: 92 (07/25/24 0343)  Resp: 18 (07/25/24 0156)  BP: (!) 174/81 (07/25/24 0156)  SpO2: 98 % (07/25/24 0156) Vital Signs (24h Range):  Temp:  [97.8 °F (36.6 °C)-98 °F (36.7 °C)] 98 °F (36.7 °C)  Pulse:  [] 92  Resp:  [18] 18  SpO2:  [96 %-98 %] 98 %  BP: (174)/(81-82) 174/81     Weight: 85.7 kg (189 lb)  Body mass index is 33.48 kg/m².     Physical Exam  Vitals reviewed.   Constitutional:       Appearance: She is obese.      Comments: Mild anasarca   HENHORACE: "      Head: Normocephalic and atraumatic.   Cardiovascular:      Rate and Rhythm: Normal rate and regular rhythm.      Pulses: Normal pulses.      Heart sounds: Normal heart sounds.      Comments: No JVD  Pulmonary:      Effort: Pulmonary effort is normal.      Breath sounds: Normal breath sounds.   Abdominal:      Palpations: Abdomen is soft.   Musculoskeletal:      Cervical back: Neck supple.      Right lower leg: Edema present.      Left lower leg: Edema present.      Comments: 1+ pitting edema bilaterally   Skin:     General: Skin is warm and dry.   Neurological:      General: No focal deficit present.      Mental Status: She is alert.   Psychiatric:         Mood and Affect: Mood normal.         Behavior: Behavior normal.         Thought Content: Thought content normal.         Judgment: Judgment normal.                Significant Labs: All pertinent labs within the past 24 hours have been reviewed.  A1C:   Recent Labs   Lab 04/10/24  0824 07/16/24  1008 07/17/24  0349   HGBA1C 13.3* >14.0* >14.0*     CBC:   Recent Labs   Lab 07/24/24 2003 07/24/24 2143   WBC 7.55  --    HGB 10.0*  --    HCT 30.6* 24*     --      CMP:   Recent Labs   Lab 07/24/24 2142 07/25/24  0003     --    K 4.5  --      --    CO2 22*  --    *  --    BUN 31*  --    CREATININE 2.0*  --    CALCIUM 8.1*  --    PROT  --  5.4*   ALBUMIN  --  1.9*   BILITOT  --  0.1   ALKPHOS  --  88   AST  --  19   ALT  --  14   ANIONGAP 4*  --      Urine Studies:   Recent Labs   Lab 07/24/24 2022   COLORU Colorless*   APPEARANCEUA Hazy*   PHUR 6.0   SPECGRAV 1.010   PROTEINUA 3+*   GLUCUA 2+*   KETONESU Negative   BILIRUBINUA Negative   OCCULTUA Trace*   NITRITE Negative   LEUKOCYTESUR 1+*   RBCUA 2   WBCUA 21*   BACTERIA Many*   SQUAMEPITHEL 8   HYALINECASTS 0       Significant Imaging: I have reviewed all pertinent imaging results/findings within the past 24 hours.  Assessment/Plan:     * Nephrotic syndrome  - patient presented with  progressively worsening anasarca and dyspnea  - labs show decreased kidney function with creatinine of 2.0 and low albumin of 1.9  - UA showed 3+ protein, 2+ glucose, 1+ leukocyte esterase and many bacteria   - started diuresis with lasix 40 IV QD  - started home dose of ARB: losartan 50 QD as nephro-protective agent   - started diabetic diet with salt restriction (max 2g/day)  - started DVT prophylaxis for hypercoagulability secondary to hypoalbuminemia  - daily weights, strict I/Os, keep K > 4 and Mg > 2  - consulted nephrology for further recommendations      EL (acute kidney injury)  EL is likely due to  nephrotic syndrome secondary to uncontrolled diabetes and hypertension . Baseline creatinine is  1.2 . Most recent creatinine and eGFR are listed below.  Recent Labs     07/24/24 2142   CREATININE 2.0*   EGFRNORACEVR 32.2*     - EL is  worsening since discharge (previous creatinine 1.8)  - avoid nephrotoxins and renally dose meds for GFR listed above  - monitor urine output, trend CMP, and adjust therapy as needed    UTI (urinary tract infection)  - had UTI at last hospitalization and previous cultures grew E-coli sensitive to gentamicin, meropenem, tobramycin, and nitrofurantoin  - current UA showed 3+ protein, 2+ glucose, 1+ leukocyte esterase and many bacteria  - Urine culture pending  - given dose of meropenem in ED, awaiting culture results to evaluate need for further antibiotic coverage      Type 2 diabetes mellitus  Last A1c reviewed-   Lab Results   Component Value Date    HGBA1C >14.0 (H) 07/17/2024     - recently discharged for [facility] with blood sugar of 797 at presentation  - most recent POC glucose is 129  - initially unsure if T1 or T2DM; INDU antibody is 0.0   - DM is uncontrolled due to hyperglycemia on current medication regimen.  - started on lantus 30 units every morning for basal  - started on novolog 7.5 units pre-prandial  - started low sliding scale novolog 0-5 units   - holding  ozempic    Hypertension  Chronic, uncontrolled. Latest blood pressure and vitals reviewed-     Temp:  [97.8 °F (36.6 °C)-98.1 °F (36.7 °C)]   Pulse:  []   Resp:  [16-18]   BP: (161-174)/(80-82)   SpO2:  [96 %-98 %] .   Home meds for hypertension were reviewed and noted below.   Hypertension Medications               losartan (COZAAR) 50 MG tablet Take 1 tablet (50 mg total) by mouth once daily.          - will manage blood pressure with home antihypertensive regimen      Anasarca  - present prior to previous admission where albumin was 1.5, today's albumin is 1.9  - secondary to the hypoalbuminemia leading to decreased oncotic pressure in the blood vessels and increased fluid extravasation into surrounding tissues   - see plan for nephrotic syndrome for management       VTE Risk Mitigation (From admission, onward)           Ordered     enoxaparin injection 40 mg  Daily         07/25/24 0114     IP VTE HIGH RISK PATIENT  Once         07/25/24 0114     Place sequential compression device  Until discontinued         07/25/24 0114                    On 07/25/2024, patient should be placed in hospital observation services under my care in collaboration with Dr. Brayden Wayne.         Brigette Julian MD  Department of Hospital Medicine  [facility] - Med Surg ([place]-[facility])

## 2024-07-25 NOTE — NURSING
Nurses Note -- 4 Eyes      7/25/2024   3:48 AM      Skin assessed during: Admit      [x] No Altered Skin Integrity Present    []Prevention Measures Documented      [] Yes- Altered Skin Integrity Present or Discovered   [] LDA Added if Not in Epic (Describe Wound)   [] New Altered Skin Integrity was Present on Admit and Documented in LDA   [] Wound Image Taken    Wound Care Consulted? No    Attending Nurse:  MARY JO Call RN    Second RN/Staff Member:  VANNESA Valdes RN

## 2024-07-25 NOTE — ASSESSMENT & PLAN NOTE
Diabetic nephropathy  Hypertensive nephropathy  Uncontrolled diabetes mellitus type 2  Acute kidney injury  UTI  HTN    -Likely diabetic/HTN nephropathy. She has had poorly controlled diabetes for at least the last 14 years w A1c >10%. Optimization of her diabetes and HTN will be beneficial for her kidneys.   -Retroperitoneal US ordered  -Needs therapeutic anticoagulation w low albumin, at least until albumin improves >2.5  -Recommend starting antihypertensives oral lasix and telmisartan/olmesartan, okay to start now despite elevated creatinine. In the future we can plan to add on SGLT2 inhibitor and epleronone.   -Check lipid panel and should be evaluated for statin therapy  -UPCR ordered, results pending  -Will need digital monitoring of her BP to be set up w PCP  -Recommend dietician consult  -Will need outpatient FU w nephrology clinic  -If the above modifications are made and she continues to have proteinuria, then in the outpatient setting she can be considered for kidney biopsy

## 2024-07-25 NOTE — ASSESSMENT & PLAN NOTE
Last A1c reviewed-   Lab Results   Component Value Date    HGBA1C >14.0 (H) 07/17/2024     - recently discharged for [facility] with blood sugar of 797 at presentation  - most recent POC glucose is 129  - initially unsure if T1 or T2DM; INDU antibody is 0.0   - DM is uncontrolled due to hyperglycemia on current medication regimen.  - started on lantus 30 units every morning for basal  - started on novolog 7.5 units pre-prandial  - started low sliding scale novolog 0-5 units   - holding ozempic

## 2024-07-25 NOTE — NURSING
Patient's glucose showing 59 on morning labs. CBG is 53. Patient given prn 16g of glucose tabs and instructed to eat them and glucose would be rechecked in 30 mins. Upon entering room, noted that patient did not eat two of the four glucose tabs. Patient stating that she does not want to eat anymore glucose tabs right now. Glucose recheck showing 61. Patient instructed again to eat the rest of the ordered glucose tabs.

## 2024-07-25 NOTE — HOSPITAL COURSE
39 y.o female with a PMHx T2DM on insulin (elevated hbA1C, non adherent) HTN, HLD and diabetic retinopathy ,recently admitted admitted to [facility] for HHS and UTI  from 7/16/24 -7/19/24. Presenting to [facility]  on 7/24 anasarca found to have nephrotic syndrome, in the setting of uncontrolled DM.  Nephrology followed during stay, recommended initiation of ARB, anticoagulation for Dvt prophylaxis in the setting of nephrotic sd on top of loop diuretic. Patient to follow up with nephrology [facility]. Her creatinine was 2.0 on admission and downtrended to 1.7 (new baseline)  Given concerns for UTI on previous admission she received 1 dose meropenem and zosyn x2 days. Cultures from this admission NGTD.   Transiently hyperkalemic on admission requiring scheduled LOKELMA.    No changes done to insulin regimen adjusted during prior hospitalization. Patient diureses during admission    Patient to follow wit pcp and her endocrinologist and nephrology at [facility]    Repeat cmp prior nephrology appointment.    Extensive conversations, counseling and education during her stay regarding inability to control blood sugar.       Recent Labs   Lab 07/26/24  2353 07/27/24  0558 07/27/24  1131 07/28/24  0659   BUN 27*  --  21*  21* 21*   CREATININE 1.9*  --  1.7*  1.7* 1.7*   CO2 20*  --  22*  23 23   CALCIUM 8.9  --  9.1  8.9 8.3*   MG  --  1.8  --   --    PHOS  --  4.0 3.6 3.9   ANIONGAP 8  --  8  7* 10

## 2024-07-25 NOTE — ED PROVIDER NOTES
"History:  Mary Sims is a 39 y.o. female who presents to the ED with Shortness of Breath (Was admitted last Tuesday. )    Described as 39-year-old female with a history of diabetes, hypertension, hyperlipidemia, recent admission for [facility] presenting to the emergency department with shortness of breath and chest pain.  She reports she was discharged from the hospital 5 days ago and has noticed progressively worsening swelling in her extremities and abdomen.  Over the past few days, she has had shortness of breath as well as chest pain, worse with exertion, feeling like a squeezing type pain in her chest.  She denies any fevers or chills, cough, or congestion.  She reports her blood sugar has been running in the 300s since discharge.    Review of Systems: All systems reviewed and are negative except as per history of present illness.    Medications:   Previous Medications    ACETAMINOPHEN (TYLENOL) 650 MG TBSR    Take 650 mg by mouth daily as needed (migraines).    BLOOD-GLUCOSE METER,CONTINUOUS MISC    Use Dexcom G7  with Dexcom G7 sensors to monitor blood glucoses as directed.    INSULIN GLARGINE U-100, LANTUS, 100 UNIT/ML (3 ML) SUBQ INPN PEN    Inject 40 Units into the skin every morning.    INSULIN LISPRO 100 UNIT/ML PEN    Inject 15 Units into the skin 3 (three) times daily before meals.    LOSARTAN (COZAAR) 50 MG TABLET    Take 1 tablet (50 mg total) by mouth once daily.    ONETOUCH DELICA LANCETS 33 GAUGE MISC    1 lancet  by skin prick route 4 (four) times daily.    ONETOUCH ULTRA BLUE TEST STRIP STRP    1 strip by Misc.(Non-Drug; Combo Route) route 4 (four) times daily.    PANTOPRAZOLE (PROTONIX) 40 MG TABLET    Take 40 mg by mouth once daily.    PEN NEEDLE, DIABETIC (BD ULTRA-FINE ISAAC PEN NEEDLES) 32 GAUGE X 5/32" NDLE    Use to inject insulin 5x/day    ROSUVASTATIN (CRESTOR) 40 MG TAB    Take 40 mg by mouth every evening.    SEMAGLUTIDE (OZEMPIC) 0.25 MG OR 0.5 MG (2 MG/3 ML) PEN INJECTOR    " Inject 0.5 mg into the skin every 7 days. Mondays       PMH:   Past Medical History:   Diagnosis Date    Cellulitis     Diabetes mellitus     Hypertension     Retinal detachment      PSH:   Past Surgical History:   Procedure Laterality Date    VITRECTOMY BY PARS PLANA APPROACH Right 4/24/2019    Procedure: VITRECTOMY, PARS PLANA APPROACH;  Surgeon: JAMI Schulz MD;  Location: [facility] OR [address] [place] [address];  Service: Ophthalmology;  Laterality: Right;  25g PPV/membrane stripping/ILM peel/EL/AFx/GFX/avastin OD    VITRECTOMY BY PARS PLANA APPROACH Left 5/29/2019    Procedure: VITRECTOMY, PARS PLANA  APPROACH  25g PPV/Membrane Stripping/IML peel/EL/AFX/Avastin left eye   60 mins;  Surgeon: JAMI Schulz MD;  Location: [facility] OR [address] [place] [address];  Service: Ophthalmology;  Laterality: Left;     Allergies: She has No Known Allergies.  Social History: Marital Status: . She  reports that she has never smoked. She has never used smokeless tobacco.. She  reports current alcohol use..       Exam:  VITAL SIGNS:   Vitals:    07/24/24 1747   BP: (!) 174/82   BP Location: Right arm   Pulse: 107   Resp: 18   Temp: 97.8 °F (36.6 °C)   TempSrc: Oral   SpO2: 96%   Weight: 85.7 kg (189 lb)     Const: Awake and alert, NAD, +diffuse mild anasarca  Head: Atraumatic  Eyes: Normal Conjunctiva  ENT: Normal External Ears, Nose and Mouth.  Neck: Full range of motion. No meningismus.  Resp: Normal respiratory effort, No distress, CTAB  Cardio: Equal and intact distal pulses, RRR  Abd: Soft, non tender, non distended.   Skin: No petechiae or rashes  Ext: 1+ pitting edema BLE  Neur: Awake and alert  Psych: Normal Mood and Affect    Data:  Results for orders placed or performed during the hospital encounter of 07/24/24   CBC auto differential   Result Value Ref Range    WBC 7.55 3.90 - 12.70 K/uL    RBC 4.32 4.00 - 5.40 M/uL    Hemoglobin 10.0 (L) 12.0 - 16.0 g/dL    Hematocrit 30.6 (L) 37.0 - 48.5 %    MCV 71 (L) 82 - 98 fL    MCH 23.1 (L) 27.0 -  31.0 pg    MCHC 32.7 32.0 - 36.0 g/dL    RDW 15.2 (H) 11.5 - 14.5 %    Platelets 307 150 - 450 K/uL    MPV 11.5 9.2 - 12.9 fL    Immature Granulocytes 0.3 0.0 - 0.5 %    Gran # (ANC) 4.6 1.8 - 7.7 K/uL    Immature Grans (Abs) 0.02 0.00 - 0.04 K/uL    Lymph # 2.1 1.0 - 4.8 K/uL    Mono # 0.6 0.3 - 1.0 K/uL    Eos # 0.2 0.0 - 0.5 K/uL    Baso # 0.07 0.00 - 0.20 K/uL    nRBC 0 0 /100 WBC    Gran % 60.9 38.0 - 73.0 %    Lymph % 27.7 18.0 - 48.0 %    Mono % 7.4 4.0 - 15.0 %    Eosinophil % 2.8 0.0 - 8.0 %    Basophil % 0.9 0.0 - 1.9 %    Differential Method Automated    Troponin I #1   Result Value Ref Range    Troponin I <0.006 0.000 - 0.026 ng/mL   B-Type natriuretic peptide (BNP)   Result Value Ref Range     (H) 0 - 99 pg/mL   Urinalysis, Reflex to Urine Culture Urine, Clean Catch    Specimen: Urine   Result Value Ref Range    Specimen UA Urine, Clean Catch     Color, UA Colorless (A) Yellow, Straw, Suly    Appearance, UA Hazy (A) Clear    pH, UA 6.0 5.0 - 8.0    Specific Gravity, UA 1.010 1.005 - 1.030    Protein, UA 3+ (A) Negative    Glucose, UA 2+ (A) Negative    Ketones, UA Negative Negative    Bilirubin (UA) Negative Negative    Occult Blood UA Trace (A) Negative    Nitrite, UA Negative Negative    Leukocytes, UA 1+ (A) Negative   D dimer, quantitative   Result Value Ref Range    D-Dimer 0.95 (H) <0.50 mg/L FEU   Urinalysis Microscopic   Result Value Ref Range    RBC, UA 2 0 - 4 /hpf    WBC, UA 21 (H) 0 - 5 /hpf    Bacteria Many (A) None-Occ /hpf    Squam Epithel, UA 8 /hpf    Hyaline Casts, UA 0 0-1/lpf /lpf    Microscopic Comment SEE COMMENT    Basic metabolic panel   Result Value Ref Range    Sodium 136 136 - 145 mmol/L    Potassium 4.5 3.5 - 5.1 mmol/L    Chloride 110 95 - 110 mmol/L    CO2 22 (L) 23 - 29 mmol/L    Glucose 124 (H) 70 - 110 mg/dL    BUN 31 (H) 6 - 20 mg/dL    Creatinine 2.0 (H) 0.5 - 1.4 mg/dL    Calcium 8.1 (L) 8.7 - 10.5 mg/dL    Anion Gap 4 (L) 8 - 16 mmol/L    eGFR 32.2 (A) >60  mL/min/1.73 m^2   POCT urine pregnancy   Result Value Ref Range    POC Preg Test, Ur Negative Negative     Acceptable Yes    ISTAT PROCEDURE   Result Value Ref Range    POC Glucose 129 (H) 70 - 110 mg/dL    POC BUN 31 (H) 6 - 30 mg/dL    POC Creatinine 2.3 (H) 0.5 - 1.4 mg/dL    POC Sodium 137 136 - 145 mmol/L    POC Potassium 4.5 3.5 - 5.1 mmol/L    POC Chloride 108 95 - 110 mmol/L    POC TCO2 (MEASURED) 22 (L) 23 - 29 mmol/L    POC Ionized Calcium 1.17 1.06 - 1.42 mmol/L    POC Hematocrit 24 (L) 36 - 54 %PCV    Sample TRISTAN      Imaging Results              CTA Chest Non-Coronary (PE Studies) (In process)                      X-Ray Chest AP Portable (Final result)  Result time 07/24/24 21:48:58      Final result by Shiv Vasquez MD (07/24/24 21:48:58)                   Impression:      Radiographic findings as above.      Electronically signed by: Shiv Vasquez  Date:    07/24/2024  Time:    21:48               Narrative:    EXAMINATION:  XR CHEST AP PORTABLE    CLINICAL HISTORY:  Chest Pain;    TECHNIQUE:  Single frontal view of the chest was performed.    COMPARISON:  Chest radiograph July 16, 2024    FINDINGS:  Single portable chest view is submitted.  The cardiomediastinal silhouette appears stable.    There is mild accentuation of pulmonary vascular.  Mild pattern of accentuation of interstitial markings may relate to mild interstitial infiltrate/edema.  Mild patchy areas of ground-glass alveolar infiltrate, more prominent at the lung bases without dense consolidation.    Minimal opacity at the costophrenic angles is thought likely parenchymal change rather than pleural fluid, there is no large pleural effusion.  There is no pneumothorax.    The osseous structures appear intact                                    12-LEAD EKG INTERPRETATION BY ME:  Rate/Rhythm: Normal Sinus Rhythm with rate of 98 beats per minute  QRS, ST, T-waves: No changes consistent with acute ischemia  Impression:  No  evidence of ischemia or arrhythmia     Labs & Imaging studies were reviewed independently by me.     Medical Decision Makin-year-old female presenting to the emergency department with diffuse swelling, chest pain, and shortness of breath.  Normal echo last week, low suspicion for CHF exacerbation.  She was found to have diffuse anasarca, possibly secondary to her recent hospitalization for HHS and significant fluid administration.  Kidney function has slowly worsened over the past week, creatinine 1.5 now 2.0 in the past 8 days.  Considered possible nephrotic syndrome.  UA again shows elevated white count and many bacteria, previously grew resistant organism, started on meropenem.  D-dimer found to be elevated, CTA ordered and pending at time of admission.  Patient admitted to Hospital Medicine.    Clinical Impression:  1. EL (acute kidney injury)    2. Chest pain    3. Acute cystitis without hematuria    4. Anasarca                 Mary Anne Augustin MD  [id number] [phone]

## 2024-07-25 NOTE — SUBJECTIVE & OBJECTIVE
Past Medical History:   Diagnosis Date    Cellulitis     Diabetes mellitus     Hypertension     Retinal detachment        Past Surgical History:   Procedure Laterality Date    VITRECTOMY BY PARS PLANA APPROACH Right 4/24/2019    Procedure: VITRECTOMY, PARS PLANA APPROACH;  Surgeon: JAMI Schulz MD;  Location: [facility] OR [address] [place] [address];  Service: Ophthalmology;  Laterality: Right;  25g PPV/membrane stripping/ILM peel/EL/AFx/GFX/avastin OD    VITRECTOMY BY PARS PLANA APPROACH Left 5/29/2019    Procedure: VITRECTOMY, PARS PLANA  APPROACH  25g PPV/Membrane Stripping/IML peel/EL/AFX/Avastin left eye   60 mins;  Surgeon: JAMI Schulz MD;  Location: [facility] OR [address] [place] [address];  Service: Ophthalmology;  Laterality: Left;       Review of patient's allergies indicates:  No Known Allergies    Current Facility-Administered Medications on File Prior to Encounter   Medication    cyclopentolate 1% ophthalmic solution 1 drop    homatropine 5 % ophthalmic solution 1 drop    moxifloxacin 0.5 % ophthalmic solution 1 drop    phenylephrine HCL 2.5% ophthalmic solution 1 drop    prednisoLONE acetate 1 % ophthalmic suspension 1 drop    tetracaine HCl (PF) 0.5 % Drop 1 drop    tropicamide 1% ophthalmic solution 1 drop     Current Outpatient Medications on File Prior to Encounter   Medication Sig    acetaminophen (TYLENOL) 650 MG TbSR Take 650 mg by mouth daily as needed (migraines).    blood-glucose meter,continuous Misc Use Dexcom G7  with Dexcom G7 sensors to monitor blood glucoses as directed.    insulin glargine U-100, Lantus, 100 unit/mL (3 mL) SubQ InPn pen Inject 40 Units into the skin every morning.    insulin lispro 100 unit/mL pen Inject 15 Units into the skin 3 (three) times daily before meals.    losartan (COZAAR) 50 MG tablet Take 1 tablet (50 mg total) by mouth once daily.    ONETOUCH DELICA LANCETS 33 gauge Misc 1 lancet  by skin prick route 4 (four) times daily.    ONETOUCH ULTRA BLUE TEST STRIP Strp 1 strip by  "Misc.(Non-Drug; Combo Route) route 4 (four) times daily.    pantoprazole (PROTONIX) 40 MG tablet Take 40 mg by mouth once daily.    pen needle, diabetic (BD ULTRA-FINE ISAAC PEN NEEDLES) 32 gauge x 5/32" Ndle Use to inject insulin 5x/day    rosuvastatin (CRESTOR) 40 MG Tab Take 40 mg by mouth every evening.    semaglutide (OZEMPIC) 0.25 mg or 0.5 mg (2 mg/3 mL) pen injector Inject 0.5 mg into the skin every 7 days. Mondays     Family History       Problem Relation (Age of Onset)    Diabetes Mother          Tobacco Use    Smoking status: Never    Smokeless tobacco: Never   Substance and Sexual Activity    Alcohol use: Yes     Comment: social    Drug use: No    Sexual activity: Yes     Partners: Male     Birth control/protection: I.U.D.     Review of Systems   Constitutional:  Positive for unexpected weight change.   Respiratory:  Positive for chest tightness and shortness of breath.    Cardiovascular:  Positive for palpitations and leg swelling.     Objective:     Vital Signs (Most Recent):  Temp: 98 °F (36.7 °C) (07/25/24 0156)  Pulse: 92 (07/25/24 0343)  Resp: 18 (07/25/24 0156)  BP: (!) 174/81 (07/25/24 0156)  SpO2: 98 % (07/25/24 0156) Vital Signs (24h Range):  Temp:  [97.8 °F (36.6 °C)-98 °F (36.7 °C)] 98 °F (36.7 °C)  Pulse:  [] 92  Resp:  [18] 18  SpO2:  [96 %-98 %] 98 %  BP: (174)/(81-82) 174/81     Weight: 85.7 kg (189 lb)  Body mass index is 33.48 kg/m².     Physical Exam  Vitals reviewed.   Constitutional:       Appearance: She is obese.      Comments: Mild anasarca   HENT:      Head: Normocephalic and atraumatic.   Cardiovascular:      Rate and Rhythm: Normal rate and regular rhythm.      Pulses: Normal pulses.      Heart sounds: Normal heart sounds.      Comments: No JVD  Pulmonary:      Effort: Pulmonary effort is normal.      Breath sounds: Normal breath sounds.   Abdominal:      Palpations: Abdomen is soft.   Musculoskeletal:      Cervical back: Neck supple.      Right lower leg: Edema present. "      Left lower leg: Edema present.      Comments: 1+ pitting edema bilaterally   Skin:     General: Skin is warm and dry.   Neurological:      General: No focal deficit present.      Mental Status: She is alert.   Psychiatric:         Mood and Affect: Mood normal.         Behavior: Behavior normal.         Thought Content: Thought content normal.         Judgment: Judgment normal.                Significant Labs: All pertinent labs within the past 24 hours have been reviewed.  A1C:   Recent Labs   Lab 04/10/24  0824 07/16/24  1008 07/17/24  0349   HGBA1C 13.3* >14.0* >14.0*     CBC:   Recent Labs   Lab 07/24/24 2003 07/24/24 2143   WBC 7.55  --    HGB 10.0*  --    HCT 30.6* 24*     --      CMP:   Recent Labs   Lab 07/24/24 2142 07/25/24  0003     --    K 4.5  --      --    CO2 22*  --    *  --    BUN 31*  --    CREATININE 2.0*  --    CALCIUM 8.1*  --    PROT  --  5.4*   ALBUMIN  --  1.9*   BILITOT  --  0.1   ALKPHOS  --  88   AST  --  19   ALT  --  14   ANIONGAP 4*  --      Urine Studies:   Recent Labs   Lab 07/24/24 2022   COLORU Colorless*   APPEARANCEUA Hazy*   PHUR 6.0   SPECGRAV 1.010   PROTEINUA 3+*   GLUCUA 2+*   KETONESU Negative   BILIRUBINUA Negative   OCCULTUA Trace*   NITRITE Negative   LEUKOCYTESUR 1+*   RBCUA 2   WBCUA 21*   BACTERIA Many*   SQUAMEPITHEL 8   HYALINECASTS 0       Significant Imaging: I have reviewed all pertinent imaging results/findings within the past 24 hours.

## 2024-07-25 NOTE — CONSULTS
[facility] Surg ([place] [id number])  Nephrology  Consult Note    Patient Name: Mary Sims  MRN: [id number]  Admission Date: 7/24/2024  Hospital Length of Stay: 0 days  Attending Provider: Katherine Ventura MD   Primary Care Physician: Ariadne, Primary Doctor  Principal Problem:Anasarca    Inpatient consult to Nephrology  Consult performed by: Abhi Sanchez MD  Consult ordered by: Brigette Julian MD  Reason for consult: proteinuria w shyla        Subjective:     HPI: Mary is a pleasant 38 yo woman w pmhx significant for uncontrolled DMT2 on insulin, HTN, HLD, diabetic retinopathy, recently in the hospital June 19th for HHS/UTI, returned w worsening edema SOB, CP, cough, palpitations, CTA showed possible atypical pna, she was diuresed and nephrology was consulted for proteinuria and likely nephrotic syndrome. Recent echocardiogram unremakrable. Most recent A1c >14% for over a decade her diabetes has been uncontrolled. UA showed 3+ protein and bacteria.     Past Medical History:   Diagnosis Date    Cellulitis     Diabetes mellitus     Hypertension     Retinal detachment        Past Surgical History:   Procedure Laterality Date    VITRECTOMY BY PARS PLANA APPROACH Right 4/24/2019    Procedure: VITRECTOMY, PARS PLANA APPROACH;  Surgeon: JAMI Schulz MD;  Location: [facility] OR [address] [place] [address];  Service: Ophthalmology;  Laterality: Right;  25g PPV/membrane stripping/ILM peel/EL/AFx/GFX/avastin OD    VITRECTOMY BY PARS PLANA APPROACH Left 5/29/2019    Procedure: VITRECTOMY, PARS PLANA  APPROACH  25g PPV/Membrane Stripping/IML peel/EL/AFX/Avastin left eye   60 mins;  Surgeon: JAMI Schulz MD;  Location: [facility] OR [address] [place] [address];  Service: Ophthalmology;  Laterality: Left;       Review of patient's allergies indicates:  No Known Allergies  Current Facility-Administered Medications   Medication Frequency    acetaminophen tablet 650 mg Q4H PRN    dextrose 10% bolus 125 mL 125 mL PRN    dextrose 10% bolus 250 mL 250 mL PRN     enoxaparin injection 40 mg Daily    glucagon (human recombinant) injection 1 mg PRN    glucose chewable tablet 16 g PRN    glucose chewable tablet 24 g PRN    insulin aspart U-100 pen 0-5 Units QID (AC + HS) PRN    melatonin tablet 6 mg Nightly PRN    naloxone 0.4 mg/mL injection 0.02 mg PRN    ondansetron disintegrating tablet 8 mg Q8H PRN    polyethylene glycol packet 17 g Daily    prochlorperazine injection Soln 5 mg Q6H PRN    sodium chloride 0.9% flush 10 mL Q12H PRN     Facility-Administered Medications Ordered in Other Encounters   Medication Frequency    cyclopentolate 1% ophthalmic solution 1 drop On Call Procedure    homatropine 5 % ophthalmic solution 1 drop On Call Procedure    moxifloxacin 0.5 % ophthalmic solution 1 drop On Call Procedure    phenylephrine HCL 2.5% ophthalmic solution 1 drop On Call Procedure    prednisoLONE acetate 1 % ophthalmic suspension 1 drop On Call Procedure    tetracaine HCl (PF) 0.5 % Drop 1 drop On Call Procedure    tropicamide 1% ophthalmic solution 1 drop On Call Procedure     Family History       Problem Relation (Age of Onset)    Diabetes Mother          Tobacco Use    Smoking status: Never    Smokeless tobacco: Never   Substance and Sexual Activity    Alcohol use: Yes     Comment: social    Drug use: No    Sexual activity: Yes     Partners: Male     Birth control/protection: I.U.D.     Review of Systems   Constitutional:  Negative for fatigue and fever.   Respiratory:  Negative for cough and shortness of breath.    Gastrointestinal:  Negative for abdominal distention, abdominal pain, constipation, diarrhea and nausea.   Genitourinary:  Negative for flank pain.   Neurological:  Negative for dizziness and light-headedness.   Psychiatric/Behavioral: Negative.       Objective:     Vital Signs (Most Recent):  Temp: 98.7 °F (37.1 °C) (07/25/24 0800)  Pulse: 88 (07/25/24 1059)  Resp: 18 (07/25/24 0800)  BP: 133/79 (07/25/24 0800)  SpO2: 99 % (07/25/24 0800) Vital Signs (24h  Range):  Temp:  [97.8 °F (36.6 °C)-98.7 °F (37.1 °C)] 98.7 °F (37.1 °C)  Pulse:  [] 88  Resp:  [16-18] 18  SpO2:  [96 %-99 %] 99 %  BP: (133-174)/(79-82) 133/79     Weight: 85.7 kg (189 lb) (07/24/24 1747)  Body mass index is 33.48 kg/m².  Body surface area is 1.95 meters squared.    I/O last 3 completed shifts:  In: 100 [IV Piggyback:100]  Out: 1 [Urine:1]     Physical Exam  Constitutional:       Appearance: Normal appearance. She is normal weight.   HENT:      Head: Normocephalic and atraumatic.      Mouth/Throat:      Mouth: Mucous membranes are moist.   Cardiovascular:      Pulses: Normal pulses.      Heart sounds: Normal heart sounds.   Pulmonary:      Effort: Pulmonary effort is normal. No respiratory distress.   Musculoskeletal:         General: No swelling or tenderness.   Skin:     General: Skin is warm and dry.   Neurological:      Mental Status: She is alert.          Significant Labs:  All labs within the past 24 hours have been reviewed.    Significant Imaging:  X-Ray: Reviewed    Assessment/Plan:     Renal/  Diabetic nephropathy  Diabetic nephropathy  Hypertensive nephropathy  Uncontrolled diabetes mellitus type 2  Acute kidney injury  UTI  HTN    -Likely diabetic/HTN nephropathy. She has had poorly controlled diabetes for at least the last 14 years w A1c >10%. Optimization of her diabetes and HTN will be beneficial for her kidneys.   -Retroperitoneal US ordered  -Needs therapeutic anticoagulation w low albumin, at least until albumin improves >2.5  -Recommend starting antihypertensives oral lasix and telmisartan/olmesartan, okay to start now despite elevated creatinine. In the future we can plan to add on SGLT2 inhibitor and epleronone.   -Check lipid panel and should be started on statin therapy  -UPCR ordered, results pending  -Will need digital monitoring of her BP to be set up w PCP  -Recommend dietician consult  -Will need outpatient FU w nephrology clinic  -If the above modifications are  made and she continues to have proteinuria, then in the outpatient setting she can be considered for kidney biopsy      Thank you for your consult. I will follow-up with patient. Please contact us if you have any additional questions.    Abhi Sanchez MD  Nephrology  [facility] ([facility])

## 2024-07-26 LAB
ALBUMIN SERPL BCP-MCNC: 1.7 G/DL (ref 3.5–5.2)
ALP SERPL-CCNC: 88 U/L (ref 55–135)
ALT SERPL W/O P-5'-P-CCNC: 12 U/L (ref 10–44)
ANION GAP SERPL CALC-SCNC: 6 MMOL/L (ref 8–16)
ANION GAP SERPL CALC-SCNC: 8 MMOL/L (ref 8–16)
AST SERPL-CCNC: 18 U/L (ref 10–40)
BASOPHILS # BLD AUTO: 0.05 K/UL (ref 0–0.2)
BASOPHILS NFR BLD: 0.9 % (ref 0–1.9)
BILIRUB SERPL-MCNC: 0.1 MG/DL (ref 0.1–1)
BUN SERPL-MCNC: 24 MG/DL (ref 6–20)
BUN SERPL-MCNC: 24 MG/DL (ref 6–20)
CALCIUM SERPL-MCNC: 8.4 MG/DL (ref 8.7–10.5)
CALCIUM SERPL-MCNC: 8.5 MG/DL (ref 8.7–10.5)
CHLORIDE SERPL-SCNC: 107 MMOL/L (ref 95–110)
CHLORIDE SERPL-SCNC: 111 MMOL/L (ref 95–110)
CHOLEST SERPL-MCNC: 278 MG/DL (ref 120–199)
CHOLEST/HDLC SERPL: 4.7 {RATIO} (ref 2–5)
CO2 SERPL-SCNC: 18 MMOL/L (ref 23–29)
CO2 SERPL-SCNC: 19 MMOL/L (ref 23–29)
CREAT SERPL-MCNC: 1.8 MG/DL (ref 0.5–1.4)
CREAT SERPL-MCNC: 2 MG/DL (ref 0.5–1.4)
DIFFERENTIAL METHOD BLD: ABNORMAL
EOSINOPHIL # BLD AUTO: 0.2 K/UL (ref 0–0.5)
EOSINOPHIL NFR BLD: 3 % (ref 0–8)
ERYTHROCYTE [DISTWIDTH] IN BLOOD BY AUTOMATED COUNT: 15.1 % (ref 11.5–14.5)
EST. GFR  (NO RACE VARIABLE): 32.2 ML/MIN/1.73 M^2
EST. GFR  (NO RACE VARIABLE): 36.5 ML/MIN/1.73 M^2
GLUCOSE SERPL-MCNC: 171 MG/DL (ref 70–110)
GLUCOSE SERPL-MCNC: 221 MG/DL (ref 70–110)
HCT VFR BLD AUTO: 30.4 % (ref 37–48.5)
HDLC SERPL-MCNC: 59 MG/DL (ref 40–75)
HDLC SERPL: 21.2 % (ref 20–50)
HGB BLD-MCNC: 9.7 G/DL (ref 12–16)
IMM GRANULOCYTES # BLD AUTO: 0.02 K/UL (ref 0–0.04)
IMM GRANULOCYTES NFR BLD AUTO: 0.4 % (ref 0–0.5)
LDLC SERPL CALC-MCNC: 197 MG/DL (ref 63–159)
LYMPHOCYTES # BLD AUTO: 1.9 K/UL (ref 1–4.8)
LYMPHOCYTES NFR BLD: 33.8 % (ref 18–48)
MAGNESIUM SERPL-MCNC: 2 MG/DL (ref 1.6–2.6)
MCH RBC QN AUTO: 23.5 PG (ref 27–31)
MCHC RBC AUTO-ENTMCNC: 31.9 G/DL (ref 32–36)
MCV RBC AUTO: 74 FL (ref 82–98)
MONOCYTES # BLD AUTO: 0.5 K/UL (ref 0.3–1)
MONOCYTES NFR BLD: 8.6 % (ref 4–15)
NEUTROPHILS # BLD AUTO: 3.1 K/UL (ref 1.8–7.7)
NEUTROPHILS NFR BLD: 53.3 % (ref 38–73)
NONHDLC SERPL-MCNC: 219 MG/DL
NRBC BLD-RTO: 0 /100 WBC
PHOSPHATE SERPL-MCNC: 3.5 MG/DL (ref 2.7–4.5)
PLATELET # BLD AUTO: 277 K/UL (ref 150–450)
PMV BLD AUTO: 11.3 FL (ref 9.2–12.9)
POCT GLUCOSE: 160 MG/DL (ref 70–110)
POCT GLUCOSE: 176 MG/DL (ref 70–110)
POCT GLUCOSE: 193 MG/DL (ref 70–110)
POCT GLUCOSE: 198 MG/DL (ref 70–110)
POCT GLUCOSE: 200 MG/DL (ref 70–110)
POCT GLUCOSE: 307 MG/DL (ref 70–110)
POCT GLUCOSE: 61 MG/DL (ref 70–110)
POTASSIUM SERPL-SCNC: 5.2 MMOL/L (ref 3.5–5.1)
POTASSIUM SERPL-SCNC: 5.6 MMOL/L (ref 3.5–5.1)
PROT SERPL-MCNC: 5.2 G/DL (ref 6–8.4)
RBC # BLD AUTO: 4.13 M/UL (ref 4–5.4)
SODIUM SERPL-SCNC: 133 MMOL/L (ref 136–145)
SODIUM SERPL-SCNC: 136 MMOL/L (ref 136–145)
TRIGL SERPL-MCNC: 110 MG/DL (ref 30–150)
WBC # BLD AUTO: 5.71 K/UL (ref 3.9–12.7)

## 2024-07-26 PROCEDURE — 96372 THER/PROPH/DIAG INJ SC/IM: CPT

## 2024-07-26 PROCEDURE — 25000003 PHARM REV CODE 250

## 2024-07-26 PROCEDURE — 25000003 PHARM REV CODE 250: Performed by: STUDENT IN AN ORGANIZED HEALTH CARE EDUCATION/TRAINING PROGRAM

## 2024-07-26 PROCEDURE — 63600175 PHARM REV CODE 636 W HCPCS: Performed by: STUDENT IN AN ORGANIZED HEALTH CARE EDUCATION/TRAINING PROGRAM

## 2024-07-26 PROCEDURE — 80048 BASIC METABOLIC PNL TOTAL CA: CPT | Mod: XB | Performed by: STUDENT IN AN ORGANIZED HEALTH CARE EDUCATION/TRAINING PROGRAM

## 2024-07-26 PROCEDURE — 36415 COLL VENOUS BLD VENIPUNCTURE: CPT

## 2024-07-26 PROCEDURE — 99233 SBSQ HOSP IP/OBS HIGH 50: CPT | Mod: ,,, | Performed by: INTERNAL MEDICINE

## 2024-07-26 PROCEDURE — 83735 ASSAY OF MAGNESIUM: CPT

## 2024-07-26 PROCEDURE — 85025 COMPLETE CBC W/AUTO DIFF WBC: CPT

## 2024-07-26 PROCEDURE — 63600175 PHARM REV CODE 636 W HCPCS

## 2024-07-26 PROCEDURE — 21400001 HC TELEMETRY ROOM

## 2024-07-26 PROCEDURE — 96372 THER/PROPH/DIAG INJ SC/IM: CPT | Performed by: STUDENT IN AN ORGANIZED HEALTH CARE EDUCATION/TRAINING PROGRAM

## 2024-07-26 PROCEDURE — 36415 COLL VENOUS BLD VENIPUNCTURE: CPT | Performed by: STUDENT IN AN ORGANIZED HEALTH CARE EDUCATION/TRAINING PROGRAM

## 2024-07-26 PROCEDURE — 80061 LIPID PANEL: CPT | Performed by: STUDENT IN AN ORGANIZED HEALTH CARE EDUCATION/TRAINING PROGRAM

## 2024-07-26 PROCEDURE — 80053 COMPREHEN METABOLIC PANEL: CPT

## 2024-07-26 PROCEDURE — 84100 ASSAY OF PHOSPHORUS: CPT

## 2024-07-26 PROCEDURE — 80048 BASIC METABOLIC PNL TOTAL CA: CPT | Mod: 91,XB

## 2024-07-26 RX ORDER — FUROSEMIDE 10 MG/ML
40 INJECTION INTRAMUSCULAR; INTRAVENOUS ONCE
Status: COMPLETED | OUTPATIENT
Start: 2024-07-26 | End: 2024-07-26

## 2024-07-26 RX ORDER — HYDRALAZINE HYDROCHLORIDE 25 MG/1
25 TABLET, FILM COATED ORAL ONCE
Status: COMPLETED | OUTPATIENT
Start: 2024-07-26 | End: 2024-07-26

## 2024-07-26 RX ORDER — ACETAMINOPHEN 325 MG/1
650 TABLET ORAL EVERY 6 HOURS PRN
Status: DISCONTINUED | OUTPATIENT
Start: 2024-07-26 | End: 2024-07-26

## 2024-07-26 RX ORDER — INSULIN GLARGINE 100 [IU]/ML
30 INJECTION, SOLUTION SUBCUTANEOUS DAILY
Status: DISCONTINUED | OUTPATIENT
Start: 2024-07-26 | End: 2024-07-27

## 2024-07-26 RX ORDER — FUROSEMIDE 40 MG/1
40 TABLET ORAL DAILY
Qty: 30 TABLET | Refills: 11 | OUTPATIENT
Start: 2024-07-27 | End: 2025-07-27

## 2024-07-26 RX ORDER — FUROSEMIDE 10 MG/ML
40 INJECTION INTRAMUSCULAR; INTRAVENOUS ONCE
Status: DISCONTINUED | OUTPATIENT
Start: 2024-07-26 | End: 2024-07-26

## 2024-07-26 RX ADMIN — ENOXAPARIN SODIUM 80 MG: 80 INJECTION SUBCUTANEOUS at 04:07

## 2024-07-26 RX ADMIN — FUROSEMIDE 40 MG: 10 INJECTION, SOLUTION INTRAVENOUS at 04:07

## 2024-07-26 RX ADMIN — INSULIN GLARGINE 30 UNITS: 100 INJECTION, SOLUTION SUBCUTANEOUS at 11:07

## 2024-07-26 RX ADMIN — SODIUM ZIRCONIUM CYCLOSILICATE 10 G: 10 POWDER, FOR SUSPENSION ORAL at 04:07

## 2024-07-26 RX ADMIN — HYDRALAZINE HYDROCHLORIDE 25 MG: 25 TABLET ORAL at 04:07

## 2024-07-26 RX ADMIN — ATORVASTATIN CALCIUM 20 MG: 20 TABLET, FILM COATED ORAL at 08:07

## 2024-07-26 RX ADMIN — ENOXAPARIN SODIUM 80 MG: 80 INJECTION SUBCUTANEOUS at 06:07

## 2024-07-26 RX ADMIN — SODIUM ZIRCONIUM CYCLOSILICATE 10 G: 10 POWDER, FOR SUSPENSION ORAL at 09:07

## 2024-07-26 RX ADMIN — FUROSEMIDE 40 MG: 20 TABLET ORAL at 08:07

## 2024-07-26 RX ADMIN — LOSARTAN POTASSIUM 25 MG: 25 TABLET, FILM COATED ORAL at 08:07

## 2024-07-26 RX ADMIN — INSULIN ASPART 2 UNITS: 100 INJECTION, SOLUTION INTRAVENOUS; SUBCUTANEOUS at 09:07

## 2024-07-26 RX ADMIN — ACETAMINOPHEN 650 MG: 325 TABLET ORAL at 04:07

## 2024-07-26 NOTE — ASSESSMENT & PLAN NOTE
Diabetic nephropathy  Hypertensive nephropathy  Uncontrolled diabetes mellitus type 2  Acute kidney injury  UTI  HTN    -Recommend Lokelma for hyperkalemia of 5.6, recheck later tonight.   -Likely diabetic/HTN nephropathy. She has had poorly controlled diabetes for at least the last 14 years w A1c >10%. Optimization of her diabetes and HTN will be beneficial for her kidneys.   -Retroperitoneal US unremarkable  -Continue therapeutic anticoagulation w low albumin, at least until albumin improves >2.5  -Recommend changing losartan to telmisartan/olmesartan/valsartan for longer duration of action  -In the future we can plan to add on SGLT2 inhibitor and epleronone.   -Will need digital monitoring of her BP to be set up w PCP  -Will need outpatient FU w nephrology clinic. Can be considered for kidney biopsy in outpatient setting if Cr remains persistently elevated.   -Recommend x2 albumin dose to help w diuresis w her diuretics 25 g 12 hours apart given her hypoalbuminemic state loop diuretics is will not be working as well. Would also consider changing her lasix to bumex 2 mg qd.   -Consider bicarb repletion  -Will need to be set up w [facility] outpatient digital monitoring for her blood pressure for closer monitoring.

## 2024-07-26 NOTE — CONSULTS
Food & Nutrition  Education    Diet Education: A1C  Time Spent: 10 minutes  Learners: Patient and Family Member    Nutrition Education provided with handouts: Meal Planning Using the Plate Method, CHO Counting for People with Diabetes    Comments: Discussed importance of choosing healthy carbohydrates and to control the amount of carbohydrates you eat at each meal for blood sugar management. Reminded patient not to skip meals. Encouraged intake of fresh, unprocessed foods. Reviewed limiting sugary beverages such as soda pop, fruit juice and sweetened iced teas. Food labels, CHO counting, and recommended CHO intake reviewed. Patient is aware of A1C >14% and what the lab value means. RD encourage pt to reach out to endocrinologist about seeing an outpatient RD and getting a referral. Pt stated that they will do so on Tuesday when they see their MD next.     All questions and concerns answered. Dietitian's contact information provided.     Follow-Up: Yes    Please Re-consult as needed    Thanks!   Leni Jean Baptiste, MS, RD, LDN

## 2024-07-26 NOTE — NURSING
Glucose monitoring changed to q4hrs per JONATHAN Miller MD. Glucose 217. Request made to MD for sliding scale order to be modified to reflect q4hr monitoring.

## 2024-07-26 NOTE — PROGRESS NOTES
"[facility] Surg ([address] Medicine  Progress Note    Patient Name: Mary Sims  MRN: [id number]  Patient Class: OP- Observation   Admission Date: 7/24/2024  Length of Stay: 0 days  Attending Physician: Katherine Ventura MD  Primary Care Provider: Ariadne, Primary Doctor        Subjective:     Principal Problem:Anasarca        HPI:  Mary Sims is a 40 YO female with a PMH significant for T2DM (on insulin), HTN, HLD and diabetic retinopathy that presents to the ED as a bounce back with a chief complaint of shortness of breath and generalized swelling. She was recently discharged July 19th for HHS. Patient states that the dyspnea and anasarca were present even before her most recent hospitalization but that since being discharged, she has noticed progressively increased swelling in her body and dyspnea on exertion. She also endorses non-radiating, substernal chest "tightness" that is 8/10 and worse with activity. She has noticed an increase in her weight, a dry cough and occasional palpitations. She denies orthopnea, fever, chills, or abdominal pain. She states her blood sugars have not been controlled, ranging in the high 300s over the last week.     In the ED, the patient was hypertensive at 174/82 but otherwise hemodynamically stable. CBC showed microcytic anemia with a Hgb of 10. CMP showed an EL with a BUN of 31 and creatinine of 2.0 (baseline 1.2, discharged at 1.8). Albumin was decreased at 1.9. BNP was mildly elevated at 124. Troponin was negative. EKG showed normal sinus rhythm. D-dimer was elevated at 0.95. UA was significant for 3+ protein, 2+ glucose, 1+ leukocyte esterase and many bacteria; reflex to culture pending. HbA1c from her last hospitalization was >14.0. CXR showed mild interstitial infiltrate/edema and ground-glass alveolar infiltrate at the lung bases without dense consolidation. CTA chest was negative for PE, but showed small ground-glass pulmonary opacities suspicious for " atypical pneumonia, including the possibility of COVID-19 pneumonia. Echo from her most recent visit was normal, with an EF of 60-65%. Due to her UA, patient was given a single dose of meropenem in the ED and was admitted to hospital medicine.     Overview/Hospital Course:  39 y.o female with a PMHx T2DM on insulin (elevated hbA1C) HTN, HLD and diabetic retinopathy ,recently admitted admitted to hospital for HHS and UTI on 7/19/24. Admitted for worsening anasarca -nephrotic syndrome, and poorly controlled DM. Nephrology rec- retroperitoneal US, low antocoag, start lasix, arb, think of adding SGLTs inhibitor and epleronone in future, lipid panel, f/u with PCP, dietician, outpt fu with nephro.    Interval History: Poorly controlled DM (Elevated HbA1C) her potassium was elevated this morning (lokelma 10 g stat given) with glucose of 171. F/u BMP scheduled for this afternoon.      Review of Systems   Constitutional:  Negative for activity change and appetite change.   HENT:  Negative for congestion, trouble swallowing and voice change.    Eyes:  Negative for discharge.   Respiratory:  Positive for shortness of breath. Negative for apnea, cough, chest tightness, wheezing and stridor.    Cardiovascular:  Positive for leg swelling. Negative for chest pain and palpitations.   Gastrointestinal:  Positive for abdominal distention. Negative for abdominal pain, constipation, nausea and vomiting.   Musculoskeletal:  Negative for back pain.   Skin: Negative.      Objective:     Vital Signs (Most Recent):  Temp: 98.8 °F (37.1 °C) (07/26/24 1115)  Pulse: 88 (07/26/24 1115)  Resp: 18 (07/26/24 1115)  BP: (!) 150/86 (07/26/24 1127)  SpO2: 100 % (07/26/24 1115) Vital Signs (24h Range):  Temp:  [96.5 °F (35.8 °C)-98.8 °F (37.1 °C)] 98.8 °F (37.1 °C)  Pulse:  [85-96] 88  Resp:  [16-18] 18  SpO2:  [97 %-100 %] 100 %  BP: (126-182)/(69-97) 150/86     Weight: 84 kg (185 lb 3 oz)  Body mass index is 31.79 kg/m².    Intake/Output Summary  (Last 24 hours) at 7/26/2024 1232  Last data filed at 7/26/2024 0909  Gross per 24 hour   Intake 1100 ml   Output 400 ml   Net 700 ml         Physical Exam             Attestation signed by Brayden Wayne DO at 7/25/2024  7:02 AM     I have seen the patient, reviewed the Resident's history and physical, assessment, and plan. I have personally interviewed and examined the patient at bedside and agree with the findings.     Patient  is a 40 YO female with a PMH significant for T2DM (on insulin, A1C >14), HTN, HLD and diabetic retinopathy who was recently admitted admitted to hospital for HHS and UTI. She was discharged home on 7/19/24. Patient now returned to ED with worsening anasarca and RIVAS since discharge. Recent echo 7/16 unremarkable. Etiology likely due to fluid overload from nephrotic syndrome 2/2 poorly controlled DM2. Will start diuresis with lasix. Check urine P/C ratio, continue ARB and consult nephrology for management of nephrotic syndrome. Optimal BP and DM2 control.         Brayden Wayne DO  Hospitalist  [facility] ([place] [address])                 [facility] ([place] [address])  [facility] Medicine  History & Physical     Patient Name: Mary Sims  MRN: [id number]  Patient Class: OP- Observation  Admission Date: 7/24/2024  Attending Physician: Katherine Ventura MD   Primary Care Provider: Ariadne Primary Doctor           Patient information was obtained from patient, past medical records, and ER records.      Subjective:      Principal Problem:Nephrotic syndrome     Chief Complaint:        Chief Complaint   Patient presents with    Shortness of Breath       Was admitted last Tuesday.          HPI: Mary Sims is a 40 YO female with a PMH significant for T2DM (on insulin), HTN, HLD and diabetic retinopathy that presents to the ED as a bounce back with a chief complaint of shortness of breath and generalized swelling. She was recently discharged July 19th for HHS. Patient states that the  "dyspnea and anasarca were present even before her most recent hospitalization but that since being discharged, she has noticed progressively increased swelling in her body and dyspnea on exertion. She also endorses non-radiating, substernal chest "tightness" that is 8/10 and worse with activity. She has noticed an increase in her weight, a dry cough and occasional palpitations. She denies orthopnea, fever, chills, or abdominal pain. She states her blood sugars have not been controlled, ranging in the high 300s over the last week.      In the ED, the patient was hypertensive at 174/82 but otherwise hemodynamically stable. CBC showed microcytic anemia with a Hgb of 10. CMP showed an EL with a BUN of 31 and creatinine of 2.0 (baseline 1.2, discharged at 1.8). Albumin was decreased at 1.9. BNP was mildly elevated at 124. Troponin was negative. EKG showed normal sinus rhythm. D-dimer was elevated at 0.95. UA was significant for 3+ protein, 2+ glucose, 1+ leukocyte esterase and many bacteria; reflex to culture pending. HbA1c from her last hospitalization was >14.0. CXR showed mild interstitial infiltrate/edema and ground-glass alveolar infiltrate at the lung bases without dense consolidation. CTA chest was negative for PE, but showed small ground-glass pulmonary opacities suspicious for atypical pneumonia, including the possibility of COVID-19 pneumonia. Echo from her most recent visit was normal, with an EF of 60-65%. Due to her UA, patient was given a single dose of meropenem in the ED and was admitted to hospital medicine.           Past Medical History:   Diagnosis Date    Cellulitis      Diabetes mellitus      Hypertension      Retinal detachment                 Past Surgical History:   Procedure Laterality Date    VITRECTOMY BY PARS PLANA APPROACH Right 4/24/2019     Procedure: VITRECTOMY, PARS PLANA APPROACH;  Surgeon: JAMI Schulz MD;  Location: [facility] OR [address] [place] [address];  Service: Ophthalmology;  Laterality: " "Right;  25g PPV/membrane stripping/ILM peel/EL/AFx/GFX/avastin OD    VITRECTOMY BY PARS PLANA APPROACH Left 5/29/2019     Procedure: VITRECTOMY, PARS PLANA  APPROACH  25g PPV/Membrane Stripping/IML peel/EL/AFX/Avastin left eye   60 mins;  Surgeon: JAMI Schulz MD;  Location: [facility] OR [address] [place] [address];  Service: Ophthalmology;  Laterality: Left;         Review of patient's allergies indicates:  No Known Allergies         Current Facility-Administered Medications on File Prior to Encounter   Medication    cyclopentolate 1% ophthalmic solution 1 drop    homatropine 5 % ophthalmic solution 1 drop    moxifloxacin 0.5 % ophthalmic solution 1 drop    phenylephrine HCL 2.5% ophthalmic solution 1 drop    prednisoLONE acetate 1 % ophthalmic suspension 1 drop    tetracaine HCl (PF) 0.5 % Drop 1 drop    tropicamide 1% ophthalmic solution 1 drop           Current Outpatient Medications on File Prior to Encounter   Medication Sig    acetaminophen (TYLENOL) 650 MG TbSR Take 650 mg by mouth daily as needed (migraines).    blood-glucose meter,continuous Misc Use Dexcom G7  with Dexcom G7 sensors to monitor blood glucoses as directed.    insulin glargine U-100, Lantus, 100 unit/mL (3 mL) SubQ InPn pen Inject 40 Units into the skin every morning.    insulin lispro 100 unit/mL pen Inject 15 Units into the skin 3 (three) times daily before meals.    losartan (COZAAR) 50 MG tablet Take 1 tablet (50 mg total) by mouth once daily.    ONETOUCH DELICA LANCETS 33 gauge Misc 1 lancet  by skin prick route 4 (four) times daily.    ONETOUCH ULTRA BLUE TEST STRIP Strp 1 strip by Misc.(Non-Drug; Combo Route) route 4 (four) times daily.    pantoprazole (PROTONIX) 40 MG tablet Take 40 mg by mouth once daily.    pen needle, diabetic (BD ULTRA-FINE ISAAC PEN NEEDLES) 32 gauge x 5/32" Ndle Use to inject insulin 5x/day    rosuvastatin (CRESTOR) 40 MG Tab Take 40 mg by mouth every evening.    semaglutide (OZEMPIC) 0.25 mg or 0.5 mg (2 mg/3 mL) pen " injector Inject 0.5 mg into the skin every 7 days. Mondays      Family History         Problem Relation (Age of Onset)     Diabetes Mother                   Tobacco Use    Smoking status: Never    Smokeless tobacco: Never   Substance and Sexual Activity    Alcohol use: Yes       Comment: social    Drug use: No    Sexual activity: Yes       Partners: Male       Birth control/protection: I.U.D.      Review of Systems   Constitutional:  Positive for unexpected weight change.   Respiratory:  Positive for chest tightness and shortness of breath.    Cardiovascular:  Positive for palpitations and leg swelling.      Objective:      Vital Signs (Most Recent):  Temp: 98 °F (36.7 °C) (07/25/24 0156)  Pulse: 92 (07/25/24 0343)  Resp: 18 (07/25/24 0156)  BP: (!) 174/81 (07/25/24 0156)  SpO2: 98 % (07/25/24 0156) Vital Signs (24h Range):  Temp:  [97.8 °F (36.6 °C)-98 °F (36.7 °C)] 98 °F (36.7 °C)  Pulse:  [] 92  Resp:  [18] 18  SpO2:  [96 %-98 %] 98 %  BP: (174)/(81-82) 174/81         Physical Exam  Vitals reviewed.   Constitutional:            Comments: Mild anasarca   HENT:      Head: Normocephalic and atraumatic.   Cardiovascular:      Rate and Rhythm: Normal rate and regular rhythm.      Pulses: Normal pulses.      Heart sounds: Normal heart sounds.       Pulmonary:      Effort: Pulmonary effort is normal.      Breath sounds: Normal breath sounds.   Abdominal:      Palpations: Abdomen is soft.   Musculoskeletal:      Cervical back: Neck supple.      Right lower leg: Edema present.      Left lower leg: Edema present.      Comments: 1+ pitting edema bilaterally   Skin:     General: Skin is warm and dry.   Neurological:      General: No focal deficit present.      Mental Status: She is alert          Significant Labs: All pertinent labs within the past 24 hours have been reviewed.    Significant Imaging: I have reviewed all pertinent imaging results/findings within the past 24 hours.    Assessment/Plan:      * Anasarca  -  present prior to previous admission where albumin was 1.5, today's albumin is 1.9  - secondary to the hypoalbuminemia leading to decreased oncotic pressure in the blood vessels and increased fluid extravasation into surrounding tissues   - see plan for nephrotic syndrome for management       UTI (urinary tract infection)  - had UTI at last hospitalization and previous cultures grew E-coli sensitive to gentamicin, meropenem, tobramycin, and nitrofurantoin  - current UA showed 3+ protein, 2+ glucose, 1+ leukocyte esterase and many bacteria  - Urine culture pending  - given dose of meropenem in ED, awaiting culture results to evaluate need for further antibiotic coverage      Type 2 diabetes mellitus  Last A1c reviewed-   Lab Results   Component Value Date    HGBA1C >14.0 (H) 07/17/2024     - recently discharged for [facility] with blood sugar of 797 at presentation  - most recent POC glucose is 129  - initially unsure if T1 or T2DM; INDU antibody is 0.0   - DM is uncontrolled due to hyperglycemia on current medication regimen.  - started on lantus 30 units every morning for basal  - started on novolog 7.5 units pre-prandial  - started low sliding scale novolog 0-5 units   - holding ozempic    Hypertension  Chronic, uncontrolled. Latest blood pressure and vitals reviewed-     Temp:  [97.8 °F (36.6 °C)-98.1 °F (36.7 °C)]   Pulse:  []   Resp:  [16-18]   BP: (161-174)/(80-82)   SpO2:  [96 %-98 %] .   Home meds for hypertension were reviewed and noted below.   Hypertension Medications               losartan (COZAAR) 50 MG tablet Take 1 tablet (50 mg total) by mouth once daily.          - will manage blood pressure with home antihypertensive regimen      Diabetic nephropathy  - patient presented with progressively worsening anasarca and dyspnea  - labs show decreased kidney function with creatinine of 2.0 and low albumin of 1.9  - UA showed 3+ protein, 2+ glucose, 1+ leukocyte esterase and many bacteria   - started diuresis  with lasix 40 IV QD  - started home dose of ARB: losartan 50 QD as nephro-protective agent   - started diabetic diet with salt restriction (max 2g/day)  - started DVT prophylaxis for hypercoagulability secondary to hypoalbuminemia  - daily weights, strict I/Os, keep K > 4 and Mg > 2  - consulted nephrology for further recommendations      EL (acute kidney injury)  EL is likely due to  nephrotic syndrome secondary to uncontrolled diabetes and hypertension . Baseline creatinine is  1.2 . Most recent creatinine and eGFR are listed below.  Recent Labs     07/24/24 2142   CREATININE 2.0*   EGFRNORACEVR 32.2*     - EL is  worsening since discharge (previous creatinine 1.8)  - avoid nephrotoxins and renally dose meds for GFR listed above  - monitor urine output, trend CMP, and adjust therapy as needed      VTE Risk Mitigation (From admission, onward)           Ordered     enoxaparin injection 80 mg  Every 12 hours         07/25/24 1556     IP VTE HIGH RISK PATIENT  Once         07/25/24 0114     Place sequential compression device  Until discontinued         07/25/24 0114                    Discharge Planning   LEXIE: 7/27/2024     Code Status: Full Code   Is the patient medically ready for discharge?:     Reason for patient still in hospital (select all that apply): Patient trending condition                     Gustavo Sweet MD  Department of Hospital Medicine   [facility] - Med Surg ([place]-)

## 2024-07-26 NOTE — PROGRESS NOTES
[facility] Surg ([place] [address])  Nephrology  Progress Note    Patient Name: Mary Sims  MRN: [id number]  Admission Date: 7/24/2024  Hospital Length of Stay: 0 days  Attending Provider: Katherine Ventura MD   Primary Care Physician: Ariadne, Primary Doctor  Principal Problem:Anasarca    Subjective:     HPI: Mary is a pleasant 38 yo woman w pmhx significant for uncontrolled DMT2 on insulin, HTN, HLD, diabetic retinopathy, recently in the hospital June 19th for HHS/UTI, returned w worsening edema SOB, CP, cough, palpitations, CTA showed possible atypical pna, she was diuresed and nephrology was consulted for proteinuria and likely nephrotic syndrome. Recent echocardiogram unremakrable. Most recent A1c >14% for over a decade her diabetes has been uncontrolled. UA showed 3+ protein and bacteria.     Interval history: No complaints today, no overnight events. She is laying in bed.     Objective:     Vital Signs (Most Recent):  Temp: 97.8 °F (36.6 °C) (07/26/24 1521)  Pulse: 87 (07/26/24 1521)  Resp: 18 (07/26/24 1521)  BP: (!) 157/75 (07/26/24 1521)  SpO2: 99 % (07/26/24 1521) Vital Signs (24h Range):  Temp:  [96.5 °F (35.8 °C)-98.8 °F (37.1 °C)] 97.8 °F (36.6 °C)  Pulse:  [87-96] 87  Resp:  [16-18] 18  SpO2:  [97 %-100 %] 99 %  BP: (150-182)/(73-97) 157/75     Weight: 84 kg (185 lb 3 oz) (07/25/24 1746)  Body mass index is 31.79 kg/m².  Body surface area is 1.95 meters squared.    I/O last 3 completed shifts:  In: 1000 [P.O.:900; IV Piggyback:100]  Out: 401 [Urine:401]     Physical Exam  Constitutional:       Appearance: Normal appearance. She is normal weight.   HENT:      Head: Normocephalic and atraumatic.      Mouth/Throat:      Mouth: Mucous membranes are moist.   Cardiovascular:      Pulses: Normal pulses.      Heart sounds: Normal heart sounds.   Pulmonary:      Effort: Pulmonary effort is normal. No respiratory distress.   Musculoskeletal:         General: No swelling or tenderness.   Skin:     General: Skin is  warm and dry.   Neurological:      Mental Status: She is alert.          Significant Labs:  All labs within the past 24 hours have been reviewed.    Significant Imaging:  X-Ray: Reviewed    Assessment/Plan:     Renal/  Diabetic nephropathy  Diabetic nephropathy  Hypertensive nephropathy  Uncontrolled diabetes mellitus type 2  Acute kidney injury  UTI  HTN    -Recommend Lokelma for hyperkalemia of 5.6, recheck later tonight.   -Likely diabetic/HTN nephropathy. She has had poorly controlled diabetes for at least the last 14 years w A1c >10%. Optimization of her diabetes and HTN will be beneficial for her kidneys.   -Retroperitoneal US unremarkable  -Continue therapeutic anticoagulation w low albumin, at least until albumin improves >2.5  -Recommend changing losartan to telmisartan/olmesartan/valsartan for longer duration of action  -In the future we can plan to add on SGLT2 inhibitor and epleronone.   -Will need digital monitoring of her BP to be set up w PCP  -Will need outpatient FU w nephrology clinic. Can be considered for kidney biopsy in outpatient setting if Cr remains persistently elevated.   -Recommend x2 albumin dose to help w diuresis w her diuretics 25 g 12 hours apart given her hypoalbuminemic state loop diuretics is will not be working as well. Would also consider changing her lasix to bumex 2 mg qd.   -Consider bicarb repletion  -Will need to be set up w [facility] outpatient digital monitoring for her blood pressure for closer monitoring.        Thank you for your consult. I will follow-up with patient. Please contact us if you have any additional questions.    Abhi Sanchez MD  Nephrology  [facility] - Med Surg ([facility]

## 2024-07-26 NOTE — PLAN OF CARE
Problem: Adult Inpatient Plan of Care  Goal: Plan of Care Review  Outcome: Progressing     Problem: Diabetes Comorbidity  Goal: Blood Glucose Level Within Targeted Range  Outcome: Progressing     Problem: Acute Kidney Injury/Impairment  Goal: Fluid and Electrolyte Balance  Outcome: Progressing  Goal: Effective Renal Function  Outcome: Progressing     Problem: Pneumonia  Goal: Resolution of Infection Signs and Symptoms  Outcome: Progressing

## 2024-07-26 NOTE — SUBJECTIVE & OBJECTIVE
Interval History: Poorly ciontrolled DM (Elevated HbA1C) her potassium was elevated this morning (lokelma 10 g stat given) with glucose of 171. F/u BMP scheduled for this afternoon.      Review of Systems   Constitutional:  Negative for activity change and appetite change.   HENT:  Negative for congestion, trouble swallowing and voice change.    Eyes:  Negative for discharge.   Respiratory:  Positive for shortness of breath. Negative for apnea, cough, chest tightness, wheezing and stridor.    Cardiovascular:  Positive for leg swelling. Negative for chest pain and palpitations.   Gastrointestinal:  Positive for abdominal distention. Negative for abdominal pain, constipation, nausea and vomiting.   Musculoskeletal:  Negative for back pain.   Skin: Negative.      Objective:     Vital Signs (Most Recent):  Temp: 98.8 °F (37.1 °C) (07/26/24 1115)  Pulse: 88 (07/26/24 1115)  Resp: 18 (07/26/24 1115)  BP: (!) 150/86 (07/26/24 1127)  SpO2: 100 % (07/26/24 1115) Vital Signs (24h Range):  Temp:  [96.5 °F (35.8 °C)-98.8 °F (37.1 °C)] 98.8 °F (37.1 °C)  Pulse:  [85-96] 88  Resp:  [16-18] 18  SpO2:  [97 %-100 %] 100 %  BP: (126-182)/(69-97) 150/86     Weight: 84 kg (185 lb 3 oz)  Body mass index is 31.79 kg/m².    Intake/Output Summary (Last 24 hours) at 7/26/2024 1232  Last data filed at 7/26/2024 0909  Gross per 24 hour   Intake 1100 ml   Output 400 ml   Net 700 ml         Physical Exam        Significant Labs: All pertinent labs within the past 24 hours have been reviewed.    Significant Imaging: I have reviewed all pertinent imaging results/findings within the past 24 hours.

## 2024-07-26 NOTE — SUBJECTIVE & OBJECTIVE
Interval history: No complaints today, no overnight events. She is laying in bed.     Objective:     Vital Signs (Most Recent):  Temp: 97.8 °F (36.6 °C) (07/26/24 1521)  Pulse: 87 (07/26/24 1521)  Resp: 18 (07/26/24 1521)  BP: (!) 157/75 (07/26/24 1521)  SpO2: 99 % (07/26/24 1521) Vital Signs (24h Range):  Temp:  [96.5 °F (35.8 °C)-98.8 °F (37.1 °C)] 97.8 °F (36.6 °C)  Pulse:  [87-96] 87  Resp:  [16-18] 18  SpO2:  [97 %-100 %] 99 %  BP: (150-182)/(73-97) 157/75     Weight: 84 kg (185 lb 3 oz) (07/25/24 1746)  Body mass index is 31.79 kg/m².  Body surface area is 1.95 meters squared.    I/O last 3 completed shifts:  In: 1000 [P.O.:900; IV Piggyback:100]  Out: 401 [Urine:401]     Physical Exam  Constitutional:       Appearance: Normal appearance. She is normal weight.   HENT:      Head: Normocephalic and atraumatic.      Mouth/Throat:      Mouth: Mucous membranes are moist.   Cardiovascular:      Pulses: Normal pulses.      Heart sounds: Normal heart sounds.   Pulmonary:      Effort: Pulmonary effort is normal. No respiratory distress.   Musculoskeletal:         General: No swelling or tenderness.   Skin:     General: Skin is warm and dry.   Neurological:      Mental Status: She is alert.          Significant Labs:  All labs within the past 24 hours have been reviewed.    Significant Imaging:  X-Ray: Reviewed

## 2024-07-27 LAB
ALBUMIN SERPL BCP-MCNC: 2.1 G/DL (ref 3.5–5.2)
ALBUMIN SERPL BCP-MCNC: 2.1 G/DL (ref 3.5–5.2)
ALP SERPL-CCNC: 97 U/L (ref 55–135)
ALT SERPL W/O P-5'-P-CCNC: 15 U/L (ref 10–44)
ANION GAP SERPL CALC-SCNC: 7 MMOL/L (ref 8–16)
ANION GAP SERPL CALC-SCNC: 8 MMOL/L (ref 8–16)
ANION GAP SERPL CALC-SCNC: 8 MMOL/L (ref 8–16)
AST SERPL-CCNC: 18 U/L (ref 10–40)
BASOPHILS # BLD AUTO: 0.07 K/UL (ref 0–0.2)
BASOPHILS NFR BLD: 1.3 % (ref 0–1.9)
BILIRUB SERPL-MCNC: 0.2 MG/DL (ref 0.1–1)
BUN SERPL-MCNC: 21 MG/DL (ref 6–20)
BUN SERPL-MCNC: 21 MG/DL (ref 6–20)
BUN SERPL-MCNC: 27 MG/DL (ref 6–20)
CALCIUM SERPL-MCNC: 8.9 MG/DL (ref 8.7–10.5)
CALCIUM SERPL-MCNC: 8.9 MG/DL (ref 8.7–10.5)
CALCIUM SERPL-MCNC: 9.1 MG/DL (ref 8.7–10.5)
CHLORIDE SERPL-SCNC: 108 MMOL/L (ref 95–110)
CHLORIDE SERPL-SCNC: 109 MMOL/L (ref 95–110)
CHLORIDE SERPL-SCNC: 109 MMOL/L (ref 95–110)
CO2 SERPL-SCNC: 20 MMOL/L (ref 23–29)
CO2 SERPL-SCNC: 22 MMOL/L (ref 23–29)
CO2 SERPL-SCNC: 23 MMOL/L (ref 23–29)
CREAT SERPL-MCNC: 1.7 MG/DL (ref 0.5–1.4)
CREAT SERPL-MCNC: 1.7 MG/DL (ref 0.5–1.4)
CREAT SERPL-MCNC: 1.9 MG/DL (ref 0.5–1.4)
DIFFERENTIAL METHOD BLD: ABNORMAL
EOSINOPHIL # BLD AUTO: 0.2 K/UL (ref 0–0.5)
EOSINOPHIL NFR BLD: 3.1 % (ref 0–8)
ERYTHROCYTE [DISTWIDTH] IN BLOOD BY AUTOMATED COUNT: 14.9 % (ref 11.5–14.5)
EST. GFR  (NO RACE VARIABLE): 34.2 ML/MIN/1.73 M^2
EST. GFR  (NO RACE VARIABLE): 39.1 ML/MIN/1.73 M^2
EST. GFR  (NO RACE VARIABLE): 39.1 ML/MIN/1.73 M^2
GLUCOSE SERPL-MCNC: 152 MG/DL (ref 70–110)
GLUCOSE SERPL-MCNC: 152 MG/DL (ref 70–110)
GLUCOSE SERPL-MCNC: 172 MG/DL (ref 70–110)
HCT VFR BLD AUTO: 31.4 % (ref 37–48.5)
HGB BLD-MCNC: 10.7 G/DL (ref 12–16)
IMM GRANULOCYTES # BLD AUTO: 0.02 K/UL (ref 0–0.04)
IMM GRANULOCYTES NFR BLD AUTO: 0.4 % (ref 0–0.5)
LYMPHOCYTES # BLD AUTO: 2.1 K/UL (ref 1–4.8)
LYMPHOCYTES NFR BLD: 37.4 % (ref 18–48)
MAGNESIUM SERPL-MCNC: 1.8 MG/DL (ref 1.6–2.6)
MCH RBC QN AUTO: 23.9 PG (ref 27–31)
MCHC RBC AUTO-ENTMCNC: 34.1 G/DL (ref 32–36)
MCV RBC AUTO: 70 FL (ref 82–98)
MONOCYTES # BLD AUTO: 0.5 K/UL (ref 0.3–1)
MONOCYTES NFR BLD: 9.3 % (ref 4–15)
NEUTROPHILS # BLD AUTO: 2.7 K/UL (ref 1.8–7.7)
NEUTROPHILS NFR BLD: 48.5 % (ref 38–73)
NRBC BLD-RTO: 0 /100 WBC
PHOSPHATE SERPL-MCNC: 3.6 MG/DL (ref 2.7–4.5)
PHOSPHATE SERPL-MCNC: 4 MG/DL (ref 2.7–4.5)
PLATELET # BLD AUTO: 323 K/UL (ref 150–450)
PMV BLD AUTO: 11.4 FL (ref 9.2–12.9)
POCT GLUCOSE: 159 MG/DL (ref 70–110)
POCT GLUCOSE: 175 MG/DL (ref 70–110)
POCT GLUCOSE: 276 MG/DL (ref 70–110)
POCT GLUCOSE: 82 MG/DL (ref 70–110)
POTASSIUM SERPL-SCNC: 4.3 MMOL/L (ref 3.5–5.1)
POTASSIUM SERPL-SCNC: 4.5 MMOL/L (ref 3.5–5.1)
POTASSIUM SERPL-SCNC: 4.5 MMOL/L (ref 3.5–5.1)
PROT SERPL-MCNC: 6.2 G/DL (ref 6–8.4)
RBC # BLD AUTO: 4.47 M/UL (ref 4–5.4)
SODIUM SERPL-SCNC: 137 MMOL/L (ref 136–145)
SODIUM SERPL-SCNC: 138 MMOL/L (ref 136–145)
SODIUM SERPL-SCNC: 139 MMOL/L (ref 136–145)
WBC # BLD AUTO: 5.51 K/UL (ref 3.9–12.7)

## 2024-07-27 PROCEDURE — 63600175 PHARM REV CODE 636 W HCPCS

## 2024-07-27 PROCEDURE — 80069 RENAL FUNCTION PANEL: CPT | Performed by: STUDENT IN AN ORGANIZED HEALTH CARE EDUCATION/TRAINING PROGRAM

## 2024-07-27 PROCEDURE — 84100 ASSAY OF PHOSPHORUS: CPT

## 2024-07-27 PROCEDURE — 36415 COLL VENOUS BLD VENIPUNCTURE: CPT

## 2024-07-27 PROCEDURE — 25000003 PHARM REV CODE 250

## 2024-07-27 PROCEDURE — 21400001 HC TELEMETRY ROOM

## 2024-07-27 PROCEDURE — 85025 COMPLETE CBC W/AUTO DIFF WBC: CPT

## 2024-07-27 PROCEDURE — 80053 COMPREHEN METABOLIC PANEL: CPT

## 2024-07-27 PROCEDURE — 83735 ASSAY OF MAGNESIUM: CPT

## 2024-07-27 PROCEDURE — 63600175 PHARM REV CODE 636 W HCPCS: Performed by: STUDENT IN AN ORGANIZED HEALTH CARE EDUCATION/TRAINING PROGRAM

## 2024-07-27 RX ORDER — BUMETANIDE 0.5 MG/1
2 TABLET ORAL ONCE
Status: COMPLETED | OUTPATIENT
Start: 2024-07-27 | End: 2024-07-27

## 2024-07-27 RX ORDER — INSULIN GLARGINE 100 [IU]/ML
20 INJECTION, SOLUTION SUBCUTANEOUS DAILY
Status: DISCONTINUED | OUTPATIENT
Start: 2024-07-27 | End: 2024-07-28 | Stop reason: HOSPADM

## 2024-07-27 RX ORDER — INSULIN ASPART 100 [IU]/ML
3 INJECTION, SOLUTION INTRAVENOUS; SUBCUTANEOUS
Status: DISCONTINUED | OUTPATIENT
Start: 2024-07-27 | End: 2024-07-28 | Stop reason: HOSPADM

## 2024-07-27 RX ADMIN — ENOXAPARIN SODIUM 80 MG: 80 INJECTION SUBCUTANEOUS at 04:07

## 2024-07-27 RX ADMIN — LOSARTAN POTASSIUM 25 MG: 25 TABLET, FILM COATED ORAL at 09:07

## 2024-07-27 RX ADMIN — INSULIN GLARGINE 20 UNITS: 100 INJECTION, SOLUTION SUBCUTANEOUS at 09:07

## 2024-07-27 RX ADMIN — INSULIN ASPART 3 UNITS: 100 INJECTION, SOLUTION INTRAVENOUS; SUBCUTANEOUS at 05:07

## 2024-07-27 RX ADMIN — ATORVASTATIN CALCIUM 20 MG: 20 TABLET, FILM COATED ORAL at 09:07

## 2024-07-27 RX ADMIN — PIPERACILLIN SODIUM AND TAZOBACTAM SODIUM 4.5 G: 4; .5 INJECTION, POWDER, FOR SOLUTION INTRAVENOUS at 05:07

## 2024-07-27 RX ADMIN — BUMETANIDE 2 MG: 0.5 TABLET ORAL at 11:07

## 2024-07-27 RX ADMIN — PIPERACILLIN SODIUM AND TAZOBACTAM SODIUM 4.5 G: 4; .5 INJECTION, POWDER, FOR SOLUTION INTRAVENOUS at 09:07

## 2024-07-27 RX ADMIN — ENOXAPARIN SODIUM 80 MG: 80 INJECTION SUBCUTANEOUS at 05:07

## 2024-07-27 RX ADMIN — INSULIN ASPART 3 UNITS: 100 INJECTION, SOLUTION INTRAVENOUS; SUBCUTANEOUS at 11:07

## 2024-07-27 NOTE — CLINICAL REVIEW
Nephrology Chart Review      Intake/Output Summary (Last 24 hours) at 7/27/2024 0832  Last data filed at 7/26/2024 1655  Gross per 24 hour   Intake 500 ml   Output --   Net 500 ml       Vitals:    07/27/24 0105 07/27/24 0110 07/27/24 0416 07/27/24 0728   BP: (!) 182/87 (!) 160/80 (!) 159/77 135/63   BP Location: Left arm Left arm Right arm Left arm   Patient Position: Lying Lying Lying Lying   Pulse: 88  92 92   Resp: 18  18 18   Temp: 98.6 °F (37 °C)  96.7 °F (35.9 °C) 98.6 °F (37 °C)   TempSrc: Oral  Oral Oral   SpO2: 97%  98% 100%   Weight:       Height:           Recent Labs   Lab 07/25/24  0446 07/26/24  0520 07/26/24  1255 07/26/24  2353 07/27/24  0558    136 133* 137  --    K 4.1 5.2* 5.6* 4.3  --    * 111* 107 109  --    CO2 19* 19* 18* 20*  --    BUN 27* 24* 24* 27*  --    CREATININE 1.8* 2.0* 1.8* 1.9*  --    CALCIUM 8.4* 8.4* 8.5* 8.9  --    PHOS 3.6 3.5  --   --  4.0         K improved. Cr stable at 1.9 from last night. Continue to control BP and glucose. Not sure if UOP is accurate. Will need outpatient FU w nephrology for her proteinuria.   No other related issues identified. Please call Nephrology as needed; We will continue to follow.

## 2024-07-27 NOTE — PROGRESS NOTES
"[facility] Surg ([address] Medicine  Progress Note    Patient Name: Mary Sims  MRN: [id number]  Patient Class: IP- Inpatient   Admission Date: 7/24/2024  Length of Stay: 1 days  Attending Physician: Kadi Kuo MD  Primary Care Provider: Ariadne, Primary Doctor        Subjective:     Principal Problem:Anasarca        HPI:  Mary Sims is a 40 YO female with a PMH significant for T2DM (on insulin), HTN, HLD and diabetic retinopathy that presents to the ED as a bounce back with a chief complaint of shortness of breath and generalized swelling. She was recently discharged July 19th for HHS. Patient states that the dyspnea and anasarca were present even before her most recent hospitalization but that since being discharged, she has noticed progressively increased swelling in her body and dyspnea on exertion. She also endorses non-radiating, substernal chest "tightness" that is 8/10 and worse with activity. She has noticed an increase in her weight, a dry cough and occasional palpitations. She denies orthopnea, fever, chills, or abdominal pain. She states her blood sugars have not been controlled, ranging in the high 300s over the last week.     In the ED, the patient was hypertensive at 174/82 but otherwise hemodynamically stable. CBC showed microcytic anemia with a Hgb of 10. CMP showed an EL with a BUN of 31 and creatinine of 2.0 (baseline 1.2, discharged at 1.8). Albumin was decreased at 1.9. BNP was mildly elevated at 124. Troponin was negative. EKG showed normal sinus rhythm. D-dimer was elevated at 0.95. UA was significant for 3+ protein, 2+ glucose, 1+ leukocyte esterase and many bacteria; reflex to culture pending. HbA1c from her last hospitalization was >14.0. CXR showed mild interstitial infiltrate/edema and ground-glass alveolar infiltrate at the lung bases without dense consolidation. CTA chest was negative for PE, but showed small ground-glass pulmonary opacities suspicious " for atypical pneumonia, including the possibility of COVID-19 pneumonia. Echo from her most recent visit was normal, with an EF of 60-65%. Due to her UA, patient was given a single dose of meropenem in the ED and was admitted to hospital medicine.     Overview/Hospital Course:  39 y.o female with a PMHx T2DM on insulin (elevated hbA1C) HTN, HLD and diabetic retinopathy ,recently admitted admitted to hospital for HHS and UTI on 7/19/24. Admitted for worsening anasarca -nephrotic syndrome, and poorly controlled DM. Nephrology rec- retroperitoneal US, low antocoag, start lasix, arb, think of adding SGLTs inhibitor and epleronone in future, lipid panel, f/u with PCP, dietician, outpt fu with nephro.    Interval History: No active event overnight. Afebrile, her glucose range is still not steady. We reduced long acting insulin(glargine) to 20 unit once daily and the short acting (aspart) to 3 units 3 times daily. Per the nephro rec- Bumex 2 mg stat give for better bioavailabilty. F/U BMP.     Review of Systems   Constitutional:  Negative for activity change and appetite change.   HENT:  Negative for congestion, trouble swallowing and voice change.    Eyes:  Negative for discharge.   Respiratory:  Positive for shortness of breath. Negative for apnea, cough, chest tightness, wheezing and stridor.    Cardiovascular:  Positive for leg swelling. Negative for chest pain and palpitations.   Gastrointestinal:  Positive for abdominal distention. Negative for abdominal pain, constipation, nausea and vomiting.   Musculoskeletal:  Negative for back pain.   Skin: Negative.      Objective:     Vital Signs (Most Recent):  Temp: 97.9 °F (36.6 °C) (07/27/24 1117)  Pulse: 95 (07/27/24 1117)  Resp: 18 (07/27/24 1117)  BP: (!) 162/74 (07/27/24 1117)  SpO2: 100 % (07/27/24 1117) Vital Signs (24h Range):  Temp:  [96.7 °F (35.9 °C)-99.4 °F (37.4 °C)] 97.9 °F (36.6 °C)  Pulse:  [87-96] 95  Resp:  [18-19] 18  SpO2:  [97 %-100 %] 100 %  BP:  (135-195)/(63-94) 162/74     Weight: 84 kg (185 lb 3 oz)  Body mass index is 31.79 kg/m².    Intake/Output Summary (Last 24 hours) at 7/27/2024 1158  Last data filed at 7/27/2024 1128  Gross per 24 hour   Intake 300 ml   Output 500 ml   Net -200 ml         Physical Exam  Vitals and nursing note reviewed.   Constitutional:       Appearance: Normal appearance.   HENT:      Head: Normocephalic and atraumatic.      Nose: No congestion.   Cardiovascular:      Rate and Rhythm: Normal rate and regular rhythm.      Pulses: Normal pulses.      Heart sounds: Normal heart sounds. No murmur heard.  Pulmonary:      Effort: Pulmonary effort is normal.      Breath sounds: Normal breath sounds.   Abdominal:      General: There is distension.      Palpations: Abdomen is soft. There is no mass.   Skin:     General: Skin is warm.      Coloration: Skin is not jaundiced.   Neurological:      Mental Status: She is alert and oriented to person, place, and time.             Significant Labs: All pertinent labs within the past 24 hours have been reviewed.    Significant Imaging: I have reviewed all pertinent imaging results/findings within the past 24 hours.    Assessment/Plan:      * Anasarca  - present prior to previous admission where albumin was 1.5, today's albumin is 1.9  - secondary to the hypoalbuminemia leading to decreased oncotic pressure in the blood vessels and increased fluid extravasation into surrounding tissues   - see plan for nephrotic syndrome for management       UTI (urinary tract infection)  - had UTI at last hospitalization and previous cultures grew E-coli sensitive to gentamicin, meropenem, tobramycin, and nitrofurantoin  - current UA showed 3+ protein, 2+ glucose, 1+ leukocyte esterase and many bacteria  - Urine culture pending  - given dose of meropenem in ED, awaiting culture results to evaluate need for further antibiotic coverage      Type 2 diabetes mellitus  Last A1c reviewed-   Lab Results   Component Value  Date    HGBA1C >14.0 (H) 07/17/2024     - recently discharged for [facility] with blood sugar of 797 at presentation  - most recent POC glucose is 129  - initially unsure if T1 or T2DM; INDU antibody is 0.0   - DM is uncontrolled due to hyperglycemia on current medication regimen.  - started on lantus 30 units every morning for basal  - started on novolog 7.5 units pre-prandial  - started low sliding scale novolog 0-5 units   - holding ozempic    Hypertension  Chronic, uncontrolled. Latest blood pressure and vitals reviewed-     Temp:  [97.8 °F (36.6 °C)-98.1 °F (36.7 °C)]   Pulse:  []   Resp:  [16-18]   BP: (161-174)/(80-82)   SpO2:  [96 %-98 %] .   Home meds for hypertension were reviewed and noted below.   Hypertension Medications               losartan (COZAAR) 50 MG tablet Take 1 tablet (50 mg total) by mouth once daily.          - will manage blood pressure with home antihypertensive regimen      Diabetic nephropathy  - patient presented with progressively worsening anasarca and dyspnea  - labs show decreased kidney function with creatinine of 2.0 and low albumin of 1.9  - UA showed 3+ protein, 2+ glucose, 1+ leukocyte esterase and many bacteria   - started diuresis with lasix 40 IV QD  - started home dose of ARB: losartan 50 QD as nephro-protective agent   - started diabetic diet with salt restriction (max 2g/day)  - started DVT prophylaxis for hypercoagulability secondary to hypoalbuminemia  - daily weights, strict I/Os, keep K > 4 and Mg > 2  - consulted nephrology for further recommendations      EL (acute kidney injury)  EL is likely due to  nephrotic syndrome secondary to uncontrolled diabetes and hypertension . Baseline creatinine is  1.2 . Most recent creatinine and eGFR are listed below.  Recent Labs     07/24/24 2142   CREATININE 2.0*   EGFRNORACEVR 32.2*     - EL is  worsening since discharge (previous creatinine 1.8)  - avoid nephrotoxins and renally dose meds for GFR listed above  - monitor  urine output, trend CMP, and adjust therapy as needed      VTE Risk Mitigation (From admission, onward)           Ordered     enoxaparin injection 80 mg  Every 12 hours         07/25/24 1556     IP VTE HIGH RISK PATIENT  Once         07/25/24 0114     Place sequential compression device  Until discontinued         07/25/24 0114                    Discharge Planning   LEXIE: 7/27/2024     Code Status: Full Code   Is the patient medically ready for discharge?:     Reason for patient still in hospital (select all that apply): Patient trending condition, Laboratory test, and Consult recommendations                     Gustavo Sweet MD  Department of Hospital Medicine   [facility] Surg ([place]

## 2024-07-27 NOTE — SUBJECTIVE & OBJECTIVE
Interval History: No active event overnight. Afebrile, her glucose range is still not steady. We reduced long acting insulin(glargine) to 20 unit once daily and the short acting (aspart) to 3 units 3 times daily. Per the nephro rec- Bumex 2 mg stat give for better bioavailabilty. F/U BMP.     Review of Systems   Constitutional:  Negative for activity change and appetite change.   HENT:  Negative for congestion, trouble swallowing and voice change.    Eyes:  Negative for discharge.   Respiratory:  Positive for shortness of breath. Negative for apnea, cough, chest tightness, wheezing and stridor.    Cardiovascular:  Positive for leg swelling. Negative for chest pain and palpitations.   Gastrointestinal:  Positive for abdominal distention. Negative for abdominal pain, constipation, nausea and vomiting.   Musculoskeletal:  Negative for back pain.   Skin: Negative.      Objective:     Vital Signs (Most Recent):  Temp: 97.9 °F (36.6 °C) (07/27/24 1117)  Pulse: 95 (07/27/24 1117)  Resp: 18 (07/27/24 1117)  BP: (!) 162/74 (07/27/24 1117)  SpO2: 100 % (07/27/24 1117) Vital Signs (24h Range):  Temp:  [96.7 °F (35.9 °C)-99.4 °F (37.4 °C)] 97.9 °F (36.6 °C)  Pulse:  [87-96] 95  Resp:  [18-19] 18  SpO2:  [97 %-100 %] 100 %  BP: (135-195)/(63-94) 162/74     Weight: 84 kg (185 lb 3 oz)  Body mass index is 31.79 kg/m².    Intake/Output Summary (Last 24 hours) at 7/27/2024 1158  Last data filed at 7/27/2024 1128  Gross per 24 hour   Intake 300 ml   Output 500 ml   Net -200 ml         Physical Exam  Vitals and nursing note reviewed.   Constitutional:       Appearance: Normal appearance.   HENT:      Head: Normocephalic and atraumatic.      Nose: No congestion.   Cardiovascular:      Rate and Rhythm: Normal rate and regular rhythm.      Pulses: Normal pulses.      Heart sounds: Normal heart sounds. No murmur heard.  Pulmonary:      Effort: Pulmonary effort is normal.      Breath sounds: Normal breath sounds.   Abdominal:      General:  There is distension.      Palpations: Abdomen is soft. There is no mass.   Skin:     General: Skin is warm.      Coloration: Skin is not jaundiced.   Neurological:      Mental Status: She is alert and oriented to person, place, and time.             Significant Labs: All pertinent labs within the past 24 hours have been reviewed.    Significant Imaging: I have reviewed all pertinent imaging results/findings within the past 24 hours.

## 2024-07-27 NOTE — PLAN OF CARE
Problem: Adult Inpatient Plan of Care  Goal: Plan of Care Review  Outcome: Progressing     Problem: Diabetes Comorbidity  Goal: Blood Glucose Level Within Targeted Range  Outcome: Progressing     Problem: Acute Kidney Injury/Impairment  Goal: Fluid and Electrolyte Balance  Outcome: Progressing  Goal: Effective Renal Function  Outcome: Progressing

## 2024-07-28 VITALS
HEART RATE: 100 BPM | RESPIRATION RATE: 18 BRPM | OXYGEN SATURATION: 100 % | WEIGHT: 185.19 LBS | TEMPERATURE: 98 F | DIASTOLIC BLOOD PRESSURE: 78 MMHG | BODY MASS INDEX: 31.62 KG/M2 | SYSTOLIC BLOOD PRESSURE: 116 MMHG | HEIGHT: 64 IN

## 2024-07-28 LAB
ALBUMIN SERPL BCP-MCNC: 1.8 G/DL (ref 3.5–5.2)
ANION GAP SERPL CALC-SCNC: 10 MMOL/L (ref 8–16)
BACTERIA UR CULT: ABNORMAL
BUN SERPL-MCNC: 21 MG/DL (ref 6–20)
CALCIUM SERPL-MCNC: 8.3 MG/DL (ref 8.7–10.5)
CHLORIDE SERPL-SCNC: 108 MMOL/L (ref 95–110)
CO2 SERPL-SCNC: 23 MMOL/L (ref 23–29)
CREAT SERPL-MCNC: 1.7 MG/DL (ref 0.5–1.4)
EST. GFR  (NO RACE VARIABLE): 39.1 ML/MIN/1.73 M^2
GLUCOSE SERPL-MCNC: 129 MG/DL (ref 70–110)
PHOSPHATE SERPL-MCNC: 3.9 MG/DL (ref 2.7–4.5)
POCT GLUCOSE: 126 MG/DL (ref 70–110)
POCT GLUCOSE: 193 MG/DL (ref 70–110)
POCT GLUCOSE: 207 MG/DL (ref 70–110)
POTASSIUM SERPL-SCNC: 4.2 MMOL/L (ref 3.5–5.1)
SODIUM SERPL-SCNC: 141 MMOL/L (ref 136–145)

## 2024-07-28 PROCEDURE — 25000003 PHARM REV CODE 250

## 2024-07-28 PROCEDURE — 80069 RENAL FUNCTION PANEL: CPT | Performed by: STUDENT IN AN ORGANIZED HEALTH CARE EDUCATION/TRAINING PROGRAM

## 2024-07-28 PROCEDURE — 63600175 PHARM REV CODE 636 W HCPCS

## 2024-07-28 PROCEDURE — 36415 COLL VENOUS BLD VENIPUNCTURE: CPT | Performed by: STUDENT IN AN ORGANIZED HEALTH CARE EDUCATION/TRAINING PROGRAM

## 2024-07-28 RX ORDER — BUMETANIDE 2 MG/1
2 TABLET ORAL DAILY
Qty: 30 TABLET | Refills: 11 | Status: SHIPPED | OUTPATIENT
Start: 2024-07-28 | End: 2025-07-28

## 2024-07-28 RX ORDER — LOSARTAN POTASSIUM 50 MG/1
50 TABLET ORAL DAILY
Qty: 30 TABLET | Refills: 0 | Status: SHIPPED | OUTPATIENT
Start: 2024-07-28 | End: 2024-07-28 | Stop reason: HOSPADM

## 2024-07-28 RX ORDER — LOSARTAN POTASSIUM 25 MG/1
25 TABLET ORAL DAILY
Qty: 90 TABLET | Refills: 3 | Status: SHIPPED | OUTPATIENT
Start: 2024-07-29 | End: 2025-07-29

## 2024-07-28 RX ADMIN — PIPERACILLIN SODIUM AND TAZOBACTAM SODIUM 4.5 G: 4; .5 INJECTION, POWDER, FOR SOLUTION INTRAVENOUS at 12:07

## 2024-07-28 RX ADMIN — LOSARTAN POTASSIUM 25 MG: 25 TABLET, FILM COATED ORAL at 09:07

## 2024-07-28 RX ADMIN — INSULIN ASPART 3 UNITS: 100 INJECTION, SOLUTION INTRAVENOUS; SUBCUTANEOUS at 08:07

## 2024-07-28 RX ADMIN — INSULIN GLARGINE 20 UNITS: 100 INJECTION, SOLUTION SUBCUTANEOUS at 08:07

## 2024-07-28 RX ADMIN — ATORVASTATIN CALCIUM 20 MG: 20 TABLET, FILM COATED ORAL at 09:07

## 2024-07-28 RX ADMIN — INSULIN ASPART 1 UNITS: 100 INJECTION, SOLUTION INTRAVENOUS; SUBCUTANEOUS at 12:07

## 2024-07-28 RX ADMIN — PIPERACILLIN SODIUM AND TAZOBACTAM SODIUM 4.5 G: 4; .5 INJECTION, POWDER, FOR SOLUTION INTRAVENOUS at 10:07

## 2024-07-28 RX ADMIN — ENOXAPARIN SODIUM 80 MG: 80 INJECTION SUBCUTANEOUS at 05:07

## 2024-07-28 RX ADMIN — INSULIN ASPART 3 UNITS: 100 INJECTION, SOLUTION INTRAVENOUS; SUBCUTANEOUS at 11:07

## 2024-07-28 NOTE — PLAN OF CARE
07/28/24 1318   Final Note   Assessment Type Final Discharge Note   Anticipated Discharge Disposition Home   Post-Acute Status   Discharge Delays None known at this time

## 2024-07-28 NOTE — DISCHARGE INSTRUCTIONS
Hospital Course:   39 y.o female with a PMHx T2DM on insulin (elevated hbA1C, non adherent) HTN, HLD and diabetic retinopathy ,recently admitted [place] [facility] [place] and UTI  from 7/16/24 -7/19/24. Presenting to [facility]  on 7/24 anasarca found to have nephrotic syndrome, in the setting of uncontrolled DM.  Nephrology followed during stay, recommended initiation of ARB, anticoagulation for Dvt prophylaxis in the setting of nephrotic sd on top of loop diuretic. Patient to follow up with nephrology [facility]. Her creatinine was 2.0 on admission and downtrended to 1.7 (new baseline)  Given concerns for UTI on previous admission she received 1 dose meropenem and zosyn x2 days. Cultures from this admission NGTD.   Transiently hyperkalemic on admission requiring scheduled LOKELMA.     No changes done to insulin regimen adjusted during prior hospitalization. Patient diureses during admission     Patient to follow wit pcp and her endocrinologist and nephrology at [facility]     Repeat cmp prior nephrology appointment.     Extensive conversations, counseling and education during her stay regarding inability to control blood sugar.                Recent Labs   Lab 07/26/24  2353 07/27/24  0558 07/27/24  1131 07/28/24  0659   BUN 27*  --  21*  21* 21*   CREATININE 1.9*  --  1.7*  1.7* 1.7*   CO2 20*  --  22*  23 23   CALCIUM 8.9  --  9.1  8.9 8.3*   MG  --  1.8  --   --    PHOS  --  4.0 3.6 3.9   ANIONGAP 8  --  8  7* 10

## 2024-07-28 NOTE — PLAN OF CARE
Problem: Adult Inpatient Plan of Care  Goal: Plan of Care Review  Outcome: Progressing     Problem: Acute Kidney Injury/Impairment  Goal: Fluid and Electrolyte Balance  Outcome: Progressing     Problem: Pneumonia  Goal: Fluid Balance  Outcome: Progressing     Problem: Diabetes Comorbidity  Goal: Blood Glucose Level Within Targeted Range  Outcome: Progressing

## 2024-07-28 NOTE — PROGRESS NOTES
[facility] - [facility] Surg ([facility])  Discharge Final Note    Primary Care Provider: Ariadne Primary Doctor    Expected Discharge Date: 7/28/2024    Final Discharge Note (most recent)       Final Note - 07/25/24 1521          Final Note    Assessment Type Discharge Planning Assessment                     Important Message from Medicare             Contact Info       No, Primary Doctor   Relationship: PCP - General        Next Steps: Follow up in 1 week(s)    [facility] NEPHROLOGY   Specialty: Nephrology    [address] [address]   Phone: [phone]       Next Steps: Follow up in 1 week(s)    [facility] ENDOCRINOLOGY   Specialty: Endocrinology    [address] [address]   Phone: [phone]       Next Steps: Follow up in 1 week(s)        Patient has been reviewed by CM.Patient does not have any additional needs. Patient is cleared to return home with her family.Patient has transportation home.

## 2024-07-28 NOTE — ASSESSMENT & PLAN NOTE
- present prior to previous admission where albumin was 1.5, today's albumin is 1.9  - secondary to the hypoalbuminemia leading to decreased oncotic pressure in the blood vessels and increased fluid extravasation into surrounding tissues   - see plan for nephrotic syndrome for management

## 2024-07-28 NOTE — ASSESSMENT & PLAN NOTE
EL is likely due to  nephrotic syndrome secondary to uncontrolled diabetes and hypertension . Baseline creatinine is  1.2 . Most recent creatinine and eGFR are listed below.  Recent Labs     07/26/24  2353 07/27/24  1131 07/28/24  0659   CREATININE 1.9* 1.7*  1.7* 1.7*   EGFRNORACEVR 34.2* 39.1*  39.1* 39.1*     Discharged with creatinine 1.7  ( baseline?)  -although arb typically CI in EL, benefit outweighs risk in the setting of such a marked proteinuria.    - avoid nephrotoxins and renally dose meds for GFR listed above  - monitor urine output, trend CMP, and adjust therapy as needed

## 2024-07-28 NOTE — ASSESSMENT & PLAN NOTE
- patient presented with progressively worsening anasarca and dyspnea  - labs show decreased kidney function with creatinine of 2.0 and low albumin of 1.9  - UA showed 3+ protein, 2+ glucose, 1+ leukocyte esterase and many bacteria   - started diuresis with lasix 40 IV QD  - started home dose of ARB: losartan 25 QD as nephro-protective agent   - started diabetic diet with salt restriction (max 2g/day)  - started DVT prophylaxis for hypercoagulability secondary to hypoalbuminemia  - daily weights, strict I/Os, keep K > 4 and Mg > 2  - consulted nephrology for further recommendations      -Partient to discuss with nephrology on follow regarding increasing losartan to home dose.

## 2024-07-28 NOTE — ASSESSMENT & PLAN NOTE
Chronic, uncontrolled. Latest blood pressure and vitals reviewed-     Temp:  [97.8 °F (36.6 °C)-98.4 °F (36.9 °C)]   Pulse:  []   Resp:  [18]   BP: (116-164)/(63-81)   SpO2:  [97 %-100 %] .   Home meds for hypertension were reviewed and noted below.   Hypertension Medications               losartan (COZAAR) 50 MG tablet Take 1 tablet (50 mg total) by mouth once daily.          Patient with EL  on admission   Losartan reduced to 25mg, normotensive on , discuss with nephro , discharge on 25mg  -Per nephrology recs discharge on bumex  (better bbioavailabilit)

## 2024-07-28 NOTE — ASSESSMENT & PLAN NOTE
Last A1c reviewed-   Lab Results   Component Value Date    HGBA1C >14.0 (H) 07/17/2024     - recently discharged for [facility] with blood sugar of 797 at presentation  - most recent POC glucose is 129  - initially unsure if T1 or T2DM; INDU antibody is 0.0   - DM is uncontrolled due to hyperglycemia on current medication regimen.  - started on lantus 30 units every morning for basal  - started on novolog 7.5 units pre-prandial  - started low sliding scale novolog 0-5 units   - holding ozempic      No changes on regimen at discharge

## 2024-07-28 NOTE — ASSESSMENT & PLAN NOTE
- had UTI at last hospitalization and previous cultures grew E-coli sensitive to gentamicin, meropenem, tobramycin, and nitrofurantoin  - current UA showed 3+ protein, 2+ glucose, 1+ leukocyte esterase and many bacteria  - Urine culture pending  - given dose of meropenem in ED, awaiting culture results to evaluate need for further antibiotic coverage  - NGTD, D/C antibiotics after merem x1 and zosyn x2    Resolved

## 2024-07-28 NOTE — DISCHARGE SUMMARY
"[facility] ([place] [address])  [facility]  Discharge Summary      Patient Name: Mary Sims  MRN: [id number]  ELLEN: [id number]  Patient Class: IP- Inpatient  Admission Date: 7/24/2024  Hospital Length of Stay: 2 days  Discharge Date and Time:  07/28/2024 2:15 PM  Attending Physician: Kadi Kuo MD   Discharging Provider: Kevan Ren MD  Primary Care Provider: Ariadne Primary Doctor  [facility] Team: [facility] HOSP MED 2 Kevan Ren MD  Primary Care Team: [facility] 2    HPI:   Mary Sims is a 40 YO female with a PMH significant for T2DM (on insulin), HTN, HLD and diabetic retinopathy that presents to the ED as a bounce back with a chief complaint of shortness of breath and generalized swelling. She was recently discharged July 19th for HHS. Patient states that the dyspnea and anasarca were present even before her most recent hospitalization but that since being discharged, she has noticed progressively increased swelling in her body and dyspnea on exertion. She also endorses non-radiating, substernal chest "tightness" that is 8/10 and worse with activity. She has noticed an increase in her weight, a dry cough and occasional palpitations. She denies orthopnea, fever, chills, or abdominal pain. She states her blood sugars have not been controlled, ranging in the high 300s over the last week.     In the ED, the patient was hypertensive at 174/82 but otherwise hemodynamically stable. CBC showed microcytic anemia with a Hgb of 10. CMP showed an EL with a BUN of 31 and creatinine of 2.0 (baseline 1.2, discharged at 1.8). Albumin was decreased at 1.9. BNP was mildly elevated at 124. Troponin was negative. EKG showed normal sinus rhythm. D-dimer was elevated at 0.95. UA was significant for 3+ protein, 2+ glucose, 1+ leukocyte esterase and many bacteria; reflex to culture pending. HbA1c from her last hospitalization was >14.0. CXR showed mild interstitial infiltrate/edema " and ground-glass alveolar infiltrate at the lung bases without dense consolidation. CTA chest was negative for PE, but showed small ground-glass pulmonary opacities suspicious for atypical pneumonia, including the possibility of COVID-19 pneumonia. Echo from her most recent visit was normal, with an EF of 60-65%. Due to her UA, patient was given a single dose of meropenem in the ED and was admitted to hospital medicine.     * No surgery found *      Hospital Course:   39 y.o female with a PMHx T2DM on insulin (elevated hbA1C, non adherent), HTN, HLD and diabetic retinopathy, recently admitted admitted to [facility] for HHS and UTI  from 7/16/24 -7/19/24. Presenting to [facility] on 7/24 anasarca found to have nephrotic syndrome, in the setting of uncontrolled DM.  Nephrology followed during stay, recommended initiation of ARB, anticoagulation for Dvt prophylaxis in the setting of nephrotic sd on top of loop diuretic. Patient to follow up with nephrology [facility]. Her creatinine was 2.0 on admission and downtrended to 1.7 (possible new baseline).   Given concerns for UTI on previous admission she received 1 dose meropenem and zosyn x2 days.  Blood cultures are negative.  Urine culture once again with ESBL E coli.   Transiently hyperkalemic on admission requiring scheduled lokelma. K normalized.     No changes done to insulin regimen adjusted during prior hospitalization. Patient diureses during admission    Patient to follow wit pcp and her endocrinologist and nephrology at [facility]    Repeat cmp prior nephrology appointment.    Extensive conversations, counseling and education during her stay regarding inability to control blood sugar.       Physical Exam  Vitals and nursing note reviewed.   Constitutional:       Appearance: Normal appearance.   HENT:      Head: Normocephalic and atraumatic.      Nose: No congestion.   Cardiovascular:      Rate and Rhythm: Normal rate and regular rhythm.      Pulses: Normal pulses.      Heart sounds:  Normal heart sounds. No murmur heard.  Pulmonary:      Effort: Pulmonary effort is normal.      Breath sounds: Normal breath sounds.   Abdominal:      Palpations: Abdomen is soft. There is no mass.   Skin:     General: Skin is warm.      Coloration: Skin is not jaundiced.   Neurological:      Mental Status: She is alert and oriented to person, place, and time.         Recent Labs   Lab 07/26/24  2353 07/27/24  0558 07/27/24  1131 07/28/24  0659   BUN 27*  --  21*  21* 21*   CREATININE 1.9*  --  1.7*  1.7* 1.7*   CO2 20*  --  22*  23 23   CALCIUM 8.9  --  9.1  8.9 8.3*   MG  --  1.8  --   --    PHOS  --  4.0 3.6 3.9   ANIONGAP 8  --  8  7* 10              Vitals:    07/28/24 1215   BP: 116/78   Pulse: 100   Resp: 18   Temp: 98.4 °F (36.9 °C)       Goals of Care Treatment Preferences:  Code Status: Full Code      Consults:   Consults (From admission, onward)          Status Ordering Provider     Inpatient consult to Registered Dietitian/Nutritionist  Once        Provider:  (Not yet assigned)    Completed LYDIA ANDREWS     Inpatient consult to Nephrology  Once        Provider:  (Not yet assigned)    Completed VERONICA FERNANDEZ            Cardiac/Vascular  Hypertension  Chronic, uncontrolled. Latest blood pressure and vitals reviewed-     Temp:  [97.8 °F (36.6 °C)-98.4 °F (36.9 °C)]   Pulse:  []   Resp:  [18]   BP: (116-164)/(63-81)   SpO2:  [97 %-100 %] .   Home meds for hypertension were reviewed and noted below.   Hypertension Medications               losartan (COZAAR) 50 MG tablet Take 1 tablet (50 mg total) by mouth once daily.          Patient with EL  on admission   Losartan reduced to 25mg, normotensive on , discuss with nephro , discharge on 25mg  -Per nephrology recs discharge on bumex  (better bbioavailabilit)      Renal/  Diabetic nephropathy  - patient presented with progressively worsening anasarca and dyspnea  - labs show decreased kidney function with creatinine of 2.0 and low albumin  of 1.9  - UA showed 3+ protein, 2+ glucose, 1+ leukocyte esterase and many bacteria   - started diuresis with lasix 40 IV QD  - started home dose of ARB: losartan 25 QD as nephro-protective agent   - started diabetic diet with salt restriction (max 2g/day)  - started DVT prophylaxis for hypercoagulability secondary to hypoalbuminemia  - daily weights, strict I/Os, keep K > 4 and Mg > 2  - consulted nephrology for further recommendations      -Partient to discuss with nephrology on follow regarding increasing losartan to home dose.    EL (acute kidney injury)  EL is likely due to  nephrotic syndrome secondary to uncontrolled diabetes and hypertension . Baseline creatinine is  1.2 . Most recent creatinine and eGFR are listed below.  Recent Labs     07/26/24  2353 07/27/24  1131 07/28/24  0659   CREATININE 1.9* 1.7*  1.7* 1.7*   EGFRNORACEVR 34.2* 39.1*  39.1* 39.1*     Discharged with creatinine 1.7  ( baseline?)  -although arb typically CI in EL, benefit outweighs risk in the setting of such a marked proteinuria.    - avoid nephrotoxins and renally dose meds for GFR listed above  - monitor urine output, trend CMP, and adjust therapy as needed    UTI (urinary tract infection)  - had UTI at last hospitalization and previous cultures grew E-coli sensitive to gentamicin, meropenem, tobramycin, and nitrofurantoin  - current UA showed 3+ protein, 2+ glucose, 1+ leukocyte esterase and many bacteria  - Urine culture pending  - given dose of meropenem in ED, awaiting culture results to evaluate need for further antibiotic coverage  - NGTD, D/C antibiotics after merem x1 and zosyn x2    Resolved    Endocrine  Type 2 diabetes mellitus  Last A1c reviewed-   Lab Results   Component Value Date    HGBA1C >14.0 (H) 07/17/2024     - recently discharged for [facility] with blood sugar of 797 at presentation  - most recent POC glucose is 129  - initially unsure if T1 or T2DM; INDU antibody is 0.0   - DM is uncontrolled due to  hyperglycemia on current medication regimen.  - started on lantus 30 units every morning for basal  - started on novolog 7.5 units pre-prandial  - started low sliding scale novolog 0-5 units   - holding ozempic      No changes on regimen at discharge    Other  * Anasarca  - present prior to previous admission where albumin was 1.5, today's albumin is 1.9  - secondary to the hypoalbuminemia leading to decreased oncotic pressure in the blood vessels and increased fluid extravasation into surrounding tissues   - see plan for nephrotic syndrome for management         Final Active Diagnoses:    Diagnosis Date Noted POA    PRINCIPAL PROBLEM:  Anasarca [R60.1] 07/25/2024 Yes    Diabetic nephropathy [E11.21] 07/25/2024 Yes    Hypertension [I10] 07/25/2024 Yes    Type 2 diabetes mellitus [E11.9] 07/25/2024 Yes    Hypoglycemia [E16.2] 07/25/2024 Yes    EL (acute kidney injury) [N17.9] 07/18/2024 Yes    UTI (urinary tract infection) [N39.0] 07/16/2024 Yes      Problems Resolved During this Admission:    Diagnosis Date Noted Date Resolved POA    Hyperglycemia [R73.9] 01/05/2020 07/25/2024 Yes       Discharged Condition: good    Disposition: Discharge home     Follow Up:   Follow-up Information       No, Primary Doctor Follow up in 1 week(s).               [facility] NEPHROLOGY Follow up in 1 week(s).    Specialty: Nephrology  Contact information:  [address] [place] [id number]  [phone]             [facility] ENDOCRINOLOGY Follow up in 1 week(s).    Specialty: Endocrinology  Contact information:  [address] [place] [id number]  [phone]                         Patient Instructions:      COMPREHENSIVE METABOLIC PANEL   Standing Status: Future Standing Exp. Date: 10/26/25   Order Comments: Prior appointment with nephrology     Ambulatory referral/consult to Nephrology   Standing Status: Future   Referral Priority: Routine Referral Type: Consultation   Referral Reason: Specialty Services  Required   Requested Specialty: Nephrology   Number of Visits Requested: 1       Significant Diagnostic Studies: Labs: CMP   Recent Labs   Lab 07/26/24  2353 07/27/24  1131 07/28/24  0659    138  139 141   K 4.3 4.5  4.5 4.2    108  109 108   CO2 20* 22*  23 23   * 152*  152* 129*   BUN 27* 21*  21* 21*   CREATININE 1.9* 1.7*  1.7* 1.7*   CALCIUM 8.9 9.1  8.9 8.3*   PROT  --  6.2  --    ALBUMIN  --  2.1*  2.1* 1.8*   BILITOT  --  0.2  --    ALKPHOS  --  97  --    AST  --  18  --    ALT  --  15  --    ANIONGAP 8 8  7* 10    and A1C:   Recent Labs   Lab 04/10/24  0824 07/16/24  1008 07/17/24  0349   HGBA1C 13.3* >14.0* >14.0*       Pending Diagnostic Studies:       None           Medications:  Reconciled Home Medications:      Medication List        START taking these medications      bumetanide 2 MG tablet  Commonly known as: BUMEX  Take 1 tablet (2 mg total) by mouth once daily.     ELIQUIS 5 mg Tab  Generic drug: apixaban  Take 1 tablet (5 mg total) by mouth 2 (two) times daily.            CHANGE how you take these medications      losartan 25 MG tablet  Commonly known as: COZAAR  Take 1 tablet (25 mg total) by mouth once daily.  Start taking on: July 29, 2024  What changed:   medication strength  how much to take            CONTINUE taking these medications      acetaminophen 650 MG Tbsr  Commonly known as: TYLENOL  Take 650 mg by mouth daily as needed (migraines).     insulin lispro 100 unit/mL pen  Inject 15 Units into the skin 3 (three) times daily before meals.     LANTUS SOLOSTAR U-100 INSULIN 100 unit/mL (3 mL) Inpn pen  Generic drug: insulin glargine U-100 (Lantus)  Inject 40 Units into the skin every morning.     ONETOUCH DELICA LANCETS 33 gauge Misc  Generic drug: lancets  1 lancet  by skin prick route 4 (four) times daily.     ONETOUCH ULTRA BLUE TEST STRIP Strp  Generic drug: blood sugar diagnostic  1 strip by Misc.(Non-Drug; Combo Route) route 4 (four) times daily.    "  OZEMPIC 0.25 mg or 0.5 mg (2 mg/3 mL) pen injector  Generic drug: semaglutide  Inject 0.5 mg into the skin every 7 days. Mondays     pantoprazole 40 MG tablet  Commonly known as: PROTONIX  Take 40 mg by mouth once daily.     pen needle, diabetic 32 gauge x 5/32" Ndle  Commonly known as: BD ULTRA-FINE ISAAC PEN NEEDLE  Use to inject insulin 5x/day     rosuvastatin 40 MG Tab  Commonly known as: CRESTOR  Take 40 mg by mouth every evening.              Indwelling Lines/Drains at time of discharge:   Lines/Drains/Airways       None                Time spent on the discharge of patient: Approximately 45 minutes of time spent on discharge, including examining the patient, providing discharge instructions, arranging follow up, and documentation.           Kevan Ren MD  Department of Hospital Medicine  [facility] ([place] [address])  "

## 2024-10-21 PROBLEM — N12 PYELONEPHRITIS: Status: RESOLVED | Noted: 2020-01-05 | Resolved: 2024-10-21

## 2024-10-28 PROBLEM — N39.0 UTI (URINARY TRACT INFECTION): Status: RESOLVED | Noted: 2024-07-16 | Resolved: 2024-10-28

## 2024-10-28 PROBLEM — N17.9 AKI (ACUTE KIDNEY INJURY): Status: RESOLVED | Noted: 2024-07-18 | Resolved: 2024-10-28

## 2024-12-26 RX ORDER — BUMETANIDE 2 MG/1
2 TABLET ORAL DAILY
Qty: 30 TABLET | Refills: 11 | Status: CANCELLED | OUTPATIENT
Start: 2024-12-26 | End: 2025-12-26

## 2024-12-27 RX ORDER — BUMETANIDE 2 MG/1
2 TABLET ORAL DAILY
Qty: 30 TABLET | Refills: 11 | Status: CANCELLED | OUTPATIENT
Start: 2024-12-26 | End: 2025-12-26

## 2024-12-27 NOTE — TELEPHONE ENCOUNTER
Refill Routing Note   Medication(s) are not appropriate for processing by [facility] for the following reason(s):        Non-participating provider    [facility] action(s):  Route        Medication Therapy Plan: PCP not listed for Pt in Epic.  Deferring to last known prescriber for assessment      Appointments  past 12m or future 3m with PCP    Date Provider   Last Visit   Visit date not found Kevan Ren MD   Next Visit   Visit date not found Kevan Ren MD   ED visits in past 90 days: 0        Note composed:5:35 PM 12/27/2024

## 2025-01-03 ENCOUNTER — OFFICE VISIT (OUTPATIENT)
Dept: URGENT CARE | Facility: CLINIC | Age: 40
End: 2025-01-03
Payer: MEDICAID

## 2025-01-03 VITALS
TEMPERATURE: 98 F | OXYGEN SATURATION: 95 % | RESPIRATION RATE: 20 BRPM | HEART RATE: 92 BPM | SYSTOLIC BLOOD PRESSURE: 155 MMHG | HEIGHT: 64 IN | DIASTOLIC BLOOD PRESSURE: 84 MMHG | BODY MASS INDEX: 31.62 KG/M2 | WEIGHT: 185.19 LBS

## 2025-01-03 DIAGNOSIS — E13.21 DIABETIC NEPHROPATHY ASSOCIATED WITH OTHER SPECIFIED DIABETES MELLITUS: ICD-10-CM

## 2025-01-03 DIAGNOSIS — R60.0 BILATERAL LEG EDEMA: Primary | ICD-10-CM

## 2025-01-03 RX ORDER — BUMETANIDE 2 MG/1
2 TABLET ORAL DAILY
Qty: 30 TABLET | Refills: 1 | Status: SHIPPED | OUTPATIENT
Start: 2025-01-03 | End: 2026-01-03

## 2025-01-03 NOTE — PROGRESS NOTES
"Subjective:      Patient ID: Mary Sims is a 39 y.o. female.    Vitals:  height is 5' 4" (1.626 m) and weight is 84 kg (185 lb 3 oz). Her oral temperature is 98.4 °F (36.9 °C). Her blood pressure is 155/84 (abnormal) and her pulse is 92. Her respiration is 20 and oxygen saturation is 95%.     Chief Complaint: Medication Refill    Pt presents today with medication refill of bumetanide 2 mg for fluid build up, has been out for a week now and noticed swelling in her legs, feet, and hands, since than has been short winded, her primary is out of town currently and is unable to give refilled.    Patient is a 39-year-old female with diabetic nephropathy as well as lower extremity edema having been treated with bumetanide 2 mg daily with success however she has run out for approximately a week or 2.  She has begun with lower extremity edema as is typical for her without her medication as well as dyspnea on exertion.  Lung exam is clear, lower extremity does show edema and refilled prescription with strong encouragement to follow up with PCP next week    Medication Refill  The current episode started in the past 7 days. The problem occurs constantly. The problem has been gradually worsening. Associated symptoms include joint swelling (Not joints but lower extremities). Pertinent negatives include no abdominal pain, chest pain, chills, coughing, fatigue, fever, nausea, neck pain, rash, sore throat or vomiting. Nothing aggravates the symptoms. She has tried nothing for the symptoms. The treatment provided no relief.     ros  Constitution: Negative for chills, fatigue and fever.   HENT:  Negative for ear pain, sinus pain and sore throat.    Neck: Negative for neck pain and neck stiffness.   Cardiovascular:  Negative for chest pain, palpitations and sob on exertion.   Eyes:  Negative for eye pain and vision loss.   Respiratory:  Positive for shortness of breath. Negative for cough and asthma.    Gastrointestinal:  " Negative for abdominal pain, nausea, vomiting and diarrhea.   Genitourinary:  Negative for dysuria, frequency and hematuria.   Musculoskeletal:  Positive for joint swelling (Not joints but lower extremities). Negative for pain, abnormal ROM of joint and back pain.   Skin:  Negative for rash and wound.   Allergic/Immunologic: Negative for seasonal allergies and asthma.   Neurological:  Negative for dizziness, light-headedness and altered mental status.   Psychiatric/Behavioral:  Negative for altered mental status and confusion.       Objective:     Physical Exam   Constitutional: She is oriented to person, place, and time. She appears well-developed. She is cooperative. No distress.   HENT:   Head: Normocephalic and atraumatic.   Nose: Nose normal.   Mouth/Throat: Oropharynx is clear and moist and mucous membranes are normal.   Eyes: Conjunctivae and lids are normal.   Neck: Trachea normal and phonation normal. Neck supple.   Cardiovascular: Normal rate, regular rhythm, normal heart sounds and normal pulses.   Pulmonary/Chest: Effort normal and breath sounds normal.         Comments: Normal respiratory effort with no lower crackles or any abnormal lung sounds noted on exam.  No increased work of breathing    Abdominal: Normal appearance and bowel sounds are normal. She exhibits no mass. Soft.   Musculoskeletal:         General: No deformity.      Left lower leg: Edema present.      Comments: Bilateral 1+ lower extremity edema to pretibial region   Neurological: She is alert and oriented to person, place, and time. She has normal strength and normal reflexes. No sensory deficit.   Skin: Skin is warm, dry, intact and not diaphoretic.   Psychiatric: Her speech is normal and behavior is normal. Judgment and thought content normal.   Nursing note and vitals reviewed.     Assessment:     1. Bilateral leg edema    2. Diabetic nephropathy associated with other specified diabetes mellitus        Plan:       Bilateral leg  edema    Diabetic nephropathy associated with other specified diabetes mellitus    Other orders  -     bumetanide (BUMEX) 2 MG tablet; Take 1 tablet (2 mg total) by mouth once daily.  Dispense: 30 tablet; Refill: 1      Patient Instructions   Prescription for bumetanide sent to pharmacy     Follow up with your primary care physician     Return here or go to the ER if having any sort of worsening edema or shortness a breath despite the treatment with your long-term chronic medication.

## 2025-01-03 NOTE — PATIENT INSTRUCTIONS
Prescription for bumetanide sent to pharmacy     Follow up with your primary care physician     Return here or go to the ER if having any sort of worsening edema or shortness a breath despite the treatment with your long-term chronic medication.

## 2025-05-08 ENCOUNTER — OFFICE VISIT (OUTPATIENT)
Dept: URGENT CARE | Facility: CLINIC | Age: 40
End: 2025-05-08
Payer: MEDICAID

## 2025-05-08 VITALS
WEIGHT: 185 LBS | TEMPERATURE: 98 F | BODY MASS INDEX: 31.58 KG/M2 | HEIGHT: 64 IN | DIASTOLIC BLOOD PRESSURE: 66 MMHG | OXYGEN SATURATION: 98 % | RESPIRATION RATE: 20 BRPM | SYSTOLIC BLOOD PRESSURE: 132 MMHG | HEART RATE: 84 BPM

## 2025-05-08 DIAGNOSIS — M62.838 MUSCLE SPASM: ICD-10-CM

## 2025-05-08 DIAGNOSIS — R73.9 HYPERGLYCEMIA: ICD-10-CM

## 2025-05-08 DIAGNOSIS — R82.998 URINE WBC INCREASED: ICD-10-CM

## 2025-05-08 DIAGNOSIS — N30.00 ACUTE CYSTITIS WITHOUT HEMATURIA: Primary | ICD-10-CM

## 2025-05-08 LAB
B-HCG UR QL: NEGATIVE
BILIRUBIN, UA POC OHS: NEGATIVE
BLOOD, UA POC OHS: ABNORMAL
CLARITY, UA POC OHS: ABNORMAL
COLOR, UA POC OHS: YELLOW
CTP QC/QA: YES
GLUCOSE SERPL-MCNC: 154 MG/DL (ref 70–110)
GLUCOSE, UA POC OHS: NEGATIVE
KETONES, UA POC OHS: NEGATIVE
LEUKOCYTES, UA POC OHS: ABNORMAL
NITRITE, UA POC OHS: POSITIVE
PH, UA POC OHS: 6.5
PROTEIN, UA POC OHS: >=300
SPECIFIC GRAVITY, UA POC OHS: 1.02
UROBILINOGEN, UA POC OHS: 0.2

## 2025-05-08 PROCEDURE — 99214 OFFICE O/P EST MOD 30 MIN: CPT | Mod: S$GLB,,,

## 2025-05-08 PROCEDURE — 81025 URINE PREGNANCY TEST: CPT | Mod: S$GLB,,,

## 2025-05-08 PROCEDURE — 81003 URINALYSIS AUTO W/O SCOPE: CPT | Mod: QW,S$GLB,,

## 2025-05-08 PROCEDURE — 87086 URINE CULTURE/COLONY COUNT: CPT

## 2025-05-08 PROCEDURE — 82962 GLUCOSE BLOOD TEST: CPT | Mod: S$GLB,,,

## 2025-05-08 RX ORDER — NITROFURANTOIN 25; 75 MG/1; MG/1
100 CAPSULE ORAL 2 TIMES DAILY
Qty: 10 CAPSULE | Refills: 0 | Status: SHIPPED | OUTPATIENT
Start: 2025-05-08 | End: 2025-05-08

## 2025-05-08 RX ORDER — METHOCARBAMOL 500 MG/1
500 TABLET, FILM COATED ORAL 4 TIMES DAILY
Qty: 40 TABLET | Refills: 0 | Status: SHIPPED | OUTPATIENT
Start: 2025-05-08 | End: 2025-05-18

## 2025-05-08 RX ORDER — METHOCARBAMOL 500 MG/1
500 TABLET, FILM COATED ORAL 4 TIMES DAILY
Qty: 40 TABLET | Refills: 0 | Status: SHIPPED | OUTPATIENT
Start: 2025-05-08 | End: 2025-05-08

## 2025-05-08 RX ORDER — NITROFURANTOIN 25; 75 MG/1; MG/1
100 CAPSULE ORAL 2 TIMES DAILY
Qty: 10 CAPSULE | Refills: 0 | Status: SHIPPED | OUTPATIENT
Start: 2025-05-08 | End: 2025-05-13

## 2025-05-08 NOTE — PROGRESS NOTES
"Subjective:      Patient ID: Mary Sims is a 39 y.o. female.    Vitals:  height is 5' 4" (1.626 m) and weight is 83.9 kg (185 lb). Her oral temperature is 98 °F (36.7 °C). Her blood pressure is 132/66 and her pulse is 84. Her respiration is 20 and oxygen saturation is 98%.     Chief Complaint: muscle cramps/elevated BS >300    Pt is a 39 y.o. female with PMHx of HTN, DMT2. She is presenting with muscle spasms of lower legs and lower back, dysuria, elevated glucose.  Onset of symptoms was yesterday. This morning, glucose was 300 at 6AM. States she took insulin and went to sleep. Concerned about glucose, but spasms are causing a lot of pain. Denies any numbness, tingling, incontinence, fever, chills, N/V, ABD pain, flank pain, dysuria.  Pt reports using no OTC tx.    Other  The current episode started in the past 7 days. Pertinent negatives include no abdominal pain, chest pain, chills, congestion, coughing, fever, headaches, myalgias, nausea, neck pain, rash, sore throat or vomiting.       Constitution: Negative for activity change, appetite change, chills and fever.   HENT:  Negative for ear pain, congestion, postnasal drip, sinus pain, sinus pressure and sore throat.    Neck: Negative for neck pain.   Cardiovascular:  Negative for chest pain and sob on exertion.   Eyes:  Negative for eye trauma, eye discharge, eye itching, eye redness, photophobia and blurred vision.   Respiratory:  Negative for cough, shortness of breath, wheezing and asthma.    Gastrointestinal:  Negative for abdominal pain, nausea, vomiting, constipation and diarrhea.   Genitourinary:  Negative for dysuria, frequency, urgency, urine decreased and hematuria.   Musculoskeletal:  Negative for pain and muscle ache.   Skin:  Negative for color change, rash and hives.   Allergic/Immunologic: Negative for seasonal allergies, asthma, hives and sneezing.   Neurological:  Negative for dizziness, light-headedness, headaches and altered mental " status.   Psychiatric/Behavioral:  Negative for altered mental status and confusion.       Objective:     Physical Exam   Constitutional: She is oriented to person, place, and time. She appears well-developed.      Comments:Pt sitting erect on examination table. No acute respiratory distress, no use of accessory muscles, no notice of nasal flaring.        HENT:   Head: Normocephalic and atraumatic.   Ears:   Right Ear: External ear normal.   Left Ear: External ear normal.   Nose: Nose normal. No nasal deformity. No epistaxis.   Mouth/Throat: Oropharynx is clear and moist and mucous membranes are normal.   Eyes: Lids are normal.   Neck: Trachea normal and phonation normal. Neck supple.   Cardiovascular: Normal pulses.   Pulmonary/Chest: Effort normal.   Abdominal: Normal appearance and bowel sounds are normal. She exhibits no distension. Soft. There is no abdominal tenderness. There is no left CVA tenderness and no right CVA tenderness.   Musculoskeletal:      Lumbar back: She exhibits tenderness and spasm. She exhibits no bony tenderness.      Right upper leg: She exhibits no tenderness, no swelling and no edema.      Left upper leg: She exhibits tenderness. She exhibits no swelling and no edema.      Right lower leg: She exhibits tenderness. No edema.      Left lower leg: She exhibits tenderness. No edema.   Neurological: She is alert and oriented to person, place, and time.   Skin: Skin is warm, dry and intact.   Psychiatric: Her speech is normal and behavior is normal.   Nursing note and vitals reviewed.    Results for orders placed or performed in visit on 05/08/25   POCT Urinalysis(Instrument)    Collection Time: 05/08/25 12:54 PM   Result Value Ref Range    Color, POC UA Yellow Yellow, Straw, Colorless    Clarity, POC UA Cloudy (A) Clear    Glucose, POC UA Negative Negative    Bilirubin, POC UA Negative Negative    Ketones, POC UA Negative Negative    Spec Grav POC UA 1.020 1.005 - 1.030    Blood, POC UA Small  (A) Negative    pH, POC UA 6.5 5.0 - 8.0    Protein, POC UA >=300 (A) Negative    Urobilinogen, POC UA 0.2 <=1.0    Nitrite, POC UA Positive (A) Negative    WBC, POC UA Small (A) Negative   POCT urine pregnancy    Collection Time: 05/08/25 12:54 PM   Result Value Ref Range    POC Preg Test, Ur Negative Negative     Acceptable Yes    POCT Glucose, Hand-Held Device    Collection Time: 05/08/25 12:55 PM   Result Value Ref Range    POC Glucose 154 (A) 70 - 110 MG/DL         Assessment:     1. Acute cystitis without hematuria    2. Hyperglycemia    3. Urine WBC increased    4. Muscle spasm        Plan:   I have reviewed the patient chart and pertinent past imaging/labs.      Acute cystitis without hematuria  -     Discontinue: nitrofurantoin, macrocrystal-monohydrate, (MACROBID) 100 MG capsule; Take 1 capsule (100 mg total) by mouth 2 (two) times daily. for 5 days  Dispense: 10 capsule; Refill: 0  -     nitrofurantoin, macrocrystal-monohydrate, (MACROBID) 100 MG capsule; Take 1 capsule (100 mg total) by mouth 2 (two) times daily. for 5 days  Dispense: 10 capsule; Refill: 0    Hyperglycemia  -     POCT Urinalysis(Instrument)  -     POCT urine pregnancy  -     POCT Glucose, Hand-Held Device    Urine WBC increased  -     Urine Culture High Risk    Muscle spasm  -     Discontinue: methocarbamoL (ROBAXIN) 500 MG Tab; Take 1 tablet (500 mg total) by mouth 4 (four) times daily. for 10 days  Dispense: 40 tablet; Refill: 0  -     methocarbamoL (ROBAXIN) 500 MG Tab; Take 1 tablet (500 mg total) by mouth 4 (four) times daily. for 10 days  Dispense: 40 tablet; Refill: 0

## 2025-05-08 NOTE — LETTER
May 8, 2025      [facility] [place]  [place] [address]  Phone: [phone]  Fax: [phone]       Patient: Mary Sims   YOB: 1985  Date of Visit: 05/08/2025    To Whom It May Concern:    Wale Sims  was at [facility] on 05/08/2025. The patient may return to work/school on Friday, May 9th, 2025 with no restrictions. If you have any questions or concerns, or if I can be of further assistance, please do not hesitate to contact me.    Sincerely,          Chacorta Maciel PA-C

## 2025-05-08 NOTE — PATIENT INSTRUCTIONS
Results in 2-3 days   UTI: Macrobid twice daily for 5 days   Muscle Spasm: Robaxin every 6 hour as needed. May cause drowsiness, take first dose at night.   Please return here or go to the Emergency Department for any concerns or worsening of condition.  If you were prescribed antibiotics, please take them to completion.  If you were prescribed a narcotic medication, do not drive or operate heavy equipment or machinery while taking these medications.  Please follow up with your primary care doctor or specialist as needed.  Please drink plenty of fluids.  Please get plenty of rest.  If you were prescribed Pyridium (phenazopyridine), please be aware that if you wear contact lens that this medication may stain your contacts.  While taking this medication it is recommended that you do not wear your contacts until 24 hours after your last dose.  Please follow up with your primary care doctor or specialist as needed.    If you  smoke, please stop smoking.

## 2025-05-09 ENCOUNTER — RESULTS FOLLOW-UP (OUTPATIENT)
Dept: URGENT CARE | Facility: CLINIC | Age: 40
End: 2025-05-09

## 2025-05-10 LAB — BACTERIA UR CULT: ABNORMAL

## 2025-05-17 ENCOUNTER — HOSPITAL ENCOUNTER (EMERGENCY)
Facility: HOSPITAL | Age: 40
Discharge: HOME | End: 2025-05-17
Attending: EMERGENCY MEDICINE
Payer: MEDICAID

## 2025-05-17 VITALS
HEART RATE: 90 BPM | TEMPERATURE: 98 F | RESPIRATION RATE: 18 BRPM | DIASTOLIC BLOOD PRESSURE: 90 MMHG | SYSTOLIC BLOOD PRESSURE: 188 MMHG | BODY MASS INDEX: 29.02 KG/M2 | HEIGHT: 64 IN | OXYGEN SATURATION: 100 % | WEIGHT: 170 LBS

## 2025-05-17 DIAGNOSIS — E16.2 HYPOGLYCEMIA: Primary | ICD-10-CM

## 2025-05-17 DIAGNOSIS — I10 HYPERTENSION, UNSPECIFIED TYPE: ICD-10-CM

## 2025-05-17 LAB
ABSOLUTE EOSINOPHIL (OHS): 0.15 K/UL
ABSOLUTE MONOCYTE (OHS): 0.45 K/UL (ref 0.3–1)
ABSOLUTE NEUTROPHIL COUNT (OHS): 4.72 K/UL (ref 1.8–7.7)
ALBUMIN SERPL BCP-MCNC: 2.9 G/DL (ref 3.5–5.2)
ALP SERPL-CCNC: 119 UNIT/L (ref 40–150)
ALT SERPL W/O P-5'-P-CCNC: 13 UNIT/L (ref 10–44)
ANION GAP (OHS): 9 MMOL/L (ref 8–16)
AST SERPL-CCNC: 16 UNIT/L (ref 11–45)
BASOPHILS # BLD AUTO: 0.07 K/UL
BASOPHILS NFR BLD AUTO: 1 %
BILIRUB SERPL-MCNC: 0.1 MG/DL (ref 0.1–1)
BUN SERPL-MCNC: 31 MG/DL (ref 6–20)
CALCIUM SERPL-MCNC: 9.5 MG/DL (ref 8.7–10.5)
CHLORIDE SERPL-SCNC: 106 MMOL/L (ref 95–110)
CO2 SERPL-SCNC: 22 MMOL/L (ref 23–29)
CREAT SERPL-MCNC: 2.2 MG/DL (ref 0.5–1.4)
ERYTHROCYTE [DISTWIDTH] IN BLOOD BY AUTOMATED COUNT: 15.6 % (ref 11.5–14.5)
GFR SERPLBLD CREATININE-BSD FMLA CKD-EPI: 29 ML/MIN/1.73/M2
GLUCOSE SERPL-MCNC: 57 MG/DL (ref 70–110)
HCT VFR BLD AUTO: 27.9 % (ref 37–48.5)
HGB BLD-MCNC: 9.2 GM/DL (ref 12–16)
HOLD SPECIMEN: NORMAL
IMM GRANULOCYTES # BLD AUTO: 0.02 K/UL (ref 0–0.04)
IMM GRANULOCYTES NFR BLD AUTO: 0.3 % (ref 0–0.5)
LYMPHOCYTES # BLD AUTO: 1.84 K/UL (ref 1–4.8)
MAGNESIUM SERPL-MCNC: 2 MG/DL (ref 1.6–2.6)
MCH RBC QN AUTO: 21.5 PG (ref 27–31)
MCHC RBC AUTO-ENTMCNC: 33 G/DL (ref 32–36)
MCV RBC AUTO: 65 FL (ref 82–98)
NUCLEATED RBC (/100WBC) (OHS): 0 /100 WBC
PLATELET # BLD AUTO: 306 K/UL (ref 150–450)
PMV BLD AUTO: 11 FL (ref 9.2–12.9)
POCT GLUCOSE: 135 MG/DL (ref 70–110)
POCT GLUCOSE: 181 MG/DL (ref 70–110)
POCT GLUCOSE: 49 MG/DL (ref 70–110)
POCT GLUCOSE: 52 MG/DL (ref 70–110)
POTASSIUM SERPL-SCNC: 4.2 MMOL/L (ref 3.5–5.1)
PROT SERPL-MCNC: 7.4 GM/DL (ref 6–8.4)
RBC # BLD AUTO: 4.28 M/UL (ref 4–5.4)
RELATIVE EOSINOPHIL (OHS): 2.1 %
RELATIVE LYMPHOCYTE (OHS): 25.4 % (ref 18–48)
RELATIVE MONOCYTE (OHS): 6.2 % (ref 4–15)
RELATIVE NEUTROPHIL (OHS): 65 % (ref 38–73)
SODIUM SERPL-SCNC: 137 MMOL/L (ref 136–145)
WBC # BLD AUTO: 7.25 K/UL (ref 3.9–12.7)

## 2025-05-17 PROCEDURE — 83735 ASSAY OF MAGNESIUM: CPT

## 2025-05-17 PROCEDURE — 82962 GLUCOSE BLOOD TEST: CPT

## 2025-05-17 PROCEDURE — 80053 COMPREHEN METABOLIC PANEL: CPT

## 2025-05-17 PROCEDURE — 99283 EMERGENCY DEPT VISIT LOW MDM: CPT

## 2025-05-17 PROCEDURE — 85025 COMPLETE CBC W/AUTO DIFF WBC: CPT

## 2025-05-17 NOTE — ED NOTES
Patient drank 2 orange juices and is consuming Turkey sandwich. Also gave a vanilla pudding and patient expressed thanks.

## 2025-05-17 NOTE — ED PROVIDER NOTES
Encounter Date: 5/17/2025       History     Chief Complaint   Patient presents with    Hypoglycemia     C/o dizziness and blurred vision started yesterday and worsened today. Pt has hx of DM2 and took Lantus and Novolog this morning without checking glucose. Glucose in triage 49. Pt given 2 apple juices.      Patient is a 39-year-old female with a past medical history of type 2 diabetes mellitus and hypertension who presents to emergency room for blurry vision, lightheadedness, and nausea that started today.  Patient states that she has a Dexcom, but is out of sensors.  She took her normal dose of Lantus and NovoLog this morning without checking her glucose 1st.  Afterwards, she started to experience lightheadedness, nausea, and blurry vision.  Glucose in triage was 49.  She denies abdominal pain, vomiting, chest pain, syncopal episode, shortness of breath, or others at this time.    The history is provided by the patient. No  was used.     Review of patient's allergies indicates:  No Known Allergies  Past Medical History:   Diagnosis Date    Cellulitis     Diabetes mellitus     Hypertension     Retinal detachment      Past Surgical History:   Procedure Laterality Date    VITRECTOMY BY PARS PLANA APPROACH Right 4/24/2019    Procedure: VITRECTOMY, PARS PLANA APPROACH;  Surgeon: JAMI Schulz MD;  Location: [facility] OR [address] [place] [address];  Service: Ophthalmology;  Laterality: Right;  25g PPV/membrane stripping/ILM peel/EL/AFx/GFX/avastin OD    VITRECTOMY BY PARS PLANA APPROACH Left 5/29/2019    Procedure: VITRECTOMY, PARS PLANA  APPROACH  25g PPV/Membrane Stripping/IML peel/EL/AFX/Avastin left eye   60 mins;  Surgeon: JAMI Schulz MD;  Location: [facility] OR [address] [place] [address];  Service: Ophthalmology;  Laterality: Left;     Family History   Problem Relation Name Age of Onset    Diabetes Mother       Social History[1]  Review of Systems   Constitutional:  Negative for chills, diaphoresis, fatigue and fever.    HENT:  Negative for congestion, sore throat and trouble swallowing.    Eyes:  Positive for visual disturbance (blurry). Negative for photophobia.   Respiratory:  Negative for cough and shortness of breath.    Cardiovascular:  Negative for chest pain and palpitations.   Gastrointestinal:  Positive for nausea. Negative for abdominal pain, blood in stool, constipation, diarrhea and vomiting.   Genitourinary:  Negative for difficulty urinating, dysuria, frequency and urgency.   Musculoskeletal:  Negative for back pain and myalgias.   Skin:  Negative for rash and wound.   Neurological:  Positive for light-headedness. Negative for weakness, numbness and headaches.       Physical Exam     Initial Vitals [05/17/25 1402]   BP Pulse Resp Temp SpO2   (!) 193/93 90 18 98.2 °F (36.8 °C) 100 %      MAP       --         Physical Exam    Nursing note and vitals reviewed.  Constitutional: She appears well-developed and well-nourished. She is not diaphoretic. No distress.   Patient well-appearing.  Awake and alert.  No acute distress.  Maintaining airway appropriately.  Speaking in complete sentences.   HENT:   Head: Normocephalic and atraumatic.   Right Ear: External ear normal.   Left Ear: External ear normal.   Eyes: Conjunctivae and EOM are normal. Pupils are equal, round, and reactive to light.   Neck: Neck supple.   Normal range of motion.  Cardiovascular:  Normal rate, regular rhythm and normal heart sounds.     Exam reveals no friction rub.       No murmur heard.  Pulmonary/Chest: Breath sounds normal. No respiratory distress. She has no wheezes. She has no rhonchi. She has no rales.   Abdominal: Abdomen is soft. Bowel sounds are normal. She exhibits no distension. There is no abdominal tenderness. There is no rebound and no guarding.   Musculoskeletal:         General: No tenderness or edema. Normal range of motion.      Cervical back: Normal range of motion and neck supple.     Neurological: She is alert and oriented to  person, place, and time. She has normal strength. No cranial nerve deficit. GCS score is 15. GCS eye subscore is 4. GCS verbal subscore is 5. GCS motor subscore is 6.   Skin: Skin is warm and dry.   Psychiatric: She has a normal mood and affect. Her behavior is normal. Thought content normal.         ED Course   Procedures  Labs Reviewed   COMPREHENSIVE METABOLIC PANEL - Abnormal       Result Value    Sodium 137      Potassium 4.2      Chloride 106      CO2 22 (*)     Glucose 57 (*)     BUN 31 (*)     Creatinine 2.2 (*)     Calcium 9.5      Protein Total 7.4      Albumin 2.9 (*)     Bilirubin Total 0.1            AST 16      ALT 13      Anion Gap 9      eGFR 29 (*)    CBC WITH DIFFERENTIAL - Abnormal    WBC 7.25      RBC 4.28      HGB 9.2 (*)     HCT 27.9 (*)     MCV 65 (*)     MCH 21.5 (*)     MCHC 33.0      RDW 15.6 (*)     Platelet Count 306      MPV 11.0      Nucleated RBC 0      Neut % 65.0      Lymph % 25.4      Mono % 6.2      Eos % 2.1      Basophil % 1.0      Imm Grans % 0.3      Neut # 4.72      Lymph # 1.84      Mono # 0.45      Eos # 0.15      Baso # 0.07      Imm Grans # 0.02     POCT GLUCOSE - Abnormal    POCT Glucose 49 (*)    POCT GLUCOSE - Abnormal    POCT Glucose 52 (*)    POCT GLUCOSE - Abnormal    POCT Glucose 135 (*)    POCT GLUCOSE - Abnormal    POCT Glucose 181 (*)    MAGNESIUM - Normal    Magnesium  2.0     CBC W/ AUTO DIFFERENTIAL    Narrative:     The following orders were created for panel order CBC auto differential.  Procedure                               Abnormality         Status                     ---------                               -----------         ------                     CBC with Differential[[id number]]       Abnormal            Final result                 Please view results for these tests on the individual orders.   EXTRA TUBES    Narrative:     The following orders were created for panel order EXTRA TUBES.  Procedure                               Abnormality          Status                     ---------                               -----------         ------                     Light Green Top Hold[[id number]]                            Final result                 Please view results for these tests on the individual orders.   LIGHT GREEN TOP HOLD    Extra Tube Hold for add-ons.     POCT GLUCOSE MONITORING CONTINUOUS          Imaging Results    None          Medications - No data to display  Medical Decision Making  Patient presents to emergency room for lightheadedness, blurry vision, nausea after insulin administration.  Blood pressure 183/93.  Vital signs otherwise stable.  Physical exam as stated above.    Differential Diagnosis includes, but is not limited to decreased PO intake, nausea/vomiting, dehydration, metabolic derangement, infection/sepsis, UTI, pneumonia, medication side effect, medication non-compliance, device malfunction, DKA, ACS/MI, or alcohol/drug abuse.  No history of drug or alcohol abuse.  No nausea or vomiting.  Patient afebrile and clinically well-appearing.  Low suspicion for sepsis.  Lab work was unremarkable.  Patient was given juice and sandwich in the emergency room with improvement in glucose to 135.  She remained stable throughout entirety of emergency room stay and was observed for 2-3 hours.  Endorsed improvement in symptoms when glucose improved.  Clinical presentation physical exam most suggestive of hypoglycemia due to insulin administration.  Advised patient on conservative management such as monitoring glucose prior to administering insulin.    I see no indication of an emergent process beyond that addressed during our encounter. Patient stable for discharge at this time. I have counseled the patient regarding follow up with primary care and gave strict return precautions. I have discussed the final diagnosis and gave instructions regarding home medications. Patient verbalized understanding and is agreeable.     Problems  Addressed:  Hypertension, unspecified type: chronic illness or injury with exacerbation, progression, or side effects of treatment  Hypoglycemia: acute illness or injury    Amount and/or Complexity of Data Reviewed  External Data Reviewed: notes.     Details: Patient last seen in the urgent care  Labs: ordered. Decision-making details documented in ED Course.  ECG/medicine tests:      Details: Considered ordering EKG, though patient without any chest pain, palpitations, leg swelling, or SOB at this time.     Risk  Risk Details: Comorbidities taken into consideration during the patient's evaluation and treatment include diabetes mellitus and hypertension.     Social determinants of health taken into consideration during development of our treatment plan include difficulty in obtaining follow-up, obtaining medications, health literacy, access to healthy options for preventative/conservative management, and/or support systems due to, but not limited to, transportation limitations, socioeconomic status, and environmental factors.                ED Course as of 05/17/25 1632   Sat May 17, 2025   1407 POCT Glucose(!!): 49 [BJ]   1417 POCT Glucose(!): 52  Recheck after patient drank juice. [BJ]   1455 CBC auto differential(!)  CBC relatively unremarkable.  No leukocytosis.  Hemoglobin stable at 9.2.  Platelet count within normal limits. [BJ]   1455 Hemoglobin(!): 9.2  Stable from previous. [BJ]   1503 Comprehensive metabolic panel(!)  CMP grossly unremarkable.  Glucose of 57.  BUN and creatinine elevated to 31 and 2.2 respectively.  Electrolytes otherwise within normal limits.  LFTs within normal limits. [BJ]   1504 Creatinine(!): 2.2  Mildly elevated from baseline of 1.7. [BJ]   1504 Magnesium : 2.0  Magnesium within limits. [BJ]   1515 POCT Glucose(!): 135  Much improved. [BJ]   1606 Patient resting comfortably in bed.  Glucose much improved.  Will plan for discharge. [BJ]      ED Course User Index  [BJ] Keeley Spivey,  JULIA                           Clinical Impression:  Final diagnoses:  [E16.2] Hypoglycemia (Primary)  [I10] Hypertension, unspecified type          ED Disposition Condition    Discharge Stable          ED Prescriptions    None       Follow-up Information       Follow up With Specialties Details Why Contact Info    Your doctor  Schedule an appointment as soon as possible for a visit       [facility] Emergency Dept Emergency Medicine Go to  If new or worsening symptoms occur [address] [facility] [id number] [phone]            This note was partially created using [employer] Voice Recognition software. Typographical and content errors may occur with this process. While efforts are made to detect and correct such errors, in some cases errors will persist. For this reason, wording in this document should be considered in the proper context and not strictly verbatim.          [1]   Social History  Tobacco Use    Smoking status: Never    Smokeless tobacco: Never   Substance Use Topics    Alcohol use: Yes     Comment: social    Drug use: No        Keeley Spivey PA-C  05/17/25 [id number]

## 2025-05-17 NOTE — Clinical Note
"Mary Lemus"Levi was seen and treated in our emergency department on 5/17/2025.  She may return to work on 05/14/2025.       If you have any questions or concerns, please don't hesitate to call.      Nataly Brower, MSN RN    "

## 2025-05-17 NOTE — Clinical Note
"Mary Lemus" Levi was seen and treated in our emergency department on 5/17/2025.  She may return to work on 05/21/2025.       If you have any questions or concerns, please don't hesitate to call.       RN    "

## 2025-06-26 ENCOUNTER — HOSPITAL ENCOUNTER (EMERGENCY)
Facility: HOSPITAL | Age: 40
Discharge: HOME | End: 2025-06-26
Attending: STUDENT IN AN ORGANIZED HEALTH CARE EDUCATION/TRAINING PROGRAM
Payer: MEDICAID

## 2025-06-26 VITALS
SYSTOLIC BLOOD PRESSURE: 151 MMHG | BODY MASS INDEX: 31.62 KG/M2 | TEMPERATURE: 99 F | OXYGEN SATURATION: 100 % | DIASTOLIC BLOOD PRESSURE: 71 MMHG | RESPIRATION RATE: 20 BRPM | WEIGHT: 185.19 LBS | HEART RATE: 99 BPM | HEIGHT: 64 IN

## 2025-06-26 DIAGNOSIS — D50.9 MICROCYTIC ANEMIA: ICD-10-CM

## 2025-06-26 DIAGNOSIS — R06.02 CHRONIC SHORTNESS OF BREATH: ICD-10-CM

## 2025-06-26 DIAGNOSIS — R73.9 HYPERGLYCEMIA WITHOUT KETOSIS: ICD-10-CM

## 2025-06-26 DIAGNOSIS — N30.00 ACUTE CYSTITIS WITHOUT HEMATURIA: ICD-10-CM

## 2025-06-26 DIAGNOSIS — R07.89 LEFT-SIDED CHEST WALL PAIN: Primary | ICD-10-CM

## 2025-06-26 LAB
ABSOLUTE EOSINOPHIL (OHS): 0.13 K/UL
ABSOLUTE MONOCYTE (OHS): 0.44 K/UL (ref 0.3–1)
ABSOLUTE NEUTROPHIL COUNT (OHS): 4.26 K/UL (ref 1.8–7.7)
ALBUMIN SERPL BCP-MCNC: 2.3 G/DL (ref 3.5–5.2)
ALP SERPL-CCNC: 102 UNIT/L (ref 40–150)
ALT SERPL W/O P-5'-P-CCNC: 12 UNIT/L (ref 10–44)
ANION GAP (OHS): 9 MMOL/L (ref 8–16)
AST SERPL-CCNC: 10 UNIT/L (ref 11–45)
B-HCG UR QL: NEGATIVE
B-OH-BUTYR BLD STRIP-SCNC: 0 MMOL/L
BACTERIA #/AREA URNS AUTO: ABNORMAL /HPF
BASOPHILS # BLD AUTO: 0.05 K/UL
BASOPHILS NFR BLD AUTO: 0.8 %
BILIRUB SERPL-MCNC: 0.1 MG/DL (ref 0.1–1)
BILIRUB UR QL STRIP.AUTO: NEGATIVE
BNP SERPL-MCNC: 23 PG/ML (ref 0–99)
BUN SERPL-MCNC: 33 MG/DL (ref 6–20)
CALCIUM SERPL-MCNC: 8.4 MG/DL (ref 8.7–10.5)
CHLORIDE SERPL-SCNC: 107 MMOL/L (ref 95–110)
CLARITY UR: ABNORMAL
CO2 SERPL-SCNC: 20 MMOL/L (ref 23–29)
COLOR UR AUTO: YELLOW
CREAT SERPL-MCNC: 2.7 MG/DL (ref 0.5–1.4)
CTP QC/QA: YES
ERYTHROCYTE [DISTWIDTH] IN BLOOD BY AUTOMATED COUNT: 16.6 % (ref 11.5–14.5)
FIO2: 21 %
GFR SERPLBLD CREATININE-BSD FMLA CKD-EPI: 22 ML/MIN/1.73/M2
GLUCOSE SERPL-MCNC: 428 MG/DL (ref 70–110)
GLUCOSE UR QL STRIP: ABNORMAL
HCT VFR BLD AUTO: 25.3 % (ref 37–48.5)
HGB BLD-MCNC: 8.5 GM/DL (ref 12–16)
HGB UR QL STRIP: ABNORMAL
HOLD SPECIMEN: NORMAL
HOLD SPECIMEN: NORMAL
HYALINE CASTS UR QL AUTO: 0 /LPF (ref 0–1)
IMM GRANULOCYTES # BLD AUTO: 0.03 K/UL (ref 0–0.04)
IMM GRANULOCYTES NFR BLD AUTO: 0.5 % (ref 0–0.5)
KETONES UR QL STRIP: NEGATIVE
LEUKOCYTE ESTERASE UR QL STRIP: ABNORMAL
LYMPHOCYTES # BLD AUTO: 1.72 K/UL (ref 1–4.8)
MAGNESIUM SERPL-MCNC: 2.2 MG/DL (ref 1.6–2.6)
MCH RBC QN AUTO: 21.5 PG (ref 27–31)
MCHC RBC AUTO-ENTMCNC: 33.6 G/DL (ref 32–36)
MCV RBC AUTO: 64 FL (ref 82–98)
MICROSCOPIC COMMENT: ABNORMAL
NITRITE UR QL STRIP: NEGATIVE
NUCLEATED RBC (/100WBC) (OHS): 0 /100 WBC
PCO2 BLDA: 46.8 MMHG (ref 35–45)
PH SMN: 7.33 [PH] (ref 7.35–7.45)
PH UR STRIP: 6 [PH]
PHOSPHATE SERPL-MCNC: 3 MG/DL (ref 2.7–4.5)
PLATELET # BLD AUTO: 258 K/UL (ref 150–450)
PMV BLD AUTO: ABNORMAL FL
PO2 BLDA: 30.7 MMHG (ref 40–60)
POC BASE DEFICIT: -1.3 MMOL/L (ref -2–2)
POC HCO3: 24.8 MMOL/L (ref 24–28)
POC PERFORMED BY: ABNORMAL
POC SATURATED O2: 52.5 % (ref 95–100)
POCT GLUCOSE: 184 MG/DL (ref 70–110)
POCT GLUCOSE: 330 MG/DL (ref 70–110)
POCT GLUCOSE: 357 MG/DL (ref 70–110)
POTASSIUM SERPL-SCNC: 4.7 MMOL/L (ref 3.5–5.1)
PROT SERPL-MCNC: 6.3 GM/DL (ref 6–8.4)
PROT UR QL STRIP: ABNORMAL
RBC # BLD AUTO: 3.96 M/UL (ref 4–5.4)
RBC #/AREA URNS AUTO: 81 /HPF (ref 0–4)
RELATIVE EOSINOPHIL (OHS): 2 %
RELATIVE LYMPHOCYTE (OHS): 25.9 % (ref 18–48)
RELATIVE MONOCYTE (OHS): 6.6 % (ref 4–15)
RELATIVE NEUTROPHIL (OHS): 64.2 % (ref 38–73)
SODIUM SERPL-SCNC: 136 MMOL/L (ref 136–145)
SP GR UR STRIP: 1.01
SPECIMEN SOURCE: ABNORMAL
SQUAMOUS #/AREA URNS AUTO: 22 /HPF
TROPONIN I SERPL DL<=0.01 NG/ML-MCNC: <0.006 NG/ML
UROBILINOGEN UR STRIP-ACNC: NEGATIVE EU/DL
WBC # BLD AUTO: 6.63 K/UL (ref 3.9–12.7)
WBC #/AREA URNS AUTO: >100 /HPF (ref 0–5)
WBC CLUMPS UR QL AUTO: ABNORMAL

## 2025-06-26 PROCEDURE — 87186 SC STD MICRODIL/AGAR DIL: CPT | Performed by: NURSE PRACTITIONER

## 2025-06-26 PROCEDURE — 93005 ELECTROCARDIOGRAM TRACING: CPT

## 2025-06-26 PROCEDURE — 82010 KETONE BODYS QUAN: CPT | Performed by: STUDENT IN AN ORGANIZED HEALTH CARE EDUCATION/TRAINING PROGRAM

## 2025-06-26 PROCEDURE — 80053 COMPREHEN METABOLIC PANEL: CPT | Performed by: NURSE PRACTITIONER

## 2025-06-26 PROCEDURE — 83735 ASSAY OF MAGNESIUM: CPT | Performed by: STUDENT IN AN ORGANIZED HEALTH CARE EDUCATION/TRAINING PROGRAM

## 2025-06-26 PROCEDURE — 63600175 PHARM REV CODE 636 W HCPCS: Performed by: STUDENT IN AN ORGANIZED HEALTH CARE EDUCATION/TRAINING PROGRAM

## 2025-06-26 PROCEDURE — 93010 ELECTROCARDIOGRAM REPORT: CPT | Mod: ,,, | Performed by: INTERNAL MEDICINE

## 2025-06-26 PROCEDURE — 84100 ASSAY OF PHOSPHORUS: CPT | Performed by: STUDENT IN AN ORGANIZED HEALTH CARE EDUCATION/TRAINING PROGRAM

## 2025-06-26 PROCEDURE — 96375 TX/PRO/DX INJ NEW DRUG ADDON: CPT

## 2025-06-26 PROCEDURE — 82962 GLUCOSE BLOOD TEST: CPT

## 2025-06-26 PROCEDURE — 99900035 HC TECH TIME PER 15 MIN (STAT)

## 2025-06-26 PROCEDURE — 96361 HYDRATE IV INFUSION ADD-ON: CPT

## 2025-06-26 PROCEDURE — 83880 ASSAY OF NATRIURETIC PEPTIDE: CPT | Performed by: STUDENT IN AN ORGANIZED HEALTH CARE EDUCATION/TRAINING PROGRAM

## 2025-06-26 PROCEDURE — 96374 THER/PROPH/DIAG INJ IV PUSH: CPT

## 2025-06-26 PROCEDURE — 82803 BLOOD GASES ANY COMBINATION: CPT

## 2025-06-26 PROCEDURE — 84484 ASSAY OF TROPONIN QUANT: CPT | Performed by: STUDENT IN AN ORGANIZED HEALTH CARE EDUCATION/TRAINING PROGRAM

## 2025-06-26 PROCEDURE — 85025 COMPLETE CBC W/AUTO DIFF WBC: CPT | Performed by: NURSE PRACTITIONER

## 2025-06-26 PROCEDURE — 81003 URINALYSIS AUTO W/O SCOPE: CPT | Performed by: NURSE PRACTITIONER

## 2025-06-26 PROCEDURE — 99285 EMERGENCY DEPT VISIT HI MDM: CPT | Mod: 25

## 2025-06-26 PROCEDURE — 81025 URINE PREGNANCY TEST: CPT | Performed by: NURSE PRACTITIONER

## 2025-06-26 RX ORDER — ONDANSETRON HYDROCHLORIDE 2 MG/ML
4 INJECTION, SOLUTION INTRAVENOUS
Status: DISCONTINUED | OUTPATIENT
Start: 2025-06-26 | End: 2025-06-27 | Stop reason: HOSPADM

## 2025-06-26 RX ORDER — CEPHALEXIN 500 MG/1
500 CAPSULE ORAL 3 TIMES DAILY
Qty: 21 CAPSULE | Refills: 0 | Status: SHIPPED | OUTPATIENT
Start: 2025-06-26 | End: 2025-07-03

## 2025-06-26 RX ORDER — CEFTRIAXONE 2 G/1
2 INJECTION, POWDER, FOR SOLUTION INTRAMUSCULAR; INTRAVENOUS
Status: COMPLETED | OUTPATIENT
Start: 2025-06-26 | End: 2025-06-26

## 2025-06-26 RX ADMIN — SODIUM CHLORIDE, POTASSIUM CHLORIDE, SODIUM LACTATE AND CALCIUM CHLORIDE 1000 ML: 600; 310; 30; 20 INJECTION, SOLUTION INTRAVENOUS at 09:06

## 2025-06-26 RX ADMIN — INSULIN HUMAN 6 UNITS: 100 INJECTION, SOLUTION PARENTERAL at 09:06

## 2025-06-26 RX ADMIN — CEFTRIAXONE SODIUM 2 G: 2 INJECTION, POWDER, FOR SOLUTION INTRAMUSCULAR; INTRAVENOUS at 09:06

## 2025-06-26 NOTE — FIRST PROVIDER EVALUATION
Emergency Department TeleTriage Encounter Note      CHIEF COMPLAINT    Chief Complaint   Patient presents with    Chest Pain     Left sided chest pain ~2 weeks. States tender to touch, worsens when lifting. States has intermittent shortness of breath with exertion. .       VITAL SIGNS   Initial Vitals [06/26/25 1838]   BP Pulse Resp Temp SpO2   (!) 175/83 94 18 99 °F (37.2 °C) 100 %      MAP       --            ALLERGIES    Review of patient's allergies indicates:  No Known Allergies    PROVIDER TRIAGE NOTE  SOB and chest pain for approximately two weeks. Pain is intermittent, worse with movement/bending/lifting. Pain reproducible with patient palpating the left upper chest wall. Reports she feels SOB with minimal activity.       ORDERS  Labs Reviewed   POCT GLUCOSE - Abnormal       Result Value    POCT Glucose 357 (*)        ED Orders (720h ago, onward)      Start Ordered     Status Ordering Provider    06/26/25 1849 06/26/25 1848  Saline lock IV  Once         Ordered LILIA RODRIGUEZ    06/26/25 1849 06/26/25 1848  CBC auto differential  Once         Ordered LILIA RODRIGUEZ    06/26/25 1849 06/26/25 1848  Comprehensive Metabolic Panel  STAT         Ordered LILIA RODRIGUEZ    06/26/25 1849 06/26/25 1848  Urinalysis, Reflex to Urine Culture Urine, Clean Catch  STAT         Ordered LILIA RODRIGUEZ    06/26/25 1849 06/26/25 1848  EKG 12-lead  Once         Ordered LILIA RODRIGUEZ    06/26/25 1849 06/26/25 1849  CBC with Differential  PROCEDURE ONCE         Ordered LILIA RODRIGUEZ    06/26/25 1848 06/26/25 1847  POCT urine pregnancy  Once         Ordered LILIA RODRIGUEZ    06/26/25 1836 06/26/25 1836  POCT glucose  Once         Final result EMERGENCY, DEPT PHYSICIAN              Virtual Visit Note: The provider triage portion of this emergency department evaluation and documentation was performed via [employer], a HIPAA-compliant telemedicine application, in concert with a tele-presenter in the  room. A face to face patient evaluation with one of my colleagues will occur once the patient is placed in an emergency department room.      DISCLAIMER: This note was prepared with [employer] voice recognition transcription software. Garbled syntax, mangled pronouns, and other bizarre constructions may be attributed to that software system.

## 2025-06-26 NOTE — ED TRIAGE NOTES
Pt presents to ED today c/o left sided CP x 2 week   Reports slightly relieved by tylenol, however pain increases with movement   Pt denies hx of HTN reports pain 7/10 currently

## 2025-06-26 NOTE — Clinical Note
"Mary Lemus"Levi was seen and treated in our emergency department on 6/26/2025.  She may return to work on 06/28/2025.  Excused from work 6/27 due to illness      If you have any questions or concerns, please don't hesitate to call.      Suzi Priest MD"

## 2025-06-27 LAB
OHS QRS DURATION: 74 MS
OHS QTC CALCULATION: 445 MS

## 2025-06-27 NOTE — DISCHARGE INSTRUCTIONS
Next steps for you:  -Call your primary care doctor to schedule an appointment  -Call your nephrologist (kidney doctor) to schedule an appointment to discuss your worsening kidney function  -Go to your endocrinologist appointment next week - you will need to increase your insulin dose   -Take keflex as prescribed for your UTI  -Return to the ER if you experience: fever, chest pain with exertion, shortness of breath with exertion, vomiting, passing out, abdominal pain, any other concerning symptoms

## 2025-06-27 NOTE — ED PROVIDER NOTES
Encounter Date: 6/26/2025       History     Chief Complaint   Patient presents with    Chest Pain     Left sided chest pain ~2 weeks. States tender to touch, worsens when lifting. States has intermittent shortness of breath with exertion. .     HPI    Pt is a 40 year old woman with a hx of DM with retinopathy, HTN, and CKD3b presenting from home for eval of left chest wall pain for 2 weeks. 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 Not present at rest. Not worse with exertion. Only occurs with certain movements of her torso. Reports chronic baseline SOB. No fevers. No vomiting. No syncope. No increased abdominal distention or BLE edema. Missed insulin dose this afternoon.     Review of patient's allergies indicates:  No Known Allergies  Past Medical History:   Diagnosis Date    Cellulitis     Diabetes mellitus     Hypertension     Retinal detachment      Past Surgical History:   Procedure Laterality Date    VITRECTOMY BY PARS PLANA APPROACH Right 4/24/2019    Procedure: VITRECTOMY, PARS PLANA APPROACH;  Surgeon: JAMI Schulz MD;  Location: [facility] OR [address] [place] [address];  Service: Ophthalmology;  Laterality: Right;  25g PPV/membrane stripping/ILM peel/EL/AFx/GFX/avastin OD    VITRECTOMY BY PARS PLANA APPROACH Left 5/29/2019    Procedure: VITRECTOMY, PARS PLANA  APPROACH  25g PPV/Membrane Stripping/IML peel/EL/AFX/Avastin left eye   60 mins;  Surgeon: JAMI Schulz MD;  Location: [facility] OR [address] [place] [address];  Service: Ophthalmology;  Laterality: Left;     Family History   Problem Relation Name Age of Onset    Diabetes Mother       Social History[1]  Review of Systems   Constitutional:  Negative for fever.   Respiratory:  Positive for shortness of breath. Negative for cough.    Cardiovascular:         Chest wall pain   Gastrointestinal:  Negative for abdominal distention, nausea and vomiting.   Neurological:  Negative for syncope, weakness and headaches.     Physical Exam     Initial Vitals [06/26/25 1838]    BP Pulse Resp Temp SpO2   (!) 175/83 94 18 99 °F (37.2 °C) 100 %      MAP       --         Physical Exam    Nursing note and vitals reviewed.  Constitutional: She appears well-developed and well-nourished. She is not diaphoretic. No distress.   HENT:   Head: Normocephalic and atraumatic.   Eyes: Conjunctivae and EOM are normal. Pupils are equal, round, and reactive to light.   Neck: Neck supple. No JVD present.   Normal range of motion.  Cardiovascular:  Normal rate, regular rhythm, normal heart sounds and intact distal pulses.           2+ radial pulses without delay   Pulmonary/Chest: Breath sounds normal. No respiratory distress. She has no wheezes. She has no rhonchi. She has no rales.   Abdominal: Abdomen is soft. There is no abdominal tenderness.   Protuberant, non tender abdomen There is no rebound and no guarding.   Musculoskeletal:         General: No tenderness or edema. Normal range of motion.      Cervical back: Normal range of motion and neck supple.     Neurological: She is alert and oriented to person, place, and time. She has normal strength. No cranial nerve deficit.   Skin: Skin is warm and dry. Capillary refill takes less than 2 seconds.       ED Course   Procedures  Labs Reviewed   COMPREHENSIVE METABOLIC PANEL - Abnormal       Result Value    Sodium 136      Potassium 4.7      Chloride 107      CO2 20 (*)     Glucose 428 (*)     BUN 33 (*)     Creatinine 2.7 (*)     Calcium 8.4 (*)     Protein Total 6.3      Albumin 2.3 (*)     Bilirubin Total 0.1            AST 10 (*)     ALT 12      Anion Gap 9      eGFR 22 (*)    URINALYSIS, REFLEX TO URINE CULTURE - Abnormal    Color, UA Yellow      Appearance, UA Cloudy (*)     pH, UA 6.0      Spec Grav UA 1.010      Protein, UA 2+ (*)     Glucose, UA 2+ (*)     Ketones, UA Negative      Bilirubin, UA Negative      Blood, UA 1+ (*)     Nitrites, UA Negative      Urobilinogen, UA Negative      Leukocyte Esterase, UA 3+ (*)    CBC WITH DIFFERENTIAL -  Abnormal    WBC 6.63      RBC 3.96 (*)     HGB 8.5 (*)     HCT 25.3 (*)     MCV 64 (*)     MCH 21.5 (*)     MCHC 33.6      RDW 16.6 (*)     Platelet Count 258      MPV        Nucleated RBC 0      Neut % 64.2      Lymph % 25.9      Mono % 6.6      Eos % 2.0      Basophil % 0.8      Imm Grans % 0.5      Neut # 4.26      Lymph # 1.72      Mono # 0.44      Eos # 0.13      Baso # 0.05      Imm Grans # 0.03     URINALYSIS MICROSCOPIC - Abnormal    RBC, UA 81 (*)     WBC, UA >100 (*)     WBC Clumps, UA Many (*)     Bacteria, UA Few (*)     Squamous Epithelial Cells, UA 22      Hyaline Casts, UA 0      Microscopic Comment       POCT GLUCOSE - Abnormal    POCT Glucose 357 (*)    POCT GLUCOSE - Abnormal    POCT Glucose 330 (*)    POCT GLUCOSE - Abnormal    POCT Glucose 184 (*)    B-TYPE NATRIURETIC PEPTIDE - Normal    BNP 23     MAGNESIUM - Normal    Magnesium  2.2     PHOSPHORUS - Normal    Phosphorus Level 3.0     BETA - HYDROXYBUTYRATE, SERUM - Normal    Beta-Hydroxybutyrate 0.0     TROPONIN I - Normal    Troponin-I <0.006     POCT URINE PREGNANCY - Normal    POC Preg Test, Ur Negative       Acceptable Yes     CULTURE, URINE   CBC W/ AUTO DIFFERENTIAL    Narrative:     The following orders were created for panel order CBC auto differential.  Procedure                               Abnormality         Status                     ---------                               -----------         ------                     CBC with Differential[[id number]]       Abnormal            Final result                 Please view results for these tests on the individual orders.   EXTRA TUBES    Narrative:     The following orders were created for panel order EXTRA TUBES.  Procedure                               Abnormality         Status                     ---------                               -----------         ------                     Lavender Top Hold[[id number]]                               Final result                  Please view results for these tests on the individual orders.   LAVENDER TOP HOLD    Extra Tube Hold for add-ons.     GREY TOP URINE HOLD   EXTRA TUBES    Narrative:     The following orders were created for panel order EXTRA TUBES.  Procedure                               Abnormality         Status                     ---------                               -----------         ------                     Light Green Top Hold[[id number]]                            In process                   Please view results for these tests on the individual orders.   LIGHT GREEN TOP HOLD     EKG Readings: (Independently Interpreted)   Previous EKG: Compared with most recent EKG   NSR without ONEL or STEMI equivalent.        Imaging Results              X-Ray Chest PA And Lateral (Final result)  Result time 06/26/25 19:59:39      Final result by Maverick Wallis DO (06/26/25 19:59:39)                   Impression:      No acute abnormality.      Electronically signed by: Maverick Wallis  Date:    06/26/2025  Time:    19:59               Narrative:    EXAMINATION:  XR CHEST PA AND LATERAL    CLINICAL HISTORY:  Other chest pain    TECHNIQUE:  PA and lateral views of the chest were performed.    COMPARISON:  07/24/2024.    FINDINGS:  The lungs are well expanded and clear. No focal opacities are seen. The pleural spaces are clear. The cardiac silhouette is unremarkable. The visualized osseous structures are unremarkable.                                    X-Rays:   Independently Interpreted Readings:   Chest X-Ray: No focal consolidation or pulmonary edema      Medications   lactated ringers bolus 1,000 mL (1,000 mLs Intravenous New Bag 6/26/25 2138)   ondansetron injection 4 mg (4 mg Intravenous Not Given 6/26/25 2200)   cefTRIAXone injection 2 g (2 g Intravenous Given 6/26/25 2134)   insulin regular injection 6 Units 0.06 mL (6 Units Intravenous Given 6/26/25 2130)     Medical Decision Making    On arrival, pt is afebrile and  HDS. Experiencing 2 weeks of chest wall pain with movement (no CP with exertion). Onset after straining herself lifting a patient. Physical exam without respiratory distress or wheezing, no evidence of acute volume overload. Bedside cardiac ultrasound shows: adequate EF, no pericardial effusion. No b-lines in bilateral apical or lateral lung fields. IVC is not plethoric and collapses >50% with respirations.     Work-up shows:  -Hyperglycemia without elevated AG, minor acidemia (7.33), no ketonuria, negative beta-hydroxybutyrate   -EL on CKD - baseline creatinine is 2.2, today it's 2.7 - uncertain etiology; could be 2/2 volume depletion in setting of uncontrolled DM2 with hyperglycemia vs. Nephrotoxic home meds vs. Hypertension vs. Worsening existing CKD vs. Combination of all of the above.  -UTI with leuk esterase, WBC, WBC clumps - has prior recent hx of UTI tx with macrobid  -Trop negative, BNP negative, CXR without overload, EKG without STEMI    I feel that the patient is clinically volume depleted given hyperglycemia, EL on CKD, and collapsing IVC, so resuscitated with LR bolus and gave a dose of insulin. Treated with rocephin for UTI.     Discussed potential admission vs discharge with careful prompt outpatient follow up with the patient. She indicates her preference for discharge. She has an endocrinologist appt next week, understands the need to make an appointment with her nephrologist to discuss EL vs worsening CKD, and displays understanding of return precautions. Rx keflex at discharge. Return precautions extensively discussed.     Problems Addressed:  Acute cystitis without hematuria: complicated acute illness or injury  Chronic shortness of breath: chronic illness or injury  Hyperglycemia without ketosis: acute illness or injury  Left-sided chest wall pain: acute illness or injury  Microcytic anemia: chronic illness or injury    Amount and/or Complexity of Data Reviewed  Independent Historian:  caregiver  External Data Reviewed: labs, ECG and notes.  Labs: ordered. Decision-making details documented in ED Course.  Radiology: ordered and independent interpretation performed.     Details: See above  ECG/medicine tests: ordered and independent interpretation performed. Decision-making details documented in ED Course.    Risk  OTC drugs.  Prescription drug management.  Decision regarding hospitalization.  Risk Details: Considered hospitalization, ultimately through shared decision making, stable vitals, and ED treatment, pt stable for discharge with prompt outpatient specialty follow up               ED Course as of 06/26/25 2227   Thu Jun 26, 2025   1930 EKG 12-lead  Normal sinus rhythm, no ONEL, STD, STEMI equivalent per my independent interpretation.  [EL]   1940 Patient states that she is experiencing no chest pain presently. It only occurs with localized palpation. [EL]   1942 POCT Glucose(!): 357 [EL]   2009 Urinalysis, Reflex to Urine Culture Urine, Clean Catch [EL]   2023 Weight is 84 kg today - no change from prior weight recorded 5/2025 in the chart [EL]   2044 POC PH(!): 7.332  Slight acidemia [EL]   2048 Comprehensive Metabolic Panel(!)  Hyperglycemia with normal anion gap. EL on CKD.  [EL]   2103 Beta-Hydroxybutyrate: 0.0 [EL]   2103 BNP: 23 [EL]   2205 Troponin I: <0.006 [EL]      ED Course User Index  [EL] Suzi Priest MD                           Clinical Impression:  Final diagnoses:  [R07.89] Left-sided chest wall pain (Primary)  [R06.02] Chronic shortness of breath  [R73.9] Hyperglycemia without ketosis  [N30.00] Acute cystitis without hematuria  [D50.9] Microcytic anemia          ED Disposition Condition    Discharge Stable          ED Prescriptions       Medication Sig Dispense Start Date End Date Auth. Provider    cephALEXin (KEFLEX) 500 MG capsule Take 1 capsule (500 mg total) by mouth 3 (three) times daily. for 7 days 21 capsule 6/26/2025 7/3/2025 Suzi Priest MD           Follow-up Information       Follow up With Specialties Details Why Contact Info    [facility] Emergency Medicine Go to  As needed, If symptoms worsen [address]  [facility] [address] [phone]                   [1]   Social History  Tobacco Use    Smoking status: Never    Smokeless tobacco: Never   Substance Use Topics    Alcohol use: Yes     Comment: social    Drug use: No        Suzi Priest MD  06/26/25 [id number]

## 2025-06-28 ENCOUNTER — RESULTS FOLLOW-UP (OUTPATIENT)
Dept: EMERGENCY MEDICINE | Facility: HOSPITAL | Age: 40
End: 2025-06-28

## 2025-06-29 LAB — BACTERIA UR CULT: ABNORMAL

## (undated) DEVICE — CONTAINER SPECIMEN STRL 4OZ

## (undated) DEVICE — KIT PERFLUOROCARBON LIQUID

## (undated) DEVICE — SYR 10CC LUER LOCK

## (undated) DEVICE — TRAY MUSCLE LID EYE

## (undated) DEVICE — NDL 22GA X1 1/2 REG BEVEL

## (undated) DEVICE — BACKFLUSH 25GA SOFT-TIP DISP

## (undated) DEVICE — SYR ONLY LEUR TIP 30CC

## (undated) DEVICE — FORCEP GRASPING 25GA SMOOTH

## (undated) DEVICE — COVER MAYO STAND REINFRCD 30

## (undated) DEVICE — SOL BETADINE 5%

## (undated) DEVICE — GLOVE BIOGEL ECLIPSE SZ 6.5

## (undated) DEVICE — SYR DISP LL 5CC

## (undated) DEVICE — SYR 1CC TB SG 27GX1/2

## (undated) DEVICE — SHEILD & GARTERS FOX METAL EYE

## (undated) DEVICE — SUT 7/0 18IN COATED VICRYL

## (undated) DEVICE — FORCEP GRIESHABER MAXGRIP 25G

## (undated) DEVICE — KNIFE OPHTH MICRO UNITOME 5MM

## (undated) DEVICE — SHIELD EYE METAL FOX 50/BX

## (undated) DEVICE — PACK TOTAL PLUS 25G VITRECTOMY

## (undated) DEVICE — GOWN SURGICAL X-LARGE

## (undated) DEVICE — CANNULA 25GA SOFT TIP

## (undated) DEVICE — CORD FOR BIPOLAR FORCEPS 12

## (undated) DEVICE — LENS VITRCTMY OPHTH 30DEG 59DE

## (undated) DEVICE — KIT GREY EYE

## (undated) DEVICE — SEE MEDLINE ITEM 157131

## (undated) DEVICE — PROBE ILLUM FLEX CURVE LASER

## (undated) DEVICE — SOL WATER STRL IRR 1000ML

## (undated) DEVICE — CLOSURE SKIN STERI STRIP 1/2X4

## (undated) DEVICE — SOL BALANCED SALT 500ML

## (undated) DEVICE — DRESSING EYE OVAL LF

## (undated) DEVICE — SOL GONAK

## (undated) DEVICE — SOL BSS BALANCED SALT